# Patient Record
Sex: MALE | Race: WHITE | NOT HISPANIC OR LATINO | URBAN - METROPOLITAN AREA
[De-identification: names, ages, dates, MRNs, and addresses within clinical notes are randomized per-mention and may not be internally consistent; named-entity substitution may affect disease eponyms.]

---

## 2017-08-15 ENCOUNTER — OUTPATIENT (OUTPATIENT)
Dept: OUTPATIENT SERVICES | Facility: HOSPITAL | Age: 67
LOS: 1 days | Discharge: HOME | End: 2017-08-15

## 2017-08-15 ENCOUNTER — APPOINTMENT (OUTPATIENT)
Age: 67
End: 2017-08-15
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
## 2017-08-15 DIAGNOSIS — S81.802A UNSPECIFIED OPEN WOUND, LEFT LOWER LEG, INITIAL ENCOUNTER: ICD-10-CM
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
## 2017-08-15 DIAGNOSIS — L03.90 CELLULITIS, UNSPECIFIED: ICD-10-CM

## 2017-08-15 DIAGNOSIS — S80.10XA CONTUSION OF UNSPECIFIED LOWER LEG, INITIAL ENCOUNTER: ICD-10-CM

## 2017-08-31 ENCOUNTER — OUTPATIENT (OUTPATIENT)
Dept: OUTPATIENT SERVICES | Facility: HOSPITAL | Age: 67
LOS: 1 days | Discharge: HOME | End: 2017-08-31

## 2017-08-31 ENCOUNTER — APPOINTMENT (OUTPATIENT)
Age: 67
End: 2017-08-31

## 2017-08-31 DIAGNOSIS — L03.90 CELLULITIS, UNSPECIFIED: ICD-10-CM

## 2017-08-31 DIAGNOSIS — S80.10XA CONTUSION OF UNSPECIFIED LOWER LEG, INITIAL ENCOUNTER: ICD-10-CM

## 2017-08-31 DIAGNOSIS — S81.802A UNSPECIFIED OPEN WOUND, LEFT LOWER LEG, INITIAL ENCOUNTER: ICD-10-CM
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
## 2017-09-13 DIAGNOSIS — S81.802A UNSPECIFIED OPEN WOUND, LEFT LOWER LEG, INITIAL ENCOUNTER: ICD-10-CM

## 2017-12-13 ENCOUNTER — APPOINTMENT (OUTPATIENT)
Dept: UROLOGY | Facility: CLINIC | Age: 67
End: 2017-12-13
Payer: MEDICARE

## 2017-12-13 VITALS
SYSTOLIC BLOOD PRESSURE: 129 MMHG | HEIGHT: 67 IN | HEART RATE: 70 BPM | BODY MASS INDEX: 45.52 KG/M2 | WEIGHT: 290 LBS | DIASTOLIC BLOOD PRESSURE: 71 MMHG

## 2017-12-13 DIAGNOSIS — N45.1 EPIDIDYMITIS: ICD-10-CM

## 2017-12-13 DIAGNOSIS — Z82.49 FAMILY HISTORY OF ISCHEMIC HEART DISEASE AND OTHER DISEASES OF THE CIRCULATORY SYSTEM: ICD-10-CM

## 2017-12-13 LAB
BILIRUB UR QL STRIP: NORMAL
CLARITY UR: CLEAR
COLLECTION METHOD: NORMAL
GLUCOSE UR-MCNC: NORMAL
HCG UR QL: NORMAL EU/DL
HGB UR QL STRIP.AUTO: NORMAL
KETONES UR-MCNC: NORMAL
LEUKOCYTE ESTERASE UR QL STRIP: NORMAL
NITRITE UR QL STRIP: NORMAL
PH UR STRIP: 8
PROT UR STRIP-MCNC: NORMAL
SP GR UR STRIP: 1.01

## 2017-12-13 PROCEDURE — 99214 OFFICE O/P EST MOD 30 MIN: CPT | Mod: 25

## 2017-12-13 PROCEDURE — 81003 URINALYSIS AUTO W/O SCOPE: CPT | Mod: QW

## 2018-06-12 ENCOUNTER — APPOINTMENT (OUTPATIENT)
Dept: BURN CARE | Facility: CLINIC | Age: 68
End: 2018-06-12

## 2018-12-19 ENCOUNTER — APPOINTMENT (OUTPATIENT)
Dept: UROLOGY | Facility: CLINIC | Age: 68
End: 2018-12-19
Payer: MEDICARE

## 2018-12-19 VITALS
HEART RATE: 60 BPM | WEIGHT: 290 LBS | SYSTOLIC BLOOD PRESSURE: 134 MMHG | BODY MASS INDEX: 45.52 KG/M2 | HEIGHT: 67 IN | DIASTOLIC BLOOD PRESSURE: 64 MMHG

## 2018-12-19 DIAGNOSIS — N20.1 CALCULUS OF URETER: ICD-10-CM

## 2018-12-19 PROCEDURE — 99213 OFFICE O/P EST LOW 20 MIN: CPT

## 2018-12-26 ENCOUNTER — OTHER (OUTPATIENT)
Age: 68
End: 2018-12-26

## 2019-12-19 ENCOUNTER — RESULT CHARGE (OUTPATIENT)
Age: 69
End: 2019-12-19

## 2019-12-19 ENCOUNTER — APPOINTMENT (OUTPATIENT)
Dept: UROLOGY | Facility: CLINIC | Age: 69
End: 2019-12-19
Payer: MEDICARE

## 2019-12-19 VITALS — HEIGHT: 67 IN | BODY MASS INDEX: 45.52 KG/M2 | WEIGHT: 290 LBS

## 2019-12-19 PROCEDURE — 99214 OFFICE O/P EST MOD 30 MIN: CPT

## 2020-12-22 ENCOUNTER — APPOINTMENT (OUTPATIENT)
Dept: UROLOGY | Facility: CLINIC | Age: 70
End: 2020-12-22
Payer: MEDICARE

## 2020-12-22 VITALS — WEIGHT: 290 LBS | BODY MASS INDEX: 45.52 KG/M2 | TEMPERATURE: 97.1 F | HEIGHT: 67 IN

## 2020-12-22 LAB
BILIRUB UR QL STRIP: NORMAL
CLARITY UR: CLEAR
COLLECTION METHOD: NORMAL
GLUCOSE UR-MCNC: NORMAL
HCG UR QL: NORMAL EU/DL
HGB UR QL STRIP.AUTO: 10
KETONES UR-MCNC: NORMAL
LEUKOCYTE ESTERASE UR QL STRIP: NORMAL
NITRITE UR QL STRIP: NORMAL
PH UR STRIP: 7
PROT UR STRIP-MCNC: NORMAL
SP GR UR STRIP: 1.01

## 2020-12-22 PROCEDURE — 99072 ADDL SUPL MATRL&STAF TM PHE: CPT

## 2020-12-22 PROCEDURE — 99214 OFFICE O/P EST MOD 30 MIN: CPT

## 2020-12-22 PROCEDURE — 81003 URINALYSIS AUTO W/O SCOPE: CPT | Mod: QW

## 2021-01-27 ENCOUNTER — NON-APPOINTMENT (OUTPATIENT)
Age: 71
End: 2021-01-27

## 2021-01-28 ENCOUNTER — APPOINTMENT (OUTPATIENT)
Dept: VASCULAR SURGERY | Facility: CLINIC | Age: 71
End: 2021-01-28
Payer: MEDICARE

## 2021-01-28 VITALS
TEMPERATURE: 96.7 F | HEIGHT: 67 IN | HEART RATE: 73 BPM | BODY MASS INDEX: 47.09 KG/M2 | WEIGHT: 300 LBS | DIASTOLIC BLOOD PRESSURE: 72 MMHG | SYSTOLIC BLOOD PRESSURE: 151 MMHG

## 2021-01-28 PROCEDURE — 99204 OFFICE O/P NEW MOD 45 MIN: CPT | Mod: 25

## 2021-01-28 PROCEDURE — 93970 EXTREMITY STUDY: CPT

## 2021-01-28 PROCEDURE — 99072 ADDL SUPL MATRL&STAF TM PHE: CPT

## 2021-01-28 PROCEDURE — 29580 STRAPPING UNNA BOOT: CPT | Mod: 50

## 2021-01-28 NOTE — ASSESSMENT
[FreeTextEntry1] : 69 y/o male with venous insufficiency and venous ulcerations to bilateral lower extremities.\par \par No evidence of DVT in the lower extremities bilaterally.\par \par I am initiating unna boot therapy to bilateral lower extremities to treat the ulcerations and improve the swelling. I will see him back in one weeks time.

## 2021-01-28 NOTE — DATA REVIEWED
[FreeTextEntry1] : I performed a venous duplex which was medically necessary to evaluate for DVT and GSV reflux. It showed no evidence of DVT in the lower extremities bilaterally.

## 2021-01-28 NOTE — HISTORY OF PRESENT ILLNESS
[FreeTextEntry1] : 69 y/o gentleman presents for evaluation of bilateral leg swelling, discoloration and weeping wounds for the past few months, denies any h/o DVT.

## 2021-02-04 ENCOUNTER — APPOINTMENT (OUTPATIENT)
Dept: VASCULAR SURGERY | Facility: CLINIC | Age: 71
End: 2021-02-04
Payer: MEDICARE

## 2021-02-04 PROCEDURE — 29580 STRAPPING UNNA BOOT: CPT | Mod: 50

## 2021-02-04 PROCEDURE — 99072 ADDL SUPL MATRL&STAF TM PHE: CPT

## 2021-02-04 RX ORDER — BUMETANIDE 1 MG/1
1 TABLET ORAL
Refills: 0 | Status: ACTIVE | COMMUNITY

## 2021-02-04 RX ORDER — INDOMETHACIN 50 MG/1
50 CAPSULE ORAL
Refills: 0 | Status: ACTIVE | COMMUNITY

## 2021-02-04 RX ORDER — ATENOLOL 25 MG/1
25 TABLET ORAL
Refills: 0 | Status: ACTIVE | COMMUNITY

## 2021-02-04 NOTE — ASSESSMENT
[FreeTextEntry1] : 69 y/o male with venous insufficiency and venous ulcerations to bilateral lower extremities.\par \par I recommend to continue with unna boot therapy to bilateral lower extremities until the wounds are healed. I will see him back in one weeks time. \par \par

## 2021-02-04 NOTE — HISTORY OF PRESENT ILLNESS
[FreeTextEntry1] : 71 y/o gentleman with venous insufficiency, ulcerations on unna boot therapy bilaterally, tolerated the unna boot, presents for followup.

## 2021-02-11 ENCOUNTER — APPOINTMENT (OUTPATIENT)
Dept: VASCULAR SURGERY | Facility: CLINIC | Age: 71
End: 2021-02-11
Payer: MEDICARE

## 2021-02-11 PROCEDURE — 29580 STRAPPING UNNA BOOT: CPT | Mod: 50

## 2021-02-11 PROCEDURE — 99072 ADDL SUPL MATRL&STAF TM PHE: CPT

## 2021-02-11 NOTE — HISTORY OF PRESENT ILLNESS
[FreeTextEntry1] : 69 y/o gentleman with venous insufficiency, ulcerations on unna boot therapy bilaterally, tolerated the unna boot, presents for followup.

## 2021-02-18 ENCOUNTER — APPOINTMENT (OUTPATIENT)
Dept: VASCULAR SURGERY | Facility: CLINIC | Age: 71
End: 2021-02-18
Payer: MEDICARE

## 2021-02-18 PROCEDURE — 29580 STRAPPING UNNA BOOT: CPT | Mod: 50

## 2021-02-18 PROCEDURE — 99072 ADDL SUPL MATRL&STAF TM PHE: CPT

## 2021-02-18 RX ORDER — SILVER SULFADIAZINE 10 MG/G
1 CREAM TOPICAL DAILY
Qty: 1 | Refills: 1 | Status: ACTIVE | COMMUNITY
Start: 2021-02-18 | End: 1900-01-01

## 2021-02-18 NOTE — ASSESSMENT
[FreeTextEntry1] : 69 y/o male with venous insufficiency and venous ulcerations to bilateral lower extremities.\par \par I recommend to continue with unna boot therapy to bilateral lower extremities until the wounds are healed. I am referring him to [facility] for weekly unna boot.\par \par

## 2021-02-25 ENCOUNTER — APPOINTMENT (OUTPATIENT)
Dept: VASCULAR SURGERY | Facility: CLINIC | Age: 71
End: 2021-02-25
Payer: MEDICARE

## 2021-02-25 PROCEDURE — 29580 STRAPPING UNNA BOOT: CPT | Mod: 50

## 2021-02-25 PROCEDURE — 99072 ADDL SUPL MATRL&STAF TM PHE: CPT

## 2021-03-25 ENCOUNTER — APPOINTMENT (OUTPATIENT)
Dept: VASCULAR SURGERY | Facility: CLINIC | Age: 71
End: 2021-03-25
Payer: MEDICARE

## 2021-03-25 VITALS
HEIGHT: 67 IN | SYSTOLIC BLOOD PRESSURE: 155 MMHG | WEIGHT: 290 LBS | TEMPERATURE: 96.6 F | BODY MASS INDEX: 45.52 KG/M2 | DIASTOLIC BLOOD PRESSURE: 76 MMHG | HEART RATE: 78 BPM

## 2021-03-25 DIAGNOSIS — I83.029 VARICOSE VEINS OF RIGHT LOWER EXTREMITY WITH ULCER OF UNSPECIFIED SITE: ICD-10-CM

## 2021-03-25 DIAGNOSIS — L97.919 VARICOSE VEINS OF RIGHT LOWER EXTREMITY WITH ULCER OF UNSPECIFIED SITE: ICD-10-CM

## 2021-03-25 DIAGNOSIS — I83.019 VARICOSE VEINS OF RIGHT LOWER EXTREMITY WITH ULCER OF UNSPECIFIED SITE: ICD-10-CM

## 2021-03-25 DIAGNOSIS — L97.929 VARICOSE VEINS OF RIGHT LOWER EXTREMITY WITH ULCER OF UNSPECIFIED SITE: ICD-10-CM

## 2021-03-25 PROCEDURE — 99212 OFFICE O/P EST SF 10 MIN: CPT

## 2021-03-25 PROCEDURE — 99072 ADDL SUPL MATRL&STAF TM PHE: CPT

## 2021-03-25 NOTE — ASSESSMENT
[FreeTextEntry1] : 69 y/o male with venous insufficiency and venous ulcerations to bilateral lower extremities that have now healed on unna boot therapy. \par \par He was advised on use of emollient daily and compression stockings. He can follow up as needed.\par

## 2021-07-08 ENCOUNTER — TRANSCRIPTION ENCOUNTER (OUTPATIENT)
Age: 71
End: 2021-07-08

## 2021-11-09 ENCOUNTER — APPOINTMENT (OUTPATIENT)
Dept: VASCULAR SURGERY | Facility: CLINIC | Age: 71
End: 2021-11-09
Payer: MEDICARE

## 2021-11-09 PROCEDURE — 29580 STRAPPING UNNA BOOT: CPT | Mod: LT

## 2021-11-09 NOTE — HISTORY OF PRESENT ILLNESS
[FreeTextEntry1] : 72 y/o gentleman with venous insufficiency, presents for ulcerations on left lower extremity, cannot tolerate compression stockings.\par

## 2021-11-09 NOTE — ASSESSMENT
[FreeTextEntry1] : 72 y/o gentleman with venous insufficiency and left leg ulcerations for the past few months.\par \par I am applying unna boot to treat the ulcerations and will refer him to obtain Lymphapress pumps to be used. He will follow up in one weeks time.

## 2021-11-16 ENCOUNTER — APPOINTMENT (OUTPATIENT)
Dept: VASCULAR SURGERY | Facility: CLINIC | Age: 71
End: 2021-11-16
Payer: MEDICARE

## 2021-11-16 PROCEDURE — 29580 STRAPPING UNNA BOOT: CPT | Mod: LT

## 2021-11-16 RX ORDER — SILVER SULFADIAZINE 10 MG/G
1 CREAM TOPICAL DAILY
Qty: 1 | Refills: 1 | Status: ACTIVE | COMMUNITY
Start: 2021-11-16 | End: 1900-01-01

## 2021-11-16 NOTE — HISTORY OF PRESENT ILLNESS
[FreeTextEntry1] : 70 y/o gentleman with venous insufficiency, presents for ulcerations on left lower extremity, improving on unna boot.\par

## 2021-11-23 ENCOUNTER — APPOINTMENT (OUTPATIENT)
Dept: VASCULAR SURGERY | Facility: CLINIC | Age: 71
End: 2021-11-23
Payer: MEDICARE

## 2021-11-23 PROCEDURE — 29580 STRAPPING UNNA BOOT: CPT | Mod: LT

## 2021-11-30 ENCOUNTER — APPOINTMENT (OUTPATIENT)
Dept: VASCULAR SURGERY | Facility: CLINIC | Age: 71
End: 2021-11-30
Payer: MEDICARE

## 2021-11-30 PROCEDURE — 29580 STRAPPING UNNA BOOT: CPT | Mod: LT

## 2021-11-30 NOTE — ASSESSMENT
[FreeTextEntry1] : 72 y/o gentleman with venous insufficiency and left leg ulcerations ,now almost healed, will need one more week of unna boot.\par \par I am applying unna boot to treat the ulcerations and advised him to continue with use of Lymphapress pumps twice a day. He will follow up in one weeks time.

## 2021-11-30 NOTE — HISTORY OF PRESENT ILLNESS
[FreeTextEntry1] : 72 y/o gentleman with venous insufficiency, presents for ulcerations on left lower extremity, improving on unna boot.\par

## 2021-12-07 ENCOUNTER — APPOINTMENT (OUTPATIENT)
Dept: VASCULAR SURGERY | Facility: CLINIC | Age: 71
End: 2021-12-07
Payer: MEDICARE

## 2021-12-07 DIAGNOSIS — L97.929 VARICOSE VEINS OF LEFT LOWER EXTREMITY WITH ULCER OF UNSPECIFIED SITE: ICD-10-CM

## 2021-12-07 DIAGNOSIS — I83.029 VARICOSE VEINS OF LEFT LOWER EXTREMITY WITH ULCER OF UNSPECIFIED SITE: ICD-10-CM

## 2021-12-07 PROCEDURE — 99213 OFFICE O/P EST LOW 20 MIN: CPT

## 2021-12-07 NOTE — ASSESSMENT
[FreeTextEntry1] : 70 y/o gentleman with venous insufficiency and left leg ulcerations ,now healed on unna boot.\par \par I advised him to continue with use of Lymphapress pumps twice a day. \par \par He was advised follow up as needed.

## 2021-12-21 ENCOUNTER — APPOINTMENT (OUTPATIENT)
Dept: UROLOGY | Facility: CLINIC | Age: 71
End: 2021-12-21
Payer: MEDICARE

## 2021-12-21 PROCEDURE — 99213 OFFICE O/P EST LOW 20 MIN: CPT

## 2022-05-31 ENCOUNTER — APPOINTMENT (OUTPATIENT)
Dept: VASCULAR SURGERY | Facility: CLINIC | Age: 72
End: 2022-05-31
Payer: MEDICARE

## 2022-05-31 DIAGNOSIS — M79.89 OTHER SPECIFIED SOFT TISSUE DISORDERS: ICD-10-CM

## 2022-05-31 DIAGNOSIS — I87.2 VENOUS INSUFFICIENCY (CHRONIC) (PERIPHERAL): ICD-10-CM

## 2022-05-31 PROCEDURE — 29580 STRAPPING UNNA BOOT: CPT | Mod: 50

## 2022-05-31 NOTE — ASSESSMENT
[FreeTextEntry1] : 72 y/o gentleman with venous insufficiency and left leg ulcerations\par \par I am applying unna boot to bilateral lower extremities.\par \par I advised him to continue with use of Lymphapress pumps twice a day. \par \par He was advised follow up as needed.

## 2022-12-21 ENCOUNTER — APPOINTMENT (OUTPATIENT)
Dept: UROLOGY | Facility: CLINIC | Age: 72
End: 2022-12-21

## 2022-12-21 VITALS
TEMPERATURE: 98 F | RESPIRATION RATE: 15 BRPM | SYSTOLIC BLOOD PRESSURE: 150 MMHG | BODY MASS INDEX: 45.52 KG/M2 | HEIGHT: 67 IN | DIASTOLIC BLOOD PRESSURE: 80 MMHG | WEIGHT: 290 LBS | HEART RATE: 80 BPM | OXYGEN SATURATION: 99 %

## 2022-12-21 LAB
BILIRUB UR QL STRIP: NORMAL
COLLECTION METHOD: NORMAL
GLUCOSE UR-MCNC: NORMAL
HCG UR QL: 0.2 EU/DL
HGB UR QL STRIP.AUTO: NORMAL
KETONES UR-MCNC: NORMAL
LEUKOCYTE ESTERASE UR QL STRIP: NORMAL
NITRITE UR QL STRIP: NORMAL
PH UR STRIP: 7.5
PROT UR STRIP-MCNC: NORMAL
SP GR UR STRIP: 1.01

## 2022-12-21 PROCEDURE — 99214 OFFICE O/P EST MOD 30 MIN: CPT | Mod: 25

## 2022-12-21 PROCEDURE — 81003 URINALYSIS AUTO W/O SCOPE: CPT | Mod: QW

## 2023-11-03 ENCOUNTER — NON-APPOINTMENT (OUTPATIENT)
Age: 73
End: 2023-11-03

## 2023-12-21 ENCOUNTER — APPOINTMENT (OUTPATIENT)
Dept: UROLOGY | Facility: CLINIC | Age: 73
End: 2023-12-21

## 2024-02-21 ENCOUNTER — APPOINTMENT (OUTPATIENT)
Dept: UROLOGY | Facility: CLINIC | Age: 74
End: 2024-02-21
Payer: MEDICARE

## 2024-02-21 DIAGNOSIS — R39.9 UNSPECIFIED SYMPTOMS AND SIGNS INVOLVING THE GENITOURINARY SYSTEM: ICD-10-CM

## 2024-02-21 DIAGNOSIS — N20.0 CALCULUS OF KIDNEY: ICD-10-CM

## 2024-02-21 DIAGNOSIS — N40.1 BENIGN PROSTATIC HYPERPLASIA WITH LOWER URINARY TRACT SYMPMS: ICD-10-CM

## 2024-02-21 DIAGNOSIS — N13.8 BENIGN PROSTATIC HYPERPLASIA WITH LOWER URINARY TRACT SYMPMS: ICD-10-CM

## 2024-02-21 PROCEDURE — 99213 OFFICE O/P EST LOW 20 MIN: CPT

## 2024-02-21 PROCEDURE — G2211 COMPLEX E/M VISIT ADD ON: CPT

## 2024-02-21 NOTE — PHYSICAL EXAM
[General Appearance - Well Developed] : well developed [General Appearance - Well Nourished] : well nourished [Normal Appearance] : normal appearance [Well Groomed] : well groomed [Abdomen Soft] : soft [Costovertebral Angle Tenderness] : no ~M costovertebral angle tenderness [Penis Abnormality] : normal circumcised penis [Scrotum] : the scrotum was normal [Testes Tenderness] : no tenderness of the testes [Testes Mass (___cm)] : there were no testicular masses [Prostate Tenderness] : the prostate was not tender [No Prostate Nodules] : no prostate nodules [Prostate Size ___ gm] : prostate size [unfilled] gm [Exam Deferred] : was deferred [Skin Color & Pigmentation] : normal skin color and pigmentation [Skin Lesions] : no skin lesions [Edema] : no peripheral edema [] : no respiratory distress [Oriented To Time, Place, And Person] : oriented to person, place, and time [Not Anxious] : not anxious [Normal Station and Gait] : the gait and station were normal for the patient's age [No Focal Deficits] : no focal deficits [Motor Exam] : the motor exam was normal [No Palpable Adenopathy] : no palpable adenopathy

## 2024-02-21 NOTE — HISTORY OF PRESENT ILLNESS
[Urinary Frequency] : urinary frequency [Nocturia] : nocturia [Post-Void Dribbling] : post-void dribbling [None] : None

## 2025-04-04 ENCOUNTER — APPOINTMENT (EMERGENCY)
Dept: RADIOLOGY | Facility: HOSPITAL | Age: 75
End: 2025-04-04
Payer: COMMERCIAL

## 2025-04-04 ENCOUNTER — HOSPITAL ENCOUNTER (INPATIENT)
Facility: HOSPITAL | Age: 75
LOS: 2 days | Discharge: HOME HEALTH | End: 2025-04-07
Attending: EMERGENCY MEDICINE | Admitting: STUDENT IN AN ORGANIZED HEALTH CARE EDUCATION/TRAINING PROGRAM
Payer: COMMERCIAL

## 2025-04-04 ENCOUNTER — APPOINTMENT (EMERGENCY)
Dept: CT IMAGING | Facility: HOSPITAL | Age: 75
End: 2025-04-04
Payer: COMMERCIAL

## 2025-04-04 DIAGNOSIS — R29.6 UNWITNESSED FALL: ICD-10-CM
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
## 2025-04-04 DIAGNOSIS — S09.90XA CLOSED HEAD INJURY, INITIAL ENCOUNTER: ICD-10-CM

## 2025-04-04 DIAGNOSIS — R56.9 SEIZURE-LIKE ACTIVITY (HCC): ICD-10-CM

## 2025-04-04 DIAGNOSIS — R55 SYNCOPE: ICD-10-CM

## 2025-04-04 DIAGNOSIS — D69.6 THROMBOCYTOPENIA (HCC): ICD-10-CM

## 2025-04-04 LAB
BASOPHILS # BLD AUTO: 0.09 THOUSANDS/ÂΜL (ref 0–0.1)
BASOPHILS NFR BLD AUTO: 1 % (ref 0–1)
EOSINOPHIL # BLD AUTO: 0.19 THOUSAND/ÂΜL (ref 0–0.61)
EOSINOPHIL NFR BLD AUTO: 2 % (ref 0–6)
ERYTHROCYTE [DISTWIDTH] IN BLOOD BY AUTOMATED COUNT: 15.1 % (ref 11.6–15.1)
GLUCOSE SERPL-MCNC: 159 MG/DL (ref 65–140)
HCT VFR BLD AUTO: 38.8 % (ref 36.5–49.3)
HGB BLD-MCNC: 12.5 G/DL (ref 12–17)
IMM GRANULOCYTES # BLD AUTO: 0.11 THOUSAND/UL (ref 0–0.2)
IMM GRANULOCYTES NFR BLD AUTO: 1 % (ref 0–2)
LYMPHOCYTES # BLD AUTO: 1.59 THOUSANDS/ÂΜL (ref 0.6–4.47)
LYMPHOCYTES NFR BLD AUTO: 13 % (ref 14–44)
MCH RBC QN AUTO: 30.1 PG (ref 26.8–34.3)
MCHC RBC AUTO-ENTMCNC: 32.2 G/DL (ref 31.4–37.4)
MCV RBC AUTO: 94 FL (ref 82–98)
MONOCYTES # BLD AUTO: 0.98 THOUSAND/ÂΜL (ref 0.17–1.22)
MONOCYTES NFR BLD AUTO: 8 % (ref 4–12)
NEUTROPHILS # BLD AUTO: 9.52 THOUSANDS/ÂΜL (ref 1.85–7.62)
NEUTS SEG NFR BLD AUTO: 75 % (ref 43–75)
NRBC BLD AUTO-RTO: 0 /100 WBCS
PLATELET # BLD AUTO: 123 THOUSANDS/UL (ref 149–390)
PMV BLD AUTO: 12.8 FL (ref 8.9–12.7)
RBC # BLD AUTO: 4.15 MILLION/UL (ref 3.88–5.62)
WBC # BLD AUTO: 12.48 THOUSAND/UL (ref 4.31–10.16)

## 2025-04-04 PROCEDURE — 71045 X-RAY EXAM CHEST 1 VIEW: CPT

## 2025-04-04 PROCEDURE — 36415 COLL VENOUS BLD VENIPUNCTURE: CPT

## 2025-04-04 PROCEDURE — 99285 EMERGENCY DEPT VISIT HI MDM: CPT

## 2025-04-04 PROCEDURE — 99285 EMERGENCY DEPT VISIT HI MDM: CPT | Performed by: EMERGENCY MEDICINE

## 2025-04-04 PROCEDURE — 96365 THER/PROPH/DIAG IV INF INIT: CPT

## 2025-04-04 PROCEDURE — 73502 X-RAY EXAM HIP UNI 2-3 VIEWS: CPT

## 2025-04-04 PROCEDURE — 93005 ELECTROCARDIOGRAM TRACING: CPT

## 2025-04-04 PROCEDURE — 70450 CT HEAD/BRAIN W/O DYE: CPT

## 2025-04-04 PROCEDURE — 72125 CT NECK SPINE W/O DYE: CPT

## 2025-04-04 PROCEDURE — 82948 REAGENT STRIP/BLOOD GLUCOSE: CPT

## 2025-04-04 PROCEDURE — 85025 COMPLETE CBC W/AUTO DIFF WBC: CPT

## 2025-04-04 PROCEDURE — 80053 COMPREHEN METABOLIC PANEL: CPT

## 2025-04-04 RX ORDER — ACETAMINOPHEN 10 MG/ML
1000 INJECTION, SOLUTION INTRAVENOUS ONCE
Status: COMPLETED | OUTPATIENT
Start: 2025-04-04 | End: 2025-04-05

## 2025-04-04 RX ORDER — ACETAMINOPHEN 325 MG/1
975 TABLET ORAL ONCE
Status: DISCONTINUED | OUTPATIENT
Start: 2025-04-04 | End: 2025-04-04

## 2025-04-04 RX ADMIN — ACETAMINOPHEN 1000 MG: 10 INJECTION INTRAVENOUS at 23:49

## 2025-04-05 ENCOUNTER — APPOINTMENT (INPATIENT)
Dept: CT IMAGING | Facility: HOSPITAL | Age: 75
End: 2025-04-05
Payer: COMMERCIAL

## 2025-04-05 PROBLEM — R55 SYNCOPE: Status: ACTIVE | Noted: 2025-04-05

## 2025-04-05 PROBLEM — Z87.442 HISTORY OF KIDNEY STONES: Status: RESOLVED | Noted: 2025-04-05 | Resolved: 2025-04-05

## 2025-04-05 PROBLEM — I10 HYPERTENSION: Status: ACTIVE | Noted: 2025-04-05

## 2025-04-05 PROBLEM — R29.6 UNWITNESSED FALL: Status: ACTIVE | Noted: 2025-04-05

## 2025-04-05 PROBLEM — E11.9 DM (DIABETES MELLITUS) (HCC): Status: ACTIVE | Noted: 2025-04-05

## 2025-04-05 PROBLEM — Z87.442 HISTORY OF KIDNEY STONES: Status: ACTIVE | Noted: 2025-04-05

## 2025-04-05 LAB
ALBUMIN SERPL BCG-MCNC: 3.8 G/DL (ref 3.5–5)
ALP SERPL-CCNC: 80 U/L (ref 34–104)
ALT SERPL W P-5'-P-CCNC: 18 U/L (ref 7–52)
ANION GAP SERPL CALCULATED.3IONS-SCNC: 8 MMOL/L (ref 4–13)
ANION GAP SERPL CALCULATED.3IONS-SCNC: 9 MMOL/L (ref 4–13)
AST SERPL W P-5'-P-CCNC: 23 U/L (ref 13–39)
BASOPHILS # BLD AUTO: 0.05 THOUSANDS/ÂΜL (ref 0–0.1)
BASOPHILS NFR BLD AUTO: 0 % (ref 0–1)
BILIRUB SERPL-MCNC: 0.57 MG/DL (ref 0.2–1)
BILIRUB UR QL STRIP: NEGATIVE
BUN SERPL-MCNC: 16 MG/DL (ref 5–25)
BUN SERPL-MCNC: 16 MG/DL (ref 5–25)
CALCIUM SERPL-MCNC: 8.9 MG/DL (ref 8.4–10.2)
CALCIUM SERPL-MCNC: 9.2 MG/DL (ref 8.4–10.2)
CHLORIDE SERPL-SCNC: 97 MMOL/L (ref 96–108)
CHLORIDE SERPL-SCNC: 98 MMOL/L (ref 96–108)
CLARITY UR: CLEAR
CO2 SERPL-SCNC: 26 MMOL/L (ref 21–32)
CO2 SERPL-SCNC: 27 MMOL/L (ref 21–32)
COLOR UR: NORMAL
CREAT SERPL-MCNC: 0.76 MG/DL (ref 0.6–1.3)
CREAT SERPL-MCNC: 0.84 MG/DL (ref 0.6–1.3)
EOSINOPHIL # BLD AUTO: 0.02 THOUSAND/ÂΜL (ref 0–0.61)
EOSINOPHIL NFR BLD AUTO: 0 % (ref 0–6)
ERYTHROCYTE [DISTWIDTH] IN BLOOD BY AUTOMATED COUNT: 14.9 % (ref 11.6–15.1)
EST. AVERAGE GLUCOSE BLD GHB EST-MCNC: 137 MG/DL
GFR SERPL CREATININE-BSD FRML MDRD: 86 ML/MIN/1.73SQ M
GFR SERPL CREATININE-BSD FRML MDRD: 89 ML/MIN/1.73SQ M
GLUCOSE SERPL-MCNC: 107 MG/DL (ref 65–140)
GLUCOSE SERPL-MCNC: 135 MG/DL (ref 65–140)
GLUCOSE SERPL-MCNC: 151 MG/DL (ref 65–140)
GLUCOSE SERPL-MCNC: 156 MG/DL (ref 65–140)
GLUCOSE SERPL-MCNC: 163 MG/DL (ref 65–140)
GLUCOSE SERPL-MCNC: 92 MG/DL (ref 65–140)
GLUCOSE UR STRIP-MCNC: NEGATIVE MG/DL
HBA1C MFR BLD: 6.4 %
HCT VFR BLD AUTO: 36.4 % (ref 36.5–49.3)
HGB BLD-MCNC: 12 G/DL (ref 12–17)
HGB UR QL STRIP.AUTO: NEGATIVE
IMM GRANULOCYTES # BLD AUTO: 0.06 THOUSAND/UL (ref 0–0.2)
IMM GRANULOCYTES NFR BLD AUTO: 1 % (ref 0–2)
KETONES UR STRIP-MCNC: NEGATIVE MG/DL
LACTATE SERPL-SCNC: 2 MMOL/L (ref 0.5–2)
LEUKOCYTE ESTERASE UR QL STRIP: NEGATIVE
LYMPHOCYTES # BLD AUTO: 0.93 THOUSANDS/ÂΜL (ref 0.6–4.47)
LYMPHOCYTES NFR BLD AUTO: 8 % (ref 14–44)
MCH RBC QN AUTO: 30.5 PG (ref 26.8–34.3)
MCHC RBC AUTO-ENTMCNC: 33 G/DL (ref 31.4–37.4)
MCV RBC AUTO: 93 FL (ref 82–98)
MONOCYTES # BLD AUTO: 0.79 THOUSAND/ÂΜL (ref 0.17–1.22)
MONOCYTES NFR BLD AUTO: 7 % (ref 4–12)
NEUTROPHILS # BLD AUTO: 9.37 THOUSANDS/ÂΜL (ref 1.85–7.62)
NEUTS SEG NFR BLD AUTO: 84 % (ref 43–75)
NITRITE UR QL STRIP: NEGATIVE
NRBC BLD AUTO-RTO: 0 /100 WBCS
PH UR STRIP.AUTO: 7.5 [PH]
PLATELET # BLD AUTO: 113 THOUSANDS/UL (ref 149–390)
PMV BLD AUTO: 13.9 FL (ref 8.9–12.7)
POTASSIUM SERPL-SCNC: 3.9 MMOL/L (ref 3.5–5.3)
POTASSIUM SERPL-SCNC: 4.2 MMOL/L (ref 3.5–5.3)
PROT SERPL-MCNC: 7.5 G/DL (ref 6.4–8.4)
PROT UR STRIP-MCNC: NEGATIVE MG/DL
RBC # BLD AUTO: 3.93 MILLION/UL (ref 3.88–5.62)
SODIUM SERPL-SCNC: 132 MMOL/L (ref 135–147)
SODIUM SERPL-SCNC: 133 MMOL/L (ref 135–147)
SP GR UR STRIP.AUTO: 1.02 (ref 1–1.03)
UROBILINOGEN UR STRIP-ACNC: <2 MG/DL
WBC # BLD AUTO: 11.22 THOUSAND/UL (ref 4.31–10.16)

## 2025-04-05 PROCEDURE — 83605 ASSAY OF LACTIC ACID: CPT

## 2025-04-05 PROCEDURE — 81003 URINALYSIS AUTO W/O SCOPE: CPT

## 2025-04-05 PROCEDURE — 82948 REAGENT STRIP/BLOOD GLUCOSE: CPT

## 2025-04-05 PROCEDURE — 80048 BASIC METABOLIC PNL TOTAL CA: CPT

## 2025-04-05 PROCEDURE — 99223 1ST HOSP IP/OBS HIGH 75: CPT | Performed by: INTERNAL MEDICINE

## 2025-04-05 PROCEDURE — 70450 CT HEAD/BRAIN W/O DYE: CPT

## 2025-04-05 PROCEDURE — 83036 HEMOGLOBIN GLYCOSYLATED A1C: CPT

## 2025-04-05 PROCEDURE — 85025 COMPLETE CBC W/AUTO DIFF WBC: CPT

## 2025-04-05 RX ORDER — ACETAMINOPHEN 325 MG/1
650 TABLET ORAL EVERY 6 HOURS PRN
Status: DISCONTINUED | OUTPATIENT
Start: 2025-04-05 | End: 2025-04-07 | Stop reason: HOSPADM

## 2025-04-05 RX ORDER — ATENOLOL 25 MG/1
50 TABLET ORAL DAILY
Status: DISCONTINUED | OUTPATIENT
Start: 2025-04-05 | End: 2025-04-07 | Stop reason: HOSPADM

## 2025-04-05 RX ORDER — ENOXAPARIN SODIUM 100 MG/ML
40 INJECTION SUBCUTANEOUS DAILY
Status: DISCONTINUED | OUTPATIENT
Start: 2025-04-05 | End: 2025-04-07 | Stop reason: HOSPADM

## 2025-04-05 RX ORDER — ALLOPURINOL 300 MG/1
300 TABLET ORAL DAILY
Status: DISCONTINUED | OUTPATIENT
Start: 2025-04-05 | End: 2025-04-07 | Stop reason: HOSPADM

## 2025-04-05 RX ORDER — POTASSIUM CITRATE AND CITRIC ACID MONOHYDRATE 1100; 334 MG/5ML; MG/5ML
5 SOLUTION ORAL
Status: DISCONTINUED | OUTPATIENT
Start: 2025-04-05 | End: 2025-04-05 | Stop reason: SDUPTHER

## 2025-04-05 RX ORDER — INSULIN LISPRO 100 [IU]/ML
1-6 INJECTION, SOLUTION INTRAVENOUS; SUBCUTANEOUS
Status: DISCONTINUED | OUTPATIENT
Start: 2025-04-05 | End: 2025-04-07 | Stop reason: HOSPADM

## 2025-04-05 RX ORDER — CHLORTHALIDONE 25 MG/1
25 TABLET ORAL DAILY
Status: DISCONTINUED | OUTPATIENT
Start: 2025-04-05 | End: 2025-04-07 | Stop reason: HOSPADM

## 2025-04-05 RX ORDER — POTASSIUM CITRATE 1080 MG/1
10 TABLET, EXTENDED RELEASE ORAL
Status: DISCONTINUED | OUTPATIENT
Start: 2025-04-05 | End: 2025-04-07 | Stop reason: HOSPADM

## 2025-04-05 RX ORDER — SODIUM CHLORIDE 9 MG/ML
75 INJECTION, SOLUTION INTRAVENOUS CONTINUOUS
Status: DISCONTINUED | OUTPATIENT
Start: 2025-04-05 | End: 2025-04-05

## 2025-04-05 RX ADMIN — SODIUM CHLORIDE 75 ML/HR: 0.9 INJECTION, SOLUTION INTRAVENOUS at 03:29

## 2025-04-05 RX ADMIN — POTASSIUM CITRATE 10 MEQ: 10 TABLET, EXTENDED RELEASE ORAL at 17:27

## 2025-04-05 RX ADMIN — POTASSIUM CITRATE 10 MEQ: 10 TABLET, EXTENDED RELEASE ORAL at 13:54

## 2025-04-05 RX ADMIN — INSULIN LISPRO 1 UNITS: 100 INJECTION, SOLUTION INTRAVENOUS; SUBCUTANEOUS at 08:22

## 2025-04-05 RX ADMIN — CHLORTHALIDONE 25 MG: 25 TABLET ORAL at 08:21

## 2025-04-05 RX ADMIN — ACETAMINOPHEN 650 MG: 325 TABLET, FILM COATED ORAL at 21:38

## 2025-04-05 RX ADMIN — ATENOLOL 50 MG: 25 TABLET ORAL at 08:21

## 2025-04-05 RX ADMIN — ALLOPURINOL 300 MG: 300 TABLET ORAL at 08:21

## 2025-04-05 NOTE — CONSULTS
Consultation - Neurology   Name: Deonte Attarian 74 y.o. male I MRN: [id number]  Unit/Bed#: S -01 I Date of Admission: 4/4/2025   Date of Service: 4/5/2025 I Hospital Day: 0   Inpatient consult to Neurology  Consult performed by: Jean Paul Partida DO  Consult ordered by: James Messina MD        Physician Requesting Evaluation: Tessa Coronado MD   Reason for Evaluation / Principal Problem: Unwitnessed fall, r/o seizure     Assessment & Plan  Unwitnessed fall  74M presenting via EMS after unwitnessed fall in at home which some heard a loud noise and found patient on ground unconscious with upper extremity shaking. After a few minutes (5-10) patient regained consciousness and was noted to be confused. Pt is amnestic to event. No tongue bite but did loose control of bowels but not bladder. Patient recalls watching Tarzan and then next thing he remembers is waking up in the ambulance.     Does report hitting his head against the side of the door to the bathroom a few weeks ago but reports its was not significant and did not fall or loose consciousness.   Epilepsy Risk Factors: None  Denies prior seizure history     Upon arrival to the ED, patient was noted to be without an focal findings. Initial VS: T 98F, HR 72, RR 20, /72. ED work up revealed EKG NSR, NC CTH showed a small subdural hemorrhage along the R posterior falx measuring up to 4 mm thickness however upon repeat CTH it appears to have spontaneously resolved in short interval follow up, labs with leukocytosis 12.47, thrombocytopenia 123, hyponatremia 133, and normal LA.     Impression: Unwitnessed fall with observed convulsion of the upper extremities with period of confusion following event, concern for possible first time seizure event. DDx include convulsive syncope.     Plan:  Frequent Neuro Checks, notify Neurology team if any acute changes in exam and obtain STAT CTH  Seizure / Fall Precautions  Telemetry Monitoring  AEDs: No  indications to initiate AEDs at this time. Continue to monitor off antiepileptics. Given that this is first event concerning for seizure no role in starting an antiepileptic agent at this time  EEG: Obtain Routine EEG  Imaging Studies: Discussed obtain MRI brain rubio seizure protocol while addmited but patient states that he is claustrophobic and requesting this to be done as an outpatient in an open MRI. This can be performed at that time but request that patient asks for a disc with his imaging to be taken to his outpatient follow up appointment with neurology.   Obtain repeat CTH in the morning to revisiualize the mentioned SDH, as well as r/o stroke   Rescue Meds: Ativan IV 2mg prn 2 complex partial seizures or 1 GTC seizure  PT/OT Evaluate and Treat  [place] DOT: Needs to be completed prior to discharge  Seizure precautions outpatient: no driving, no operating heavy machinery, no swimming alone, no climbing, no baths only showers, no cooking alone, or any activity that would put them at increased risk of harm if they were to have a seizure.   Rest of care per primary medical team   DM (diabetes mellitus) ([facility])  Lab Results   Component Value Date    HGBA1C 6.4 (H) 04/05/2025       Recent Labs     04/04/25  2340 04/05/25  0729 04/05/25  1043 04/05/25  1605   POCGLU 159* 151* 135 92       Blood Sugar Average: Last 72 hrs:  (P) 134.25      Deonte Attarian will need follow up in in 6 weeks with epilepsy attending.     History of Present Illness   74M presenting via EMS after unwitnessed fall in at home which some heard a loud noise and found patient on ground unconscious with upper extremity shaking. After a few minutes (5-10) patient regained consciousness and was noted to be confused. Pt is amnestic to event. No tongue bite but did loose control of bowels but not bladder. Patient recalls watching Tarzan and then next thing he remembers is waking up in the ambulance.   Loss of Consciousness: completely unresponsive  during worst of episode  Preceding/concomitant actions: None  Precipitants: Appear to be precipitated by no precipitation factors noted.   Prodromal features: None reported/identified  Premonitory symptoms: none  Aura: none  Abnormal movements: Jerking and twitching movements were recognized on the bilateral upper extremities  Epileptic Features: incontinence of stool, loss of memory, and amnesia  Post Ictal State: Confusion  Recall: He has no recall of the event.     Does report hitting his head against the side of the door to the bathroom a few weeks ago but reports its was not significant and did not fall or loose consciousness.     Patient has not had prior episodes. He denies recent trauma, head strike, medication changes, recent sickness/sick contacts.    Upon arrival to the ED, patient was noted to be without an focal findings. Initial VS: T 98F, HR 72, RR 20, /72. ED work up revealed EKG NSR, NC CTH showed a small subdural hemorrhage along the R posterior falx measuring up to 4 mm thickness however upon repeat CTH it appears to have spontaneously resolved in short interval follow up, labs with leukocytosis 12.47, thrombocytopenia 123, hyponatremia 133, and normal LA.     Briefly reviewing his history, Deonte Greenfield does not have a history prior seizure and/or epilepsy.   - He has not  been evaluated for this before.  - Current outpatient neurologist: None    Anti-Seizure Medications (ASM's): Patient is not currently on any Anti-Seizure Medications, and has never been previously prescribed any ASMs in the past.    Epilepsy Risk Factors: None  Abnormal Pregnancy: No  Abnormal Birth/: No  Abnormal Development: No  Intellectual Disability: No  Cerebral Palsy: No  Febrile Seizures, Simple: No  Febrile Seizures, Complex: No  CNS Infection: No  Head Trauma (Moderate/Severe): No  CNS Malformation: No  CNS Neoplasm: No  Prior Neurosurgical Procedure(s): No  Prior Stroke: No  History of EtOH Abuse:  No  History of Drug Abuse: No  Family History of Seizures/Epilepsy: No  Prior Physical/Sexual/Emotional Abuse: No    Prior Evaluation:  MRI brain: No Prior  Routine EEG: No Prior  Ambulatory EEG: No Prior  Video EEG: No Prior  PET scan brain : No Prior  Neuropsychologic testing: No PriorFamily History of Seizures/Epilepsy: No    Psychiatric History:  Depression: denies  Anxiety: denies  Psychosis: denies  Psychiatric Admissions: denies      Review of Systems   Constitutional:  Negative for chills, fatigue, fever and unexpected weight change.   HENT:  Negative for drooling, hearing loss, sinus pressure, sinus pain, tinnitus, trouble swallowing and voice change.    Eyes:  Negative for photophobia and visual disturbance.   Respiratory:  Negative for chest tightness and shortness of breath.    Cardiovascular:  Negative for chest pain, palpitations and leg swelling.   Gastrointestinal:  Negative for constipation, diarrhea and nausea.   Endocrine: Negative for cold intolerance.   Genitourinary:  Negative for difficulty urinating.   Musculoskeletal:  Negative for arthralgias, back pain, gait problem, myalgias, neck pain and neck stiffness.   Skin:  Negative for pallor and rash.   Neurological:  Negative for dizziness, tremors, seizures, syncope, facial asymmetry, speech difficulty, weakness, light-headedness, numbness and headaches.   Psychiatric/Behavioral:  Negative for behavioral problems, confusion, decreased concentration and sleep disturbance.         I have reviewed the patient's PMH, PSH, Social History, Family History, Meds, and Allergies  Historical Information   History reviewed. No pertinent past medical history.  Past Surgical History:   Procedure Laterality Date    TOTAL HIP ARTHROPLASTY Bilateral      Social History     Tobacco Use    Smoking status: Former     Types: Cigarettes    Smokeless tobacco: Never   Substance and Sexual Activity    Alcohol use: Not Currently    Drug use: Not on file    Sexual  activity: Not on file     E-Cigarette/Vaping     E-Cigarette/Vaping Substances     History reviewed. No pertinent family history.  Social History     Tobacco Use    Smoking status: Former     Types: Cigarettes    Smokeless tobacco: Never   Substance and Sexual Activity    Alcohol use: Not Currently    Drug use: Not on file    Sexual activity: Not on file       ACTIVE MEDICATIONS   Scheduled PRN   allopurinol, 300 mg, Daily  atenolol, 50 mg, Daily   And  chlorthalidone, 25 mg, Daily  enoxaparin, 40 mg, Daily  insulin lispro, 1-6 Units, TID AC  insulin lispro, 1-6 Units, HS  potassium citrate, 10 mEq, TID With Meals          Continuous          None     Patient has no known allergies.    Objective :  Temp:  [98 °F (36.7 °C)-98.4 °F (36.9 °C)] 98.3 °F (36.8 °C)  HR:  [61-78] 61  BP: ()/(52-91) 151/78  Resp:  [15-20] 15  SpO2:  [93 %-97 %] 96 %  O2 Device: None (Room air)      GENERAL EXAM:  General Appearance: in no apparent distress, well developed and well nourished, non-toxic, and in no respiratory distress and acyanotic  HENT: Normocephalic and atraumatic. Nose and Ears normal.   Neck: Full range of motion with no pain or limitations in flexion, extension, lateral flexion and rotation  Cardiovascular: Distal extremities warm without palpable edema or tenderness, no observed significant swelling.   Pulmonary: Pulmonary effort is normal. Not in respiratory distress  Musculoskeletal: No swelling or deformity.  Skin: Warm and dry  Psychiatric: Normal behavior and appropriate affect     NEUROLOGIC  EXAM:  MENTAL STATUS:   Alertness: alert  Orientation: time, date, person, place, city, president  Speech/Language Normal Rate, Tone, Rhythm, Clear, No Dysarthria , Coherent, Fluent, and Able to identify/Name both Low/High Frequency Objects  Commands: Can follow multistep commands  Current and Remote Memory:intact  Affect: normal  Thought content exhibits logical connections    CRANIAL NERVES:  I Not tested   II Visual  Fields normal   II, Ill,  IV, VI Pupils equal, round, reactive to light and accommodation  EOM full and intact   V facial sensation was normal and symmetrical   VII facial symmetry equal   VIII Normal hearing to speech   IX, X Normal Palatal Elevation, No Uvular Deviation   XI Shoulder shrug and head turn is normal   XII Midline tongue protrusion      MOTOR:   Pronator Drift - Absent  Atrophy - No atrophy or muscle wasting  Normal Tone  No Involuntary Movements  No Tremors Appreciated  ARM RIGHT  LEFT LEG RIGHT  LEFT   Deltoid 5/5 5/5 lliopsoas 5/5 5/5   Biceps 5/5 5/5 Quads 5/5 5/5   Triceps 5/5 5/5 Hamstrings 5/5 5/5   Wrist Extension 5/5 5/5 Ankle Dorsiflexion 5/5 5/5   Wrist Flexion 5/5 5/5 Ankle Plantarflexion 5/5 5/5     REFLEXES:   Reflexes are normal and symmetric  DTR's are 2/4 in all tested locations  Ankle Clonus: Absent  Babinski: Bilateral toes downgoing  Oakley Sign: Absent    SENSORY:  Normal sensation to light touch, pinprick, vibration, temperature, and proprioception in all limbs, romberg negative    COORDINATION:   Fast Alternative Movements:  R - Intact, L - Intact  Finger To Nose:  R - Intact, L - Intact, Heel To Shin: R - Intact, L - Intact  Romberg Test: normal    STATION/GAIT: NT      Lab Results: I have reviewed the following results:  Labs: I have personally reviewed pertinent labs.    CBC:  Results from last 7 days   Lab Units 04/05/25  0425 04/04/25  2345   WBC Thousand/uL 11.22* 12.48*   RBC Million/uL 3.93 4.15   HEMOGLOBIN g/dL 12.0 12.5   HEMATOCRIT % 36.4* 38.8   MCV fL 93 94   MCH pg 30.5 30.1   MCHC g/dL 33.0 32.2   RDW % 14.9 15.1   MPV fL 13.9* 12.8*   PLATELETS Thousands/uL 113* 123*   NRBC AUTO /100 WBCs 0 0   SEGS PCT % 84* 75   LYMPHO PCT % 8* 13*   MONO PCT % 7 8   EOS PCT % 0 2   BASOS PCT % 0 1   TOTAL NEUT ABS Thousands/µL 9.37* 9.52*   LYMPHS ABS Thousands/µL 0.93 1.59   MONOS ABS Thousand/µL 0.79 0.98   EOS ABS Thousand/µL 0.02 0.19   ,   Chemistry Profile:   Results  "from last 7 days   Lab Units 04/05/25  0425 04/04/25  2345   POTASSIUM mmol/L 4.2 3.9   CHLORIDE mmol/L 98 97   CO2 mmol/L 26 27   BUN mg/dL 16 16   CREATININE mg/dL 0.76 0.84   CALCIUM mg/dL 8.9 9.2   AST U/L  --  23   ALT U/L  --  18   ALK PHOS U/L  --  80   EGFR ml/min/1.73sq m 89 86     Results from last 7 days   Lab Units 04/05/25  0425 04/04/25  2345   GLUCOSE RANDOM mg/dL 156* 163*      Recent Labs     04/04/25  2340 04/05/25  0729 04/05/25  1043 04/05/25  1605   POCGLU 159* 151* 135 92     PT/INR: No results found for: \"PT\", \"INR\",   Hemoglobin A1c 6.4 (4/5/2025), LDL No results in last 5 years (No results in last 5 years)    Micro: I have reviewed pertinent results.  Results from last 7 days   Lab Units 04/05/25  0110   LEUKOCYTES UA  Negative   NITRITE UA  Negative   GLUCOSE UA mg/dl Negative   KETONES UA mg/dl Negative   BLOOD UA  Negative     No results found for: \"BLOODCX\", \"URINECX\", \"WOUNDCULT\", \"SPUTUMCULTUR\"    Imaging Results Review: No pertinent imaging studies reviewed.  Other Study Results Review: No additional pertinent studies reviewed.  IMAGING STUDIES    Radiology Results: I have personally reviewed pertinent reports and reviewed films in PACS  XR chest 1 view portable  Result Date: 4/5/2025  Impression: No acute cardiopulmonary disease. No acute displaced fractures. Workstation performed: [id number]     CT head wo contrast  Result Date: 4/5/2025  Impression: Previously identified tiny right parafalcine subdural hematoma has resolved. No new intracranial hemorrhage. Workstation performed: [id number]     CT head without contrast  Result Date: 4/5/2025  Impression: Small subdural hemorrhage along the right posterior falx, measuring up to 4 mm thickness. Close clinical correlation and follow-up recommended. Recommend follow-up head CT now. There is a significant additional finding or different interpretation from the [facility] preliminary report which was provided shortly after " completion of the exam. The finding of subdural hemorrhage may alter immediate patient management, therefore we will now contact referring physicians for direct verbal discussion. I personally discussed this study with SHALINI RUBIO on 4/5/2025 7:29 AM. Workstation performed: [id number]     CT cervical spine without contrast  Result Date: 4/5/2025  Impression: No acute cervical spine fracture or traumatic malalignment. Multilevel degenerative changes are present. Atherosclerosis. The major findings are in agreement with the preliminary report provided by [facility] which was provided shortly after completion of the exam. Workstation performed: [id number]      Other Diagnostic Results: I have personally reviewed pertinent reports  No results found for this or any previous visit.    Encounter Date: 04/04/25   ECG 12 lead   Result Value    Ventricular Rate 74    Atrial Rate 74    WY Interval 168    QRSD Interval 94    QT Interval 372    QTC Interval 412    P Axis 70    QRS Axis 83    T Wave Axis 70        VTE Prophylaxis: VTE covered by:  enoxaparin, Subcutaneous       Jean Paul Partida DO [facility] Neurology Residency, PGY-3

## 2025-04-05 NOTE — ED PROVIDER NOTES
Time reflects when diagnosis was documented in both MDM as applicable and the Disposition within this note       Time User Action Codes Description Comment    4/5/2025  2:22 AM Victor M Bah [W19.XXXA] Fall, initial encounter     4/5/2025  2:22 AM Victor M Bah [S09.90XA] Closed head injury, initial encounter     4/5/2025  2:22 AM Victor M Bah [[id number]] Syncope           ED Disposition       ED Disposition   Admit    Condition   Stable    Date/Time   Sat Apr 5, 2025  2:22 AM    Comment                  Assessment & Plan       Medical Decision Making  74-year-old male history of hypertension presenting after unwitnessed fall with head strike.  Patient states that he does not remember falling or what he was doing at that time.  Per family, son was upstairs and heard a crash, came downstairs and patient was laying on the ground unconscious with some shaking in his upper extremities.  Patient did take a few minutes to regain consciousness at which time patient was confused and did not know what was going on.  Last thing patient remembers is being in the car driving to the hospital.  Wife does state that after this episode patient had soiled himself but is unsure if this happened prior to or after the fall.  No recent medication changes.  Currently patient is alert and oriented, family states he is acting normally at this time.  Patient's current complaint is of right hip pain.  Denies any chest pain, shortness of breath, palpitations, nausea or vomiting, headache, vision changes.  Patient has no history of seizures.    Vitals reviewed, hypertensive but otherwise unremarkable, physical exam does shows small abrasion of the right posterior scalp as well as small half centimeter skin tear of right hand.  No clear tenderness to right hip appreciated.  No neurologic deficits appreciated.  Cranial nerves intact.  Normal coordination and strength and sensation.  EKG shows normal sinus rhythm, 75 bpm.  Due to  unclear cause of fall, will do basic labs for evaluation of electrolyte abnormalities, anemia, dehydration, decreased kidney function.  Will also do x-ray of the right hip, chest and CT of head and neck evaluation of traumatic injuries.    Amount and/or Complexity of Data Reviewed  Labs: ordered. Decision-making details documented in ED Course.  Radiology: ordered and independent interpretation performed. Decision-making details documented in ED Course.    Risk  OTC drugs.  Prescription drug management.  Decision regarding hospitalization.        ED Course as of 04/05/25 0223 Fri Apr 04, 2025   2318 Blood Pressure: 158/72   2318 Temperature: 98 °F (36.7 °C)   2318 Pulse: 72   2318 Respirations: 20   2318 SpO2: 97 %   2344 POC Glucose(!): 159   Sat Apr 05, 2025   0002 WBC(!): 12.48   0002 Platelet Count(!): 123   0042 XR hip/pelv 2-3 vws right  My personal interpretation-no acute osseous abnormalities or dislocations appreciated.  Arthritic changes present.   0151 Workup largely unremarkable at this time other than mild leukocytosis and mild hyponatremia.  UA not suggestive of UTI.  Pending CT radiology read which has been sent to be read at this time for traumatic injuries to head or neck.   0152 XR chest 1 view portable  My personal interpretation-no acute cardiopulmonary disease appreciated.   0206 CT cervical spine without contrast  IMPRESSION: 1. No cervical spine fracture or focal subluxation. 2. Multilevel uncovertebral and facet joint hypertrophic changes with multilevel bilateral severe foraminal stenoses. 3. Severe central canal stenosis at C4-C5 secondary to posterior osteophyte disc complex.     0207 CT head without contrast  IMPRESSION: 1. Mild hypodensities in the deep white matter which are characteristic for chronic small vessel ischemic disease. 2. No acute intracranial findings.     0223 Discussed with inpatient team, will admit for further observation and workup due to syncope of unknown origin  and prolonged time until patient was back to baseline.       Medications   acetaminophen (Ofirmev) injection 1,000 mg (0 mg Intravenous Stopped 4/5/25 0055)       ED Risk Strat Scores                            SBIRT 22yo+      Flowsheet Row Most Recent Value   Initial Alcohol Screen: US AUDIT-C     1. How often do you have a drink containing alcohol? 0 Filed at: 04/05/2025 0030   2. How many drinks containing alcohol do you have on a typical day you are drinking?  0 Filed at: 04/05/2025 0030   3a. Male UNDER 65: How often do you have five or more drinks on one occasion? 0 Filed at: 04/05/2025 0030   3b. FEMALE Any Age, or MALE 65+: How often do you have 4 or more drinks on one occassion? 0 Filed at: 04/05/2025 0030   Audit-C Score 0 Filed at: 04/05/2025 0030   CHIO: How many times in the past year have you...    Used an illegal drug or used a prescription medication for non-medical reasons? Never Filed at: 04/05/2025 0030                            History of Present Illness       Chief Complaint   Patient presents with    Fall     Pt arrives from home via EMS. Pt fell in the kitchen unsure how. +HS/ unknown LOC./ denies Blood thinners/aspirin. Small lac back of head. Pt c/o of right hip/leg pain. Ambulated to stretcher        History reviewed. No pertinent past medical history.   Past Surgical History:   Procedure Laterality Date    TOTAL HIP ARTHROPLASTY Bilateral       History reviewed. No pertinent family history.   Social History     Tobacco Use    Smoking status: Former     Types: Cigarettes    Smokeless tobacco: Never      E-Cigarette/Vaping      E-Cigarette/Vaping Substances      I have reviewed and agree with the history as documented.     74-year-old male history of hypertension presenting after unwitnessed fall with head strike.  Patient states that he does not remember falling or what he was doing at that time.  Per family, son was upstairs and heard a crash, came downstairs and patient was laying on  the ground unconscious with some shaking in his upper extremities.  Patient did take a few minutes to regain consciousness at which time patient was confused and did not know what was going on.  Last thing patient remembers is being in the car driving to the hospital.  Wife does state that after this episode patient had soiled himself but is unsure if this happened prior to or after the fall.  No recent medication changes.  Currently patient is alert and oriented, family states he is acting normally at this time.  Patient's current complaint is of right hip pain.  Denies any chest pain, shortness of breath, palpitations, nausea or vomiting, headache, vision changes.        Review of Systems   Constitutional:  Negative for chills and fever.   HENT:  Negative for congestion, dental problem, ear pain and sore throat.    Eyes:  Negative for pain and visual disturbance.   Respiratory:  Negative for cough and shortness of breath.    Cardiovascular:  Negative for chest pain and palpitations.   Gastrointestinal:  Negative for abdominal pain and vomiting.   Genitourinary:  Negative for dysuria and hematuria.   Musculoskeletal:  Positive for arthralgias (R hip pain). Negative for back pain, neck pain and neck stiffness.   Skin:  Positive for wound. Negative for color change and rash.   Neurological:  Negative for dizziness, seizures, syncope, facial asymmetry, light-headedness, numbness and headaches.   All other systems reviewed and are negative.          Objective       ED Triage Vitals   Temperature Pulse Blood Pressure Respirations SpO2 Patient Position - Orthostatic VS   04/04/25 2250 04/04/25 2250 04/04/25 2253 04/04/25 2250 04/04/25 2250 04/05/25 0114   98 °F (36.7 °C) 72 158/72 20 97 % Sitting      Temp src Heart Rate Source BP Location FiO2 (%) Pain Score    -- 04/04/25 2250 04/04/25 2253 -- --     Monitor Right arm        Vitals      Date and Time Temp Pulse SpO2 Resp BP Pain Score FACES Pain Rating User   04/05/25  0130 -- 70 93 % -- 165/75 -- --    04/05/25 0114 -- 73 95 % 20 181/82 -- -- EB   04/04/25 2253 -- -- -- -- 158/72 -- -- SM   04/04/25 2250 98 °F (36.7 °C) 72 97 % 20 -- -- --             Physical Exam  Vitals and nursing note reviewed.   Constitutional:       General: He is not in acute distress.     Appearance: He is well-developed.   HENT:      Head: Normocephalic.      Comments: Small contusion of right posterior scalp, no laceration or bleeding appreciated.  No hematoma.     Right Ear: Tympanic membrane, ear canal and external ear normal.      Left Ear: Tympanic membrane, ear canal and external ear normal.      Nose: Nose normal. No congestion.      Mouth/Throat:      Mouth: Mucous membranes are moist.      Pharynx: Oropharynx is clear.   Eyes:      Extraocular Movements: Extraocular movements intact.      Conjunctiva/sclera: Conjunctivae normal.      Pupils: Pupils are equal, round, and reactive to light.   Cardiovascular:      Rate and Rhythm: Normal rate and regular rhythm.      Pulses: Normal pulses.      Heart sounds: Normal heart sounds. No murmur heard.  Pulmonary:      Effort: Pulmonary effort is normal. No respiratory distress.      Breath sounds: Normal breath sounds.   Chest:      Chest wall: No tenderness.   Abdominal:      General: Abdomen is flat. Bowel sounds are normal. There is no distension.      Palpations: Abdomen is soft.      Tenderness: There is no abdominal tenderness. There is no right CVA tenderness or left CVA tenderness.   Musculoskeletal:         General: No tenderness, deformity or signs of injury. Normal range of motion.      Cervical back: Normal range of motion and neck supple. No rigidity or tenderness.   Skin:     General: Skin is warm and dry.      Findings: Lesion present. No bruising or rash.      Comments: Half centimeter skin tear of right dorsal hand.  No active bleeding.   Neurological:      General: No focal deficit present.      Mental Status: He is alert.       Cranial Nerves: No cranial nerve deficit.      Sensory: No sensory deficit.      Motor: No weakness.      Coordination: Coordination normal.         Results Reviewed       Procedure Component Value Units Date/Time    UA w Reflex to Microscopic w Reflex to Culture [[id number]] Collected: 04/05/25 0110    Lab Status: Final result Specimen: Urine, Clean Catch Updated: 04/05/25 0134     Color, UA Light Yellow     Clarity, UA Clear     Specific Gravity, UA 1.016     pH, UA 7.5     Leukocytes, UA Negative     Nitrite, UA Negative     Protein, UA Negative mg/dl      Glucose, UA Negative mg/dl      Ketones, UA Negative mg/dl      Urobilinogen, UA <2.0 mg/dl      Bilirubin, UA Negative     Occult Blood, UA Negative    Comprehensive metabolic panel [[id number]]  (Abnormal) Collected: 04/04/25 2345    Lab Status: Final result Specimen: Blood from Arm, Right Updated: 04/05/25 0004     Sodium 133 mmol/L      Potassium 3.9 mmol/L      Chloride 97 mmol/L      CO2 27 mmol/L      ANION GAP 9 mmol/L      BUN 16 mg/dL      Creatinine 0.84 mg/dL      Glucose 163 mg/dL      Calcium 9.2 mg/dL      AST 23 U/L      ALT 18 U/L      Alkaline Phosphatase 80 U/L      Total Protein 7.5 g/dL      Albumin 3.8 g/dL      Total Bilirubin 0.57 mg/dL      eGFR 86 ml/min/1.73sq m     Narrative:      [employer] guidelines for Chronic Kidney Disease (CKD):     Stage 1 with normal or high GFR (GFR > 90 mL/min/1.73 square meters)    Stage 2 Mild CKD (GFR = 60-89 mL/min/1.73 square meters)    Stage 3A Moderate CKD (GFR = 45-59 mL/min/1.73 square meters)    Stage 3B Moderate CKD (GFR = 30-44 mL/min/1.73 square meters)    Stage 4 Severe CKD (GFR = 15-29 mL/min/1.73 square meters)    Stage 5 End Stage CKD (GFR <15 mL/min/1.73 square meters)  Note: GFR calculation is accurate only with a steady state creatinine    CBC and differential [[id number]]  (Abnormal) Collected: 04/04/25 2345    Lab Status: Final result Specimen: Blood from Arm,  Right Updated: 04/04/25 2351     WBC 12.48 Thousand/uL      RBC 4.15 Million/uL      Hemoglobin 12.5 g/dL      Hematocrit 38.8 %      MCV 94 fL      MCH 30.1 pg      MCHC 32.2 g/dL      RDW 15.1 %      MPV 12.8 fL      Platelets 123 Thousands/uL      nRBC 0 /100 WBCs      Segmented % 75 %      Immature Grans % 1 %      Lymphocytes % 13 %      Monocytes % 8 %      Eosinophils Relative 2 %      Basophils Relative 1 %      Absolute Neutrophils 9.52 Thousands/µL      Absolute Immature Grans 0.11 Thousand/uL      Absolute Lymphocytes 1.59 Thousands/µL      Absolute Monocytes 0.98 Thousand/µL      Eosinophils Absolute 0.19 Thousand/µL      Basophils Absolute 0.09 Thousands/µL     Fingerstick Glucose (POCT) [[id number]]  (Abnormal) Collected: 04/04/25 2340    Lab Status: Final result Specimen: Blood Updated: 04/04/25 2341     POC Glucose 159 mg/dl             XR hip/pelv 2-3 vws right   ED Interpretation by Victor M Bah MD (04/05 0042)   My personal interpretation-no acute osseous abnormalities or dislocations appreciated.  Arthritic changes present.      XR chest 1 view portable   ED Interpretation by Victor M Bah MD (04/05 0152)   My personal interpretation-no acute cardiopulmonary disease appreciated.      CT head without contrast    (Results Pending)   CT cervical spine without contrast    (Results Pending)       ECG 12 Lead Documentation Only    Date/Time: 4/4/2025 11:18 PM    Performed by: Vicotr M Bah MD  Authorized by: Victor M Bah MD    Patient location:  ED  Previous ECG:     Previous ECG:  Unavailable  Interpretation:     Interpretation: normal    Rate:     ECG rate:  74    ECG rate assessment: normal    Rhythm:     Rhythm: sinus rhythm    Ectopy:     Ectopy: none    QRS:     QRS axis:  Normal    QRS intervals:  Normal  Conduction:     Conduction: normal    ST segments:     ST segments:  Normal  T waves:     T waves: normal        ED Medication and Procedure Management   None     Patient's Medications    No  medications on file     No discharge procedures on file.  ED SEPSIS DOCUMENTATION   Time reflects when diagnosis was documented in both MDM as applicable and the Disposition within this note       Time User Action Codes Description Comment    4/5/2025  2:22 AM Victor M Bah [W19.XXXA] Fall, initial encounter     4/5/2025  2:22 AM Victor M Bah [S09.90XA] Closed head injury, initial encounter     4/5/2025  2:22 AM Victor M Bah [R55] Syncope                  Victor M Bah MD  04/05/25 0223

## 2025-04-05 NOTE — ASSESSMENT & PLAN NOTE
"No results found for: \"HGBA1C\"    Recent Labs     04/04/25  2340   POCGLU 159*       Blood Sugar Average: Last 72 hrs:  (P) 159  Recent Labs     04/04/25  2340 04/04/25  2345   POCGLU 159*  --    GLUC  --  163*       Hold home regimen while inpatient and resume on discharge: metformin 1000 mg twice daily  Diabetic diet  Insulin regimen  Glucose checks and Insulin correction ACHS  Goal -180 while admitted, adjusting insulin regimen as appropriate  Monitor for hypoglycemia and treat per protocol    "

## 2025-04-05 NOTE — H&P
"H&P - Hospitalist   Name: Deonte Attdallas 74 y.o. male I MRN: [id number]  Unit/Bed#: S -01 I Date of Admission: 4/4/2025   Date of Service: 4/5/2025 I Hospital Day: 0     Assessment & Plan  Unwitnessed fall  Patient presents to the ED due to an unwitnessed fall/syncope/seizure episode.  Patient does not recall the event and he denies any presyncopal symptoms.  The son heard a sound from downstairs where he went immediately and found the patient laying on the kitchen floor.  Patient was unresponsive for 5-10 minutes then he gradually regained consciousness however, he was confused unable to answer questions appropriately.  His confusion lasted until he got to the emergency department.  Patient reports multiple episodes of loose stool in the past 2 days.  In the ED, there was concerns for head strike due to head laceration.  Labs were unremarkable except for elevated WBC and mildly hyponatremia.  Chest x-ray, hip x-ray negative.  CT head and spine were unremarkable per ED reading.  Patient denies fever, chills, headache, dizziness, lightheadedness, visual changes, cough, SOB, chest pain, palpitation, nausea or vomiting, abdominal pain, pain or burning while urination, hematuria, poor appetite, history of seizures, history of falls, any new mental/behavioral changes.  Patient denies any history of cardiac disease, arrhythmia, previous similar episodes.    Symptoms likely due to seizure VS mechanical form VS hypoglycemia VS less likely arrhythmia vs orthostatic hypotension due to volume depletion.    Plan:  Telemetry  Orthostatic vitals  Gentle fluid IV normal saline 75 cc/HR for 10 hours  Lactic acid  EEG  Echocardiogram  Fall precautions  Consider neurology consult  Hypertension  Continue atenolol-chlorthalidone 50/25 mg  DM (diabetes mellitus) (HCC)  No results found for: \"HGBA1C\"    Recent Labs     04/04/25  2340   POCGLU 159*       Blood Sugar Average: Last 72 hrs:  (P) 159  Recent Labs     04/04/25  2340 " 04/04/25  2345   POCGLU 159*  --    GLUC  --  163*       Hold home regimen while inpatient and resume on discharge: metformin 1000 mg twice daily  Diabetic diet  Insulin regimen  Glucose checks and Insulin correction ACHS  Goal -180 while admitted, adjusting insulin regimen as appropriate  Monitor for hypoglycemia and treat per protocol    History of kidney stones  Continue allopurinol 300 mg once daily  continue potassium citrate 5 mL oral solution 3 times daily      VTE Pharmacologic Prophylaxis: VTE Score: 3 Moderate Risk (Score 3-4) - Pharmacological DVT Prophylaxis Ordered: enoxaparin (Lovenox).  Code Status: Level 1 - Full Code   Discussion with family: Updated  (wife and son) at bedside.    Anticipated Length of Stay: Patient will be admitted on an observation basis with an anticipated length of stay of less than 2 midnights secondary to unwitnessed fall/syncope episode.    History of Present Illness   Chief Complaint: Fall/syncope unwitnessed    Deonte Attarian is a 74 y.o. male with a PMH of HTN, HLD, DM, kidney stones who presents with unwitnessed syncope/seizure episode.    Patient does not recall the episode or any presyncopal symptoms or events that led to his presentation.    The family was at bedside, the son recalls hearing a sound from downstairs.  He went immediately and found his and laying on the kitchen floor, on his back, with hands clenched up to his chest.  Patient was unresponsive for 5-10 minutes, then he was confused and unable to answer questions appropriately neither recalling what happened all the way until he reached the hospital.  The wife reported urine incontinence without tongue bite or drooling.    In the ED, patient regained full consciousness and he was alert oriented and back to baseline.  He was concerned for head strike due to a laceration on the posterior scalp.    CT scan head and spine were unremarkable.  Labs includes CBC, CMP, UA  were unremarkable  except for elevated WBC and mild hyponatremia.  EKG showed normal sinus rhythm without ST segment abnormalities.    On my evaluation, patient was alert oriented x 3.  Without any complaints.    Patient denies fever, chills, headache, dizziness, lightheadedness, visual changes, cough, SOB, chest pain, palpitation, nausea or vomiting, abdominal pain, pain or burning while urination, hematuria.  Patient reports good appetite and oral intake.  Patient reports constipation and he has a bowel regimen due to which he had 4-5 episodes of loose stool daily for the past 48 hours, he reports that his normal, he also denies any blood in stool.    Patient and family denies any history of seizures, history of falls, recent mental or behavior changes, sick contacts or recent travels.    Patient was admitted for observation, further evaluation and management    Review of Systems   Constitutional:  Negative for activity change, appetite change, chills, fatigue, fever and unexpected weight change.   HENT:  Negative for congestion, ear pain, postnasal drip, rhinorrhea and sore throat.    Eyes:  Negative for photophobia, pain and visual disturbance.   Respiratory:  Negative for apnea, cough, chest tightness, shortness of breath and wheezing.    Cardiovascular:  Negative for chest pain, palpitations and leg swelling.   Gastrointestinal:  Positive for diarrhea. Negative for abdominal distention, abdominal pain, blood in stool, nausea and vomiting.   Endocrine: Negative for cold intolerance, heat intolerance, polydipsia and polyuria.   Genitourinary:  Negative for difficulty urinating, dysuria, flank pain and hematuria.   Musculoskeletal:  Negative for arthralgias, back pain, myalgias and neck pain.   Skin:  Negative for color change and rash.   Neurological:  Negative for dizziness, seizures, syncope, speech difficulty, weakness, light-headedness, numbness and headaches.   Psychiatric/Behavioral:  Negative for agitation and confusion.     All other systems reviewed and are negative.      Historical Information   History reviewed. No pertinent past medical history.  Past Surgical History:   Procedure Laterality Date    TOTAL HIP ARTHROPLASTY Bilateral      Social History     Tobacco Use    Smoking status: Former     Types: Cigarettes    Smokeless tobacco: Never   Substance and Sexual Activity    Alcohol use: Not Currently    Drug use: Not on file    Sexual activity: Not on file     E-Cigarette/Vaping     E-Cigarette/Vaping Substances     History reviewed. No pertinent family history.  Social History:  Marital Status: /Civil Union   Occupation:   Patient Pre-hospital Living Situation: Home  Patient Pre-hospital Level of Mobility: walks  Patient Pre-hospital Diet Restrictions:     Meds/Allergies   I have reviewed home medications with patient personally.  Prior to Admission medications    Not on File     No Known Allergies    Objective :  Temp:  [98 °F (36.7 °C)-98.4 °F (36.9 °C)] 98.4 °F (36.9 °C)  HR:  [69-78] 78  BP: ()/(52-91) 94/52  Resp:  [18-20] 18  SpO2:  [93 %-97 %] 94 %  O2 Device: None (Room air)    Physical Exam  Vitals and nursing note reviewed.   Constitutional:       General: He is not in acute distress.     Appearance: Normal appearance. He is well-developed. He is not ill-appearing.   HENT:      Head: Normocephalic and atraumatic.      Nose: No congestion.      Mouth/Throat:      Mouth: Mucous membranes are dry.   Eyes:      General: No scleral icterus.     Conjunctiva/sclera: Conjunctivae normal.   Cardiovascular:      Rate and Rhythm: Normal rate and regular rhythm.      Pulses: Normal pulses.      Heart sounds: No murmur heard.  Pulmonary:      Effort: Pulmonary effort is normal. No respiratory distress.      Breath sounds: Normal breath sounds. No wheezing or rhonchi.   Abdominal:      General: Bowel sounds are normal.      Palpations: Abdomen is soft.      Tenderness: There is no abdominal tenderness. There is no  "right CVA tenderness, left CVA tenderness, guarding or rebound.   Musculoskeletal:         General: No swelling.      Cervical back: Neck supple. No rigidity.      Right lower leg: No edema.      Left lower leg: No edema.      Comments: Bilateral venous stasis   Skin:     General: Skin is warm and dry.      Capillary Refill: Capillary refill takes less than 2 seconds.      Findings: Bruising present.      Comments: Laceration on posterior scalp   Neurological:      Mental Status: He is alert and oriented to person, place, and time.      Cranial Nerves: No cranial nerve deficit.      Sensory: No sensory deficit.      Motor: No weakness.   Psychiatric:         Mood and Affect: Mood normal.         Behavior: Behavior normal.          Lines/Drains:            Lab Results: I have reviewed the following results:  Results from last 7 days   Lab Units 04/04/25  2345   WBC Thousand/uL 12.48*   HEMOGLOBIN g/dL 12.5   HEMATOCRIT % 38.8   PLATELETS Thousands/uL 123*   SEGS PCT % 75   LYMPHO PCT % 13*   MONO PCT % 8   EOS PCT % 2     Results from last 7 days   Lab Units 04/04/25  2345   SODIUM mmol/L 133*   POTASSIUM mmol/L 3.9   CHLORIDE mmol/L 97   CO2 mmol/L 27   BUN mg/dL 16   CREATININE mg/dL 0.84   ANION GAP mmol/L 9   CALCIUM mg/dL 9.2   ALBUMIN g/dL 3.8   TOTAL BILIRUBIN mg/dL 0.57   ALK PHOS U/L 80   ALT U/L 18   AST U/L 23   GLUCOSE RANDOM mg/dL 163*         Results from last 7 days   Lab Units 04/04/25  2340   POC GLUCOSE mg/dl 159*     No results found for: \"HGBA1C\"        Imaging Results Review: I reviewed radiology reports from this admission including: chest xray, CT head, CT C-spine, and xray(s).  Other Study Results Review: EKG was reviewed.     Administrative Statements     ** Please Note: This note has been constructed using a voice recognition system. **    "

## 2025-04-05 NOTE — ASSESSMENT & PLAN NOTE
Continue allopurinol 300 mg once daily  continue potassium citrate 5 mL oral solution 3 times daily

## 2025-04-05 NOTE — PLAN OF CARE
Problem: Potential for Falls  Goal: Patient will remain free of falls  Description: INTERVENTIONS:- Educate patient/family on patient safety including physical limitations- Instruct patient to call for assistance with activity - Consult OT/PT to assist with strengthening/mobility - Keep Call bell within reach- Keep bed low and locked with side rails adjusted as appropriate- Keep care items and personal belongings within reach- Initiate and maintain comfort rounds- Make Fall Risk Sign visible to staff- Apply yellow socks and bracelet for high fall risk patients- Consider moving patient to room near nurses station  INTERVENTIONS:- Educate patient/family on patient safety including physical limitations- Instruct patient to call for assistance with activity - Consult OT/PT to assist with strengthening/mobility - Keep Call bell within reach- Keep bed low and locked with side rails adjusted as appropriate- Keep care items and personal belongings within reach- Initiate and maintain comfort rounds- Make Fall Risk Sign visible to staff- Apply yellow socks and bracelet for high fall risk patients- Consider moving patient to room near nurses station  Outcome: Progressing     Problem: PAIN - ADULT  Goal: Verbalizes/displays adequate comfort level or baseline comfort level  Description: Interventions:- Encourage patient to monitor pain and request assistance- Assess pain using appropriate pain scale- Administer analgesics based on type and severity of pain and evaluate response- Implement non-pharmacological measures as appropriate and evaluate response- Consider cultural and social influences on pain and pain management- Notify physician/advanced practitioner if interventions unsuccessful or patient reports new pain  Outcome: Progressing     Problem: INFECTION - ADULT  Goal: Absence or prevention of progression during hospitalization  Description: INTERVENTIONS:- Assess and monitor for signs and symptoms of infection- Monitor  lab/diagnostic results- Monitor all insertion sites, i.e. indwelling lines, tubes, and drains- Monitor endotracheal if appropriate and nasal secretions for changes in amount and color- [place] appropriate cooling/warming therapies per order- Administer medications as ordered- Instruct and encourage patient and family to use good hand hygiene technique- Identify and instruct in appropriate isolation precautions for identified infection/condition  Outcome: Progressing  Goal: Absence of fever/infection during neutropenic period  Description: INTERVENTIONS:- Monitor WBC  Outcome: Progressing     Problem: SAFETY ADULT  Goal: Patient will remain free of falls  Description: INTERVENTIONS:- Educate patient/family on patient safety including physical limitations- Instruct patient to call for assistance with activity - Consult OT/PT to assist with strengthening/mobility - Keep Call bell within reach- Keep bed low and locked with side rails adjusted as appropriate- Keep care items and personal belongings within reach- Initiate and maintain comfort rounds- Make Fall Risk Sign visible to staff- Apply yellow socks and bracelet for high fall risk patients- Consider moving patient to room near nurses station  INTERVENTIONS:- Educate patient/family on patient safety including physical limitations- Instruct patient to call for assistance with activity - Consult OT/PT to assist with strengthening/mobility - Keep Call bell within reach- Keep bed low and locked with side rails adjusted as appropriate- Keep care items and personal belongings within reach- Initiate and maintain comfort rounds- Make Fall Risk Sign visible to staff- Apply yellow socks and bracelet for high fall risk patients- Consider moving patient to room near nurses station  Outcome: Progressing

## 2025-04-05 NOTE — QUICK NOTE
Evaluated patient at bedside, no overnight events noted per nursing staff.  Patient was seen resting comfortably in bed, appears to be in no apparent distress.  He denies any chest pain, headache, vision changes, neurological changes.  He denies any sudden weakness or paresthesias that are abnormal for him.  And regarding his history, he denies any recent medication changes, illnesses, diarrhea, changes in food or habits.    Physical exam unremarkable, complete neurological exam shows no deficits.    Was notified by on-call radiologist in regards to his CT scan of the head that was done in the ED.  Per radiologist, there is a possible 4 mm subdural hematoma present.  It was advised to have a stat CT of the head without contrast at this time.  Orders were placed, DVT prophylaxis Lovenox held.  Patient being transported to CT upon completion of my visit.  Nursing notified, patient aware and understands plan of care going forward.    Repeat ct head showed subdural hematoma resolved

## 2025-04-05 NOTE — ASSESSMENT & PLAN NOTE
Patient presents to the ED due to an unwitnessed fall/syncope/seizure episode.  Patient does not recall the event and he denies any presyncopal symptoms.  The son heard a sound from downstairs where he went immediately and found the patient laying on the kitchen floor.  Patient was unresponsive for 5-10 minutes then he gradually regained consciousness however, he was confused unable to answer questions appropriately.  His confusion lasted until he got to the emergency department.  Patient reports multiple episodes of loose stool in the past 2 days.  In the ED, there was concerns for head strike due to head laceration.  Labs were unremarkable except for elevated WBC and mildly hyponatremia.  Chest x-ray, hip x-ray negative.  CT head and spine were unremarkable per ED reading.  Patient denies fever, chills, headache, dizziness, lightheadedness, visual changes, cough, SOB, chest pain, palpitation, nausea or vomiting, abdominal pain, pain or burning while urination, hematuria, poor appetite, history of seizures, history of falls, any new mental/behavioral changes.  Patient denies any history of cardiac disease, arrhythmia, previous similar episodes.    Symptoms likely due to seizure VS mechanical form VS hypoglycemia VS less likely arrhythmia vs orthostatic hypotension due to volume depletion.    Plan:  Telemetry  Orthostatic vitals  Gentle fluid IV normal saline 75 cc/HR for 10 hours  Lactic acid  EEG  Echocardiogram  Fall precautions  Consider neurology consult

## 2025-04-05 NOTE — ASSESSMENT & PLAN NOTE
Lab Results   Component Value Date    HGBA1C 6.4 (H) 04/05/2025       Recent Labs     04/04/25  2340 04/05/25  0729 04/05/25  1043 04/05/25  1605   POCGLU 159* 151* 135 92       Blood Sugar Average: Last 72 hrs:  (P) 134.25

## 2025-04-05 NOTE — PHYSICAL THERAPY NOTE
Physical Therapy Cancellation Note         04/05/25 8055   Note Type   Note type Cancelled Session   Cancel Reasons Other   Additional Comments PT orders noted and chart reviewed.  Pt currently pending imaging of R hip/pelvis x-ray and chest x-ray.  Will continue to follow and evaluate once imaging is officially read.     BRIEN JiménezT

## 2025-04-05 NOTE — ED ATTENDING ATTESTATION
4/4/2025   IMela MD, saw and evaluated the patient. I have discussed the patient with the resident/non-physician practitioner and agree with the resident's/non-physician practitioner's findings, Plan of Care, and MDM as documented in the resident's/non-physician practitioner's note, except where noted. All available labs and Radiology studies were reviewed.  I was present for key portions of any procedure(s) performed by the resident/non-physician practitioner and I was immediately available to provide assistance.       At this point I agree with the current assessment done in the Emergency Department.  I have conducted an independent evaluation of this patient a history and physical is as follows:    Unit/Bed#: ED-33 Encounter: [id number]    Chief Complaint   Patient presents with    Fall     Pt arrives from home via EMS. Pt fell in the kitchen unsure how. +HS/ unknown LOC./ denies Blood thinners/aspirin. Small lac back of head. Pt c/o of right hip/leg pain. [facility]      74 y.o. male presenting after a fall.  Patient does not recall the circumstances leading up to the fall.  Patient's son heard a loud bang and ran downstairs to find his father on his back, eyes open, arms up in the air with bilateral upper extremity tremor.  Patient was not  answering any questions.  He was not responding for at least several minutes.  Afterwards, he did regain consciousness but was very confused, forgetting that they moved to [place], and then forgetting how old he is.  No recent illnesses.  Patient has not any chest pain or shortness of breath.  He did have diarrhea for the past 2 days but he hydrates well.  No nausea or vomiting.  No headache at present.  No history of stroke.  No history of seizures.  Patient follows with his physician in [place].  He has a history of hypertension, hyperlipidemia, diabetes mellitus, and gout, he has a history of kidney stones and takes potassium citrate for  this.  He has no burning with urination or blood in urine.    Physical Exam  ED Triage Vitals   Temperature Pulse Respirations Blood Pressure SpO2   04/04/25 2250 04/04/25 2250 04/04/25 2250 04/04/25 2253 04/04/25 2250   98 °F (36.7 °C) 72 20 158/72 97 %      Temp src Heart Rate Source Patient Position - Orthostatic VS BP Location FiO2 (%)   -- 04/04/25 2250 04/05/25 0114 04/04/25 2253 --    Monitor Sitting Right arm       Pain Score       --                  Vital signs and nursing notes reviewed    CONSTITUTIONAL: male appearing stated age resting in bed, in no acute distress  HEENT: atraumatic, normocephalic. Sclera anicteric, conjunctiva are not injected. Moist oral mucosa  CARDIOVASCULAR/CHEST: RRR, no M/R/G. 2+ radial pulses  PULMONARY: Breathing comfortably on RA. Breath sounds are equal and clear to auscultation  ABDOMEN: non-distended. BS present, normoactive. Non-tender  MSK: No midline C, T, or L-spine tenderness.  Moves all extremities, no deformities, no peripheral edema, no calf asymmetry.  Some tenderness with palpation along the right medial thigh without underlying crepitus or edema.  NEURO: Awake, alert, and oriented x 3.  GCS 15.  Face symmetric. Moves all extremities spontaneously. No focal neurologic deficits  SKIN: Warm, appears well-perfused  MENTAL STATUS: Normal affect      Labs and Imaging  Labs Reviewed   CBC AND DIFFERENTIAL - Abnormal       Result Value Ref Range Status    WBC 12.48 (*) 4.31 - 10.16 Thousand/uL Final    RBC 4.15  3.88 - 5.62 Million/uL Final    Hemoglobin 12.5  12.0 - 17.0 g/dL Final    Hematocrit 38.8  36.5 - 49.3 % Final    MCV 94  82 - 98 fL Final    MCH 30.1  26.8 - 34.3 pg Final    MCHC 32.2  31.4 - 37.4 g/dL Final    RDW 15.1  11.6 - 15.1 % Final    MPV 12.8 (*) 8.9 - 12.7 fL Final    Platelets 123 (*) 149 - 390 Thousands/uL Final    nRBC 0  /100 WBCs Final    Segmented % 75  43 - 75 % Final    Immature Grans % 1  0 - 2 % Final    Lymphocytes % 13 (*) 14 - 44 %  Final    Monocytes % 8  4 - 12 % Final    Eosinophils Relative 2  0 - 6 % Final    Basophils Relative 1  0 - 1 % Final    Absolute Neutrophils 9.52 (*) 1.85 - 7.62 Thousands/µL Final    Absolute Immature Grans 0.11  0.00 - 0.20 Thousand/uL Final    Absolute Lymphocytes 1.59  0.60 - 4.47 Thousands/µL Final    Absolute Monocytes 0.98  0.17 - 1.22 Thousand/µL Final    Eosinophils Absolute 0.19  0.00 - 0.61 Thousand/µL Final    Basophils Absolute 0.09  0.00 - 0.10 Thousands/µL Final   COMPREHENSIVE METABOLIC PANEL - Abnormal    Sodium 133 (*) 135 - 147 mmol/L Final    Potassium 3.9  3.5 - 5.3 mmol/L Final    Chloride 97  96 - 108 mmol/L Final    CO2 27  21 - 32 mmol/L Final    ANION GAP 9  4 - 13 mmol/L Final    BUN 16  5 - 25 mg/dL Final    Creatinine 0.84  0.60 - 1.30 mg/dL Final    Comment: Standardized to IDMS reference method    Glucose 163 (*) 65 - 140 mg/dL Final    Comment: If the patient is fasting, the ADA then defines impaired fasting glucose as > 100 mg/dL and diabetes as > or equal to 123 mg/dL.    Calcium 9.2  8.4 - 10.2 mg/dL Final    AST 23  13 - 39 U/L Final    ALT 18  7 - 52 U/L Final    Comment: Specimen collection should occur prior to Sulfasalazine administration due to the potential for falsely depressed results.     Alkaline Phosphatase 80  34 - 104 U/L Final    Total Protein 7.5  6.4 - 8.4 g/dL Final    Albumin 3.8  3.5 - 5.0 g/dL Final    Total Bilirubin 0.57  0.20 - 1.00 mg/dL Final    Comment: Use of this assay is not recommended for patients undergoing treatment with eltrombopag due to the potential for falsely elevated results.  N-acetyl-p-benzoquinone imine (metabolite of Acetaminophen) will generate erroneously low results in samples for patients that have taken an overdose of Acetaminophen.    eGFR 86  ml/min/1.73sq m Final    Narrative:     National Kidney Disease Foundation guidelines for Chronic Kidney Disease (CKD):     Stage 1 with normal or high GFR (GFR > 90 mL/min/1.73 square  meters)    Stage 2 Mild CKD (GFR = 60-89 mL/min/1.73 square meters)    Stage 3A Moderate CKD (GFR = 45-59 mL/min/1.73 square meters)    Stage 3B Moderate CKD (GFR = 30-44 mL/min/1.73 square meters)    Stage 4 Severe CKD (GFR = 15-29 mL/min/1.73 square meters)    Stage 5 End Stage CKD (GFR <15 mL/min/1.73 square meters)  Note: GFR calculation is accurate only with a steady state creatinine   POCT GLUCOSE - Abnormal    POC Glucose 159 (*) 65 - 140 mg/dl Final   UA W REFLEX TO MICROSCOPIC WITH REFLEX TO CULTURE       XR hip/pelv 2-3 vws right   ED Interpretation   My personal interpretation-no acute osseous abnormalities or dislocations appreciated.  Arthritic changes present.      CT head without contrast    (Results Pending)   CT cervical spine without contrast    (Results Pending)   XR chest 1 view portable    (Results Pending)         Procedures  ECG 12 Lead Documentation Only    Date/Time: 4/4/2025 11:00 PM    Performed by: Mela Whiting MD  Authorized by: Mela Whiting MD    Comments:      Normal sinus rhythm, ventricular rate 74, OR interval 168, QRS 94, QTc 412, normal axis, isolated T wave inversions in high lateral lead aVL, no delta waves of WPW, no evidence of Brugada syndrome, no epsilon waves of ARVD, nothing to suggest hypertrophic cardiomyopathy, no bundle-branch blocks or AV blocks, no STEMI.  No prior EKG available for my review.          ED Course  Medications   acetaminophen (Ofirmev) injection 1,000 mg (0 mg Intravenous Stopped 4/5/25 0055)     74-year-old male presenting after either a syncopal event, seizure event, or mechanical fall with head strike and closed head injury with resultant loss of consciousness and confusion.  At present, patient is awake, alert, oriented x 3 and GCS is 15.  EKG to my interpretation is as above, no proarrhythmic changes.  Workup including CT head and CT C-spine pursued, chest x-ray and x-ray of right hip and pelvis pursued.  To my review, chest x-ray and x-ray of  right hip and pelvis are without acute traumatic injuries.  There is no cardiomediastinal widening.  We are awaiting CT imaging of head and neck.  Labs are reassuring, CMP is with mild hyponatremia that is not seem to be clinically significant.  CBC is with mild leukocytosis and mild thrombocytopenia.  Patient will require admission due to unclear etiology of his symptoms today but given possibility of syncopal event versus seizure event.

## 2025-04-06 ENCOUNTER — APPOINTMENT (INPATIENT)
Dept: NON INVASIVE DIAGNOSTICS | Facility: HOSPITAL | Age: 75
End: 2025-04-06
Payer: COMMERCIAL

## 2025-04-06 ENCOUNTER — APPOINTMENT (INPATIENT)
Dept: CT IMAGING | Facility: HOSPITAL | Age: 75
End: 2025-04-06
Payer: COMMERCIAL

## 2025-04-06 PROBLEM — R10.31 GROIN PAIN, RIGHT: Status: ACTIVE | Noted: 2025-04-06

## 2025-04-06 LAB
ALBUMIN SERPL BCG-MCNC: 3.7 G/DL (ref 3.5–5)
ALP SERPL-CCNC: 70 U/L (ref 34–104)
ALT SERPL W P-5'-P-CCNC: 14 U/L (ref 7–52)
ANION GAP SERPL CALCULATED.3IONS-SCNC: 8 MMOL/L (ref 4–13)
AORTIC ROOT: 3.2 CM
AST SERPL W P-5'-P-CCNC: 16 U/L (ref 13–39)
ATRIAL RATE: 74 BPM
BASOPHILS # BLD AUTO: 0.06 THOUSANDS/ÂΜL (ref 0–0.1)
BASOPHILS NFR BLD AUTO: 1 % (ref 0–1)
BILIRUB SERPL-MCNC: 1.23 MG/DL (ref 0.2–1)
BSA FOR ECHO PROCEDURE: 2.22 M2
BUN SERPL-MCNC: 16 MG/DL (ref 5–25)
CALCIUM SERPL-MCNC: 9 MG/DL (ref 8.4–10.2)
CHLORIDE SERPL-SCNC: 97 MMOL/L (ref 96–108)
CO2 SERPL-SCNC: 26 MMOL/L (ref 21–32)
CREAT SERPL-MCNC: 0.79 MG/DL (ref 0.6–1.3)
DOP CALC LVOT AREA: 3.8 CM2
DOP CALC LVOT DIAMETER: 2.2 CM
E WAVE DECELERATION TIME: 337 MS
E/A RATIO: 0.71
EOSINOPHIL # BLD AUTO: 0.12 THOUSAND/ÂΜL (ref 0–0.61)
EOSINOPHIL NFR BLD AUTO: 2 % (ref 0–6)
ERYTHROCYTE [DISTWIDTH] IN BLOOD BY AUTOMATED COUNT: 15 % (ref 11.6–15.1)
FRACTIONAL SHORTENING: 35 (ref 28–44)
GFR SERPL CREATININE-BSD FRML MDRD: 88 ML/MIN/1.73SQ M
GLUCOSE SERPL-MCNC: 120 MG/DL (ref 65–140)
GLUCOSE SERPL-MCNC: 126 MG/DL (ref 65–140)
GLUCOSE SERPL-MCNC: 137 MG/DL (ref 65–140)
GLUCOSE SERPL-MCNC: 140 MG/DL (ref 65–140)
GLUCOSE SERPL-MCNC: 154 MG/DL (ref 65–140)
HCT VFR BLD AUTO: 37.7 % (ref 36.5–49.3)
HGB BLD-MCNC: 12.1 G/DL (ref 12–17)
IMM GRANULOCYTES # BLD AUTO: 0.04 THOUSAND/UL (ref 0–0.2)
IMM GRANULOCYTES NFR BLD AUTO: 1 % (ref 0–2)
INTERVENTRICULAR SEPTUM IN DIASTOLE (PARASTERNAL SHORT AXIS VIEW): 1.2 CM
INTERVENTRICULAR SEPTUM: 1.2 CM (ref 0.6–1.1)
LAAS-AP2: 25.6 CM2
LAAS-AP4: 25.1 CM2
LEFT ATRIUM AREA SYSTOLE SINGLE PLANE A4C: 20.9 CM2
LEFT ATRIUM SIZE: 3.4 CM
LEFT ATRIUM VOLUME (MOD BIPLANE): 87 ML
LEFT ATRIUM VOLUME INDEX (MOD BIPLANE): 39.4 ML/M2
LEFT INTERNAL DIMENSION IN SYSTOLE: 3.2 CM (ref 2.1–4)
LEFT VENTRICULAR INTERNAL DIMENSION IN DIASTOLE: 4.9 CM (ref 3.5–6)
LEFT VENTRICULAR POSTERIOR WALL IN END DIASTOLE: 1.1 CM
LEFT VENTRICULAR STROKE VOLUME: 71 ML
LV EF US.2D.A4C+ESTIMATED: 64 %
LVSV (TEICH): 71 ML
LYMPHOCYTES # BLD AUTO: 1.46 THOUSANDS/ÂΜL (ref 0.6–4.47)
LYMPHOCYTES NFR BLD AUTO: 18 % (ref 14–44)
MCH RBC QN AUTO: 29.8 PG (ref 26.8–34.3)
MCHC RBC AUTO-ENTMCNC: 32.1 G/DL (ref 31.4–37.4)
MCV RBC AUTO: 93 FL (ref 82–98)
MONOCYTES # BLD AUTO: 0.89 THOUSAND/ÂΜL (ref 0.17–1.22)
MONOCYTES NFR BLD AUTO: 11 % (ref 4–12)
MV E'TISSUE VEL-SEP: 8 CM/S
MV PEAK A VEL: 0.85 M/S
MV PEAK E VEL: 60 CM/S
MV STENOSIS PRESSURE HALF TIME: 98 MS
MV VALVE AREA P 1/2 METHOD: 2.2
NEUTROPHILS # BLD AUTO: 5.61 THOUSANDS/ÂΜL (ref 1.85–7.62)
NEUTS SEG NFR BLD AUTO: 67 % (ref 43–75)
NRBC BLD AUTO-RTO: 0 /100 WBCS
P AXIS: 70 DEGREES
PLATELET # BLD AUTO: 109 THOUSANDS/UL (ref 149–390)
PMV BLD AUTO: 13.8 FL (ref 8.9–12.7)
POTASSIUM SERPL-SCNC: 3.9 MMOL/L (ref 3.5–5.3)
PR INTERVAL: 168 MS
PROT SERPL-MCNC: 7.3 G/DL (ref 6.4–8.4)
QRS AXIS: 83 DEGREES
QRSD INTERVAL: 94 MS
QT INTERVAL: 372 MS
QTC INTERVAL: 412 MS
RA PRESSURE ESTIMATED: 3 MMHG
RBC # BLD AUTO: 4.06 MILLION/UL (ref 3.88–5.62)
RIGHT ATRIUM AREA SYSTOLE A4C: 15.7 CM2
RIGHT VENTRICLE ID DIMENSION: 2.7 CM
SL CV LEFT ATRIUM LENGTH A2C: 6.6 CM
SL CV LV EF: 65
SL CV PED ECHO LEFT VENTRICLE DIASTOLIC VOLUME (MOD BIPLANE) 2D: 113 ML
SL CV PED ECHO LEFT VENTRICLE SYSTOLIC VOLUME (MOD BIPLANE) 2D: 42 ML
SODIUM SERPL-SCNC: 131 MMOL/L (ref 135–147)
T WAVE AXIS: 70 DEGREES
TRICUSPID ANNULAR PLANE SYSTOLIC EXCURSION: 2.5 CM
VENTRICULAR RATE: 74 BPM
WBC # BLD AUTO: 8.18 THOUSAND/UL (ref 4.31–10.16)

## 2025-04-06 PROCEDURE — 70450 CT HEAD/BRAIN W/O DYE: CPT

## 2025-04-06 PROCEDURE — 97116 GAIT TRAINING THERAPY: CPT

## 2025-04-06 PROCEDURE — 93306 TTE W/DOPPLER COMPLETE: CPT

## 2025-04-06 PROCEDURE — 82948 REAGENT STRIP/BLOOD GLUCOSE: CPT

## 2025-04-06 PROCEDURE — 97110 THERAPEUTIC EXERCISES: CPT

## 2025-04-06 PROCEDURE — 97166 OT EVAL MOD COMPLEX 45 MIN: CPT

## 2025-04-06 PROCEDURE — 85025 COMPLETE CBC W/AUTO DIFF WBC: CPT

## 2025-04-06 PROCEDURE — 97163 PT EVAL HIGH COMPLEX 45 MIN: CPT

## 2025-04-06 PROCEDURE — 93306 TTE W/DOPPLER COMPLETE: CPT | Performed by: STUDENT IN AN ORGANIZED HEALTH CARE EDUCATION/TRAINING PROGRAM

## 2025-04-06 PROCEDURE — 93010 ELECTROCARDIOGRAM REPORT: CPT | Performed by: STUDENT IN AN ORGANIZED HEALTH CARE EDUCATION/TRAINING PROGRAM

## 2025-04-06 PROCEDURE — 99232 SBSQ HOSP IP/OBS MODERATE 35: CPT | Performed by: INTERNAL MEDICINE

## 2025-04-06 PROCEDURE — 80053 COMPREHEN METABOLIC PANEL: CPT

## 2025-04-06 RX ORDER — LIDOCAINE 50 MG/G
1 PATCH TOPICAL DAILY
Status: DISCONTINUED | OUTPATIENT
Start: 2025-04-06 | End: 2025-04-07 | Stop reason: HOSPADM

## 2025-04-06 RX ORDER — SODIUM CHLORIDE 9 MG/ML
75 INJECTION, SOLUTION INTRAVENOUS CONTINUOUS
Status: DISPENSED | OUTPATIENT
Start: 2025-04-06 | End: 2025-04-06

## 2025-04-06 RX ADMIN — INSULIN LISPRO 1 UNITS: 100 INJECTION, SOLUTION INTRAVENOUS; SUBCUTANEOUS at 11:44

## 2025-04-06 RX ADMIN — LIDOCAINE 1 PATCH: 700 PATCH TOPICAL at 08:09

## 2025-04-06 RX ADMIN — ATENOLOL 50 MG: 25 TABLET ORAL at 08:07

## 2025-04-06 RX ADMIN — ACETAMINOPHEN 650 MG: 325 TABLET, FILM COATED ORAL at 03:37

## 2025-04-06 RX ADMIN — ALLOPURINOL 300 MG: 300 TABLET ORAL at 08:07

## 2025-04-06 RX ADMIN — ACETAMINOPHEN 650 MG: 325 TABLET, FILM COATED ORAL at 08:30

## 2025-04-06 RX ADMIN — DICLOFENAC SODIUM 2 G: 10 GEL TOPICAL at 13:09

## 2025-04-06 RX ADMIN — POTASSIUM CITRATE 10 MEQ: 10 TABLET, EXTENDED RELEASE ORAL at 13:09

## 2025-04-06 RX ADMIN — DICLOFENAC SODIUM 2 G: 10 GEL TOPICAL at 17:12

## 2025-04-06 RX ADMIN — DICLOFENAC SODIUM 2 G: 10 GEL TOPICAL at 21:06

## 2025-04-06 RX ADMIN — POTASSIUM CITRATE 10 MEQ: 10 TABLET, EXTENDED RELEASE ORAL at 08:08

## 2025-04-06 RX ADMIN — SODIUM CHLORIDE 75 ML/HR: 0.9 INJECTION, SOLUTION INTRAVENOUS at 09:46

## 2025-04-06 RX ADMIN — CHLORTHALIDONE 25 MG: 25 TABLET ORAL at 08:08

## 2025-04-06 RX ADMIN — POTASSIUM CITRATE 10 MEQ: 10 TABLET, EXTENDED RELEASE ORAL at 17:13

## 2025-04-06 NOTE — PROGRESS NOTES
Progress Note - Hospitalist   Name: Deonte Greenfield 74 y.o. male I MRN: [id number]  Unit/Bed#: S -01 I Date of Admission: 4/4/2025   Date of Service: 4/6/2025 I Hospital Day: 1    Assessment & Plan  Unwitnessed fall  Patient presents to the ED due to an unwitnessed fall/syncope/seizure episode.  Patient does not recall the event and he denies any presyncopal symptoms.  The son heard a sound from downstairs where he went immediately and found the patient laying on the kitchen floor.  Patient was unresponsive for 5-10 minutes then he gradually regained consciousness however, he was confused unable to answer questions appropriately.  His confusion lasted until he got to the emergency department.  Patient reports multiple episodes of loose stool in the past 2 days.  In the ED, there was concerns for head strike due to head laceration.  Labs were unremarkable except for elevated WBC and mildly hyponatremia.  Chest x-ray, hip x-ray negative.  CT head and spine were unremarkable per ED reading.  Patient denies fever, chills, headache, dizziness, lightheadedness, visual changes, cough, SOB, chest pain, palpitation, nausea or vomiting, abdominal pain, pain or burning while urination, hematuria, poor appetite, history of seizures, history of falls, any new mental/behavioral changes.  Patient denies any history of cardiac disease, arrhythmia, previous similar episodes.    Symptoms likely due to seizure VS mechanical form VS hypoglycemia VS less likely arrhythmia vs orthostatic hypotension due to volume depletion.    Plan:  Telemetry  Orthostatic vitals positive  Received gentle fluid IV normal saline 75 cc/HR for 10 hours. Reordered, will check orthostatics again.  EEG pending  Echocardiogram pending  Fall precautions  Neuro consulted, recommended repeat CTH given previous finding of SDH that resolved and to r/o stroke, recommend MRI brain but patient would like to pursue this outpatient in open MRI since he is  claustrophobic  Pending PT/OT recommendations  Hypertension  Continue atenolol-chlorthalidone 50/25 mg  DM (diabetes mellitus) ([facility])  Lab Results   Component Value Date    HGBA1C 6.4 (H) 04/05/2025       Recent Labs     04/05/25  0729 04/05/25  1043 04/05/25  1605 04/05/25  2110   POCGLU 151* 135 92 107       Blood Sugar Average: Last 72 hrs:  (P) 128.8  Recent Labs     04/04/25  2345 04/05/25  0425 04/05/25  0729 04/05/25  1043 04/05/25  1605 04/05/25  2110 04/06/25  0506   POCGLU  --   --    < > 135 92 107  --    GLUC 163* 156*  --   --   --   --  140    < > = values in this interval not displayed.       Hold home regimen while inpatient and resume on discharge: metformin 1000 mg twice daily  Diabetic diet  Insulin regimen  Glucose checks and Insulin correction ACHS  Goal -180 while admitted, adjusting insulin regimen as appropriate  Monitor for hypoglycemia and treat per protocol    Groin pain, right  Patient states last night he experienced some groin pain, Tylenol helped.  He states 3 weeks ago he was changing his cat litter and thinks he pulled a muscle.  Has been having pain on and off since then.  -Ordered lidocaine patch for pain down his thigh.  -PT/OT evaluation pending given patient states it is difficult for him to walk.      VTE Pharmacologic Prophylaxis: VTE Score: 3 Moderate Risk (Score 3-4) - Pharmacological DVT Prophylaxis Ordered: enoxaparin (Lovenox).    Mobility:   Basic Mobility Inpatient Raw Score: 18  JH-HLM Goal: 6: Walk 10 steps or more  JH-HLM Achieved: 6: Walk 10 steps or more  JH-HLM Goal achieved. Continue to encourage appropriate mobility.    Patient Centered Rounds: I performed bedside rounds with nursing staff today.   Discussions with Specialists or Other Care Team Provider: Dr. Coronado    Education and Discussions with Family / Patient:  will update family.     Current Length of Stay: 1 day(s)  Current Patient Status: Inpatient   Certification Statement: The patient will  continue to require additional inpatient hospital stay due to EEG, EKG, pending PT/OT eval,  Discharge Plan: Anticipate discharge in 24-48 hrs to discharge location to be determined pending rehab evaluations.    Code Status: Level 1 - Full Code    Subjective   Patient appears comfortable upon my evaluation, sitting up in chair.  Patient states that he has been tolerating oral intake and drinking well.  He complains about groin pain that he experienced last night in his right groin.  He stated Tylenol helped.  He endorses a burning like pain on his right upper thigh.  He has been having this groin pain on and off, denies any history of hernias.  States he thinks he pulled a muscle when he was changing his cat litter 3 weeks ago.  Patient states that he is having some difficulty ambulating with his groin pain, physical therapy has not seen him yet.  Patient uses a cane at home to ambulate, has needed to use a walker while being here.    Objective :  Temp:  [98 °F (36.7 °C)-98.3 °F (36.8 °C)] 98 °F (36.7 °C)  HR:  [61-71] 71  BP: (115-151)/(78-92) 115/92  Resp:  [14-15] 14  SpO2:  [93 %-96 %] 93 %    Body mass index is 36.71 kg/m².     Input and Output Summary (last 24 hours):     Intake/Output Summary (Last 24 hours) at 4/6/2025 0755  Last data filed at 4/5/2025 0900  Gross per 24 hour   Intake 120 ml   Output --   Net 120 ml       Physical Exam  Constitutional:       Appearance: Normal appearance.   HENT:      Head: Normocephalic and atraumatic.   Cardiovascular:      Rate and Rhythm: Normal rate and regular rhythm.      Pulses: Normal pulses.      Heart sounds: Normal heart sounds.   Pulmonary:      Effort: Pulmonary effort is normal.      Breath sounds: Normal breath sounds.   Abdominal:      General: Abdomen is flat. Bowel sounds are normal.      Palpations: Abdomen is soft.   Musculoskeletal:      Right lower leg: No edema.      Left lower leg: No edema.   Skin:     General: Skin is warm and dry.   Neurological:       General: No focal deficit present.      Mental Status: He is alert.      Cranial Nerves: No cranial nerve deficit.      Sensory: No sensory deficit.      Motor: No weakness.   Psychiatric:         Mood and Affect: Mood normal.           Lines/Drains:        Telemetry:  Telemetry Orders (From admission, onward)               24 Hour Telemetry Monitoring  Continuous x 24 Hours (Telem)        Expiring   Question:  Reason for 24 Hour Telemetry  Answer:  Syncope suspected to be cardiac in origin                     Telemetry Reviewed: Normal Sinus Rhythm  Indication for Continued Telemetry Use: Syncope               Lab Results: I have reviewed the following results:   Results from last 7 days   Lab Units 04/06/25  0506   WBC Thousand/uL 8.18   HEMOGLOBIN g/dL 12.1   HEMATOCRIT % 37.7   PLATELETS Thousands/uL 109*   SEGS PCT % 67   LYMPHO PCT % 18   MONO PCT % 11   EOS PCT % 2     Results from last 7 days   Lab Units 04/06/25  0506   SODIUM mmol/L 131*   POTASSIUM mmol/L 3.9   CHLORIDE mmol/L 97   CO2 mmol/L 26   BUN mg/dL 16   CREATININE mg/dL 0.79   ANION GAP mmol/L 8   CALCIUM mg/dL 9.0   ALBUMIN g/dL 3.7   TOTAL BILIRUBIN mg/dL 1.23*   ALK PHOS U/L 70   ALT U/L 14   AST U/L 16   GLUCOSE RANDOM mg/dL 140         Results from last 7 days   Lab Units 04/05/25  2110 04/05/25  1605 04/05/25  1043 04/05/25  0729 04/04/25  2340   POC GLUCOSE mg/dl 107 92 135 151* 159*     Results from last 7 days   Lab Units 04/05/25  0416   HEMOGLOBIN A1C % 6.4*     Results from last 7 days   Lab Units 04/05/25  0416   LACTIC ACID mmol/L 2.0       Recent Cultures (last 7 days):               Last 24 Hours Medication List:     Current Facility-Administered Medications:     acetaminophen (TYLENOL) tablet 650 mg, Q6H PRN    allopurinol (ZYLOPRIM) tablet 300 mg, Daily    atenolol (TENORMIN) tablet 50 mg, Daily **AND** chlorthalidone tablet 25 mg, Daily    enoxaparin (LOVENOX) subcutaneous injection 40 mg, Daily    insulin lispro  (HumALOG/ADMELOG) 100 units/mL subcutaneous injection 1-6 Units, TID AC **AND** Fingerstick Glucose (POCT), TID AC    insulin lispro (HumALOG/ADMELOG) 100 units/mL subcutaneous injection 1-6 Units, HS    potassium citrate (UROCIT-K) CR tablet 10 mEq, TID With Meals    Administrative Statements   Today, Patient Was Seen By: Zuly Higgins MD      **Please Note: This note may have been constructed using a voice recognition system.**

## 2025-04-06 NOTE — PLAN OF CARE
Problem: OCCUPATIONAL THERAPY ADULT  Goal: Performs self-care activities at highest level of function for planned discharge setting.  See evaluation for individualized goals.  Description: Treatment Interventions: ADL retraining, Functional transfer training, Neuromuscular reeducation, Compensatory technique education, Continued evaluation, Activityengagement          See flowsheet documentation for full assessment, interventions and recommendations.   Note: Limitation: Decreased ADL status, Decreased self-care trans, Decreased high-level ADLs  Prognosis: Fair  Assessment: Patient evaluated by Occupational Therapy. Patient admitted with Unwitnessed fall. The patients occupational profile, medical and therapy history includes a expanded review of medical and/or therapy records and additional review of physical, cognitive, or psychosocial history related to current functional performance. Comorbidities affecting functional mobility and ADLS include: diabetes and hypertension. Prior to admission, patient was independent with functional mobility with SPC, independent with ADLS, and requiring assist for IADLS. See above for performance levels.The evaluation identifies the following performance deficits: impaired balance, decreased endurance, decreased coordination, increased fall risk, new onset of impairment of functional mobility, decreased ADLS, decreased IADLS, pain, decreased activity tolerance, and decreased safety awareness, that result in activity limitations and/or participation restrictions. This evaluation requires clinical decision making of moderate complexity, because the patient may present with comorbidities that affect occupational performance and required minimal or moderate modification of tasks or assistance with the consideration of several treatment options. The Barthel Index was used as a functional outcome tool presenting with a score of Barthel Index Score: 65, The patient's raw score on the  AM-PAC Daily Activity Inpatient Short Form is 19. A raw score of greater than or equal to 19 suggests the patient may benefit from discharge to home. Please refer to the recommendation of the Occupational Therapist for safe discharge planning. Patient will benefit from skilled Occupational Therapy services to address above deficits and facilitate a safe return to prior level of function.     Rehab Resource Intensity Level, OT: III (Minimum Resource Intensity)

## 2025-04-06 NOTE — ASSESSMENT & PLAN NOTE
Patient presents to the ED due to an unwitnessed fall/syncope/seizure episode.  Patient does not recall the event and he denies any presyncopal symptoms.  The son heard a sound from downstairs where he went immediately and found the patient laying on the kitchen floor.  Patient was unresponsive for 5-10 minutes then he gradually regained consciousness however, he was confused unable to answer questions appropriately.  His confusion lasted until he got to the emergency department.  Patient reports multiple episodes of loose stool in the past 2 days.  In the ED, there was concerns for head strike due to head laceration.  Labs were unremarkable except for elevated WBC and mildly hyponatremia.  Chest x-ray, hip x-ray negative.  CT head and spine were unremarkable per ED reading.  Patient denies fever, chills, headache, dizziness, lightheadedness, visual changes, cough, SOB, chest pain, palpitation, nausea or vomiting, abdominal pain, pain or burning while urination, hematuria, poor appetite, history of seizures, history of falls, any new mental/behavioral changes.  Patient denies any history of cardiac disease, arrhythmia, previous similar episodes.    Symptoms likely due to seizure VS mechanical form VS hypoglycemia VS less likely arrhythmia vs orthostatic hypotension due to volume depletion.    Plan:  Telemetry  Orthostatic vitals positive  Received gentle fluid IV normal saline 75 cc/HR for 10 hours. Reordered, will check orthostatics again.  EEG pending  Echocardiogram pending  Fall precautions  Neuro consulted, recommended repeat CTH given previous finding of SDH that resolved and to r/o stroke, recommend MRI brain but patient would like to pursue this outpatient in open MRI since he is claustrophobic  Pending PT/OT recommendations

## 2025-04-06 NOTE — ASSESSMENT & PLAN NOTE
74M presenting via EMS after unwitnessed fall in at home which some heard a loud noise and found patient on ground unconscious with upper extremity shaking. After a few minutes (5-10) patient regained consciousness and was noted to be confused. Pt is amnestic to event. No tongue bite but did loose control of bowels but not bladder. Patient recalls watching Tarzan and then next thing he remembers is waking up in the ambulance.     Does report hitting his head against the side of the door to the bathroom a few weeks ago but reports its was not significant and did not fall or loose consciousness.   Epilepsy Risk Factors: None  Denies prior seizure history     Upon arrival to the ED, patient was noted to be without an focal findings. Initial VS: T 98F, HR 72, RR 20, /72. ED work up revealed EKG NSR, NC CTH showed a small subdural hemorrhage along the R posterior falx measuring up to 4 mm thickness however upon repeat CTH it appears to have spontaneously resolved in short interval follow up, labs with leukocytosis 12.47, thrombocytopenia 123, hyponatremia 133, and normal LA.     Impression: Unwitnessed fall with observed convulsion of the upper extremities with period of confusion following event, concern for possible first time seizure event. DDx include convulsive syncope.     Plan:  Frequent Neuro Checks, notify Neurology team if any acute changes in exam and obtain STAT CTH  Seizure / Fall Precautions  Telemetry Monitoring  AEDs: No indications to initiate AEDs at this time. Continue to monitor off antiepileptics. Given that this is first event concerning for seizure no role in starting an antiepileptic agent at this time  EEG: Obtain Routine EEG  Imaging Studies: Discussed obtain MRI brain rubio seizure protocol while addmited but patient states that he is claustrophobic and requesting this to be done as an outpatient in an open MRI. This can be performed at that time but request that patient asks for a disc  with his imaging to be taken to his outpatient follow up appointment with neurology.   Obtain repeat CTH in the morning to revisiualize the mentioned SDH, as well as r/o stroke   Rescue Meds: Ativan IV 2mg prn 2 complex partial seizures or 1 GTC seizure  PT/OT Evaluate and Treat  [place] DOT: Needs to be completed prior to discharge  Seizure precautions outpatient: no driving, no operating heavy machinery, no swimming alone, no climbing, no baths only showers, no cooking alone, or any activity that would put them at increased risk of harm if they were to have a seizure.   Rest of care per primary medical team

## 2025-04-06 NOTE — ASSESSMENT & PLAN NOTE
----- Message from Katrina Vilchis LPN sent at 3/15/2017  9:30 AM CDT -----  Please advise. Thanks   ----- Message -----     From: Diana Delaney     Sent: 3/15/2017   9:22 AM       To: Ag Cabrera V Staff    Pt mom(Lucila) at [phone]//states she is calling to get the results of a test done a few days ago for kidney stones//please call asap//thanks/lh     Continue atenolol-chlorthalidone 50/25 mg

## 2025-04-06 NOTE — PLAN OF CARE
Problem: Potential for Falls  Goal: Patient will remain free of falls  Description: INTERVENTIONS:- Educate patient/family on patient safety including physical limitations- Instruct patient to call for assistance with activity - Consult OT/PT to assist with strengthening/mobility - Keep Call bell within reach- Keep bed low and locked with side rails adjusted as appropriate- Keep care items and personal belongings within reach- Initiate and maintain comfort rounds- Make Fall Risk Sign visible to staff- Offer Toileting every  Hours, in advance of need- Initiate/Maintain alarm- Obtain necessary fall risk management equipment: - Apply yellow socks and bracelet for high fall risk patients- Consider moving patient to room near nurses station  INTERVENTIONS:- Educate patient/family on patient safety including physical limitations- Instruct patient to call for assistance with activity - Consult OT/PT to assist with strengthening/mobility - Keep Call bell within reach- Keep bed low and locked with side rails adjusted as appropriate- Keep care items and personal belongings within reach- Initiate and maintain comfort rounds- Make Fall Risk Sign visible to staff- Offer Toileting every  Hours, in advance of need- Initiate/Maintain alarm- Obtain necessary fall risk management equipme- Apply yellow socks and bracelet for high fall risk patients- Consider moving patient to room near nurses station  Outcome: Progressing     Problem: INFECTION - ADULT  Goal: Absence or prevention of progression during hospitalization  Description: INTERVENTIONS:- Assess and monitor for signs and symptoms of infection- Monitor lab/diagnostic results- Monitor all insertion sites, i.e. indwelling lines, tubes, and drains- Monitor endotracheal if appropriate and nasal secretions for changes in amount and color- [place] appropriate cooling/warming therapies per order- Administer medications as ordered- Instruct and encourage patient and family to use  good hand hygiene technique- Identify and instruct in appropriate isolation precautions for identified infection/condition  Outcome: Progressing  Goal: Absence of fever/infection during neutropenic period  Description: INTERVENTIONS:- Monitor WBC  Outcome: Progressing     Problem: PAIN - ADULT  Goal: Verbalizes/displays adequate comfort level or baseline comfort level  Description: Interventions:- Encourage patient to monitor pain and request assistance- Assess pain using appropriate pain scale- Administer analgesics based on type and severity of pain and evaluate response- Implement non-pharmacological measures as appropriate and evaluate response- Consider cultural and social influences on pain and pain management- Notify physician/advanced practitioner if interventions unsuccessful or patient reports new pain  Outcome: Progressing

## 2025-04-06 NOTE — UTILIZATION REVIEW
Initial Clinical Review    Admission: Date/Time/Statement:   Admission Orders (From admission, onward)       Ordered        04/05/25 0222  INPATIENT ADMISSION  Once                          Orders Placed This Encounter   Procedures    INPATIENT ADMISSION     Standing Status:   Standing     Number of Occurrences:   1     Level of Care:   Med Surg [16]     Estimated length of stay:   More than 2 Midnights     Certification:   I certify that inpatient services are medically necessary for this patient for a duration of greater than two midnights. See H&P and MD Progress Notes for additional information about the patient's course of treatment.     ED Arrival Information       Expected   -    Arrival   4/4/2025 22:44    Acuity   Urgent              Means of arrival   Ambulance    Escorted by   [facility]    Service   Hospitalist    Admission type   Emergency              Arrival complaint   EMS_Fall             Chief Complaint   Patient presents with    Fall     Pt arrives from home via EMS. Pt fell in the kitchen unsure how. +HS/ unknown LOC./ denies Blood thinners/aspirin. Small lac back of head. Pt c/o of right hip/leg pain. Ambulated to stretcher        Initial Presentation: 74 y.o. male with hx  HTN, HLD, DM, kidney stones  who presents to ED 4/4  via EMS from home with unwitnessed syncope/seizure episode. Pt son heard noises  and found pt laying on the kitchen floor, on his back, with hands clenched up to his chest. Pt was unresponsive for 5-10 minutes, then he was confused and unable to answer questions appropriately neither recalling what happened all the way until he reached the hospital. On exam in ED, pt regained full consciousness and he was alert oriented and back to baseline. Has laceration posterior scalp. Concern for head strike . reports constipation and he has a bowel regimen due to which he had 4-5 episodes of loose stool daily for the past 48 hours, he reports that his normal, he also denies any  blood in stool. Has dry mucous membranes. Labs- elevated WBC 12.48  , mild hyponatremia 133. Plt 123.  CT scan head and spine were unremarkable. EKG : NSR without ST segment abnormalities. Pt given IV Tylenol in ED. Admitted as Inpatient to telemetry 4/5 with unwitnessed fall/ syncope  2/2 seizure VS mechanical form VS hypoglycemia VS less likely arrhythmia vs orthostatic hypotension due to volume depletion. Plan- telemetry, check orthostatic vitals . IVF x 10 h . Check lactic acid. Echo . Consider neuro consult .   Anticipated Length of Stay/Certification Statement: Patient will be admitted on an Inpatient basis with an anticipated length of stay of  more than 2 midnights.   Justification for Hospital Stay: syncope.     Date: 4/5    Day 2:    + ortho statics . IVF infusing .   No focal neuro deficits on exam .  . Notified by radiology of possible 4 mm subdural hematoma present . Stat CT of the head without contrast . Lovenox DVT ppx on hold . Repeat ct head showed subdural hematoma resolved    Neurology consult-  Patient recalls watching Tarzan and then next thing he remembers is waking up in the ambulance. Does report hitting his head against the side of the door to the bathroom a few weeks ago but reports its was not significant and did not fall or loose consciousness. CTH showed a small subdural hemorrhage along the R posterior falx measuring up to 4 mm thickness however upon repeat CTH it appears to have spontaneously resolved in short interval follow up . Unwitnessed fall with observed convulsion of the upper extremities with period of confusion following event, concern for possible first time seizure event. DDx include convulsive syncope. PLan- frequent neuro checks, Seizure monitoring. Telemetry. No indication for AED's at this time . Routine EEG . Obtain repeat CTH in the morning to revisiualize the mentioned SDH, as well as r/o stroke  . Ativan IV 2mg prn 2 complex partial seizures or 1 GTC seizure . PT/OT  . Antwan DFoT for needs kathrine be completed .     Date: 4/6   Day 3: Has surpassed a 2nd midnight with active treatments and services.  IVF reordered today . Recheck orthostatic vitals . GCS  15 .No focal neuro deficits .  Repeat CT head today shows resolved SDH . Recommend MRI brain but patient would like to pursue this outpatient in open MRI since he is claustrophobic   Pending EEG, echo .  . Last night he experienced some groin pain, Tylenol helped. He states 3 weeks ago he was changing his cat litter and thinks he pulled a muscle.Pt states that he is having some difficulty ambulating with his groin pain . Patient uses a cane at home to ambulate, has needed to use a walker while being here. Ordered Lido patch . Pending PT/OT evals      ED Treatment-Medication Administration from 04/04/2025 2244 to 04/05/2025 0322         Date/Time Order Dose Route Action     04/04/2025 4658 acetaminophen (Ofirmev) injection 1,000 mg 1,000 mg Intravenous New Bag            Scheduled Medications:  allopurinol, 300 mg, Oral, Daily  atenolol, 50 mg, Oral, Daily   And  chlorthalidone, 25 mg, Oral, Daily  Diclofenac Sodium, 2 g, Topical, 4x Daily  enoxaparin, 40 mg, Subcutaneous, Daily  insulin lispro, 1-6 Units, Subcutaneous, TID AC  insulin lispro, 1-6 Units, Subcutaneous, HS  lidocaine, 1 patch, Topical, Daily  potassium citrate, 10 mEq, Oral, TID With Meals    citric acid-potassium citrate (POLYCITRA K) oral solution 5 mL  Dose: 5 mL  Freq: 3 times daily before meals Route: PO  Start: 04/05/25 0700 End: 04/05/25 1240- on hold prio to d/c     Continuous IV Infusions:  sodium chloride, 75 mL/hr, Intravenous, ContinuousStart: 04/06/25 0930     sodium chloride 0.9 % infusion  Rate: 75 mL/hr Dose: 75 mL/hr  Freq: Continuous Route: IV  Last Dose: Stopped (04/05/25 1333)  Start: 04/05/25 0330 End: 04/05/25 1328  PRN Meds:  acetaminophen, 650 mg, Oral, Q6H PRN x1 4/5, x 2 4/6       ED Triage Vitals   Temperature Pulse Respirations Blood  Pressure SpO2 Pain Score   04/04/25 2250 04/04/25 2250 04/04/25 2250 04/04/25 2253 04/04/25 2250 04/05/25 0230   98 °F (36.7 °C) 72 20 158/72 97 % No Pain     Weight (last 2 days)       Date/Time Weight    04/04/25 2253 110 (241.4)            Vital Signs (last 3 days)       Date/Time Temp Pulse Resp BP MAP (mmHg) SpO2 O2 Device Patient Position - Orthostatic VS [place] Coma Scale Score Pain    04/06/25 1225 -- -- -- -- -- -- None (Room air) -- -- No Pain    04/06/25 11:47:30 -- 59 -- 124/58 80 95 % -- -- -- --    04/06/25 1147 -- -- -- 124/58 -- -- -- -- -- --    04/06/25 10:56:41 98.6 °F (37 °C) 54 -- 103/64 77 94 % -- -- -- --    04/06/25 0800 -- -- -- -- -- -- None (Room air) -- 15 --    04/06/25 07:54:36 98 °F (36.7 °C) 71 -- 115/92 100 93 % -- -- -- --    04/06/25 0337 -- -- -- -- -- -- -- -- -- 8    04/05/25 22:39:38 98.3 °F (36.8 °C) 61 14 149/80 103 96 % -- -- -- --    04/05/25 2138 -- -- -- -- -- -- -- -- -- 6    04/05/25 2000 -- -- -- -- -- -- -- -- 15 No Pain    04/05/25 15:36:47 98.3 °F (36.8 °C) 61 15 151/78 102 96 % -- -- -- --    04/05/25 1300 -- -- -- -- -- -- -- -- -- No Pain    04/05/25 1215 -- -- -- -- -- -- -- -- 15 --    04/05/25 07:29:59 98.2 °F (36.8 °C) 67 -- 161/73 102 93 % -- -- -- --    04/05/25 03:56:56 -- 78 -- 94/52 66 94 % -- Standing for 3 minutes - Orthostatic VS 15 No Pain    04/05/25 03:52:36 -- 78 -- 116/91 99 94 % -- Standing - Orthostatic VS -- --    04/05/25 03:50:24 -- 76 -- 159/66 97 94 % -- Sitting - Orthostatic VS -- --    04/05/25 03:49:52 -- 76 -- 159/66 97 94 % -- Lying - Orthostatic VS -- --    04/05/25 03:47:21 98.4 °F (36.9 °C) 73 -- 163/80 108 93 % -- Lying -- --    04/05/25 03:42:33 98.4 °F (36.9 °C) 69 -- 152/67 95 94 % -- -- -- --    04/05/25 03:27:48 -- 69 -- 152/67 95 96 % -- -- -- --    04/05/25 0230 -- 73 18 160/74 107 94 % None (Room air) Sitting -- No Pain    04/05/25 0130 -- 70 -- 165/75 108 93 % -- -- -- --    04/05/25 0114 -- 73 20 181/82 -- 95 % None  (Room air) Sitting -- --    04/05/25 0016 -- -- -- -- -- -- -- -- 15 --    04/04/25 2253 -- -- -- 158/72 -- -- -- -- -- --    04/04/25 2250 98 °F (36.7 °C) 72 20 -- -- 97 % None (Room air) -- -- --              Pertinent Labs/Diagnostic Test Results:   Radiology:  CT head wo contrast   Final Interpretation by Price Meehan MD (04/06 0940)      Previously noted parafalcine subdural hematoma has resolved. There is no new intra or extra-axial hemorrhage.      Prominence of the bilateral frontoparietal extra-axial CSF spaces likely representing a combination of volume loss and chronic subdural hygromas.      Mild chronic microangiopathic ischemic changes.                  Workstation performed: [id number]         CT head wo contrast   Final Interpretation by Carlito Saldivar MD (04/05 0915)      Previously identified tiny right parafalcine subdural hematoma has resolved. No new intracranial hemorrhage.                  Workstation performed: [id number]         XR hip/pelv 2-3 vws right   ED Interpretation by Victor M Bah MD (04/05 0042)   My personal interpretation-no acute osseous abnormalities or dislocations appreciated.  Arthritic changes present.      Final Interpretation by Davey No MD (04/06 0835)      No acute osseous abnormality. 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      Computerized Assisted Algorithm (CAA) may have been used to analyze all applicable images.               Resident: Blayne Gillespie I, the attending radiologist, have reviewed the images and agree with the final report above.      Workstation performed: [id number]         XR chest 1 view portable   ED Interpretation by Victor M Bah MD (04/05 0152)   My personal interpretation-no acute cardiopulmonary disease appreciated.      Final Interpretation by Lavinia Maldonado MD (04/05 6530)      No acute cardiopulmonary disease.      No acute displaced fractures.             Workstation performed: [id number]         CT head without contrast   Final Interpretation by Meenakshi Bland MD (04/05 0748)      Small subdural hemorrhage along the right posterior falx, measuring up to 4 mm thickness. Close clinical correlation and follow-up recommended. Recommend follow-up head CT now.      There is a significant additional finding or different interpretation from the [facility] preliminary report which was provided shortly after completion of the exam. The finding of subdural hemorrhage may alter immediate patient management,    therefore we will now contact referring physicians for direct verbal discussion.         I personally discussed this study with SHALINI RAMIRO on 4/5/2025 7:29 AM.                        Workstation performed: [id number]         CT cervical spine without contrast   Final Interpretation by Meenakshi Bland MD (04/05 0743)      No acute cervical spine fracture or traumatic malalignment. Multilevel degenerative changes are present.      Atherosclerosis.      The major findings are in agreement with the preliminary report provided by [facility] which was provided shortly after completion of the exam.               Workstation performed: [id number]           Cardiology:   4/4 ECG- ED read    Normal sinus rhythm, ventricular rate 74, MA interval 168, QRS 94, QTc   412, normal axis, isolated T wave inversions in high lateral lead aVL, no   delta waves of WPW, no evidence of Brugada syndrome, no epsilon waves of   ARVD, nothing to suggest hypertrophic cardiomyopathy, no bundle-branch   blocks or AV blocks, no STEMI.  No prior EKG available for my review.   ECG 12 lead    by Interface, Ris Results In (04/04 2255)              Results from last 7 days   Lab Units 04/06/25  0506 04/05/25  0425 04/04/25  2345   WBC Thousand/uL 8.18 11.22* 12.48*   HEMOGLOBIN g/dL 12.1 12.0 12.5   HEMATOCRIT % 37.7 36.4* 38.8   PLATELETS Thousands/uL 109* 113* 123*   TOTAL NEUT  ABS Thousands/µL 5.61 9.37* 9.52*         Results from last 7 days   Lab Units 04/06/25  0506 04/05/25  0425 04/04/25  2345   SODIUM mmol/L 131* 132* 133*   POTASSIUM mmol/L 3.9 4.2 3.9   CHLORIDE mmol/L 97 98 97   CO2 mmol/L 26 26 27   ANION GAP mmol/L 8 8 9   BUN mg/dL 16 16 16   CREATININE mg/dL 0.79 0.76 0.84   EGFR ml/min/1.73sq m 88 89 86   CALCIUM mg/dL 9.0 8.9 9.2     Results from last 7 days   Lab Units 04/06/25  0506 04/04/25  2345   AST U/L 16 23   ALT U/L 14 18   ALK PHOS U/L 70 80   TOTAL PROTEIN g/dL 7.3 7.5   ALBUMIN g/dL 3.7 3.8   TOTAL BILIRUBIN mg/dL 1.23* 0.57     Results from last 7 days   Lab Units 04/06/25  1056 04/06/25  0755 04/05/25  2110 04/05/25  1605 04/05/25  1043 04/05/25  0729 04/04/25  2340   POC GLUCOSE mg/dl 154* 137 107 92 135 151* 159*     Results from last 7 days   Lab Units 04/06/25  0506 04/05/25  0425 04/04/25  2345   GLUCOSE RANDOM mg/dL 140 156* 163*         Results from last 7 days   Lab Units 04/05/25  0416   HEMOGLOBIN A1C % 6.4*   EAG mg/dl 137             Results from last 7 days   Lab Units 04/05/25  0416   LACTIC ACID mmol/L 2.0                   Results from last 7 days   Lab Units 04/05/25  0110   CLARITY UA  Clear   COLOR UA  Light Yellow   SPEC GRAV UA  1.016   PH UA  7.5   GLUCOSE UA mg/dl Negative   KETONES UA mg/dl Negative   BLOOD UA  Negative   PROTEIN UA mg/dl Negative   NITRITE UA  Negative   BILIRUBIN UA  Negative   UROBILINOGEN UA (BE) mg/dl <2.0   LEUKOCYTES UA  Negative             Past Medical History:   Diagnosis Date    History of kidney stones 04/05/2025     Present on Admission:  **None**      Admitting Diagnosis: Syncope [R55]  Closed head injury, initial encounter [S09.90XA]  Multiple injuries [T07.XXXA]  Age/Sex: 74 y.o. male    Network Utilization Review Department  ATTENTION: Please call with any questions or concerns to [phone] and carefully listen to the prompts so that you are directed to the right person. All voicemails are  confidential.   For Discharge needs, contact Care Management DC Support Team at [phone] opt. 2  Send all requests for admission clinical reviews, approved or denied determinations and any other requests to dedicated fax number below belonging to the campus where the patient is receiving treatment. List of dedicated fax numbers for the Facilities:  FACILITY NAME UR FAX NUMBER   ADMISSION DENIALS (Administrative/Medical Necessity) [phone]   DISCHARGE SUPPORT TEAM (NETWORK) [phone]   PARENT CHILD HEALTH (Maternity/NICU/Pediatrics) [phone]   [facility] [phone]   [facility] [phone]   [facility] [phone]   [facility] [phone]   [facility] [phone]   [facility] [phone]   [facility] [phone]   [facility] [phone]   [facility] [phone]   [facility] [phone]   [facility] [phone]   [facility] [phone]

## 2025-04-06 NOTE — OCCUPATIONAL THERAPY NOTE
Occupational Therapy Evaluation     Patient Name: Deonte Greenfield  Today's Date: 4/6/2025  Problem List  Principal Problem:    Unwitnessed fall  Active Problems:    Hypertension    DM (diabetes mellitus) (HCC)    Groin pain, right    Past Medical History  Past Medical History:   Diagnosis Date    History of kidney stones 04/05/2025     Past Surgical History  Past Surgical History:   Procedure Laterality Date    TOTAL HIP ARTHROPLASTY Bilateral          04/06/25 1445   Note Type   Note type Evaluation   Pain Assessment   Pain Assessment Tool 0-10   Restrictions/Precautions   Other Precautions Bed Alarm;Chair Alarm;Fall Risk;Pain;Multiple lines   Home Living   Type of Home House  (1 level 1 HERMILO)   Home Layout One level;Able to live on main level with bedroom/bathroom   Bathroom Shower/Tub Walk-in shower   Bathroom Toilet Raised   Bathroom Equipment Grab bars in shower   Home Equipment Cane   Additional Comments Per patient uses a SPC to ambulate at baseline.   Prior Function   Level of [place] Independent with ADLs;Independent with functional mobility;Needs assistance with IADLS   Lives With Spouse   Receives Help From Family   IADLs Independent with medication management;Independent with driving;Family/Friend/Other provides meals   Falls in the last 6 months 1 to 4  (This admission)   Comments Per spouse she performes the cooking and cleaning   General   Additional Pertinent History X-Ray:o acute osseous abnormality. 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   Additional General Comments Patient is s/p a unwitnessed fall/syncope/seizure episode.   ADL   Eating Assistance 7  Independent   Grooming Assistance 5  Supervision/Setup   UB Bathing Assistance 5  Supervision/Setup   UB Dressing Assistance 5  Supervision/Setup   LB Dressing Assistance 2  Maximal Assistance   Additional Comments Patient was educated on compensatory strategies with dressing due to pain   Bed  Mobility   Additional Comments unable to assess received in recliner   Transfers   Sit to Stand 5  Supervision   Additional items Assist x 1;Verbal cues   Stand to Sit 5  Supervision   Additional items Assist x 1;Increased time required;Verbal cues   Toilet transfer 5  Supervision   Additional items Assist x 1;Increased time required;Verbal cues   Functional Mobility   Additional Comments Patient ambulated SHD with RW at Sup. Pt required verbal cues with negotiating the RW   Balance   Static Sitting Good   Dynamic Sitting Good   Static Standing Fair   Dynamic Standing Fair -   Activity Tolerance   Activity Tolerance Patient limited by pain   Nurse Made Aware   (RN)   RUE Assessment   RUE Assessment WFL   LUE Assessment   LUE Assessment WFL   Assessment   Limitation Decreased ADL status;Decreased self-care trans;Decreased high-level ADLs   Prognosis Fair   Assessment Patient evaluated by Occupational Therapy. Patient admitted with Unwitnessed fall. The patients occupational profile, medical and therapy history includes a expanded review of medical and/or therapy records and additional review of physical, cognitive, or psychosocial history related to current functional performance. Comorbidities affecting functional mobility and ADLS include: diabetes and hypertension. Prior to admission, patient was independent with functional mobility with SPC, independent with ADLS, and requiring assist for IADLS. See above for performance levels.The evaluation identifies the following performance deficits: impaired balance, decreased endurance, decreased coordination, increased fall risk, new onset of impairment of functional mobility, decreased ADLS, decreased IADLS, pain, decreased activity tolerance, and decreased safety awareness, that result in activity limitations and/or participation restrictions. This evaluation requires clinical decision making of moderate complexity, because the patient may present with comorbidities  "that affect occupational performance and required minimal or moderate modification of tasks or assistance with the consideration of several treatment options. The Barthel Index was used as a functional outcome tool presenting with a score of Barthel Index Score: 65, The patient's raw score on the -PAC Daily Activity Inpatient Short Form is 19. A raw score of greater than or equal to 19 suggests the patient may benefit from discharge to home. Please refer to the recommendation of the Occupational Therapist for safe discharge planning. Patient will benefit from skilled Occupational Therapy services to address above deficits and facilitate a safe return to prior level of function.   Goals   Patient Goals \"less pain in groin\"   LTG Time Frame   (8-14)   Long Term Goal #1 see below   Plan   Treatment Interventions ADL retraining;Functional transfer training;Neuromuscular reeducation;Compensatory technique education;Continued evaluation;Activityengagement   Goal Expiration Date 04/20/25   OT Frequency 1-2x/wk   Discharge Recommendation   Rehab Resource Intensity Level, OT III (Minimum Resource Intensity)   AM-PAC Daily Activity Inpatient   Lower Body Dressing 2   Bathing 3   Toileting 3   Upper Body Dressing 3   Grooming 4   Eating 4   Daily Activity Raw Score 19   Daily Activity Standardized Score (Calc for Raw Score >=11) 40.22   AM-PAC Applied Cognition Inpatient   Following a Speech/Presentation 4   Understanding Ordinary Conversation 4   Taking Medications 4   Remembering Where Things Are Placed or Put Away 4   Remembering List of 4-5 Errands 4   Taking Care of Complicated Tasks 4   Applied Cognition Raw Score 24   Applied Cognition Standardized Score 62.21   Barthel Index   Feeding 10   Bathing 5   Grooming Score 5   Dressing Score 5   Bladder Score 10   Bowels Score 10   Toilet Use Score 5   Transfers (Bed/Chair) Score 10   Mobility (Level Surface) Score 0   Stairs Score 5   Barthel Index Score 65   End of " Consult   Patient Position at End of Consult Bed/Chair alarm activated;All needs within reach;Bedside chair   GOALS    1) Pt will improve activity tolerance to G for 30 min txment sessions for increased engagement in functional tasks    2) Pt will complete UB/LB dressing/self care w/ mod I using adaptive device and DME as needed    3) Pt will complete bathing w/ Mod I w/ use of AE and DME as needed    4) Pt will complete toileting w/ mod I w/ G hygiene/thoroughness using DME as needed    5) Pt will improve functional transfers to Mod I on/off all surfaces using DME as needed w/ G balance/safety     6) Pt will improve functional mobility during ADL/IADL/leisure tasks to Mod I using DME as needed w/ G balance/safety     7) Pt will improve bed mobility to Mod I and sit EOB w/ G balance/trunk control as a prerequisite for further engagement in meaningful tasks

## 2025-04-06 NOTE — ASSESSMENT & PLAN NOTE
Patient states last night he experienced some groin pain, Tylenol helped.  He states 3 weeks ago he was changing his cat litter and thinks he pulled a muscle.  Has been having pain on and off since then.  -Ordered lidocaine patch for pain down his thigh.  -PT/OT evaluation pending given patient states it is difficult for him to walk.

## 2025-04-06 NOTE — ASSESSMENT & PLAN NOTE
Lab Results   Component Value Date    HGBA1C 6.4 (H) 04/05/2025       Recent Labs     04/05/25  0729 04/05/25  1043 04/05/25  1605 04/05/25  2110   POCGLU 151* 135 92 107       Blood Sugar Average: Last 72 hrs:  (P) 128.8  Recent Labs     04/04/25  2345 04/05/25  0425 04/05/25  0729 04/05/25  1043 04/05/25  1605 04/05/25  2110 04/06/25  0506   POCGLU  --   --    < > 135 92 107  --    GLUC 163* 156*  --   --   --   --  140    < > = values in this interval not displayed.       Hold home regimen while inpatient and resume on discharge: metformin 1000 mg twice daily  Diabetic diet  Insulin regimen  Glucose checks and Insulin correction ACHS  Goal -180 while admitted, adjusting insulin regimen as appropriate  Monitor for hypoglycemia and treat per protocol

## 2025-04-07 ENCOUNTER — TELEPHONE (OUTPATIENT)
Age: 75
End: 2025-04-07

## 2025-04-07 ENCOUNTER — APPOINTMENT (INPATIENT)
Dept: NEUROLOGY | Facility: HOSPITAL | Age: 75
End: 2025-04-07
Payer: COMMERCIAL

## 2025-04-07 VITALS
TEMPERATURE: 98.7 F | WEIGHT: 242.51 LBS | HEIGHT: 68 IN | SYSTOLIC BLOOD PRESSURE: 115 MMHG | BODY MASS INDEX: 36.75 KG/M2 | DIASTOLIC BLOOD PRESSURE: 45 MMHG | HEART RATE: 59 BPM | OXYGEN SATURATION: 92 % | RESPIRATION RATE: 18 BRPM

## 2025-04-07 PROBLEM — R93.1 ABNORMAL ECHOCARDIOGRAM: Status: ACTIVE | Noted: 2025-04-07

## 2025-04-07 PROBLEM — D69.6 THROMBOCYTOPENIA (HCC): Status: ACTIVE | Noted: 2025-04-07

## 2025-04-07 PROBLEM — R55 SYNCOPE: Status: ACTIVE | Noted: 2025-04-07

## 2025-04-07 LAB
ALBUMIN SERPL BCG-MCNC: 3.7 G/DL (ref 3.5–5)
ALP SERPL-CCNC: 68 U/L (ref 34–104)
ALT SERPL W P-5'-P-CCNC: 12 U/L (ref 7–52)
ANION GAP SERPL CALCULATED.3IONS-SCNC: 6 MMOL/L (ref 4–13)
AST SERPL W P-5'-P-CCNC: 13 U/L (ref 13–39)
BASOPHILS # BLD AUTO: 0.05 THOUSANDS/ÂΜL (ref 0–0.1)
BASOPHILS NFR BLD AUTO: 1 % (ref 0–1)
BILIRUB SERPL-MCNC: 0.97 MG/DL (ref 0.2–1)
BUN SERPL-MCNC: 21 MG/DL (ref 5–25)
CALCIUM SERPL-MCNC: 9.1 MG/DL (ref 8.4–10.2)
CHLORIDE SERPL-SCNC: 99 MMOL/L (ref 96–108)
CO2 SERPL-SCNC: 28 MMOL/L (ref 21–32)
CREAT SERPL-MCNC: 0.92 MG/DL (ref 0.6–1.3)
EOSINOPHIL # BLD AUTO: 0.14 THOUSAND/ÂΜL (ref 0–0.61)
EOSINOPHIL NFR BLD AUTO: 2 % (ref 0–6)
ERYTHROCYTE [DISTWIDTH] IN BLOOD BY AUTOMATED COUNT: 15.2 % (ref 11.6–15.1)
GFR SERPL CREATININE-BSD FRML MDRD: 81 ML/MIN/1.73SQ M
GLUCOSE SERPL-MCNC: 106 MG/DL (ref 65–140)
GLUCOSE SERPL-MCNC: 111 MG/DL (ref 65–140)
GLUCOSE SERPL-MCNC: 122 MG/DL (ref 65–140)
HCT VFR BLD AUTO: 35.1 % (ref 36.5–49.3)
HGB BLD-MCNC: 11.6 G/DL (ref 12–17)
IMM GRANULOCYTES # BLD AUTO: 0.02 THOUSAND/UL (ref 0–0.2)
IMM GRANULOCYTES NFR BLD AUTO: 0 % (ref 0–2)
LYMPHOCYTES # BLD AUTO: 1.84 THOUSANDS/ÂΜL (ref 0.6–4.47)
LYMPHOCYTES NFR BLD AUTO: 24 % (ref 14–44)
MCH RBC QN AUTO: 30.4 PG (ref 26.8–34.3)
MCHC RBC AUTO-ENTMCNC: 33 G/DL (ref 31.4–37.4)
MCV RBC AUTO: 92 FL (ref 82–98)
MONOCYTES # BLD AUTO: 0.95 THOUSAND/ÂΜL (ref 0.17–1.22)
MONOCYTES NFR BLD AUTO: 12 % (ref 4–12)
NEUTROPHILS # BLD AUTO: 4.68 THOUSANDS/ÂΜL (ref 1.85–7.62)
NEUTS SEG NFR BLD AUTO: 61 % (ref 43–75)
NRBC BLD AUTO-RTO: 0 /100 WBCS
PLATELET # BLD AUTO: 97 THOUSANDS/UL (ref 149–390)
PMV BLD AUTO: 13.4 FL (ref 8.9–12.7)
POTASSIUM SERPL-SCNC: 4.2 MMOL/L (ref 3.5–5.3)
PROT SERPL-MCNC: 7.3 G/DL (ref 6.4–8.4)
RBC # BLD AUTO: 3.81 MILLION/UL (ref 3.88–5.62)
SODIUM SERPL-SCNC: 133 MMOL/L (ref 135–147)
WBC # BLD AUTO: 7.68 THOUSAND/UL (ref 4.31–10.16)

## 2025-04-07 PROCEDURE — 99239 HOSP IP/OBS DSCHRG MGMT >30: CPT | Performed by: INTERNAL MEDICINE

## 2025-04-07 PROCEDURE — 95816 EEG AWAKE AND DROWSY: CPT

## 2025-04-07 PROCEDURE — 80053 COMPREHEN METABOLIC PANEL: CPT | Performed by: INTERNAL MEDICINE

## 2025-04-07 PROCEDURE — 85025 COMPLETE CBC W/AUTO DIFF WBC: CPT | Performed by: INTERNAL MEDICINE

## 2025-04-07 PROCEDURE — 82948 REAGENT STRIP/BLOOD GLUCOSE: CPT

## 2025-04-07 PROCEDURE — 95816 EEG AWAKE AND DROWSY: CPT | Performed by: STUDENT IN AN ORGANIZED HEALTH CARE EDUCATION/TRAINING PROGRAM

## 2025-04-07 RX ORDER — ALLOPURINOL 300 MG/1
300 TABLET ORAL DAILY
COMMUNITY

## 2025-04-07 RX ORDER — ATENOLOL AND CHLORTHALIDONE TABLET 50; 25 MG/1; MG/1
1 TABLET ORAL DAILY
COMMUNITY

## 2025-04-07 RX ORDER — ATORVASTATIN CALCIUM 10 MG/1
10 TABLET, FILM COATED ORAL
COMMUNITY

## 2025-04-07 RX ORDER — VALSARTAN 320 MG/1
320 TABLET ORAL DAILY
COMMUNITY
Start: 2024-11-26

## 2025-04-07 RX ORDER — POTASSIUM CITRATE AND CITRIC ACID MONOHYDRATE 1100; 334 MG/5ML; MG/5ML
5 SOLUTION ORAL
COMMUNITY

## 2025-04-07 RX ADMIN — ATENOLOL 50 MG: 25 TABLET ORAL at 08:01

## 2025-04-07 RX ADMIN — DICLOFENAC SODIUM 2 G: 10 GEL TOPICAL at 11:38

## 2025-04-07 RX ADMIN — POTASSIUM CITRATE 10 MEQ: 10 TABLET, EXTENDED RELEASE ORAL at 08:01

## 2025-04-07 RX ADMIN — CHLORTHALIDONE 25 MG: 25 TABLET ORAL at 08:01

## 2025-04-07 RX ADMIN — DICLOFENAC SODIUM 2 G: 10 GEL TOPICAL at 08:01

## 2025-04-07 RX ADMIN — ENOXAPARIN SODIUM 40 MG: 40 INJECTION SUBCUTANEOUS at 08:01

## 2025-04-07 RX ADMIN — ALLOPURINOL 300 MG: 300 TABLET ORAL at 08:01

## 2025-04-07 RX ADMIN — POTASSIUM CITRATE 10 MEQ: 10 TABLET, EXTENDED RELEASE ORAL at 11:39

## 2025-04-07 NOTE — PHYSICAL THERAPY NOTE
"PHYSICAL THERAPY EVALUATION  NAME:  Deonte Attdallas  DATE: 04/06/25    AGE:   74 y.o.  Mrn:   [id number]  Principal problem: Principal Problem:    Unwitnessed fall  Active Problems:    Hypertension    DM (diabetes mellitus) (HCC)    Groin pain, right      Vitals:    04/06/25 1147 04/06/25 1147 04/06/25 1330 04/06/25 1555   BP: 124/58 124/58 124/58 127/59   Pulse:  59 60 60   Resp:       Temp:    98.4 °F (36.9 °C)   SpO2:  95%  94%   Weight:   110 kg (242 lb 8.1 oz)    Height:   5' 8\" (1.727 m)        Length Of Stay: 1  Performed at least 2 patient identifiers during session: Name and ID bracelet  PHYSICAL THERAPY EVALUATION :    04/06/25 1450   PT Last Visit   PT Visit Date 04/06/25   Note Type   Note type Evaluation   Pain Assessment   Pain Assessment Tool FLACC   Pain Location/Orientation Orientation: Right  (medial thigh)   Pain Radiating Towards 0   Effect of Pain on Daily Activities limits eccentric >concentric at medial R thigh, but not reproduced at this time.  Pt reports recently having pain medication/patch/ ointment that he stated is asssiting his pain.   Patient's Stated Pain Goal No pain   Hospital Pain Intervention(s) Medication (See MAR)  (cold offered to pt at medial thigh)   Multiple Pain Sites No   Pain Rating: FLACC (Rest) - Face 0   Pain Rating: FLACC (Rest) - Legs 1   Pain Rating: FLACC (Rest) - Activity 0   Pain Rating: FLACC (Rest) - Cry 0   Pain Rating: FLACC (Rest) - Consolability 0   Score: FLACC (Rest) 1   Pain Rating: FLACC (Activity) - Face 1   Pain Rating: FLACC (Activity) - Legs 1   Pain Rating: FLACC (Activity) - Activity 1   Pain Rating: FLACC (Activity) - Cry 1   Pain Rating: FLACC (Activity) - Consolability 0   Score: FLACC (Activity) 4   Restrictions/Precautions   Weight Bearing Precautions Per Order No   Other Precautions Bed Alarm;Chair Alarm;Fall Risk   Home Living   Type of Home House   Home Layout One level  (1STE)   Home Equipment Cane  (does have access to a walker if " needed)   Additional Comments sleeps on side in sofe or on recliner   Prior Function   Level of [place] Independent with functional mobility   Lives With Spouse   Receives Help From Family   IADLs Independent with medication management   Falls in the last 6 months 1 to 4   Vocational   (worked as a  in the past)   General   Family/Caregiver Present   (spouse and son)   Cognition   Overall Cognitive Status WFL   Arousal/Participation Alert   Orientation Level Oriented X4   Subjective   Subjective Pt pleasant and cooperative, attributed R medial thigh injury to maneuvering to manage Cat litter at home.   RLE Assessment   RLE Assessment   (only intermittent grimacing with hip ER+hip flexion)   Strength RLE   R Hip Flexion 3/5   R Knee Extension 4/5   R Ankle Dorsiflexion 4+/5   Strength LLE   L Hip Flexion 4/5   L Knee Extension 4+/5   L Ankle Dorsiflexion 4+/5   Light Touch   RLE Light Touch Grossly intact   LLE Light Touch Grossly intact   Bed Mobility   Supine to Sit   (NT as pt OOB in recliner)   Transfers   Sit to Stand 5  Supervision   Additional items Increased time required;Armrests   Stand to Sit 5  Supervision   Additional items Increased time required;Armrests   Ambulation/Elevation   Gait pattern Antalgic;Excessively slow;Step to  (increased grimacing w/ RLE advancement and RLE WB during advance/retreat.)   Gait Assistance 5  Supervision  (vs steadying support)   Additional items Assist x 1;Verbal cues   Assistive Device Rolling walker   Distance advance retreat x3 reps RLE and 2 sets x3 reps LLE   Stair Management Assistance Not tested   Balance   Static Sitting Good   Dynamic Sitting Good   Static Standing Fair   Dynamic Standing Fair -   Endurance Deficit   Endurance Deficit Yes   Endurance Deficit Description lmited amb distance and standing tolerance   Activity Tolerance   Activity Tolerance Patient limited by pain;Patient limited by fatigue   Medical Staff Made Aware care coordination w/ Dariana  "from OT   Nurse Made Aware spoke to RN post session, including re ice for R thigh. :   Assessment:   Pt is a 74 y.o. male who [facility] on 4/4/2025 w/ Unwitnessed fall. Neuro DDx include convulsive syncope.  CT head and R hip xrays reviewed. Order placed for PT.  Neuro recommending seizure precautions outpatient. However pt reports having R groin pain for some time.      Prior to admission: Pt lived w/ s/o in one story home with 1 HERMILO, and typically used cane for amb.  Upon evaluation: Pt needed no physical A for transfers but intermittent steadying A for ambulation w/rolling walker and instruction for walker management.      Pt's clinical presentation is currently unstable/unpredictable given the functional mobility deficits above, especially (but not limited to) gait deviations and pain, and combined with medical complications including abnormal WBCs, readmission to hospital, and abnormal sodium values.  Pt IS NOT at his mobility baseline.      During this admission, pt would benefit from continued skilled inpatient PT in the acute care setting in order to address deficits as defined above to maximize function and mobility.      Recommendations:    From a PT standpoint, recommend next several sessions focus on continued mobility trial to promote definitive A device, therex with slow progress open to closed chain, stair training.     Prognosis Fair   Problem List Decreased strength;Decreased endurance;Impaired balance;Decreased mobility;Pain;Obesity;Decreased coordination  (gait deviations)   Barriers to Discharge Decreased caregiver support;Inaccessible home environment   Goals   Patient Goals \"less pain in groin\"   STG Expiration Date 04/16/25   Short Term Goal #1 Goals: Pt will: Perform rolling  and supine<>sit bed mobility tasks with modified I to prepare for transfers and reposition in bed. Perform transfers with modified I to promote proper hand placement and approach. Perform ambulation " with LRAD for up to 50' with modified I to increase Indep in home environment and promote proper use of assistive device. Perform 1 step w/ railing +/- DME and w/Supervision to return to home with HERMILO. Perform at least 2 HEP w/o physical A to demonstrate compliance w/therex and improve ease of transfers and improve ease of bed mobility.   PT Treatment Day 1   Plan   Treatment/Interventions Functional transfer training;LE strengthening/ROM;Elevations;Therapeutic exercise;Endurance training;Patient/family training;Equipment eval/education;Bed mobility;Gait training;Spoke to nursing;OT;Family   PT Frequency 3-5x/wk   Discharge Recommendation   Rehab Resource Intensity Level, PT III (Minimum Resource Intensity)  (Pt/family report preferring HHPT >OPPT)   Equipment Recommended Walker   AM-PAC Basic Mobility Inpatient   Turning in Flat Bed Without Bedrails 3   Lying on Back to Sitting on Edge of Flat Bed Without Bedrails 3   Moving Bed to Chair 3   Standing Up From Chair Using Arms 3   Walk in Room 3   Climb 3-5 Stairs With Railing 1   Basic Mobility Inpatient Raw Score 16   Basic Mobility Standardized Score 38.32   [facility] Highest Level Of Mobility   -HL Goal 5: Stand one or more mins   -HL Achieved 7: Walk 25 feet or more  (during treatment)   Additional Treatment Session   Start Time 1515   End Time 1549   Treatment Assessment Pt /family verbalized understanding of education and potential progression of therapy in the future, jeffrey w/using BUE support vs unilateral support (preferably on non involved side). Pt displays less gait deviations when using walker > one sided device. Anticipate pt will be able to perform HERMILO jeffrey if using recommended sequencing as described. SKilled PT recommended to progress pt toward treatment goals.   Equipment Use rolling walker, and trials w/unilateral DME including SPC and SBQC.   Additional Treatment Day 1   Exercises   Neuro re-ed Transfers continued S. Amb w/rolling walker  "for 20'x2 w/ S, with pt abandoning walker when close to target. Post RW trial, Pt performed pregait activities such as advance-retreat w/quad cane and SPC on both involved and uninvolved side--Pt using unilateral device with open portion of cane forward--using device more for support than for steadying. Pt/spouse verbally instructed on sequencing with stairs for HERMILO @ home, and recommended progression for therex. Ice provided to pt.   End of Consult   Patient Position at End of Consult All needs within reach;Bed/Chair alarm activated;Bedside chair   Pt requires PT /OT co-eval due to required skilled interventions of at least 2 clinicians for care delivery, medical complexity, limited activity tolerance.   PT and OT goals addressed separately.   (Please find full objective findings from PT assessment regarding body systems outlined above).     The patient's -[facility] Basic Mobility Inpatient Short Form Raw Score is 16. A Raw score of less than or equal to 16 suggests the patient may benefit from discharge to post-acute rehabilitation services, which DOES NOT coincide with EVENTUAL above PT recommendations.     However please refer to therapist recommendation for discharge planning given other factors that may influence destination.     Adapted from Freeman DEAN, Frank J, Manju J, Denise J. [facility] “6-Clicks” Basic Mobility and Daily Activity Scores With Discharge Destination. Physical Therapy, 2021;101:1-9. DOI: 10.1093/[email or web address]    Portions of the record may have been created with voice recognition software.  Occasional wrong word or \"sound a like\" substitutions may have occurred due to the inherent limitations of voice recognition software.  Read the chart carefully and recognize, using context, where substitutions have occurred    "

## 2025-04-07 NOTE — QUICK NOTE
[facility] Service Attending Physician Attestation Note - Discharge    I have seen and examined Deonte Greenfield personally and have reviewed the medical record independently.  I have reviewed the case with the resident physician including all assessments and the plan of care for each.  I agree with the resident physician and offer the following addendum to the below statements by the resident physician:     Date Evaluated: April 7th 2025  Time Evaluated: morning.     Subjective / HPI:     This is a 74-year-old male with syncopal episode.  Neuro were consulted for questionable seizure, EEG has been performed and is pending at the time of my encounter with the patient.  Discussed with neurology could possibly be discharged without report however now that the EEG has been done we will anny up the report prior to discharge.    Regardless patient has no further syncopal episodes, and is asymptomatic with no dizziness or lightheadedness.  He is able to ambulate independently and was assessed by PT and OT yesterday and determined to be a level 3 with no inpatient rehab needs.    He will need to follow-up with neurology.  In addition I have recommended that he follow-up with cardiology and we have placed a cardiology referral in the chart for him to be assessed for a Zio patch.  Both him and his son and wife are in understanding.  He also understands that if he experiences any further syncopal episodes or lightheadedness or dizziness he should come to the ER for evaluation.    So explained to patient that he will need routine lab work done including a basic metabolic panel in 3 to 5 days and follow-up with the primary care doctor.    Exam:   Regular, chest clear, abdomen soft nondistended, bowel sounds present, extremities no edema, strength is 5 out of 5 throughout, normal reflexes, cranial nerves II through XII grossly intact, the patient is not confused and cheerful making jokes eager to be discharged  home.      Patient Centered Rounds: I have performed bedside rounds with nursing staff today.    Discussions with Specialists or Other Care Team Provider: Discussed with neurology they will follow-up on EEG results in an hour or so and get back to her team.    Education and Discussions with Family / Patient: Flavio with the wife and son at bedside went over discharge plan with them in detail.    Discharge Statement:  I spent 45 minutes discharging the patient. This time was spent on the day of discharge. I had direct contact with the patient on the day of discharge. Greater than 50% of the total time was spent examining patient, answering all patient questions, arranging and discussing plan of care with patient as well as directly providing post-discharge instructions.  Additional time then spent on discharge activities.    For detailed history, assessment, and plan of care, please review the statements below by the resident physician .    Tessa Coronado MD

## 2025-04-07 NOTE — QUICK NOTE
Chart reviewed. Patient admitted s/p unwitnessed fall with observed convulsion of BUEs with period of confusion following event. Concern for first time seizure vs convulsive syncope. MRI brain recommended; however patient claustrophobic and requesting it to be done outpatient in an open MRI. Order has been placed.   EEG was also completed, read pending.  Discharge is pending.    Deonte Attarian will need follow-up in in 6 weeks with epilepsy team for Other in 60 minute appointment. They will require an MRI brain w wo as an outpatient with open MRI machine.  Message sent to the office.

## 2025-04-07 NOTE — ASSESSMENT & PLAN NOTE
Lab Results   Component Value Date    HGBA1C 6.4 (H) 04/05/2025       Recent Labs     04/06/25  1056 04/06/25  1636 04/06/25 2049 04/07/25  0714   POCGLU 154* 120 126 106       Blood Sugar Average: Last 72 hrs:  (P) 128.7  Recent Labs     04/05/25  0425 04/05/25  0729 04/06/25  0506 04/06/25  0755 04/06/25  1636 04/06/25 2049 04/07/25  0306 04/07/25  0714   POCGLU  --    < >  --    < > 120 126  --  106   GLUC 156*  --  140  --   --   --  111  --     < > = values in this interval not displayed.       Held home regimen while inpatient and resume on discharge: metformin 1000 mg twice daily

## 2025-04-07 NOTE — CASE MANAGEMENT
Case Management Discharge Planning Note    Patient name Deonte Cohenarian  Location S /S - MRN [id number]  : 1950 Date 2025       Current Admission Date: 2025  Current Admission Diagnosis:Unwitnessed fall   Patient Active Problem List    Diagnosis Date Noted Date Diagnosed    Syncope 2025     Thrombocytopenia (HCC) 2025     Abnormal echocardiogram 2025     Groin pain, right 2025     Unwitnessed fall 2025     Hypertension 2025     DM (diabetes mellitus) (HCC) 2025       LOS (days): 2  Geometric Mean LOS (GMLOS) (days):   Days to GMLOS:     OBJECTIVE:  Risk of Unplanned Readmission Score: 9.57         Current admission status: Inpatient   Preferred Pharmacy:   UNKNOWN - FOLLOW UP PRIOR TO DISCHARGE TO E-PRESCRIBE  No address on file      Primary Care Provider: Javier Engel MD    Primary Insurance: LISNR REP  Secondary Insurance:     DISCHARGE DETAILS:    Discharge planning discussed with:: Pt and S/O, Jing  Freedom of Choice: Yes  Comments - [facility]: [facility]    Contacts  Patient Contacts: S/O Jing  Relationship to Patient:: Family  Contact Method: In Person  Reason/Outcome: Emergency Contact, Continuity of Care    DME Referral Provided  Referral made for DME?: Yes  DME referral completed for the following items:: Walker    Other Referral/Resources/Interventions Provided:  Interventions: DME  Referral Comments: CM provided list for pt's S/O to review. She decided on [facility]. CM reserved and updated f/u providers. As per [employer] pt does not have a co-pay. RW delivered to bedside. OT plans to provide grabber.

## 2025-04-07 NOTE — ASSESSMENT & PLAN NOTE
Results for orders placed during the hospital encounter of 04/04/25    Echo complete w/ contrast if indicated    Interpretation Summary    Left Ventricle: Left ventricular cavity size is normal. Wall thickness is mildly increased. The left ventricular ejection fraction is 65%. Systolic function is normal. Although no diagnostic regional wall motion abnormality was identified, this possibility cannot be completely excluded on the basis of this study. Diastolic function is mildly abnormal, consistent with grade I (abnormal) relaxation.    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 There is no associated thrombus.    Aortic Valve: There is aortic valve sclerosis.    Mitral Valve: There is moderate annular calcification.    Tricuspid Valve: There is mild regurgitation.    In combination with syncope, concern for possible cardiac arrhythmia   Cardiology referral outpatient

## 2025-04-07 NOTE — DISCHARGE INSTR - AVS FIRST PAGE
Dear Deonte Greenfield,     It was our pleasure to care for you here at [facility].  It is our hope that we were always able to exceed the expected standards for your care during your stay.  You were hospitalized due to unwitnessed fall.  You were cared for on the 2nd floor by Inna Tariq DO under the service of Tessa Coronado MD with the [facility] who covers for your primary care physician (PCP), Javier Engel MD, while you were hospitalized.  If you have any questions or concerns related to this hospitalization, you may contact us at .  For follow up as well as any medication refills, we recommend that you follow up with your primary care physician.  A registered nurse will reach out to you by phone within a few days after your discharge to answer any additional questions that you may have after going home.  However, at this time we provide for you here, the most important instructions / recommendations at discharge:     Notable Medication Adjustments -   None  Testing Required after Discharge -   CBC and CMP in 3-5 days   ** Please contact your PCP to request testing orders for any of the testing recommended here **  Outpatient brain MRI as ordered by neurology   Important follow up information -   Please follow up with neurology outpatient, get brain MRI done and review EEG done on 4/7   Please follow up with PCP within 3-5 days of discharge   Please follow up with hem/onc for low platelets   Please follow up with cardiology given heart ultrasound results in combination with your syncopal event  Other Instructions -   If you experience any syncopal, seizure-like activity, or fall please go to ED   Please review this entire after visit summary as additional general instructions including medication list, appointments, activity, diet, any pertinent wound care, and other additional recommendations from your care team that may be  provided for you.      Sincerely,     Inna Tariq, DO

## 2025-04-07 NOTE — ASSESSMENT & PLAN NOTE
Recent Labs     04/05/25  0425 04/06/25  0506 04/07/25  0306   * 109* 97*     Appears to be chronic, unclear etiology   Referral to hem/onc upon dc

## 2025-04-07 NOTE — ASSESSMENT & PLAN NOTE
Patient states experienced some groin pain, Tylenol helped.  He states 3 weeks ago he was changing his cat litter and thinks he pulled a muscle.  Has been having pain on and off since then.  -Ordered lidocaine patch for pain down his thigh.  -PT/OT rec level 3  Follow up with outpatient PCP

## 2025-04-07 NOTE — ASSESSMENT & PLAN NOTE
Lab Results   Component Value Date    HGBA1C 6.4 (H) 04/05/2025       Recent Labs     04/06/25  1056 04/06/25  1636 04/06/25 2049 04/07/25  0714   POCGLU 154* 120 126 106       Blood Sugar Average: Last 72 hrs:  (P) 128.7  Recent Labs     04/05/25  0425 04/05/25  0729 04/06/25  0506 04/06/25  0755 04/06/25  1636 04/06/25 2049 04/07/25  0306 04/07/25  0714   POCGLU  --    < >  --    < > 120 126  --  106   GLUC 156*  --  140  --   --   --  111  --     < > = values in this interval not displayed.       Hold home regimen while inpatient and resume on discharge: metformin 1000 mg twice daily  Diabetic diet  Insulin regimen  Glucose checks and Insulin correction ACHS  Goal -180 while admitted, adjusting insulin regimen as appropriate  Monitor for hypoglycemia and treat per protocol

## 2025-04-07 NOTE — DISCHARGE SUMMARY
Discharge Summary - Hospitalist   Name: Deonte Greenfield 74 y.o. male I MRN: [id number]  Unit/Bed#: S -01 I Date of Admission: 4/4/2025   Date of Service: 4/7/2025 I Hospital Day: 2     Assessment & Plan  Unwitnessed fall  Patient presents to the ED due to an unwitnessed fall/syncope/seizure episode.  Patient does not recall the event and he denies any presyncopal symptoms.  The son heard a sound from downstairs where he went immediately and found the patient laying on the kitchen floor.  Patient was unresponsive for 5-10 minutes then he gradually regained consciousness however, he was confused unable to answer questions appropriately.  His confusion lasted until he got to the emergency department.  Patient reports multiple episodes of loose stool in the past 2 days.  In the ED, there was concerns for head strike due to head laceration.  Labs were unremarkable except for elevated WBC and mildly hyponatremia.  Chest x-ray, hip x-ray negative.  CT head and spine were unremarkable per ED reading.  Echo 65% EF, mildly abnormal diastolic function, LV wall thickness mildly increased, small mobile septal aneurysm, AV sclerosis, moderate annular calcification, mild TR  Patient denies fever, chills, headache, dizziness, lightheadedness, visual changes, cough, SOB, chest pain, palpitation, nausea or vomiting, abdominal pain, pain or burning while urination, hematuria, poor appetite, history of seizures, history of falls, any new mental/behavioral changes.  Patient denies any history of cardiac disease, arrhythmia, previous similar episodes.    Symptoms likely due to seizure VS mechanical form VS hypoglycemia VS less likely arrhythmia vs orthostatic hypotension due to volume depletion.    Plan:  Received gentle fluid IV normal saline 75 cc/HR for 10 hours x2, orthostatics were positive.  EEG completed 4/7  MRI brain ordered by neurology for outpatient given patient claustrophobia and request to be done op   Follow up  with neurology outpatient   PT/OT rec level 3   Hypertension  Continue atenolol-chlorthalidone 50/25 mg  DM (diabetes mellitus) (HCC)  Lab Results   Component Value Date    HGBA1C 6.4 (H) 04/05/2025       Recent Labs     04/06/25  1056 04/06/25  1636 04/06/25 2049 04/07/25  0714   POCGLU 154* 120 126 106       Blood Sugar Average: Last 72 hrs:  (P) 128.7  Recent Labs     04/05/25  0425 04/05/25  0729 04/06/25  0506 04/06/25  0755 04/06/25  1636 04/06/25  2049 04/07/25  0306 04/07/25  0714   POCGLU  --    < >  --    < > 120 126  --  106   GLUC 156*  --  140  --   --   --  111  --     < > = values in this interval not displayed.       Held home regimen while inpatient and resume on discharge: metformin 1000 mg twice daily    Groin pain, right  Patient states experienced some groin pain, Tylenol helped.  He states 3 weeks ago he was changing his cat litter and thinks he pulled a muscle.  Has been having pain on and off since then.  -Ordered lidocaine patch for pain down his thigh.  -PT/OT rec level 3  Follow up with outpatient PCP    Syncope  See unwitnessed fall  Thrombocytopenia (HCC)  Recent Labs     04/05/25  0425 04/06/25  0506 04/07/25  0306   * 109* 97*     Appears to be chronic, unclear etiology   Referral to hem/onc upon dc      Medical Problems       Resolved Problems  Date Reviewed: 4/6/2025          Resolved    History of kidney stones 4/5/2025     Resolved by  Jean Paul Partida DO        Discharging Physician / Practitioner: Inna Tariq DO  PCP: Javier Engel MD  Admission Date:   Admission Orders (From admission, onward)       Ordered        04/05/25 0222  INPATIENT ADMISSION  Once                          Discharge Date: 04/07/25    Consultations During Hospital Stay:  Neurology, PT/OT    Procedures Performed:   None    Significant Findings / Test Results:   CT head wo 4/5 showing small subdural hemorrhage along right posterior falx measuring up to 4 mm thickness, repeat showing  resolved and no new intracranial hemorrhage; prominence of bilateral frontoparietal extra-axial CSF spaces likely representing combination of volume loss and chronic subdural hygromas, mild chronic microangiopathic ischemic changes  Hyponatremia  Anemia  Leukocytosis  Results for orders placed during the hospital encounter of 04/04/25    Echo complete w/ contrast if indicated    Interpretation Summary    Left Ventricle: Left ventricular cavity size is normal. Wall thickness is mildly increased. The left ventricular ejection fraction is 65%. Systolic function is normal. Although no diagnostic regional wall motion abnormality was identified, this possibility cannot be completely excluded on the basis of this study. Diastolic function is mildly abnormal, consistent with grade I (abnormal) relaxation.    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 There is no associated thrombus.    Aortic Valve: There is aortic valve sclerosis.    Mitral Valve: There is moderate annular calcification.    Tricuspid Valve: There is mild regurgitation.    Incidental Findings:   None    Test Results Pending at Discharge (will require follow up):    None     Outpatient Tests Requested: Discharge  Outpatient MRI brain and EEG   CMP and CBC in 3-5 days   Referral to hem/onc for thrombocytopenia   Referral to cardiology outpatient     Complications:  None    Reason for Admission: unwitnessed fall    Hospital Course:   Deonte Greenfield is a 74 y.o. male patient who originally presented to the hospital on 4/4/2025 due to unwitnessed fall. Patient was watching TV then was found by son on his back on the floor. Patient unaware of what happened during event. Was likely unresponsive for 5-10 min, was confused upon waking and unable to answer questions. Had episode of urinary incontinence at that time. Some loose stools for past 2 days prior to admission. [place] okay when got to ED. Noted to have small head lac posteriorly, may have hit  head during fall.  Patient complained of right groin pain down to right knee, thinks leg may have given out which contributed to fall.    Leukocytosis and mild hyponatremia on labs. Placed on tele, orthostatic VS positive. Given IVFs. CT cervical spine without acute abnormality. CT head initially showing small subdural hemorrhage along right posterior falx measuring up to 4 mm thickness.  Repeat CT head showing resolution.  CXR unremarkable.  Right hip x-ray no acute osseous abnormality.  Another repeat CT head still showing resolution of subdural hematoma.  Showing prominence of bilateral frontoparietal extra-axial CSF spaces likely representing combination of volume loss and chronic subdural hygromas as well as mild chronic microangiopathic ischemic changes.    Evaluated by neurology due to concern for possible first-time seizure event.  Recommended no AEDs at that time.  Recommended MRI brain with/without seizure protocol while admitted but patient requesting done outpatient due to claustrophobia.  Also recommended EEG, reports can be done outpatient as well.  PT/OT recommending level 3. Patient feels overall well and discharged home with follow up op MRI, EEG, and neurology follow up. Follow up with PCP in 3-5 days for repeat CBC and CMP. Also provided referral to hem/onc and cardiology upon dc.     Please see above list of diagnoses and related plan for additional information.     Condition at Discharge: stable    Discharge Day Visit / Exam:   Subjective:  Reports feeling overall well. Still having some right groin pain with radiation down to right knee. Feels this may have contributed to fall. Denies any headaches. Feels might have hit head during fall. Reports was watching TV then next remembers waking up in home in wheelchair and taken to ED. [place] okay once in ED. Denies CP, SOB, NVD.     Vitals: Blood Pressure: 115/65 (04/07/25 1129)  Pulse: 61 (04/07/25 1129)  Temperature: 97.6 °F (36.4 °C) (04/07/25  "1129)  Respirations: 16 (04/07/25 1129)  Height: 5' 8\" (172.7 cm) (04/06/25 1330)  Weight - Scale: 110 kg (242 lb 8.1 oz) (04/06/25 1330)  SpO2: 92 % (04/07/25 1129)  Physical Exam  Constitutional:       Appearance: Normal appearance.   HENT:      Head: Normocephalic and atraumatic.      Right Ear: External ear normal.      Left Ear: External ear normal.      Nose: Nose normal.      Mouth/Throat:      Pharynx: Oropharynx is clear.   Eyes:      Conjunctiva/sclera: Conjunctivae normal.      Pupils: Pupils are equal, round, and reactive to light.   Cardiovascular:      Rate and Rhythm: Normal rate and regular rhythm.      Pulses: Normal pulses.      Heart sounds: Normal heart sounds.   Pulmonary:      Effort: Pulmonary effort is normal.      Breath sounds: Normal breath sounds.   Abdominal:      General: Abdomen is flat.      Palpations: Abdomen is soft.   Musculoskeletal:         General: Tenderness present.      Cervical back: Neck supple.      Right lower leg: No edema.      Left lower leg: No edema.      Comments: Tenderness to palpation of inner thigh muscle from right groin region to right knee   Skin:     General: Skin is warm.   Neurological:      Mental Status: He is alert and oriented to person, place, and time. Mental status is at baseline.   Psychiatric:         Mood and Affect: Mood normal.         Behavior: Behavior normal.          Discussion with Family: Updated  (wife) via phone.    Discharge instructions/Information to patient and family:   See after visit summary for information provided to patient and family.      Provisions for Follow-Up Care:  See after visit summary for information related to follow-up care and any pertinent home health orders.      Mobility at time of Discharge:   Basic Mobility Inpatient Raw Score: 16  JH-HLM Goal: 5: Stand one or more mins  JH-HLM Achieved: 6: Walk 10 steps or more  HLM Goal achieved. Continue to encourage appropriate mobility.     Disposition: "   Home    Planned Readmission: No    Discharge Medications:  See after visit summary for reconciled discharge medications provided to patient and/or family.      Administrative Statements   Discharge Statement:  I have spent a total time of 45 minutes in caring for this patient on the day of the visit/encounter. >30 minutes of time was spent on: Instructions for management, Patient and family education, Impressions, Counseling / Coordination of care, Documenting in the medical record, and Reviewing / ordering tests, medicine, procedures  .    **Please Note: This note may have been constructed using a voice recognition system**

## 2025-04-07 NOTE — CASE MANAGEMENT
Case Management Discharge Planning Note    Patient name Deonet Attarian  Location S /S - MRN [id number]  : 1950 Date 2025       Current Admission Date: 2025  Current Admission Diagnosis:Unwitnessed fall   Patient Active Problem List    Diagnosis Date Noted Date Diagnosed    Syncope 2025     Thrombocytopenia (HCC) 2025     Abnormal echocardiogram 2025     Groin pain, right 2025     Unwitnessed fall 2025     Hypertension 2025     DM (diabetes mellitus) (HCC) 2025       LOS (days): 2  Geometric Mean LOS (GMLOS) (days):   Days to GMLOS:     OBJECTIVE:  Risk of Unplanned Readmission Score: 9.54         Current admission status: Inpatient   Preferred Pharmacy:   UNKNOWN - FOLLOW UP PRIOR TO DISCHARGE TO E-PRESCRIBE  No address on file      Primary Care Provider: Javier Engel MD    Primary Insurance: 3nder REP  Secondary Insurance:     DISCHARGE DETAILS:    Discharge planning discussed with:: pt and his S/o, Jing  Freedom of Choice: Yes  Comments - Freedom of Choice: HHC referrals and DME RW order    Contacts  Patient Contacts: S/O Jing  Relationship to Patient:: Family  Contact Method: In Person  Reason/Outcome: Emergency Contact, Continuity of Care    Requested Home Health Care         Is the patient interested in HHC at discharge?: Yes  Home Health Discipline requested:: Nursing, Physical Therapy, Occupational Therapy  Home Health Agency Name:: Other (TBD)  Home Health Follow-Up Provider:: PCP  Home Health Services Needed:: Other (comment), Gait/ADL Training, Evaluate Functional Status and Safety, Strengthening/Theraputic Exercises to Improve Function  Homebound Criteria Met:: Uses an Assist Device (i.e. cane, walker, etc)  Supporting Clincal Findings:: Limited Endurance    DME Referral Provided  Referral made for DME?: Yes  DME referral completed for the following items:: Walker    Other Referral/Resources/Interventions  Provided:  Interventions: DME, HHC  Referral Comments: CM notified by [facility] Pt medically stable for dc. Pt requesting HHC and RW. CM met with pt and his S/O at bedside. Both confirmed they would like HHC and RW. Referrals placed in [facility] and DME order placed via parachute for RW. Pt also asking for a grabber - OT notified.

## 2025-04-07 NOTE — PLAN OF CARE
Problem: PHYSICAL THERAPY ADULT  Goal: Performs mobility at highest level of function for planned discharge setting.  See evaluation for individualized goals.  Description: Treatment/Interventions: Functional transfer training, LE strengthening/ROM, Elevations, Therapeutic exercise, Endurance training, Patient/family training, Equipment eval/education, Bed mobility, Gait training, Spoke to nursing, OT, Family  Equipment Recommended: Walker       See flowsheet documentation for full assessment, interventions and recommendations.  Note: Prognosis: Fair  Problem List: Decreased strength, Decreased endurance, Impaired balance, Decreased mobility, Pain, Obesity, Decreased coordination (gait deviations)  Assessment: Assessment:   Pt is a 74 y.o. male who [facility] at [facility] on 4/4/[facility]25 w/ Unwitnessed fall. Neuro DDx include convulsive syncope.  CT head and R hip xrays reviewed. Order placed for PT.  Neuro recommending seizure precautions outpatient. However pt reports having R groin pain for some time.      Prior to admission: Pt lived w/ s/o in one story home with 1 HERMILO, and typically used cane for amb.  Upon evaluation: Pt needed no physical A for transfers but intermittent steadying A for ambulation w/rolling walker and instruction for walker management.      Pt's clinical presentation is currently unstable/unpredictable given the functional mobility deficits above, especially (but not limited to) gait deviations and pain, and combined with medical complications including abnormal WBCs, readmission to hospital, and abnormal sodium values.  Pt IS NOT at his mobility baseline.      During this admission, pt would benefit from continued skilled inpatient PT in the acute care setting in order to address deficits as defined above to maximize function and mobility.      Recommendations:     From a PT standpoint, recommend next several sessions focus on continued mobility trial to promote definitive A device,  therex with slow progress open to closed chain, stair training.  Barriers to Discharge: Decreased caregiver support, Inaccessible home environment     Rehab Resource Intensity Level, PT: III (Minimum Resource Intensity) (Pt/family report preferring HHPT >OPPT)    See flowsheet documentation for full assessment.

## 2025-04-07 NOTE — ASSESSMENT & PLAN NOTE
Patient presents to the ED due to an unwitnessed fall/syncope/seizure episode.  Patient does not recall the event and he denies any presyncopal symptoms.  The son heard a sound from downstairs where he went immediately and found the patient laying on the kitchen floor.  Patient was unresponsive for 5-10 minutes then he gradually regained consciousness however, he was confused unable to answer questions appropriately.  His confusion lasted until he got to the emergency department.  Patient reports multiple episodes of loose stool in the past 2 days.  In the ED, there was concerns for head strike due to head laceration.  Labs were unremarkable except for elevated WBC and mildly hyponatremia.  Chest x-ray, hip x-ray negative.  CT head and spine were unremarkable per ED reading.  Echo 65% EF, mildly abnormal diastolic function, LV wall thickness mildly increased, small mobile septal aneurysm, AV sclerosis, moderate annular calcification, mild TR  Patient denies fever, chills, headache, dizziness, lightheadedness, visual changes, cough, SOB, chest pain, palpitation, nausea or vomiting, abdominal pain, pain or burning while urination, hematuria, poor appetite, history of seizures, history of falls, any new mental/behavioral changes.  Patient denies any history of cardiac disease, arrhythmia, previous similar episodes.    Symptoms likely due to seizure VS mechanical form VS hypoglycemia VS less likely arrhythmia vs orthostatic hypotension due to volume depletion.    Plan:  Received gentle fluid IV normal saline 75 cc/HR for 10 hours x2, orthostatics were positive.  EEG completed 4/7  MRI brain ordered by neurology for outpatient given patient claustrophobia and request to be done op   Follow up with neurology outpatient   PT/OT rec level 3

## 2025-04-07 NOTE — TELEPHONE ENCOUNTER
STILL ADMITTED:4/4/2025 - present (3 days)  [facility]      [facility]/ VERONICA CHERELLE/Unwitnessed fall     ----- Message from Lily Marr PA-C sent at 4/7/2025 11:42 AM EDT -----  Regarding: HFMACKENZIE Clemons Attarian will need follow-up in in 6 weeks with epilepsy team for Other = ATTENDING in 60 minute appointment. They will require an MRI brain w wo as an outpatient with open MRI machine.

## 2025-04-07 NOTE — QUICK NOTE
[facility] Service Attending Physician Attestation Note - Discharge    I have seen and examined Deonte Greenfield personally and have reviewed the medical record independently.  I have reviewed the case with the resident physician including all assessments and the plan of care for each.  I agree with the resident physician and offer the following addendum to the below statements by the resident physician:     Date Evaluated: April 7th 2025  Time Evaluated: morning.     Subjective / HPI:     This is a 74-year-old male with syncopal episode.  Neuro were consulted for questionable seizure, EEG has been performed and is pending at the time of my encounter with the patient.  Discussed with neurology could possibly be discharged without report however now that the EEG has been done we will anny up the report prior to discharge.    Regardless patient has no further syncopal episodes, and is asymptomatic with no dizziness or lightheadedness.  He is able to ambulate independently and was assessed by PT and OT yesterday and determined to be a level 3 with no inpatient rehab needs.    He will need to follow-up with neurology.  In addition I have recommended that he follow-up with cardiology and we have placed a cardiology referral in the chart for him to be assessed for a Zio patch.  Both him and his son and wife are in understanding.  He also understands that if he experiences any further syncopal episodes or lightheadedness or dizziness he should come to the ER for evaluation.    So explained to patient that he will need routine lab work done including a basic metabolic panel in 3 to 5 days and follow-up with the primary care doctor.    Exam:   Regular, chest clear, abdomen soft nondistended, bowel sounds present, extremities no edema, strength is 5 out of 5 throughout, normal reflexes, cranial nerves II through XII grossly intact, the patient is not confused and cheerful making jokes eager to be discharged  home.      Patient Centered Rounds: I have performed bedside rounds with nursing staff today.    Discussions with Specialists or Other Care Team Provider: Discussed with neurology they will follow-up on EEG results in an hour or so and get back to her team.    Education and Discussions with Family / Patient: Flavio with the wife and son at bedside went over discharge plan with them in detail.    Discharge Statement:  I spent 45 minutes discharging the patient. This time was spent on the day of discharge. I had direct contact with the patient on the day of discharge. Greater than 50% of the total time was spent examining patient, answering all patient questions, arranging and discussing plan of care with patient as well as directly providing post-discharge instructions.  Additional time then spent on discharge activities.    For detailed history, assessment, and plan of care, please review the statements below by the resident physician .    Tessa Coronado MD

## 2025-04-08 LAB
DME PARACHUTE DELIVERY DATE ACTUAL: NORMAL
DME PARACHUTE DELIVERY DATE REQUESTED: NORMAL
DME PARACHUTE ITEM DESCRIPTION: NORMAL
DME PARACHUTE ORDER STATUS: NORMAL
DME PARACHUTE SUPPLIER NAME: NORMAL
DME PARACHUTE SUPPLIER PHONE: NORMAL

## 2025-04-08 NOTE — UTILIZATION REVIEW
NOTIFICATION OF ADMISSION DISCHARGE   This is a Notification of Discharge from [facility]. Please be advised that this patient has been discharge from our facility. Below you will find the admission and discharge date and time including the patient’s disposition.   UTILIZATION REVIEW CONTACT:  Utilization Review Assistants  Network Utilization Review Department  Phone: [phone] x carefully listen to the prompts. All voicemails are confidential.  Email: [email or web address]     ADMISSION INFORMATION  PRESENTATION DATE: 4/4/2025 10:44 PM  OBERVATION ADMISSION DATE: N/A  INPATIENT ADMISSION DATE: 4/5/25  2:22 AM   DISCHARGE DATE: 4/7/2025  4:45 PM   DISPOSITION:Home with Home Health Care    [facility] Utilization Review Department  ATTENTION: Please call with any questions or concerns to [phone] and carefully listen to the prompts so that you are directed to the right person. All voicemails are confidential.   For Discharge needs, contact Care Management DC Support Team at [phone] opt. [phone]  Send all requests for admission clinical reviews, approved or denied determinations and any other requests to dedicated fax number below belonging to the campus where the patient is receiving treatment. List of dedicated fax numbers for the Facilities:  FACILITY NAME UR FAX NUMBER   ADMISSION DENIALS (Administrative/Medical Necessity) [phone]   DISCHARGE SUPPORT TEAM ([facility]) [phone]   PARENT CHILD HEALTH (Maternity/NICU/Pediatrics) [phone]   [facility] [phone]   [facility] [phone]   [facility] [phone]   [facility] [phone]   [facility] [phone]   [facility] [phone]   [facility] [phone]   [facility] [phone]   [facility]  [facility] [phone]   [facility] [phone]   [facility] [phone]   [facility] [phone]

## 2025-04-09 ENCOUNTER — OFFICE VISIT (OUTPATIENT)
Dept: INTERNAL MEDICINE CLINIC | Facility: CLINIC | Age: 75
End: 2025-04-09
Payer: COMMERCIAL

## 2025-04-09 ENCOUNTER — HOSPITAL ENCOUNTER (OUTPATIENT)
Dept: RADIOLOGY | Facility: HOSPITAL | Age: 75
Discharge: HOME | End: 2025-04-09
Payer: COMMERCIAL

## 2025-04-09 ENCOUNTER — APPOINTMENT (OUTPATIENT)
Dept: LAB | Facility: CLINIC | Age: 75
End: 2025-04-09
Payer: COMMERCIAL

## 2025-04-09 VITALS
RESPIRATION RATE: 16 BRPM | BODY MASS INDEX: 39.99 KG/M2 | TEMPERATURE: 98 F | OXYGEN SATURATION: 96 % | HEIGHT: 65 IN | SYSTOLIC BLOOD PRESSURE: 120 MMHG | WEIGHT: 240 LBS | DIASTOLIC BLOOD PRESSURE: 68 MMHG | HEART RATE: 61 BPM

## 2025-04-09 DIAGNOSIS — N18.2 TYPE 2 DIABETES MELLITUS WITH STAGE 2 CHRONIC KIDNEY DISEASE, WITHOUT LONG-TERM CURRENT USE OF INSULIN  (HCC): ICD-10-CM

## 2025-04-09 DIAGNOSIS — R17 SERUM TOTAL BILIRUBIN ELEVATED: ICD-10-CM

## 2025-04-09 DIAGNOSIS — D69.6 THROMBOCYTOPENIA (HCC): ICD-10-CM

## 2025-04-09 DIAGNOSIS — D64.9 ACUTE ANEMIA: ICD-10-CM

## 2025-04-09 DIAGNOSIS — D64.9 ACUTE ANEMIA: Primary | ICD-10-CM
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
## 2025-04-09 DIAGNOSIS — D64.9 NORMOCYTIC ANEMIA: Primary | ICD-10-CM

## 2025-04-09 DIAGNOSIS — E11.22 TYPE 2 DIABETES MELLITUS WITH STAGE 2 CHRONIC KIDNEY DISEASE, WITHOUT LONG-TERM CURRENT USE OF INSULIN  (HCC): ICD-10-CM

## 2025-04-09 DIAGNOSIS — I10 PRIMARY HYPERTENSION: ICD-10-CM

## 2025-04-09 PROBLEM — R82.991 HYPOCITRATURIA: Status: ACTIVE | Noted: 2024-01-18

## 2025-04-09 PROBLEM — I25.3 ATRIAL SEPTAL ANEURYSM: Status: ACTIVE | Noted: 2025-04-09

## 2025-04-09 PROBLEM — G62.9 NEUROPATHY: Status: ACTIVE | Noted: 2019-09-12

## 2025-04-09 LAB
ALBUMIN SERPL BCG-MCNC: 3.8 G/DL (ref 3.5–5)
ALP SERPL-CCNC: 65 U/L (ref 34–104)
ALT SERPL W P-5'-P-CCNC: 15 U/L (ref 7–52)
ANION GAP SERPL CALCULATED.3IONS-SCNC: 5 MMOL/L (ref 4–13)
AST SERPL W P-5'-P-CCNC: 16 U/L (ref 13–39)
BILIRUB DIRECT SERPL-MCNC: 0.1 MG/DL (ref 0–0.2)
BILIRUB SERPL-MCNC: 1 MG/DL (ref 0.2–1)
BUN SERPL-MCNC: 29 MG/DL (ref 5–25)
CALCIUM SERPL-MCNC: 9.2 MG/DL (ref 8.4–10.2)
CHLORIDE SERPL-SCNC: 98 MMOL/L (ref 96–108)
CO2 SERPL-SCNC: 30 MMOL/L (ref 21–32)
CREAT SERPL-MCNC: 1.04 MG/DL (ref 0.6–1.3)
ERYTHROCYTE [DISTWIDTH] IN BLOOD BY AUTOMATED COUNT: 15.1 % (ref 11.6–15.1)
FERRITIN SERPL-MCNC: 90 NG/ML (ref 30–336)
GFR SERPL CREATININE-BSD FRML MDRD: 70 ML/MIN/1.73SQ M
GLUCOSE P FAST SERPL-MCNC: 98 MG/DL (ref 65–99)
HCT VFR BLD AUTO: 36.9 % (ref 36.5–49.3)
HGB BLD-MCNC: 11.7 G/DL (ref 12–17)
IRON SATN MFR SERPL: 10 % (ref 15–50)
IRON SERPL-MCNC: 39 UG/DL (ref 50–212)
LDH SERPL-CCNC: 165 U/L (ref 140–271)
MCH RBC QN AUTO: 30 PG (ref 26.8–34.3)
MCHC RBC AUTO-ENTMCNC: 31.7 G/DL (ref 31.4–37.4)
MCV RBC AUTO: 95 FL (ref 82–98)
PLATELET # BLD AUTO: 129 THOUSANDS/UL (ref 149–390)
PMV BLD AUTO: 13.5 FL (ref 8.9–12.7)
POTASSIUM SERPL-SCNC: 4.4 MMOL/L (ref 3.5–5.3)
PROT SERPL-MCNC: 7.6 G/DL (ref 6.4–8.4)
RBC # BLD AUTO: 3.9 MILLION/UL (ref 3.88–5.62)
RETICS # AUTO: NORMAL 10*3/UL (ref 14356–105094)
RETICS # CALC: 1.42 % (ref 0.37–1.87)
SODIUM SERPL-SCNC: 133 MMOL/L (ref 135–147)
TIBC SERPL-MCNC: 383.6 UG/DL (ref 250–450)
TRANSFERRIN SERPL-MCNC: 274 MG/DL (ref 203–362)
UIBC SERPL-MCNC: 345 UG/DL (ref 155–355)
WBC # BLD AUTO: 7.61 THOUSAND/UL (ref 4.31–10.16)

## 2025-04-09 PROCEDURE — 82728 ASSAY OF FERRITIN: CPT

## 2025-04-09 PROCEDURE — 85045 AUTOMATED RETICULOCYTE COUNT: CPT

## 2025-04-09 PROCEDURE — 80053 COMPREHEN METABOLIC PANEL: CPT

## 2025-04-09 PROCEDURE — 99387 INIT PM E/M NEW PAT 65+ YRS: CPT

## 2025-04-09 PROCEDURE — 83550 IRON BINDING TEST: CPT

## 2025-04-09 PROCEDURE — 36415 COLL VENOUS BLD VENIPUNCTURE: CPT

## 2025-04-09 PROCEDURE — 73562 X-RAY EXAM OF KNEE 3: CPT

## 2025-04-09 PROCEDURE — 82248 BILIRUBIN DIRECT: CPT

## 2025-04-09 PROCEDURE — 83615 LACTATE (LD) (LDH) ENZYME: CPT

## 2025-04-09 PROCEDURE — 83540 ASSAY OF IRON: CPT

## 2025-04-09 PROCEDURE — 85027 COMPLETE CBC AUTOMATED: CPT

## 2025-04-09 NOTE — ASSESSMENT & PLAN NOTE
Chronic per review of EMR  Repeat CBC  Referred to Heme-Onc during recent admission, encouraged to schedule initial eval

## 2025-04-09 NOTE — PROGRESS NOTES
Name: Deonte Greenfield      : 1950      MRN: [id number]  Encounter Provider: Garcia Cartwright MD  Encounter Date: 2025   Encounter department: [facility] INTERNAL MEDICINE [place]  :  Assessment & Plan  Acute anemia  -Most recent Hgb 11.6 during recent admission.  -MCV 92  -T bili elevated  -No prior anemia on review of EMR.    -Repeat CBC  -Check iron panel  -Check retic count  -Check D bili and LDH   -If iron deficiency, will recommend colonoscopy  -Pt referred to Heme-Onc during recent admission, encouraged to make appointment for initial evaluation.    Orders:    Iron Panel (Includes Ferritin, Iron Sat%, Iron, and TIBC); Future    Retic Count; Future    Bilirubin, direct; Future    LD,Blood; Future    CBC and Platelet; Future    Serum total bilirubin elevated  AST, ALT, Alk phos wnl  Pt reports remote history of episodic excessive EtOH; pt and spouse report only occasional EtOH in moderation for last 30 years.  Repeat CMP, check RUQ US  Check D bili and LDH, as above    Orders:    US right upper quadrant; Future    Comprehensive metabolic panel; Future    Fall, subsequent encounter  Seizure vs syncope   Has referrals to Neuro and Cardiology from recent admission  With RLE pain and tenderness to palpation, check XR R knee. XR R hip and pelvis during recent admission showed b/l arthroplasties of hip and degenerative changes of lumbar spine, otherwise unremarkable.    Check XR R knee  Obtain MRI as ordered  F/u w Neuro as ordered  F/u w Cards as ordered  Establish w PT as ordered--referral provided to Ambulatory PT this visit if pt deemed not appropriate for Home Health    Orders:    XR knee 3 vw right non injury; Future    Ambulatory Referral to Physical Therapy; Future    Thrombocytopenia (HCC)  Chronic per review of EMR  Repeat CBC  Referred to Heme-Onc during recent admission, encouraged to schedule initial eval     Type 2 diabetes mellitus with stage 2 chronic kidney disease, without long-term  current use of insulin  ([facility])    Lab Results   Component Value Date    HGBA1C 6.4 (H) 04/05/2025     A1c during recent hospitalization demonstrates excellent glycemic control, continue metformin at current dose       Primary hypertension  BP at goal in clinic today, continue home regimen valsartan, atenolol-chlorthalidone            History of Present Illness   Mr. Greenfield is seen in clinic this morning to establish care following a recent admission for an unwitnessed fall with head strike due to apparent LOC followed by confusion concerning for possible seizure vs syncope. Found to have small acute subdural hemorrhage (up to 4 mm) that resolved on subsequent imaging. No focal neuro deficits. EEG during admission w/o epileptiform activity. Outpatient MRI brain seizure protocol ordered.     Today, pt reports feeling well. Complains only of MSK pain, predominantly RLE, but also some pain in his back and L shoulder. Reports that RLE pain started 2 weeks prior to fall, associated with an episode of increased physical activity while emptying cat litter boxes. Pain is described as a burning sensation in a distribution from R groin to medial thigh to knee. Superior-lateral R patella is tender to palpation w/o similar contralateral findings. Reports using Voltaren gel since discharge with good analgesic effect. ROM of BLE at baseline and symmetric. Pt ambulates with cane at baseline, no acute change.     Denies fevers, chills, sweats, dizziness, lightheadedness, fatigue, change in bowel habits--including diarrhea, constipation, BRBPR, melena--change in urinary habits. Does endorse unintentional weight loss of 50 pounds over the last year, in the setting of dental problem--pt and wife report that weight loss occurred due to resultant difficulty chewing, and that patient's weight has stabilized since corrective dental work. Pt denies decreased appetite or early satiety. Pt reports stable chronic swelling of the BLE's, and  "denies associated SOB, NUÑEZ, orthopnea, or PND. Echo during recent hospitalization showed LVEF of 65% and only grade 1 diastolic dysfunction.       Review of Systems as noted in HPI    Objective   /68 (BP Location: Left arm, Patient Position: Sitting, Cuff Size: Large)   Pulse 61   Temp 98 °F (36.7 °C) (Tympanic)   Resp 16   Ht 5' 5\" (1.651 m)   Wt 109 kg (240 lb)   SpO2 96%   BMI 39.94 kg/m²      Physical Exam  Vitals reviewed.   Constitutional:       General: He is not in acute distress.     Appearance: Normal appearance. He is obese. He is not ill-appearing, toxic-appearing or diaphoretic.   HENT:      Head: Normocephalic and atraumatic.   Eyes:      General: No scleral icterus.        Right eye: No discharge.         Left eye: No discharge.      Conjunctiva/sclera: Conjunctivae normal.   Cardiovascular:      Rate and Rhythm: Normal rate and regular rhythm.      Heart sounds: Normal heart sounds. No murmur heard.     No friction rub. No gallop.   Pulmonary:      Effort: Pulmonary effort is normal. No respiratory distress.      Breath sounds: Normal breath sounds. No stridor. No wheezing, rhonchi or rales.   Chest:      Chest wall: No tenderness.   Abdominal:      General: Bowel sounds are normal. There is no distension.      Palpations: Abdomen is soft.      Tenderness: There is no abdominal tenderness. There is no guarding or rebound.   Musculoskeletal:         General: Tenderness present.      Right lower leg: Edema (1+ pitting edema to the mid-shin) present.      Left lower leg: Edema (1+ pitting edema to the mid-shin) present.      Comments: Moderate tenderness to palpation of the R superior-lateral patella. Mild/minimal tenderness to palpation of the medial R thigh and groin.    Skin:     General: Skin is warm and dry.      Coloration: Skin is not jaundiced or pale.      Comments: Darkening of the mid-shins bilaterally c/w chronic hemosiderin deposition   Neurological:      General: No focal " deficit present.      Mental Status: He is alert.      Sensory: No sensory deficit.      Motor: No weakness.      Comments: Shuffling gate, walks with cane   Psychiatric:         Mood and Affect: Mood normal.         Behavior: Behavior normal.

## 2025-04-09 NOTE — ASSESSMENT & PLAN NOTE
Lab Results   Component Value Date    HGBA1C 6.4 (H) 04/05/2025     A1c during recent hospitalization demonstrates excellent glycemic control, continue metformin at current dose

## 2025-04-10 ENCOUNTER — RESULTS FOLLOW-UP (OUTPATIENT)
Dept: INTERNAL MEDICINE CLINIC | Facility: CLINIC | Age: 75
End: 2025-04-10

## 2025-04-10 DIAGNOSIS — Z12.11 COLON CANCER SCREENING: Primary | ICD-10-CM

## 2025-04-10 DIAGNOSIS — D50.9 IRON DEFICIENCY ANEMIA, UNSPECIFIED IRON DEFICIENCY ANEMIA TYPE: ICD-10-CM

## 2025-04-10 NOTE — TELEPHONE ENCOUNTER
Reviewed lab work, called patient, referral placed to GI for colonoscopy, will order oral iron supplementation when pt calls clinic with his preferred pharmacy.

## 2025-04-10 NOTE — TELEPHONE ENCOUNTER
Patient's wife called stating that preferred pharmacy is [facility] mail order.

## 2025-04-11 NOTE — TELEPHONE ENCOUNTER
Hello,     I have Scheduled with , [facility], MACKENZIE HUMMEL. 7/9/2025, 11AM.     Placed patient on waiting list.     Thank you,     Jenifer        SPOUSE AND BOTH SON'S ON CONSENT FORM..    CHUN/ VERONICA VILLARREAL/Unwitnessed fall     DC- HOME- 4/7/2025     ----- Message from Lily Marr PA-C sent at 4/7/2025 11:42 AM EDT -----  Regarding: [facility]  Deonte Attarian will need follow-up in in 6 weeks with epilepsy team for Other = ATTENDING in 60 minute appointment.     They will require an MRI brain w wo as an outpatient with open MRI machine.    PATIENT IS WORKING ON GETTING SCHEDULED FOR THE MRI BRAIN....

## 2025-04-14 ENCOUNTER — HOSPITAL ENCOUNTER (OUTPATIENT)
Dept: ULTRASOUND IMAGING | Facility: HOSPITAL | Age: 75
Discharge: HOME | End: 2025-04-14
Payer: COMMERCIAL

## 2025-04-14 ENCOUNTER — TELEPHONE (OUTPATIENT)
Dept: INTERNAL MEDICINE CLINIC | Facility: CLINIC | Age: 75
End: 2025-04-14

## 2025-04-14 DIAGNOSIS — K86.89 DILATED PANCREATIC DUCT: ICD-10-CM

## 2025-04-14 DIAGNOSIS — K86.2 PANCREATIC CYST: Primary | ICD-10-CM

## 2025-04-14 DIAGNOSIS — R17 SERUM TOTAL BILIRUBIN ELEVATED: ICD-10-CM

## 2025-04-14 PROCEDURE — 76705 ECHO EXAM OF ABDOMEN: CPT

## 2025-04-16 DIAGNOSIS — D50.9 IRON DEFICIENCY ANEMIA, UNSPECIFIED IRON DEFICIENCY ANEMIA TYPE: Primary | ICD-10-CM

## 2025-04-16 RX ORDER — FERROUS SULFATE 325(65) MG
325 TABLET ORAL EVERY OTHER DAY
Qty: 5 TABLET | Refills: 0 | Status: SHIPPED | OUTPATIENT
Start: 2025-04-16 | End: 2025-04-26

## 2025-04-18 ENCOUNTER — TELEPHONE (OUTPATIENT)
Age: 75
End: 2025-04-18

## 2025-04-18 NOTE — TELEPHONE ENCOUNTER
Patient needs to schedule MRI for Brain and Abdomen and is claustrophobic. Patient's wife would like PCP to call patient and advise if something can be given for his claustrophobia.    Thank you!!

## 2025-04-21 ENCOUNTER — CONSULT (OUTPATIENT)
Dept: CARDIOLOGY CLINIC | Facility: CLINIC | Age: 75
End: 2025-04-21
Payer: COMMERCIAL

## 2025-04-21 VITALS
TEMPERATURE: 97 F | HEIGHT: 65 IN | WEIGHT: 230 LBS | OXYGEN SATURATION: 95 % | BODY MASS INDEX: 38.32 KG/M2 | DIASTOLIC BLOOD PRESSURE: 70 MMHG | HEART RATE: 58 BPM | SYSTOLIC BLOOD PRESSURE: 146 MMHG

## 2025-04-21 DIAGNOSIS — R55 SYNCOPE: ICD-10-CM

## 2025-04-21 DIAGNOSIS — E78.2 MIXED HYPERLIPIDEMIA: Primary | ICD-10-CM

## 2025-04-21 DIAGNOSIS — R00.1 BRADYCARDIA, SINUS: ICD-10-CM

## 2025-04-21 DIAGNOSIS — R55 RECURRENT SYNCOPE: ICD-10-CM

## 2025-04-21 DIAGNOSIS — R29.6 UNWITNESSED FALL: ICD-10-CM

## 2025-04-21 PROCEDURE — 99204 OFFICE O/P NEW MOD 45 MIN: CPT | Performed by: INTERNAL MEDICINE

## 2025-04-21 RX ORDER — LANCETS 33 GAUGE
EACH MISCELLANEOUS
COMMUNITY
Start: 2025-04-09

## 2025-04-21 NOTE — ASSESSMENT & PLAN NOTE
Lipids reviewed from 10/24.  HDL 21, LDL 55 and triglycerides 152.  Previously was on low intensity statins but currently not taking them.  Will follow-up with primary care regarding this.

## 2025-04-21 NOTE — ASSESSMENT & PLAN NOTE
Mild asymptomatic sinus bradycardia on exam related to atenolol use.  Heart rate historically is in the 50s.  Recent EKG with sinus rhythm with rate 74 and no Q waves or ischemic changes.  Montyo patch requested to look for any worsening nocturnal bradycardia given that he may also have sleep apnea.  If significant bradycardia noted, beta-blockers will need to be stopped before consideration of device therapy.

## 2025-04-21 NOTE — PROGRESS NOTES
[facility] [place]  [place] [facility] [address]  Phone#  [phone]  Fax#  [phone]  [facility] Cardiology Office Consultation             NAME: Deonte Greenfield  AGE: 74 y.o. SEX: male   : 1950   MRN: [id number]    DATE: 2025  TIME: 9:30 AM    Cardiology Problem list:  Syncope  : Echo EF 65, DD 1, aortic sclerosis, moderate MAC  Possible convulsive syncope versus seizures  Hypertension  Dyslipidemia      Assessment & Plan  Syncope  Suspected primary neurological event: seizures versus convulsive syncope.  Neurological workup is in progress.  Inpatient neurology notes reviewed.  Imaging studies reviewed.  MRI is going to be scheduled by them soon.  Referred here for further cardiac evaluation to look for arrhythmic cause of syncope.  Echo with preserved LV function, aortic sclerosis and marginal calcification but no significant obstructive valvular disease.  Low likelihood of tachyarrhythmia.  Zio patch event monitor planned to correlate symptoms of palpitations with any cardiac rhythm abnormality.    Orders:    Ambulatory Referral to Cardiology    Zio Monitor; Future    Mixed hyperlipidemia  Lipids reviewed from 10/24.  HDL 21, LDL 55 and triglycerides 152.  Previously was on low intensity statins but currently not taking them.  Will follow-up with primary care regarding this.       Bradycardia, sinus  Mild asymptomatic sinus bradycardia on exam related to atenolol use.  Heart rate historically is in the 50s.  Recent EKG with sinus rhythm with rate 74 and no Q waves or ischemic changes.  Zio patch requested to look for any worsening nocturnal bradycardia given that he may also have sleep apnea.  If significant bradycardia noted, beta-blockers will need to be stopped before consideration of device therapy.              HPI:    Deonte Greenfield is a 74 y.o.-year-old male who presents to the cardiology clinic for initial consultation.    ER visit on 2025 with  syncope.  Patient does not recall the details of the event.  Had a unwitnessed fall with head strike and was found unconscious by family.  Regained consciousness in a few minutes.  Report of incontinence during the episode.  EKG on the ER visit showed normal sinus rhythm.  Telemetry showed sinus rhythm as well.  CT cervical spine with severe C4-C5 stenosis.  Seen by neurology during the hospital stay due to concerns of seizure versus convulsive syncope.  MRI recommended but patient was claustrophobic.  No prior history of epilepsy.  CT head showed small subdural hematoma that resolved on follow-up imaging.  Mild chronic microangiopathic ischemic changes also noted.  Echo reviewed: Preserved LVEF and RV function with aortic sclerosis and moderate mitral calcification.  Lost 70lbs over last one year related to poor oral intake due to dental issues.  Heart rate historically is in the 50s related to beta-blocker use.  Uses a cane due to severe osteoarthritis and back pain.    He has been referred to cardiology for prolonged rhythm monitoring.    Past history, family history, social history, current medications, vital signs, recent lab and imaging studies and  prior cardiology studies reviewed independently on this visit.    No Known Allergies    Current Outpatient Medications:     allopurinol (ZYLOPRIM) 300 mg tablet, Take 300 mg by mouth daily, Disp: , Rfl:     atenolol-chlorthalidone (TENORETIC) 50-25 mg per tablet, Take 1 tablet by mouth daily, Disp: , Rfl:     citric acid-potassium citrate (POLYCITRA K) 1,100-334 mg/5 mL solution, Take 5 mL by mouth 3 (three) times a day with meals, Disp: , Rfl:     Lancets (OneTouch Delica Plus [id number]) MISC, USE AS DIRECTED 1 TO 2     TIMES DAILY., Disp: , Rfl:     metFORMIN (GLUCOPHAGE) 1000 MG tablet, Take 1,000 mg by mouth 2 (two) times a day with meals, Disp: , Rfl:     valsartan (DIOVAN) 320 MG tablet, Take 320 mg by mouth daily, Disp: , Rfl:     atorvastatin (LIPITOR) 10  mg tablet, Take 10 mg by mouth daily at bedtime (Patient not taking: Reported on 4/21/2025), Disp: , Rfl:     ferrous sulfate 325 (65 Fe) mg tablet, Take 1 tablet (325 mg total) by mouth every other day for 10 days (Patient not taking: Reported on 4/21/2025), Disp: 5 tablet, Rfl: 0    Past Medical History:   Diagnosis Date    History of kidney stones 04/05/2025     Past Surgical History:   Procedure Laterality Date    TOTAL HIP ARTHROPLASTY Bilateral      History reviewed. No pertinent family history.  Social History   reports that he has quit smoking. His smoking use included cigarettes. He has never used smokeless tobacco. He reports that he does not currently use alcohol. He reports that he does not use drugs.     Review of Systems   Constitutional:  Positive for unexpected weight change. Negative for fatigue.   HENT: Negative.     Eyes: Negative.    Respiratory:  Negative for shortness of breath.    Cardiovascular:  Negative for chest pain, palpitations and leg swelling.   Gastrointestinal: Negative.    Endocrine: Negative.    Musculoskeletal:  Positive for arthralgias, back pain and gait problem.   Neurological:  Positive for syncope. Negative for dizziness, weakness and light-headedness.   Hematological: Negative.    Psychiatric/Behavioral:  Positive for sleep disturbance.    All other systems reviewed and are negative.      Objective:     Vitals:    04/21/25 0922   BP: 146/70   Pulse: 58   Temp: (!) 97 °F (36.1 °C)   SpO2: 95%     Physical Exam  Vitals reviewed.   Constitutional:       General: He is not in acute distress.     Appearance: He is obese.   HENT:      Head: Normocephalic.   Cardiovascular:      Rate and Rhythm: Normal rate and regular rhythm.      Heart sounds: No murmur heard.  Pulmonary:      Breath sounds: No wheezing or rales.   Musculoskeletal:         General: No swelling.   Skin:     General: Skin is warm.   Neurological:      Mental Status: He is alert. Mental status is at baseline.       "Gait: Gait abnormal.   Psychiatric:         Mood and Affect: Mood normal.         Pertinent Laboratory/Diagnostic Studies:    Laboratory studies reviewed personally by Navdeep Dutta MD    BMP:   Lab Results   Component Value Date    SODIUM 133 (L) 04/09/2025    K 4.4 04/09/2025    CL 98 04/09/2025    CO2 30 04/09/2025    BUN 29 (H) 04/09/2025    CREATININE 1.04 04/09/2025    GLUC 111 04/07/2025    CALCIUM 9.2 04/09/2025     CBC:  Lab Results   Component Value Date    WBC 7.61 04/09/2025    HGB 11.7 (L) 04/09/2025    HCT 36.9 04/09/2025    MCV 95 04/09/2025     (L) 04/09/2025       Other labs:  No results found for: \"[id number]\", \"TSH\"  Lab Results   Component Value Date    ALT 15 04/09/2025    AST 16 04/09/2025     Lab Results   Component Value Date    HGBA1C 6.4 (H) 04/05/2025       Imaging Studies:     Pertinent cardiac studies and imaging studies were personally reviewed on this visit and results summarized.    I have spent a total time of 42  minutes in caring for this patient on the day of the visit/encounter including reviewing and entering of medical history, physical examination, diagnostic results, instructions for management, patient education, risk factor reduction, documenting in the medical record, sending communications to other providers, reviewing/ordering tests, medicine, procedures etc.    Portions of the record may have been created with voice recognition software.  Occasional wrong word or \"sound alike\" substitutions may have occurred due to the inherent limitations of voice recognition software.  Read the chart carefully and recognize, using context, where substitutions have occurred. Please reach out to me directly for any clarifications.  "

## 2025-04-21 NOTE — ASSESSMENT & PLAN NOTE
Suspected primary neurological event: seizures versus convulsive syncope.  Neurological workup is in progress.  Inpatient neurology notes reviewed.  Imaging studies reviewed.  MRI is going to be scheduled by them soon.  Referred here for further cardiac evaluation to look for arrhythmic cause of syncope.  Echo with preserved LV function, aortic sclerosis and marginal calcification but no significant obstructive valvular disease.  Low likelihood of tachyarrhythmia.  Zio patch event monitor planned to correlate symptoms of palpitations with any cardiac rhythm abnormality.    Orders:    Ambulatory Referral to Cardiology    Zio Monitor; Future

## 2025-04-21 NOTE — PATIENT INSTRUCTIONS
o patch event monitor planned to correlate symptoms of palpitations with any cardiac rhythm abnormality.

## 2025-05-07 ENCOUNTER — RA CDI HCC (OUTPATIENT)
Dept: OTHER | Facility: HOSPITAL | Age: 75
End: 2025-05-07

## 2025-05-07 NOTE — PROGRESS NOTES
E66.01, E11.51  HCC coding opportunities          Chart Reviewed number of suggestions sent to Provider: 2     Patients Insurance     Medicare Insurance: [employer]

## 2025-05-08 ENCOUNTER — APPOINTMENT (OUTPATIENT)
Dept: LAB | Facility: CLINIC | Age: 75
End: 2025-05-08
Attending: INTERNAL MEDICINE
Payer: COMMERCIAL

## 2025-05-08 ENCOUNTER — RESULTS FOLLOW-UP (OUTPATIENT)
Dept: INTERNAL MEDICINE CLINIC | Facility: CLINIC | Age: 75
End: 2025-05-08

## 2025-05-08 DIAGNOSIS — D64.9 NORMOCYTIC ANEMIA: ICD-10-CM

## 2025-05-08 LAB — BLD SMEAR INTERP: NORMAL

## 2025-05-08 PROCEDURE — 83010 ASSAY OF HAPTOGLOBIN QUANT: CPT

## 2025-05-08 PROCEDURE — 36415 COLL VENOUS BLD VENIPUNCTURE: CPT

## 2025-05-08 NOTE — RESULT ENCOUNTER NOTE
Please let the patient know that I received the results for the remainder of his blood work that was ordered last month.  There was no evidence of hemolysis to explain his anemia (his red blood cells are not being broken down in circulation).  Agree with Dr. Cartwright's referral to GI for consideration for endoscopy for evaluation of unexplained iron deficiency anemia and starting iron supplementation that was started last month. I do not see that the patient has a scheduled appointment with them yet.      Thanks,     Cardio

## 2025-05-09 LAB — HAPTOGLOB SERPL-MCNC: 191 MG/DL (ref 34–355)

## 2025-05-14 ENCOUNTER — OFFICE VISIT (OUTPATIENT)
Dept: INTERNAL MEDICINE CLINIC | Facility: CLINIC | Age: 75
End: 2025-05-14
Payer: COMMERCIAL

## 2025-05-14 VITALS
WEIGHT: 228 LBS | BODY MASS INDEX: 37.99 KG/M2 | TEMPERATURE: 97.6 F | HEIGHT: 65 IN | RESPIRATION RATE: 14 BRPM | SYSTOLIC BLOOD PRESSURE: 130 MMHG | DIASTOLIC BLOOD PRESSURE: 70 MMHG | HEART RATE: 62 BPM | OXYGEN SATURATION: 97 %

## 2025-05-14 DIAGNOSIS — E78.2 MIXED HYPERLIPIDEMIA: ICD-10-CM

## 2025-05-14 DIAGNOSIS — R29.6 UNWITNESSED FALL: ICD-10-CM

## 2025-05-14 DIAGNOSIS — D69.6 THROMBOCYTOPENIA (HCC): ICD-10-CM

## 2025-05-14 DIAGNOSIS — Z00.00 MEDICARE ANNUAL WELLNESS VISIT, SUBSEQUENT: Primary | ICD-10-CM

## 2025-05-14 DIAGNOSIS — E11.22 TYPE 2 DIABETES MELLITUS WITH STAGE 2 CHRONIC KIDNEY DISEASE, WITHOUT LONG-TERM CURRENT USE OF INSULIN  (HCC): ICD-10-CM

## 2025-05-14 DIAGNOSIS — N18.2 TYPE 2 DIABETES MELLITUS WITH STAGE 2 CHRONIC KIDNEY DISEASE, WITHOUT LONG-TERM CURRENT USE OF INSULIN  (HCC): ICD-10-CM

## 2025-05-14 DIAGNOSIS — R55 SYNCOPE, UNSPECIFIED SYNCOPE TYPE: ICD-10-CM

## 2025-05-14 DIAGNOSIS — Z13.6 SCREENING FOR AAA (ABDOMINAL AORTIC ANEURYSM): ICD-10-CM

## 2025-05-14 DIAGNOSIS — N18.2 STAGE 2 CHRONIC KIDNEY DISEASE: ICD-10-CM

## 2025-05-14 DIAGNOSIS — F40.240 CLAUSTROPHOBIA: ICD-10-CM

## 2025-05-14 LAB — SL AMB POCT HEMOGLOBIN AIC: 6 (ref ?–6.5)

## 2025-05-14 PROCEDURE — 83036 HEMOGLOBIN GLYCOSYLATED A1C: CPT

## 2025-05-14 PROCEDURE — G2211 COMPLEX E/M VISIT ADD ON: HCPCS

## 2025-05-14 PROCEDURE — G0439 PPPS, SUBSEQ VISIT: HCPCS

## 2025-05-14 PROCEDURE — 99213 OFFICE O/P EST LOW 20 MIN: CPT

## 2025-05-14 RX ORDER — LORAZEPAM 0.5 MG/1
0.5 TABLET ORAL ONCE AS NEEDED
Qty: 1 TABLET | Refills: 0 | Status: ON HOLD | OUTPATIENT
Start: 2025-05-14

## 2025-05-14 NOTE — PATIENT INSTRUCTIONS
Medicare Preventive Visit Patient Instructions  Thank you for completing your Welcome to Medicare Visit or Medicare Annual Wellness Visit today. Your next wellness visit will be due in one year (5/15/2026).  The screening/preventive services that you may require over the next 5-10 years are detailed below. Some tests may not apply to you based off risk factors and/or age. Screening tests ordered at today's visit but not completed yet may show as past due. Also, please note that scanned in results may not display below.  Preventive Screenings:  Service Recommendations Previous Testing/Comments   Colorectal Cancer Screening  Colonoscopy    Fecal Occult Blood Test (FOBT)/Fecal Immunochemical Test (FIT)  Fecal DNA/Cologuard Test  Flexible Sigmoidoscopy Age: 45-75 years old   Colonoscopy: every 10 years (May be performed more frequently if at higher risk)  OR  FOBT/FIT: every 1 year  OR  Cologuard: every 3 years  OR  Sigmoidoscopy: every 5 years  Screening may be recommended earlier than age 45 if at higher risk for colorectal cancer. Also, an individualized decision between you and your healthcare provider will decide whether screening between the ages of 76-85 would be appropriate. Colonoscopy: Not on file  FOBT/FIT: Not on file  Cologuard: Not on file  Sigmoidoscopy: Not on file          Prostate Cancer Screening Individualized decision between patient and health care provider in men between ages of 55-69   Medicare will cover every 12 months beginning on the day after your 50th birthday PSA: No results in last 5 years           Hepatitis C Screening Once for adults born between 1945 and 1965  More frequently in patients at high risk for Hepatitis C Hep C Antibody: Not on file        Diabetes Screening 1-2 times per year if you're at risk for diabetes or have pre-diabetes Fasting glucose: 98 mg/dL (4/9/2025)  A1C: 6.4 % (4/5/2025)  Screening Not Indicated  History Diabetes   Cholesterol Screening Once every 5 years if  you don't have a lipid disorder. May order more often based on risk factors. Lipid panel: Not on file  Screening Not Indicated  History Lipid Disorder      Other Preventive Screenings Covered by Medicare:  Abdominal Aortic Aneurysm (AAA) Screening: covered once if your at risk. You're considered to be at risk if you have a family history of AAA or a male between the age of 65-75 who smoking at least 100 cigarettes in your lifetime.  Lung Cancer Screening: covers low dose CT scan once per year if you meet all of the following conditions: (1) Age 55-77; (2) No signs or symptoms of lung cancer; (3) Current smoker or have quit smoking within the last 15 years; (4) You have a tobacco smoking history of at least 20 pack years (packs per day x number of years you smoked); (5) You get a written order from a healthcare provider.  Glaucoma Screening: covered annually if you're considered high risk: (1) You have diabetes OR (2) Family history of glaucoma OR (3)  aged 50 and older OR (4)  American aged 65 and older  Osteoporosis Screening: covered every 2 years if you meet one of the following conditions: (1) Have a vertebral abnormality; (2) On glucocorticoid therapy for more than 3 months; (3) Have primary hyperparathyroidism; (4) On osteoporosis medications and need to assess response to drug therapy.  HIV Screening: covered annually if you're between the age of 15-65. Also covered annually if you are younger than 15 and older than 65 with risk factors for HIV infection. For pregnant patients, it is covered up to 3 times per pregnancy.    Immunizations:  Immunization Recommendations   Influenza Vaccine Annual influenza vaccination during flu season is recommended for all persons aged >= 6 months who do not have contraindications   Pneumococcal Vaccine   * Pneumococcal conjugate vaccine = PCV13 (Prevnar 13), PCV15 (Vaxneuvance), PCV20 (Prevnar 20)  * Pneumococcal polysaccharide vaccine = PPSV23  (Pneumovax) Adults 19-65 yo with certain risk factors or if 65+ yo  If never received any pneumonia vaccine: recommend Prevnar 20 (PCV20)  Give PCV20 if previously received 1 dose of PCV13 or PPSV23   Hepatitis B Vaccine 3 dose series if at intermediate or high risk (ex: diabetes, end stage renal disease, liver disease)   Respiratory syncytial virus (RSV) Vaccine - COVERED BY MEDICARE PART D  * RSVPreF3 (Arexvy) CDC recommends that adults 60 years of age and older may receive a single dose of RSV vaccine using shared clinical decision-making (SCDM)   Tetanus (Td) Vaccine - COST NOT COVERED BY MEDICARE PART B Following completion of primary series, a booster dose should be given every 10 years to maintain immunity against tetanus. Td may also be given as tetanus wound prophylaxis.   Tdap Vaccine - COST NOT COVERED BY MEDICARE PART B Recommended at least once for all adults. For pregnant patients, recommended with each pregnancy.   Shingles Vaccine (Shingrix) - COST NOT COVERED BY MEDICARE PART B  2 shot series recommended in those 19 years and older who have or will have weakened immune systems or those 50 years and older     Health Maintenance Due:      Topic Date Due   • Hepatitis C Screening  Never done   • Colorectal Cancer Screening  Never done     Immunizations Due:      Topic Date Due   • Pneumococcal Vaccine: 65+ Years (1 of 2 - PCV) Never done   • COVID-19 Vaccine (1 - 2024-25 season) Never done     Advance Directives   What are advance directives?  Advance directives are legal documents that state your wishes and plans for medical care. These plans are made ahead of time in case you lose your ability to make decisions for yourself. Advance directives can apply to any medical decision, such as the treatments you want, and if you want to donate organs.   What are the types of advance directives?  There are many types of advance directives, and each state has rules about how to use them. You may choose a  combination of any of the following:  Living will:  This is a written record of the treatment you want. You can also choose which treatments you do not want, which to limit, and which to stop at a certain time. This includes surgery, medicine, IV fluid, and tube feedings.   Durable power of  for healthcare (DPAHC):  This is a written record that states who you want to make healthcare choices for you when you are unable to make them for yourself. This person, called a proxy, is usually a family member or a friend. You may choose more than 1 proxy.  Do not resuscitate (DNR) order:  A DNR order is used in case your heart stops beating or you stop breathing. It is a request not to have certain forms of treatment, such as CPR. A DNR order may be included in other types of advance directives.  Medical directive:  This covers the care that you want if you are in a coma, near death, or unable to make decisions for yourself. You can list the treatments you want for each condition. Treatment may include pain medicine, surgery, blood transfusions, dialysis, IV or tube feedings, and a ventilator (breathing machine).  Values history:  This document has questions about your views, beliefs, and how you feel and think about life. This information can help others choose the care that you would choose.  Why are advance directives important?  An advance directive helps you control your care. Although spoken wishes may be used, it is better to have your wishes written down. Spoken wishes can be misunderstood, or not followed. Treatments may be given even if you do not want them. An advance directive may make it easier for your family to make difficult choices about your care.   Fall Prevention    Fall prevention  includes ways to make your home and other areas safer. It also includes ways you can move more carefully to prevent a fall. Health conditions that cause changes in your blood pressure, vision, or muscle strength and  coordination may increase your risk for falls. Medicines may also increase your risk for falls if they make you dizzy, weak, or sleepy.   Fall prevention tips:   Stand or sit up slowly.    Use assistive devices as directed.    Wear shoes that fit well and have soles that .    Wear a personal alarm.    Stay active.    Manage your medical conditions.    Home Safety Tips:  Add items to prevent falls in the bathroom.    Keep paths clear.    Install bright lights in your home.    Keep items you use often on shelves within reach.    Paint or place reflective tape on the edges of your stairs.    Weight Management   Why it is important to manage your weight:  Being overweight increases your risk of health conditions such as heart disease, high blood pressure, type 2 diabetes, and certain types of cancer. It can also increase your risk for osteoarthritis, sleep apnea, and other respiratory problems. Aim for a slow, steady weight loss. Even a small amount of weight loss can lower your risk of health problems.  How to lose weight safely:  A safe and healthy way to lose weight is to eat fewer calories and get regular exercise. You can lose up about 1 pound a week by decreasing the number of calories you eat by 500 calories each day.   Healthy meal plan for weight management:  A healthy meal plan includes a variety of foods, contains fewer calories, and helps you stay healthy. A healthy meal plan includes the following:  Eat whole-grain foods more often.  A healthy meal plan should contain fiber. Fiber is the part of grains, fruits, and vegetables that is not broken down by your body. Whole-grain foods are healthy and provide extra fiber in your diet. Some examples of whole-grain foods are whole-wheat breads and pastas, oatmeal, brown rice, and bulgur.  Eat a variety of vegetables every day.  Include dark, leafy greens such as spinach, kale, elizabeth greens, and mustard greens. Eat yellow and orange vegetables such as  carrots, sweet potatoes, and winter squash.   Eat a variety of fruits every day.  Choose fresh or canned fruit (canned in its own juice or light syrup) instead of juice. Fruit juice has very little or no fiber.  Eat low-fat dairy foods.  Drink fat-free (skim) milk or 1% milk. Eat fat-free yogurt and low-fat cottage cheese. Try low-fat cheeses such as mozzarella and other reduced-fat cheeses.  Choose meat and other protein foods that are low in fat.  Choose beans or other legumes such as split peas or lentils. Choose fish, skinless poultry (chicken or turkey), or lean cuts of red meat (beef or pork). Before you cook meat or poultry, cut off any visible fat.   Use less fat and oil.  Try baking foods instead of frying them. Add less fat, such as margarine, sour cream, regular salad dressing and mayonnaise to foods. Eat fewer high-fat foods. Some examples of high-fat foods include french fries, doughnuts, ice cream, and cakes.  Eat fewer sweets.  Limit foods and drinks that are high in sugar. This includes candy, cookies, regular soda, and sweetened drinks.  Exercise:  Exercise at least 30 minutes per day on most days of the week. Some examples of exercise include walking, biking, dancing, and swimming. You can also fit in more physical activity by taking the stairs instead of the elevator or parking farther away from stores. Ask your healthcare provider about the best exercise plan for you.    © Copyright [employer] 2018 Information is for End User's use only and may not be sold, redistributed or otherwise used for commercial purposes. All illustrations and images included in CareNotes® are the copyrighted property of [employer] Inc[employer] or [employer]

## 2025-05-14 NOTE — PROGRESS NOTES
"Name: Deonte Attdallas      : 1950      MRN: [id number]  Encounter Provider: Nico Baumann MD  Encounter Date: 2025   Encounter department: [facility]NAL MEDIC[facility] [place]  :  Assessment & Plan  Type 2 diabetes mellitus with stage 2 chronic kidney disease, without long-term current use of insulin  (HCC)    Lab Results   Component Value Date    HGBA1C 6.4 (H) 2025     Currently well controled  POCT HgA1c was 6.0 we decided to decrease metformin from 1000mg PO BID to 500mg PO BID    Orders:    POCT hemoglobin A1c    Albumin / creatinine urine ratio; Future    Ambulatory referral to Podiatry; Future    IRIS Diabetic eye exam    Ambulatory Referral to Ophthalmology; Future    Thrombocytopenia (HCC)  No results for input(s): \"PLT\" in the last 72 hours.  Pt was previously referred to Heme Onc in setting of no hemolytic process noted on recent labs and plts pt should follow up w/ heme onc asap         Syncope, unspecified syncope type  Follow up w/ neuro         Mixed hyperlipidemia  Continue statin     Orders:    Lipid Panel with Direct LDL reflex; Future    Stage 2 chronic kidney disease  Lab Results   Component Value Date    EGFR 70 2025    EGFR 81 2025    EGFR 88 2025    CREATININE 1.04 2025    CREATININE 0.92 2025    CREATININE 0.79 2025     Pt w/ previous history of nephrolithiasis where multiple stones removed from kidney and some left in   Pt would like to continue to follow with nephrology after having moved from [place]  Orders:    Ambulatory Referral to Nephrology; Future    Screening for AAA (abdominal aortic aneurysm)    Orders:     Abdominal Aorta Medicare Screening AAA; Future    Claustrophobia    Orders:    LORazepam (ATIVAN) 0.5 mg tablet; Take 1 tablet (0.5 mg total) by mouth once as needed (Take 30 minutes prior to MRI) for up to 1 dose    Unwitnessed fall  PT recommended. Pt already has referral provided sheet with options " to contact. Both pt and wife were made aware they have to contact PT.   Pt and wife asked to use walker all the time.          Depression Screening and Follow-up Plan: Patient was screened for depression during today's encounter. They screened negative with a PHQ-2 score of 0.      Falls Plan of Care: referral to physical therapy. Recommended assistive device to help with gait and balance. Assessed feet and footwear.       Preventive health issues were discussed with patient, and age appropriate screening tests were ordered as noted in patient's After Visit Summary. Personalized health advice and appropriate referrals for health education or preventive services given if needed, as noted in patient's After Visit Summary.    History of Present Illness     Pt presented for annual wellness visit on 5/14.   Pt denies anything new.    Pt does endorse chronic leg problem that is worsening. Pt was supposed to get physical therapy but no one came.  Pt saw dr. Cartwright and cardio who suggested physical therapy and no one called to make an appointment.   Pt denies any changes.  Still burning pain.   Pt is        Patient Care Team:  Garcia Cartwright MD as PCP - General (Internal Medicine)  Navdeep Dutta MD (Cardiology)    Review of Systems  Medical History Reviewed by provider this encounter:  Tobacco  Allergies  Meds  Problems  Med Hx  Surg Hx  Fam Hx       Annual Wellness Visit Questionnaire   Deonte is here for his Subsequent Wellness visit.     Health Risk Assessment:   Patient rates overall health as fair. Patient feels that their physical health rating is slightly worse. Patient is satisfied with their life. Eyesight was rated as same. Hearing was rated as same. Patient feels that their emotional and mental health rating is same. Patients states they are sometimes angry. Patient states they are often unusually tired/fatigued. Pain experienced in the last 7 days has been some. Patient's pain rating has been 6/10.  Patient states that he has experienced weight loss or gain in last 6 months.     Depression Screening:   PHQ-2 Score: 0      Fall Risk Screening:   In the past year, patient has experienced: history of falling in past year    Number of falls: 1  Injured during fall?: Yes    Feels unsteady when standing or walking?: Yes    Worried about falling?: Yes      Home Safety:  Patient has trouble with stairs inside or outside of their home. Home safety hazards include: none.     Nutrition:   Current diet is Regular.     Medications:   Patient is not currently taking any over-the-counter supplements. Patient is able to manage medications.     Activities of Daily Living (ADLs)/Instrumental Activities of Daily Living (IADLs):   Walk and transfer into and out of bed and chair?: Yes  Dress and groom yourself?: Yes    Bathe or shower yourself?: Yes    Feed yourself? Yes  Do your laundry/housekeeping?: No  Manage your money, pay your bills and track your expenses?: No  Make your own meals?: No    Do your own shopping?: No    Durable Medical Equipment Suppliers  cane, walker    Previous Hospitalizations:   Any hospitalizations or ED visits within the last 12 months?: Yes      Hospitalization Comments: Fall    Preventive Screenings      Cardiovascular Screening:    General: Screening Not Indicated and History Lipid Disorder      Diabetes Screening:     General: Screening Not Indicated and History Diabetes      Abdominal Aortic Aneurysm (AAA) Screening:    Risk factors include: age between 65-76 yo and tobacco use        Lung Cancer Screening:     General: Screening Not Indicated    Immunizations:  - Immunizations due: Prevnar 20 and Zoster (Shingrix)    Screening, Brief Intervention, and Referral to Treatment (SBIRT)     Screening      AUDIT-C Screenin) How often did you have a drink containing alcohol in the past year? never  2) How many drinks did you have on a typical day when you were drinking in the past year? 0  3) How  "often did you have 6 or more drinks on one occasion in the past year? never    AUDIT-C Score: 0  Interpretation: Score 0-3 (male): Negative screen for alcohol misuse    Single Item Drug Screening:  How often have you used an illegal drug (including marijuana) or a prescription medication for non-medical reasons in the past year? never    Single Item Drug Screen Score: 0  Interpretation: Negative screen for possible drug use disorder    Social Drivers of Health     Financial Resource Strain: Low Risk  (11/26/2024)    Received from [facility]    Overall Financial Resource Strain (CARDIA)     Difficulty of Paying Living Expenses: Not hard at all   Food Insecurity: No Food Insecurity (11/26/2024)    Received from [facility]    Hunger Vital Sign     Within the past 12 months, you worried that your food would run out before you got the money to buy more.: Never true     Within the past 12 months, the food you bought just didn't last and you didn't have money to get more.: Never true   Transportation Needs: No Transportation Needs (5/14/2025)    PRAPARE - Transportation     Lack of Transportation (Medical): No     Lack of Transportation (Non-Medical): No   Housing Stability: Low Risk  (5/14/2025)    Housing Stability Vital Sign     Unable to Pay for Housing in the Last Year: No     Number of Times Moved in the Last Year: 0     Homeless in the Last Year: No   Utilities: Not At Risk (5/14/2025)    [facility] Utilities     Threatened with loss of utilities: No     No results found.    Objective   /70 (BP Location: Left arm, Patient Position: Sitting, Cuff Size: Large)   Pulse 62   Temp 97.6 °F (36.4 °C) (Tympanic)   Resp 14   Ht 5' 5\" (1.651 m)   Wt 103 kg (228 lb)   SpO2 97%   BMI 37.94 kg/m²     Physical Exam    Cardiovascular:      Pulses: no weak pulses.           Dorsalis pedis pulses are 1+ on the right side and 1+ on the left side.        Posterior tibial pulses are 1+ on the right " side and 1+ on the left side.   Feet:      Right foot:      Skin integrity: Callus and dry skin present. No ulcer, skin breakdown, erythema or warmth.      Left foot:      Skin integrity: Callus and dry skin present. No ulcer, skin breakdown, erythema or warmth.       Diabetic Foot Exam    Patient's shoes and socks removed.    Right Foot/Ankle   Right Foot Inspection  Skin Exam: skin normal, skin intact, dry skin, callus, abnormal color and callus. No warmth, no erythema, no maceration, no pre-ulcer and no ulcer.     Toe Exam: swelling.     Sensory   Monofilament testing: intact    Vascular  The right DP pulse is 1+. The right PT pulse is 1+.     Left Foot/Ankle  Left Foot Inspection  Skin Exam: skin normal, skin intact, dry skin, abnormal color and callus. No warmth, no erythema, no maceration, no pre-ulcer and no ulcer.     Toe Exam: swelling.     Sensory   Monofilament testing: intact    Vascular  The left DP pulse is 1+. The left PT pulse is 1+.     Assign Risk Category  No deformity present  No loss of protective sensation  No weak pulses  Risk: 0

## 2025-05-14 NOTE — ASSESSMENT & PLAN NOTE
PT recommended. Pt already has referral provided sheet with options to contact. Both pt and wife were made aware they have to contact PT.   Pt and wife asked to use walker all the time.

## 2025-05-14 NOTE — ASSESSMENT & PLAN NOTE
May 14, 2024      [facility]  [address] [address]  Phone: [phone]  Fax: [phone]       Patient: Mayda Lyman   YOB: 2018  Date of Visit: 05/14/2024    To Whom It May Concern:    Myra Lyman  was at [facility] on 05/14/2024. The patient may return to school on 05/15/2024 with no restrictions. If you have any questions or concerns, or if I can be of further assistance, please do not hesitate to contact me.    Sincerely,    Silvia De La Torre MD      "No results for input(s): \"PLT\" in the last 72 hours.  Pt was previously referred to Heme Onc in setting of no hemolytic process noted on recent labs and plts pt should follow up w/ heme onc asap         "

## 2025-05-14 NOTE — ASSESSMENT & PLAN NOTE
Lab Results   Component Value Date    HGBA1C 6.4 (H) 04/05/2025     Currently well controled  POCT HgA1c was 6.0 we decided to decrease metformin from 1000mg PO BID to 500mg PO BID    Orders:    POCT hemoglobin A1c    Albumin / creatinine urine ratio; Future    Ambulatory referral to Podiatry; Future    IRIS Diabetic eye exam    Ambulatory Referral to Ophthalmology; Future

## 2025-05-15 ENCOUNTER — TELEPHONE (OUTPATIENT)
Dept: OTHER | Facility: HOSPITAL | Age: 75
End: 2025-05-15

## 2025-05-15 NOTE — TELEPHONE ENCOUNTER
Called patient. Spoke with wife. Reminded her about the importance of undergoing an MRI brain w wo. She informed me that it has been scheduled for 5/17 with Ativan given prior. Patient has a follow-up with Dr. Tellez In July.

## 2025-05-16 ENCOUNTER — OFFICE VISIT (OUTPATIENT)
Dept: HEMATOLOGY ONCOLOGY | Facility: CLINIC | Age: 75
End: 2025-05-16
Payer: COMMERCIAL

## 2025-05-16 VITALS
DIASTOLIC BLOOD PRESSURE: 88 MMHG | HEART RATE: 68 BPM | BODY MASS INDEX: 37.82 KG/M2 | SYSTOLIC BLOOD PRESSURE: 150 MMHG | TEMPERATURE: 96.3 F | HEIGHT: 65 IN | RESPIRATION RATE: 18 BRPM | WEIGHT: 227 LBS | OXYGEN SATURATION: 97 %

## 2025-05-16 DIAGNOSIS — E66.01 SEVERE OBESITY (BMI 35.0-39.9) WITH COMORBIDITY (HCC): ICD-10-CM

## 2025-05-16 DIAGNOSIS — D69.6 THROMBOCYTOPENIA (HCC): Primary | ICD-10-CM

## 2025-05-16 DIAGNOSIS — D64.9 ANEMIA, UNSPECIFIED TYPE: ICD-10-CM

## 2025-05-16 PROCEDURE — 99204 OFFICE O/P NEW MOD 45 MIN: CPT | Performed by: PHYSICIAN ASSISTANT

## 2025-05-16 NOTE — ASSESSMENT & PLAN NOTE
See labs attached, mild fluctuating thrombocytopenia present for the last 10 years  Likely secondary to mild thrombopoietin deficiency secondary to his hepatic steatosis  Also patient notes he lost 60 pounds in the last year secondary to decreased oral intake from numerous teeth extractions, would recommend assessing B12 and folic acid as if he has any substrate deficiencies they can also contribute to mild thrombocytopenia    Patient was advised to complete labs in the next 1 to 2 months at minimum.  He does not require follow-up here    Continue monitoring with primary care    Orders:    Vitamin B12; Future    Folate; Future    CBC and differential; Future    Erythropoietin; Future    Protein electrophoresis, serum; Future

## 2025-05-16 NOTE — PROGRESS NOTES
Name: Deonte Greenfield      : 1950      MRN: [id number]  Encounter Provider: Jessica Bautista PA-C  Encounter Date: 2025   Encounter department: [facility] [place]  :  Assessment & Plan  Thrombocytopenia (HCC)  See labs attached, mild fluctuating thrombocytopenia present for the last 10 years  Likely secondary to mild thrombopoietin deficiency secondary to his hepatic steatosis  Also patient notes he lost 60 pounds in the last year secondary to decreased oral intake from numerous teeth extractions, would recommend assessing B12 and folic acid as if he has any substrate deficiencies they can also contribute to mild thrombocytopenia    Patient was advised to complete labs in the next 1 to 2 months at minimum.  He does not require follow-up here    Continue monitoring with primary care    Orders:    Vitamin B12; Future    Folate; Future    CBC and differential; Future    Erythropoietin; Future    Protein electrophoresis, serum; Future    Anemia, unspecified type  Mild anemia   Borderline iron def -- started on oral iron.  Advised to continue.  PCP to monitor to avoid any iron overload  Also likely related to erythropoietin deficiency in the setting of chronic kidney disease --erythropoietin level below 500 we will confirm this  For completeness we will check SPEP    Orders:    Erythropoietin; Future    Protein electrophoresis, serum; Future    Severe obesity (BMI 35.0-39.9) with comorbidity (HCC)  Continue weight loss            Assessment & Plan        Return if symptoms worsen or fail to improve.    History of Present Illness   Chief Complaint   Patient presents with    Consult     History of Present Illness    Pertinent Medical History     25:     74-year-old male presents for consultation regarding thrombocytopenia.    Mild fluctuating thrombocytopenia has been ongoing for 10 years at minimum    His average weight during adulthood has been close to 300  "pounds    No significant alcohol intake, no smoking, worked as a  in [place] with a sedentary job       Review of Systems   Constitutional:  Negative for activity change, appetite change, chills, fatigue, fever and unexpected weight change.   HENT:  Negative for mouth sores and nosebleeds.    Respiratory:  Negative for cough and shortness of breath.    Cardiovascular:  Negative for chest pain, palpitations and leg swelling.   Gastrointestinal:  Negative for abdominal pain, constipation, diarrhea, nausea and vomiting.   Genitourinary:  Negative for difficulty urinating, dysuria and hematuria.   Musculoskeletal:  Negative for arthralgias.   Skin: Negative.    Neurological:  Negative for dizziness, weakness, light-headedness, numbness and headaches.   Hematological: Negative.    Psychiatric/Behavioral: Negative.             Objective   /88 (BP Location: Left arm, Patient Position: Sitting, Cuff Size: Adult)   Pulse 68   Temp (!) 96.3 °F (35.7 °C) (Temporal)   Resp 18   Ht 5' 5\" (1.651 m)   Wt 103 kg (227 lb)   SpO2 97%   BMI 37.77 kg/m²     Physical Exam  Vitals reviewed.   Constitutional:       General: He is not in acute distress.     Appearance: He is well-developed. He is not ill-appearing.   HENT:      Head: Normocephalic and atraumatic.     Eyes:      General: No scleral icterus.     Conjunctiva/sclera: Conjunctivae normal.       Cardiovascular:      Rate and Rhythm: Normal rate and regular rhythm.      Heart sounds: Normal heart sounds. No murmur heard.  Pulmonary:      Effort: Pulmonary effort is normal. No respiratory distress.      Breath sounds: Normal breath sounds.   Abdominal:      Palpations: Abdomen is soft.      Tenderness: There is no abdominal tenderness.     Musculoskeletal:         General: No tenderness. Normal range of motion.      Cervical back: Normal range of motion and neck supple.      Right lower leg: No edema.      Left lower leg: No edema. "   Lymphadenopathy:      Cervical: No cervical adenopathy.     Skin:     General: Skin is warm and dry.     Neurological:      Mental Status: He is alert and oriented to person, place, and time.      Cranial Nerves: No cranial nerve deficit.     Psychiatric:         Mood and Affect: Mood normal.         Behavior: Behavior normal.       Physical Exam      Results    Labs: I have reviewed the following labs:                    Lab Results   Component Value Date/Time    Iron 39 (L) 04/09/2025 11:41 AM    Iron Saturation 10 (L) 04/09/2025 11:41 AM    Ferritin 90 04/09/2025 11:41 AM

## 2025-05-19 ENCOUNTER — HOSPITAL ENCOUNTER (OUTPATIENT)
Dept: ULTRASOUND IMAGING | Facility: HOSPITAL | Age: 75
Discharge: HOME | End: 2025-05-19
Payer: COMMERCIAL

## 2025-05-19 DIAGNOSIS — Z13.6 SCREENING FOR AAA (ABDOMINAL AORTIC ANEURYSM): ICD-10-CM

## 2025-05-19 PROCEDURE — 76706 US ABDL AORTA SCREEN AAA: CPT

## 2025-05-20 ENCOUNTER — RESULTS FOLLOW-UP (OUTPATIENT)
Dept: CARDIOLOGY CLINIC | Facility: CLINIC | Age: 75
End: 2025-05-20

## 2025-05-20 NOTE — RESULT ENCOUNTER NOTE
Cardiac monitor reviewed.  Overall heart rhythm is normal (sinus rhythm).  Very few skipped beats arising from the upper/lower heart chamber (APCs/PVCs) noted.  No dangerous heart rhythms (arrhythmias) recorded.  Average heart rate is normal at 60.  No need for pacemaker.  Can safely continue current blood pressure medicines.  Please call with results.

## 2025-05-22 ENCOUNTER — HOSPITAL ENCOUNTER (OUTPATIENT)
Dept: MRI IMAGING | Facility: HOSPITAL | Age: 75
Discharge: HOME | End: 2025-05-22
Attending: INTERNAL MEDICINE
Payer: COMMERCIAL

## 2025-05-22 DIAGNOSIS — K86.89 DILATED PANCREATIC DUCT: ICD-10-CM

## 2025-05-22 DIAGNOSIS — K86.2 PANCREATIC CYST: ICD-10-CM

## 2025-05-22 PROCEDURE — A9585 GADOBUTROL INJECTION: HCPCS

## 2025-05-22 PROCEDURE — 74183 MRI ABD W/O CNTR FLWD CNTR: CPT

## 2025-05-22 RX ORDER — GADOBUTROL 604.72 MG/ML
10 INJECTION INTRAVENOUS
Status: COMPLETED | OUTPATIENT
Start: 2025-05-22 | End: 2025-05-22

## 2025-05-22 RX ADMIN — GADOBUTROL 10 ML: 604.72 INJECTION INTRAVENOUS at 19:05

## 2025-05-24 ENCOUNTER — HOSPITAL ENCOUNTER (INPATIENT)
Facility: HOSPITAL | Age: 75
LOS: 10 days | DRG: 025 | End: 2025-06-03
Attending: SURGERY | Admitting: SURGERY
Payer: COMMERCIAL

## 2025-05-24 ENCOUNTER — HOSPITAL ENCOUNTER (OUTPATIENT)
Dept: MRI IMAGING | Facility: HOSPITAL | Age: 75
Discharge: HOME | DRG: 084 | End: 2025-05-24
Attending: PHYSICIAN ASSISTANT
Payer: COMMERCIAL

## 2025-05-24 ENCOUNTER — HOSPITAL ENCOUNTER (INPATIENT)
Facility: HOSPITAL | Age: 75
LOS: 1 days | DRG: 084 | End: 2025-05-24
Attending: EMERGENCY MEDICINE | Admitting: SURGERY
Payer: COMMERCIAL

## 2025-05-24 ENCOUNTER — APPOINTMENT (EMERGENCY)
Dept: CT IMAGING | Facility: HOSPITAL | Age: 75
DRG: 084 | End: 2025-05-24
Payer: COMMERCIAL

## 2025-05-24 VITALS
RESPIRATION RATE: 18 BRPM | DIASTOLIC BLOOD PRESSURE: 87 MMHG | HEART RATE: 70 BPM | BODY MASS INDEX: 39.73 KG/M2 | TEMPERATURE: 97.6 F | WEIGHT: 238.76 LBS | OXYGEN SATURATION: 96 % | SYSTOLIC BLOOD PRESSURE: 186 MMHG

## 2025-05-24 DIAGNOSIS — K86.2 PANCREATIC CYST: ICD-10-CM
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
## 2025-05-24 DIAGNOSIS — Z98.890 POST-OPERATIVE STATE: ICD-10-CM

## 2025-05-24 DIAGNOSIS — G93.5 BRAIN COMPRESSION (HCC): ICD-10-CM

## 2025-05-24 DIAGNOSIS — R29.6 UNWITNESSED FALL: ICD-10-CM

## 2025-05-24 DIAGNOSIS — S06.5XAA SUBDURAL HEMATOMA (HCC): Primary | ICD-10-CM

## 2025-05-24 DIAGNOSIS — I63.9 ACUTE CVA (CEREBROVASCULAR ACCIDENT) (HCC): ICD-10-CM

## 2025-05-24 PROBLEM — M79.89 LEG SWELLING: Status: ACTIVE | Noted: 2025-05-24

## 2025-05-24 LAB
AAASAAGGREGATION: 99.4 % (ref 89–100)
AAASAINHIBITION: 0.6 % (ref 0–11)
AAMA (MAX AMPLITUDE AA): 68.3 MM (ref 51–71)
ABO GROUP BLD: NORMAL
ACTFMA(MAX AMPLITUDE ACTF): 20.4 MM (ref 2–19)
ADPADPAGGREGATION: 96.9 % (ref 83–100)
ADPADPINHIBITION: 3.1 % (ref 0–17)
ADPMA(MAX AMPLITUDE ADP): 67.1 MM (ref 45–69)
ALBUMIN SERPL BCG-MCNC: 3.8 G/DL (ref 3.5–5)
ALP SERPL-CCNC: 80 U/L (ref 34–104)
ALT SERPL W P-5'-P-CCNC: 8 U/L (ref 7–52)
ANION GAP SERPL CALCULATED.3IONS-SCNC: 7 MMOL/L (ref 4–13)
APTT PPP: 32 SECONDS (ref 23–34)
AST SERPL W P-5'-P-CCNC: 12 U/L (ref 13–39)
BASOPHILS # BLD AUTO: 0.1 THOUSANDS/ÂΜL (ref 0–0.1)
BASOPHILS NFR BLD AUTO: 1 % (ref 0–1)
BILIRUB SERPL-MCNC: 0.8 MG/DL (ref 0.2–1)
BLD GP AB SCN SERPL QL: NEGATIVE
BLD GP AB SCN SERPL QL: NEGATIVE
BUN SERPL-MCNC: 24 MG/DL (ref 5–25)
CALCIUM SERPL-MCNC: 9.4 MG/DL (ref 8.4–10.2)
CFFMA (FUNCTIONAL FIBRINOGEN MAX AMPLITUDE): 20.1 MM (ref 15–32)
CFFMA (FUNCTIONAL FIBRINOGEN MAX AMPLITUDE): 32 MM (ref 15–32)
CHLORIDE SERPL-SCNC: 98 MMOL/L (ref 96–108)
CKLY30: 0.1 % (ref 0–2.6)
CKLY30: 0.7 % (ref 0–2.6)
CKR(REACTION TIME): 10.3 MIN (ref 4.6–9.1)
CKR(REACTION TIME): 5.2 MIN (ref 4.6–9.1)
CO2 SERPL-SCNC: 29 MMOL/L (ref 21–32)
CREAT SERPL-MCNC: 0.9 MG/DL (ref 0.6–1.3)
CRTMA(RAPIDTEG MAX AMPLITUDE): 55.5 MM (ref 52–70)
CRTMA(RAPIDTEG MAX AMPLITUDE): 67.1 MM (ref 52–70)
EOSINOPHIL # BLD AUTO: 0.11 THOUSAND/ÂΜL (ref 0–0.61)
EOSINOPHIL NFR BLD AUTO: 1 % (ref 0–6)
ERYTHROCYTE [DISTWIDTH] IN BLOOD BY AUTOMATED COUNT: 14.9 % (ref 11.6–15.1)
EST. AVERAGE GLUCOSE BLD GHB EST-MCNC: 131 MG/DL
FOLATE SERPL-MCNC: 19.2 NG/ML
GFR SERPL CREATININE-BSD FRML MDRD: 83 ML/MIN/1.73SQ M
GLUCOSE SERPL-MCNC: 102 MG/DL (ref 65–140)
GLUCOSE SERPL-MCNC: 130 MG/DL (ref 65–140)
GLUCOSE SERPL-MCNC: 69 MG/DL (ref 65–140)
HBA1C MFR BLD: 6.2 %
HCT VFR BLD AUTO: 37 % (ref 36.5–49.3)
HGB BLD-MCNC: 11.9 G/DL (ref 12–17)
HKHMA(MAX AMPLITUDE KAOLIN): 68.6 MM (ref 53–68)
IMM GRANULOCYTES # BLD AUTO: 0.03 THOUSAND/UL (ref 0–0.2)
IMM GRANULOCYTES NFR BLD AUTO: 0 % (ref 0–2)
INR PPP: 1.13 (ref 0.85–1.19)
LYMPHOCYTES # BLD AUTO: 1.34 THOUSANDS/ÂΜL (ref 0.6–4.47)
LYMPHOCYTES NFR BLD AUTO: 15 % (ref 14–44)
MCH RBC QN AUTO: 29.9 PG (ref 26.8–34.3)
MCHC RBC AUTO-ENTMCNC: 32.2 G/DL (ref 31.4–37.4)
MCV RBC AUTO: 93 FL (ref 82–98)
MONOCYTES # BLD AUTO: 0.79 THOUSAND/ÂΜL (ref 0.17–1.22)
MONOCYTES NFR BLD AUTO: 9 % (ref 4–12)
NEUTROPHILS # BLD AUTO: 6.45 THOUSANDS/ÂΜL (ref 1.85–7.62)
NEUTS SEG NFR BLD AUTO: 74 % (ref 43–75)
NRBC BLD AUTO-RTO: 0 /100 WBCS
PLATELET # BLD AUTO: 171 THOUSANDS/UL (ref 149–390)
PMV BLD AUTO: 12.1 FL (ref 8.9–12.7)
POTASSIUM SERPL-SCNC: 4.7 MMOL/L (ref 3.5–5.3)
PROT SERPL-MCNC: 7.6 G/DL (ref 6.4–8.4)
PROTHROMBIN TIME: 15.2 SECONDS (ref 12.3–15)
RBC # BLD AUTO: 3.98 MILLION/UL (ref 3.88–5.62)
RH BLD: POSITIVE
SODIUM SERPL-SCNC: 134 MMOL/L (ref 135–147)
SPECIMEN EXPIRATION DATE: NORMAL
SPECIMEN EXPIRATION DATE: NORMAL
VIT B12 SERPL-MCNC: 271 PG/ML (ref 180–914)
WBC # BLD AUTO: 8.82 THOUSAND/UL (ref 4.31–10.16)

## 2025-05-24 PROCEDURE — 86900 BLOOD TYPING SEROLOGIC ABO: CPT | Performed by: EMERGENCY MEDICINE

## 2025-05-24 PROCEDURE — 36415 COLL VENOUS BLD VENIPUNCTURE: CPT | Performed by: EMERGENCY MEDICINE

## 2025-05-24 PROCEDURE — 85347 COAGULATION TIME ACTIVATED: CPT | Performed by: PHYSICIAN ASSISTANT

## 2025-05-24 PROCEDURE — 85576 BLOOD PLATELET AGGREGATION: CPT | Performed by: PHYSICIAN ASSISTANT

## 2025-05-24 PROCEDURE — 80053 COMPREHEN METABOLIC PANEL: CPT | Performed by: EMERGENCY MEDICINE

## 2025-05-24 PROCEDURE — 30233K1 TRANSFUSION OF NONAUTOLOGOUS FROZEN PLASMA INTO PERIPHERAL VEIN, PERCUTANEOUS APPROACH: ICD-10-PCS | Performed by: STUDENT IN AN ORGANIZED HEALTH CARE EDUCATION/TRAINING PROGRAM

## 2025-05-24 PROCEDURE — 82746 ASSAY OF FOLIC ACID SERUM: CPT

## 2025-05-24 PROCEDURE — 85397 CLOTTING FUNCT ACTIVITY: CPT | Performed by: PHYSICIAN ASSISTANT

## 2025-05-24 PROCEDURE — 82948 REAGENT STRIP/BLOOD GLUCOSE: CPT

## 2025-05-24 PROCEDURE — 87081 CULTURE SCREEN ONLY: CPT | Performed by: PHYSICIAN ASSISTANT

## 2025-05-24 PROCEDURE — 85025 COMPLETE CBC W/AUTO DIFF WBC: CPT | Performed by: EMERGENCY MEDICINE

## 2025-05-24 PROCEDURE — 85347 COAGULATION TIME ACTIVATED: CPT | Performed by: SURGERY

## 2025-05-24 PROCEDURE — 85576 BLOOD PLATELET AGGREGATION: CPT | Performed by: SURGERY

## 2025-05-24 PROCEDURE — 86901 BLOOD TYPING SEROLOGIC RH(D): CPT | Performed by: EMERGENCY MEDICINE

## 2025-05-24 PROCEDURE — 99284 EMERGENCY DEPT VISIT MOD MDM: CPT

## 2025-05-24 PROCEDURE — 70450 CT HEAD/BRAIN W/O DYE: CPT

## 2025-05-24 PROCEDURE — 83036 HEMOGLOBIN GLYCOSYLATED A1C: CPT | Performed by: PHYSICIAN ASSISTANT

## 2025-05-24 PROCEDURE — 99291 CRITICAL CARE FIRST HOUR: CPT | Performed by: SURGERY

## 2025-05-24 PROCEDURE — 99291 CRITICAL CARE FIRST HOUR: CPT | Performed by: PHYSICIAN ASSISTANT

## 2025-05-24 PROCEDURE — 86850 RBC ANTIBODY SCREEN: CPT | Performed by: PHYSICIAN ASSISTANT

## 2025-05-24 PROCEDURE — 70553 MRI BRAIN STEM W/O & W/DYE: CPT

## 2025-05-24 PROCEDURE — 86901 BLOOD TYPING SEROLOGIC RH(D): CPT | Performed by: PHYSICIAN ASSISTANT

## 2025-05-24 PROCEDURE — 82607 VITAMIN B-12: CPT

## 2025-05-24 PROCEDURE — 85384 FIBRINOGEN ACTIVITY: CPT | Performed by: PHYSICIAN ASSISTANT

## 2025-05-24 PROCEDURE — 86850 RBC ANTIBODY SCREEN: CPT | Performed by: EMERGENCY MEDICINE

## 2025-05-24 PROCEDURE — A9585 GADOBUTROL INJECTION: HCPCS | Performed by: PHYSICIAN ASSISTANT

## 2025-05-24 PROCEDURE — 99285 EMERGENCY DEPT VISIT HI MDM: CPT | Performed by: EMERGENCY MEDICINE

## 2025-05-24 PROCEDURE — 85730 THROMBOPLASTIN TIME PARTIAL: CPT | Performed by: PHYSICIAN ASSISTANT

## 2025-05-24 PROCEDURE — 85610 PROTHROMBIN TIME: CPT | Performed by: PHYSICIAN ASSISTANT

## 2025-05-24 PROCEDURE — 85384 FIBRINOGEN ACTIVITY: CPT | Performed by: SURGERY

## 2025-05-24 PROCEDURE — 85397 CLOTTING FUNCT ACTIVITY: CPT | Performed by: SURGERY

## 2025-05-24 PROCEDURE — 99223 1ST HOSP IP/OBS HIGH 75: CPT | Performed by: NEUROLOGICAL SURGERY

## 2025-05-24 PROCEDURE — 86900 BLOOD TYPING SEROLOGIC ABO: CPT | Performed by: PHYSICIAN ASSISTANT

## 2025-05-24 PROCEDURE — P9017 PLASMA 1 DONOR FRZ W/IN 8 HR: HCPCS

## 2025-05-24 RX ORDER — LEVETIRACETAM 500 MG/1
500 TABLET ORAL EVERY 12 HOURS SCHEDULED
Qty: 13 TABLET | Refills: 0 | OUTPATIENT
Start: 2025-05-24 | End: 2025-05-31

## 2025-05-24 RX ORDER — ONDANSETRON 2 MG/ML
4 INJECTION INTRAMUSCULAR; INTRAVENOUS EVERY 6 HOURS PRN
Status: DISCONTINUED | OUTPATIENT
Start: 2025-05-24 | End: 2025-06-03 | Stop reason: HOSPADM

## 2025-05-24 RX ORDER — FERROUS SULFATE 325(65) MG
325 TABLET ORAL EVERY OTHER DAY
Status: CANCELLED | OUTPATIENT
Start: 2025-05-24

## 2025-05-24 RX ORDER — LOSARTAN POTASSIUM 50 MG/1
100 TABLET ORAL DAILY
Status: CANCELLED | OUTPATIENT
Start: 2025-05-24

## 2025-05-24 RX ORDER — LOSARTAN POTASSIUM 50 MG/1
100 TABLET ORAL DAILY
Status: DISCONTINUED | OUTPATIENT
Start: 2025-05-25 | End: 2025-06-03 | Stop reason: HOSPADM

## 2025-05-24 RX ORDER — CHLORTHALIDONE 25 MG/1
25 TABLET ORAL DAILY
Status: DISCONTINUED | OUTPATIENT
Start: 2025-05-25 | End: 2025-06-03 | Stop reason: HOSPADM

## 2025-05-24 RX ORDER — ACETAMINOPHEN 325 MG/1
650 TABLET ORAL EVERY 6 HOURS PRN
Status: DISCONTINUED | OUTPATIENT
Start: 2025-05-24 | End: 2025-06-03 | Stop reason: HOSPADM

## 2025-05-24 RX ORDER — CHLORHEXIDINE GLUCONATE ORAL RINSE 1.2 MG/ML
15 SOLUTION DENTAL ONCE
Status: DISCONTINUED | OUTPATIENT
Start: 2025-05-24 | End: 2025-05-26

## 2025-05-24 RX ORDER — CEFAZOLIN SODIUM 2 G/50ML
2000 SOLUTION INTRAVENOUS ONCE
Status: DISCONTINUED | OUTPATIENT
Start: 2025-05-25 | End: 2025-05-28

## 2025-05-24 RX ORDER — SODIUM CHLORIDE, SODIUM GLUCONATE, SODIUM ACETATE, POTASSIUM CHLORIDE, MAGNESIUM CHLORIDE, SODIUM PHOSPHATE, DIBASIC, AND POTASSIUM PHOSPHATE .53; .5; .37; .037; .03; .012; .00082 G/100ML; G/100ML; G/100ML; G/100ML; G/100ML; G/100ML; G/100ML
75 INJECTION, SOLUTION INTRAVENOUS CONTINUOUS
Status: DISCONTINUED | OUTPATIENT
Start: 2025-05-24 | End: 2025-05-24

## 2025-05-24 RX ORDER — LEVETIRACETAM 250 MG/1
500 TABLET ORAL EVERY 12 HOURS SCHEDULED
Status: DISCONTINUED | OUTPATIENT
Start: 2025-05-24 | End: 2025-05-24 | Stop reason: HOSPADM

## 2025-05-24 RX ORDER — ATENOLOL 50 MG/1
50 TABLET ORAL DAILY
Status: DISCONTINUED | OUTPATIENT
Start: 2025-05-25 | End: 2025-06-03 | Stop reason: HOSPADM

## 2025-05-24 RX ORDER — ALLOPURINOL 300 MG/1
300 TABLET ORAL DAILY
Status: DISCONTINUED | OUTPATIENT
Start: 2025-05-25 | End: 2025-06-03 | Stop reason: HOSPADM

## 2025-05-24 RX ORDER — LABETALOL HYDROCHLORIDE 5 MG/ML
10 INJECTION, SOLUTION INTRAVENOUS EVERY 4 HOURS PRN
Status: DISCONTINUED | OUTPATIENT
Start: 2025-05-24 | End: 2025-06-03 | Stop reason: HOSPADM

## 2025-05-24 RX ORDER — CHLORHEXIDINE GLUCONATE ORAL RINSE 1.2 MG/ML
15 SOLUTION DENTAL EVERY 12 HOURS SCHEDULED
Status: DISCONTINUED | OUTPATIENT
Start: 2025-05-24 | End: 2025-05-24

## 2025-05-24 RX ORDER — ALLOPURINOL 300 MG/1
300 TABLET ORAL DAILY
Status: CANCELLED | OUTPATIENT
Start: 2025-05-24

## 2025-05-24 RX ORDER — LABETALOL HYDROCHLORIDE 5 MG/ML
10 INJECTION, SOLUTION INTRAVENOUS EVERY 6 HOURS PRN
Status: DISCONTINUED | OUTPATIENT
Start: 2025-05-24 | End: 2025-05-24

## 2025-05-24 RX ORDER — INSULIN LISPRO 100 [IU]/ML
2-12 INJECTION, SOLUTION INTRAVENOUS; SUBCUTANEOUS
Status: DISCONTINUED | OUTPATIENT
Start: 2025-05-24 | End: 2025-06-03 | Stop reason: HOSPADM

## 2025-05-24 RX ORDER — GADOBUTROL 604.72 MG/ML
10 INJECTION INTRAVENOUS
Status: COMPLETED | OUTPATIENT
Start: 2025-05-24 | End: 2025-05-24

## 2025-05-24 RX ORDER — PHYTONADIONE 10 MG/ML
10 INJECTION, EMULSION INTRAMUSCULAR; INTRAVENOUS; SUBCUTANEOUS ONCE
Status: DISCONTINUED | OUTPATIENT
Start: 2025-05-24 | End: 2025-05-24

## 2025-05-24 RX ORDER — CHLORHEXIDINE GLUCONATE ORAL RINSE 1.2 MG/ML
15 SOLUTION DENTAL EVERY 12 HOURS SCHEDULED
Status: DISCONTINUED | OUTPATIENT
Start: 2025-05-24 | End: 2025-06-03 | Stop reason: HOSPADM

## 2025-05-24 RX ORDER — LEVETIRACETAM 500 MG/1
500 TABLET ORAL EVERY 12 HOURS SCHEDULED
Status: COMPLETED | OUTPATIENT
Start: 2025-05-24 | End: 2025-05-31

## 2025-05-24 RX ORDER — HYDRALAZINE HYDROCHLORIDE 20 MG/ML
5 INJECTION INTRAMUSCULAR; INTRAVENOUS EVERY 4 HOURS PRN
Status: DISCONTINUED | OUTPATIENT
Start: 2025-05-24 | End: 2025-05-25

## 2025-05-24 RX ADMIN — HYDRALAZINE HYDROCHLORIDE 5 MG: 20 INJECTION INTRAMUSCULAR; INTRAVENOUS at 21:15

## 2025-05-24 RX ADMIN — LEVETIRACETAM 500 MG: 500 TABLET, FILM COATED ORAL at 21:11

## 2025-05-24 RX ADMIN — LEVETIRACETAM 500 MG: 250 TABLET, FILM COATED ORAL at 14:07

## 2025-05-24 RX ADMIN — CHLORHEXIDINE GLUCONATE 15 ML: 1.2 SOLUTION ORAL at 21:11

## 2025-05-24 RX ADMIN — GADOBUTROL 10 ML: 604.72 INJECTION INTRAVENOUS at 11:14

## 2025-05-24 RX ADMIN — LABETALOL HYDROCHLORIDE 10 MG: 5 INJECTION, SOLUTION INTRAVENOUS at 19:12

## 2025-05-24 NOTE — ED ATTENDING ATTESTATION
5/24/2025  I, Marcy Vela MD, saw and evaluated the patient. I have discussed the patient with the resident/non-physician practitioner and agree with the resident's/non-physician practitioner's findings, Plan of Care, and MDM as documented in the resident's/non-physician practitioner's note, except where noted. All available labs and Radiology studies were reviewed.  I was present for key portions of any procedure(s) performed by the resident/non-physician practitioner and I was immediately available to provide assistance.       At this point I agree with the current assessment done in the Emergency Department.  I have conducted an independent evaluation of this patient a history and physical is as follows:    74-year-old male with history of mild thrombocytopenia secondary to mild thrombopoietin deficiency in the setting of hepatic steatosis.  Patient comes in for evaluation of an abnormal outpatient MRI.    The patient was admitted to the hospital after a fall with head strike in early April.  He had a CT scan of his head on April 5 that showed resolution of a previously noted parafalcine subdural hematoma.  Patient was evaluated by neurology during that admission and his fall was thought to possibly be due to seizure activity.  He had an EEG and was referred for an outpatient MRI which was performed this morning.  Following the MRI the MRI tech recommended that the family bring him into the emergency department for evaluation.    Patient denies any headache, nausea, or vomiting.  Family does note that he has had a progressive decline over the last month, now requiring a walker to ambulate.    Prior fall, hit the back of his head, was stable, no intervention. Discharged with routien f/u. Chronic RLE weakness.     Unwitnessed fall back in April- fall vs. Syncope vs. Seizure- MRI brain ordered by neuro to be performed outpatient    Last CT head reviewed from 4/5/25- previously noted parafalcine subdural  hematoma has resolved, no new itnra or exta-axial hemorrhage.  Patient was noted to have prominence of the bilateral frontoparietal extra-axial CSF spaces likely representing a combination of volume loss and chronic subdural hygromas.    Physical Exam  Vitals and nursing note reviewed.   Constitutional:       General: He is not in acute distress.     Appearance: He is well-developed. He is not ill-appearing, toxic-appearing or diaphoretic.   HENT:      Head: Normocephalic and atraumatic.      Right Ear: External ear normal.      Left Ear: External ear normal.      Nose: Nose normal.     Eyes:      Conjunctiva/sclera: Conjunctivae normal.       Cardiovascular:      Rate and Rhythm: Normal rate and regular rhythm.      Pulses: Normal pulses.      Heart sounds: Normal heart sounds. No murmur heard.     No friction rub. No gallop.   Pulmonary:      Effort: Pulmonary effort is normal. No respiratory distress.      Breath sounds: Normal breath sounds. No wheezing or rales.   Abdominal:      General: Bowel sounds are normal. There is no distension.      Palpations: Abdomen is soft.      Tenderness: There is no abdominal tenderness. There is no guarding.     Musculoskeletal:         General: No deformity. Normal range of motion.      Cervical back: Normal range of motion and neck supple.     Skin:     General: Skin is warm and dry.     Neurological:      General: No focal deficit present.      Mental Status: He is alert and oriented to person, place, and time.      Motor: No abnormal muscle tone.      Comments: 5 out of 5 strength in the proximal and distal bilateral upper and lower extremities with intact sensation to light touch.  No truncal ataxia with sitting up in bed.  Clear speech.  Cranial nerves intact. A&Ox4.    Psychiatric:         Mood and Affect: Mood normal.       ED Course  ED Course as of 05/24/25 1436   Sat May 24, 2025   1221 Patient's MRI shows subacute large bilateral subdurals with mass effect, no  midline shift, abnormal FLAIR signal, and suggestion of trace subarachnoid hemorrhage.  Patient denies any recent falls since his fall at the beginning of April.  No headache.  Family does report gradual decline in his ability to attend to his own ADLs over the last month, states that he is now ambulating with a walker which he is never required in the past.  No confusion.  Patient is laughing and joking with me in the exam room with normal neurologic exam.  Case was discussed with Dr. Higginbotham with trauma surgery.  She will be admitting the patient to her service.  Message sent to AYLEEN De La Paz on call with trauma for evaluation.    1238 Platelet count 171.    1238 Case discussed with AYLEEN Lopez on call with [facility]. Requests a non-contrasted CT of the patient's head. Order placed.    1416 CT head with stable large bilateral mixed attenuating high convexity subdural hematomas, right greater than left, likely predominantly subacute in timing, with mass effect on the right greater than left cerebral hemispheres.  No midline shift noted.  Patient admitted to trauma for further management.         Critical Care Time  Procedures

## 2025-05-24 NOTE — CONSULTS
Consultation - Neurosurgery   Name: Deonte Attdallas 74 y.o. male I MRN: [id number]  Unit/Bed#: ICU 06 I Date of Admission: 2025   Date of Service: 2025 I Hospital Day: 0   Inpatient consult to Neurosurgery  Consult performed by: Consuelo Yao PA-C  Consult ordered by: Adolfo Alfonso PA-C        Physician Requesting Evaluation: Cassidy Raza DO   Reason for Evaluation / Principal Problem: odalis SDH     Assessment & Plan  Thrombocytopenia (HCC)  Hx of mild thrombocytopenia - been fluctuating and present for the last 10 years  Following with  heme-onc - seen on 2025   Felt to be secondary to mild thrombopoietin deficiency secondary to his hepatic steatosis    plt: 171     Plan:   Maintain goal plt > 100   Pt received FFP per [facility] team   Ongoing correct of coagulopathy pending TEG results   [facility] attending to provide medical clearance for possible surgical intervention     Subdural hematoma (HCC)  Odalis mixed density SDH   Reports a fall on 2025   Since fall, pt has had progressively worsening ambulatory dysfunction   No requiring use of a rolling walker   Family also reported some slower speech   Pt has been following with  neurology as an outpt and underwent MRI brain today which revealed odalis SDH.   Pt was advised he seek care in the ER - presented to [facility] ER on  and transferred to [facility] for further eval and recs   No reported AC/AP med use     Imagin/24 CTH: Stable, large bilateral mixed attenuating, high convexity subdural hematomas, right greater than left, likely predominantly subacute in timing, with mass effect on the right greater than left cerebral hemispheres near the vertex. No midline shift noted.    MRI brain: New, large bilateral high convexity subdural hematomas, right greater than left, which are predominantly subacute in timing, with mass effect on the bilateral cerebral hemispheres, and effacement of the sulci. No significant midline shift noted. FLAIR  signal hyperintensity along the sulci of the right frontal and parietal convexities, which may represent trace associated subarachnoid hemorrhage. No acute infarct. Mild chronic white matter microangiopathic changes involving both cerebral hemispheres. No intracranial enhancing lesion noted    Plan:   Continue to closely monitor neuro exam   Frequent neuro checks per primary team   Repeat STAT CTH with any acute decline in GCS > 2pts or more in 1hr   Maintain normotensive BP goals, SBP < 160, MAP> 65   Case and imaging reviewed with Dr. Grady   Patient with bilateral mixed density subdural collections, right greater than left  Patient with progressively worsening ambulatory dysfunction as well as some noted left upper extremity and left lower extremity drift and subtle weakness on exam, consistent with symptomatic subdural hematoma  Anticipate need for possible evacuation of SDH with lake holes vs mini craniotomies   Ongoing discussions w/ nsgy attending, tentatively could add on for tomorrow  Will prep pt for possible OR tomorrow --> maintain NPO status at midnight   [facility] attending to provide medical clearance   Will consider odalis MMA embolization as well this admission at a later date   Continue keppra for seizure ppx   Hold all AC/AP meds   No reported use at home   Correct any coagulopathy   Maintain goal INR < 1.4, plt > 100 - see above   DVT ppx: SCDs, hold chem dvt ppx until cleared by nsgy team   Medical management per primary team   Pain control per primary team   PT/OT   Social work following for assistance with dispo once medically cleared     Neurosurgery will continue to follow. Please reach out with any further questions or concerns.     Please contact the SecureChat role for the Neurosurgery service with any questions/concerns.    History of Present Illness   HPI: Deonte Attarian is a 74 y.o. year old male with a PMH significant for thrombocytopenia, CKD 2, obesity, diabetes type 2, hyperlipidemia  who presents to the [facility] emergency department today after outpatient MRI revealed bilateral acute on chronic subdural hematoma.    Upon further questioning, patient reports that he had a fall in the beginning of April 2025 and since this fall he and his family have noted a progressive decline, specifically in his ambulatory dysfunction.  Patient states that he started to have trouble walking, often losing his balance and requiring use of a cane.  This is progressed to the point of now he needs a rolling walker at all times.  Patient denies any additional falls or injuries.  His family reports some slower speech but otherwise they offer no concerns for confusion, or altered mental status.  Patient denies any headaches, dizziness, vision changes.  He states at times he will intermittently drag his leg.  He does report some pain/burning sensation to the right thigh.  Patient denies any AC/AP medication use.    Review of Systems   Constitutional:  Negative for fatigue and fever.   Eyes:  Negative for photophobia and visual disturbance.   Respiratory:  Negative for cough.    Cardiovascular:  Negative for chest pain.   Gastrointestinal:  Negative for nausea and vomiting.   Musculoskeletal:  Positive for gait problem and myalgias. Negative for arthralgias, back pain, joint swelling, neck pain and neck stiffness.   Neurological:  Positive for speech difficulty (slower speech). Negative for dizziness, tremors, seizures, syncope, facial asymmetry, weakness, light-headedness, numbness and headaches.   Psychiatric/Behavioral:  Negative for agitation, behavioral problems, confusion and decreased concentration. The patient is not nervous/anxious.      Medical History Review: I have reviewed the patient's PMH, PSH, Social History, Family History, Meds, and Allergies     Objective :  Temp:  [97.5 °F (36.4 °C)-97.6 °F (36.4 °C)] 97.5 °F (36.4 °C)  HR:  [66-79] 79  BP: (150-199)/(66-89) 199/89  Resp:  [16-18] 16  SpO2:   [95 %-97 %] 95 %  O2 Device: None (Room air)    Physical Exam Neurological Exam  General appearance: alert, appears stated age, cooperative and no distress  Head: Normocephalic, without obvious abnormality, atraumatic  Eyes: EOMI, PERRL  Neck: supple, symmetrical, trachea midline and NT  Back: no kyphosis present, no tenderness to percussion or palpation  Lungs: non labored breathing, no resp distress on room air   Heart: regular heart rate  Neurologic:   Mental status: Alert, oriented x3, thought content appropriate  Cranial nerves: grossly intact (Cranial nerves II-XII)  Sensory: normal to LT odalis throughout   Motor: moving all extremities   - RUE: 4/5   - RLE: 4+/5   - L side intact, 5/5 throughout   Coordination: noted mild ataxia to the RUE with RUE and RLE drift       Lab Results: I have reviewed the following results:  Recent Labs     05/24/25  1226 05/24/25  1411   WBC 8.82  --    HGB 11.9*  --    HCT 37.0  --      --    SODIUM 134*  --    K 4.7  --    CL 98  --    CO2 29  --    BUN 24  --    CREATININE 0.90  --    GLUC 102  --    AST 12*  --    ALT 8  --    ALB 3.8  --    TBILI 0.80  --    ALKPHOS 80  --    PTT  --  32   INR  --  1.13     VTE Pharmacologic Prophylaxis: Sequential compression device (Venodyne)

## 2025-05-24 NOTE — ASSESSMENT & PLAN NOTE
- obtain LEVD bilaterally to evaluate for DVT given swelling and patient has been sedentary over the last few weeks.

## 2025-05-24 NOTE — H&P
H&P - Critical Care/ICU   Name: Deonte Greenfield 74 y.o. male I MRN: [id number]  Unit/Bed#: ICU 06 I Date of Admission: 5/24/2025   Date of Service: 5/24/2025 I Hospital Day: 0       Assessment & Plan   There are no hospital problems to display for this patient.    Neuro:   Diagnosis: b/l SDHs (R>L) s/p 4/4 fall  Plan: appreciate nsg recs - planning b/l lake holes, MMA embo  5/25 AM CTH  Q1 neurochecks  Keppra 500 bid for 7d for seizure ppx  Vit K and Kcentra given 5/24 TEG    CV:   Diagnosis: HTN, HLD  Plan: hold home valsartan, atenolol-chlorthalidone  Prn hydralazine/labetalol  Continue statin    Pulm:  No active issues    GI:   Diagnosis: hepatic steatosis  Plan: outpt f/u with PCP    :   Diagnosis: CKD2 (base 0.7-0.9)  Plan: trend Cr, IVF resuscitation prn    F/E/N:   F: none  E: replete prn  N: NPO    Heme/Onc:   DVT ppx: hold AC/AP meds until cleared by nsg  Diagnosis: chronic thrombocytopenia  Plan: per 5/16 hemonc office visit: mild fluctuating thrombocytopenia present for last 10 years, likely 2/2 mild thrombopoietin deficiency 2/2 hepatic steatosis  pt has also lost 60 pounds in last year 2/2 decreased oral intake from numerous teeth extractions  assess B12 and folic acid (can contribute to mild thrombocytopenia)  Diagnosis: gout  Plan: continue home allopurinol    Endo:   Diagnosis: DM  Plan: hold home metformin  Sliding scale insulin, goal -180    ID:   No active issues    MSK/Skin:   Diagnosis: b/l lower extremity edema  Plan: f/u b/l LE venous duplex (given swelling and patient has been sedentary over last few weeks)  Diagnosis: 4/4 fall, frequent falls  Plan: PT/OT, IS/OOB  Appreciate valerie recs    Disposition: Critical care    History of Present Illness   Deonte Greenfield is a 74 y.o. with hx of DM, CKD2, HTN, HLD, thrombocytopenia, gout, b/l total hip arthroplasty, who presents as transfer from [facility] due to SDH. Had a fall on 4/4 resulting in small R posterior falx SDH. Follow-up exams on 4/5  and 4/6 revealed resolution of SDH. A workup was initiated for frequent falls by neurology including an MRI which was performed 5/24 revealing large b/l high convexity SDHs with mass effect. He was referred to the hospital where CTH confirmed these findings.  Patient arrives to the ICU with no complaints. Patient does note progressive weakness, requiring a walker to ambulate in last couple weeks. No headache, n/v, or recent new falls. GCS is 15.     History obtained from chart review and the patient.  Review of Systems: See HPI for Review of Systems    Historical Information   Past Medical History:  No date: Diabetes mellitus (HCC)  04/05/2025: History of kidney stones  No date: Hypertension Past Surgical History:  No date: TOTAL HIP ARTHROPLASTY; Bilateral   Current Outpatient Medications   Medication Instructions    allopurinol (ZYLOPRIM) 300 mg, Daily    atenolol-chlorthalidone (TENORETIC) 50-25 mg per tablet 1 tablet, Daily    atorvastatin (LIPITOR) 10 mg, Daily at bedtime    citric acid-potassium citrate (POLYCITRA K) 1,100-334 mg/5 mL solution 5 mL, 3 times daily with meals    ferrous sulfate 325 mg, Oral, Every other day    Lancets (OneTouch Delica Plus [id number]) MISC     LORazepam (ATIVAN) 0.5 mg, Oral, Once as needed    LORazepam (ATIVAN) 0.5 mg, Oral, Once, Take 1 tablet 0.5 mg 30mins prior to MRI    metFORMIN (GLUCOPHAGE) 1,000 mg, 2 times daily with meals    valsartan (DIOVAN) 320 mg, Daily    Allergies[1]   Social History[2] Family History[3]       Objective :                   Vitals I/O      Most Recent Min/Max in 24hrs   Temp   Temp  Min: 97.6 °F (36.4 °C)  Max: 97.6 °F (36.4 °C)   Pulse   Pulse  Min: 66  Max: 74   Resp   Resp  Min: 18  Max: 18   BP   BP  Min: 150/66  Max: 186/87   O2 Sat   SpO2  Min: 95 %  Max: 97 %    No intake or output data in the 24 hours ending 05/24/25 1839    No diet orders on file    Invasive Monitoring           Physical Exam   Physical Exam  Eyes:      Extraocular  Movements: Extraocular movements intact.      Conjunctiva/sclera: Conjunctivae normal.   Skin:     General: Skin is warm and dry.      Findings: Wound (1cm LLE) present.   HENT:      Head: Normocephalic.   Cardiovascular:      Rate and Rhythm: Normal rate.      Pulses: Normal pulses.   Musculoskeletal:         General: Normal range of motion.      Right lower le+ Edema present.      Left lower le+ Edema present.      Comments: Red discoloration, appears like venous insufficiency   Abdominal:      Palpations: Abdomen is soft.   Constitutional:       General: He is not in acute distress.  Pulmonary:      Effort: Pulmonary effort is normal. No respiratory distress.      Comments: On RA  Neurological:      General: No focal deficit present.      Mental Status: He is alert and oriented to person, place and time.          Diagnostic Studies        Lab Results: I have reviewed the following results:     Medications:  Scheduled PRN        Continuous    No current facility-administered medications for this encounter.       Labs:   CBC    Recent Labs     25  1226   WBC 8.82   HGB 11.9*   HCT 37.0        BMP    Recent Labs     25  1226   SODIUM 134*   K 4.7   CL 98   CO2 29   AGAP 7   BUN 24   CREATININE 0.90   CALCIUM 9.4       Coags    Recent Labs     25  1411   INR 1.13   PTT 32        Additional Electrolytes  No recent results       Blood Gas    No recent results  No recent results LFTs  Recent Labs     25  1226   ALT 8   AST 12*   ALKPHOS 80   ALB 3.8   TBILI 0.80       Infectious  No recent results  Glucose  Recent Labs     25  1226   GLUC 102             [1] No Known Allergies  [2]   Social History  Tobacco Use    Smoking status: Former     Types: Cigarettes    Smokeless tobacco: Never   Vaping Use    Vaping status: Never Used   Substance Use Topics    Alcohol use: Not Currently    Drug use: Never   [3] No family history on file.

## 2025-05-24 NOTE — EMTALA/ACUTE CARE TRANSFER
[facility] EMERGENCY DEPARTMENT   [facility] [place]  [place] [address]  Dept: [phone]      EMTALA TRANSFER CONSENT    NAME Deonte AKINS 1950                              MRN [id number]    I have been informed of my rights regarding examination, treatment, and transfer   by Dr. Sherry Higginbotham MD    Benefits:      Risks:        Transfer Request   I acknowledge that my medical condition has been evaluated and explained to me by the emergency department physician or other qualified medical person and/or my attending physician who has recommended and offered to me further medical examination and treatment. I understand the Hospital's obligation with respect to the treatment and stabilization of my emergency medical condition. I nevertheless request to be transferred. I release the Hospital, the doctor, and any other persons caring for me from all responsibility or liability for any injury or ill effects that may result from my transfer and agree to accept all responsibility for the consequences of my choice to transfer, rather than receive stabilizing treatment at the Hospital. I understand that because the transfer is my request, my insurance may not provide reimbursement for the services.  The Hospital will assist and direct me and my family in how to make arrangements for transfer, but the hospital is not liable for any fees charged by the transport service.  In spite of this understanding, I refuse to consent to further medical examination and treatment which has been offered to me, and request transfer to  . I authorize the performance of emergency medical procedures and treatments upon me in both transit and upon arrival at the receiving facility.  Additionally, I authorize the release of any and all medical records to the receiving facility and request they be transported with me, if possible.    I authorize the performance of  emergency medical procedures and treatments upon me in both transit and upon arrival at the receiving facility.  Additionally, I authorize the release of any and all medical records to the receiving facility and request they be transported with me, if possible.  I understand that the safest mode of transportation during a medical emergency is an ambulance and that the Hospital advocates the use of this mode of transport. Risks of traveling to the receiving facility by car, including absence of medical control, life sustaining equipment, such as oxygen, and medical personnel has been explained to me and I fully understand them.    (ALCON CORRECT BOX BELOW)  [  ]  I consent to the stated transfer and to be transported by ambulance/helicopter.  [  ]  I consent to the stated transfer, but refuse transportation by ambulance and accept full responsibility for my transportation by car.  I understand the risks of non-ambulance transfers and I exonerate the Hospital and its staff from any deterioration in my condition that results from this refusal.    X___________________________________________    DATE  25  TIME________  Signature of patient or legally responsible individual signing on patient behalf           RELATIONSHIP TO PATIENT_________________________          Provider Certification    NAME Deonte Attdallas                                         1950                              MRN [id number]    A medical screening exam was performed on the above named patient.  Based on the examination:    Condition Necessitating Transfer The primary encounter diagnosis was Subdural hematoma (HCC). A diagnosis of Fall, initial encounter was also pertinent to this visit.    Patient Condition:      Reason for Transfer:      Transfer Requirements: Facility     Space available and qualified personnel available for treatment as acknowledged by    Agreed to accept transfer and to provide appropriate medical treatment as  acknowledged by          Appropriate medical records of the examination and treatment of the patient are provided at the time of transfer   STAFF INITIAL WHEN COMPLETED _______  Transfer will be performed by qualified personnel from    and appropriate transfer equipment as required, including the use of necessary and appropriate life support measures.    Provider Certification: I have examined the patient and explained the following risks and benefits of being transferred/refusing transfer to the patient/family:         Based on these reasonable risks and benefits to the patient and/or the unborn child(michelle), and based upon the information available at the time of the patient’s examination, I certify that the medical benefits reasonably to be expected from the provision of appropriate medical treatments at another medical facility outweigh the increasing risks, if any, to the individual’s medical condition, and in the case of labor to the unborn child, from effecting the transfer.    X____________________________________________ DATE 05/24/25        TIME_______      ORIGINAL - SEND TO MEDICAL RECORDS   COPY - SEND WITH PATIENT DURING TRANSFER

## 2025-05-24 NOTE — ASSESSMENT & PLAN NOTE
- mild, chronic from hepatic steatosis, per hematology  - PC normal today  - f/u outpatient with hematology as needed

## 2025-05-24 NOTE — ASSESSMENT & PLAN NOTE
- fall appx 2 months ago  - sustained below stated injury  - Transfer to [facility] for MMA embolization/Yael holes per neurosugery  - Geriatrics consultation  - PT/OT evaluations  - CM for ultimate dispo planning

## 2025-05-24 NOTE — PLAN OF CARE
Problem: PAIN - ADULT  Goal: Verbalizes/displays adequate comfort level or baseline comfort level  Description: Interventions:  - Encourage patient to monitor pain and request assistance  - Assess pain using appropriate pain scale  - Administer analgesics as ordered based on type and severity of pain and evaluate response  - Implement non-pharmacological measures as appropriate and evaluate response  - Consider cultural and social influences on pain and pain management  - Notify physician/advanced practitioner if interventions unsuccessful or patient reports new pain  - Educate patient/family on pain management process including their role and importance of  reporting pain   - Provide non-pharmacologic/complimentary pain relief interventions  Outcome: Not Progressing

## 2025-05-24 NOTE — ASSESSMENT & PLAN NOTE
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  - Transfer to [facility] for MMA embolization and [place] holes, per Dr. Grady. Patient and family in agreement. ICU and trauma team accepting   - NPO after midnight for anticipated procedure tomorrow   - Neurosurgery evaluation and recommendations appreciated.  - Hold anti-platelet and anticoagulant medications for least 2 weeks and/or until cleared by Neurosurgery.  - Keppra for 7 days for seizure prophylaxis.  - F/u TEG and coags.  - Continue symptomatic management with analgesia as needed.  - F/u CT head in AM recommended by neurosurgery for 5/25.

## 2025-05-24 NOTE — ASSESSMENT & PLAN NOTE
Hx of mild thrombocytopenia - been fluctuating and present for the last 10 years  Following with [facility] heme-onc - seen on 5/16/2025   Felt to be secondary to mild thrombopoietin deficiency secondary to his hepatic steatosis   5/24 plt: 171     Plan:   Maintain goal plt > 100   Pt received FFP per [facility] team   Ongoing correct of coagulopathy pending TEG results   SCC attending to provide medical clearance for possible surgical intervention

## 2025-05-24 NOTE — H&P
"H&P - Trauma   Name: Deonte Greenfield 74 y.o. 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  - Transfer to [facility] for MMA embolization and Yael holes, per Dr. Grady. Patient and family in agreement. ICU and trauma team accepting   - NPO after midnight for anticipated procedure tomorrow   - Neurosurgery evaluation and recommendations appreciated.  - Hold anti-platelet and anticoagulant medications for least 2 weeks and/or until cleared by Neurosurgery.  - Keppra for 7 days for seizure prophylaxis.  - F/u TEG and coags.  - Continue symptomatic management with analgesia as needed.  - F/u CT head in AM recommended by neurosurgery for 5/25.   Leg swelling  - obtain LEVD bilaterally to evaluate for DVT given swelling and patient has been sedentary over the last few weeks.   Thrombocytopenia (HCC)  - mild, chronic from hepatic steatosis, per hematology  - PC normal today  - f/u outpatient with hematology as needed    Trauma Alert: Evaluation; trauma team notified at 1221 via text   Model of Arrival: Self    Trauma Team: Attending Neftaly and RACHELLE Huggins  Consultants:     Neurosurgery: routine consult; Epic consult order placed and consultant notified (via phone/text @ time 1229);     History of Present Illness   Chief Complaint: \"I'm doing okay\"  Mechanism:Fall     Deonte Greenfield is a 74 y.o. male who presents s/p fall at the beginning of April with head strike. He was admitted to [facility] on the medicine service, however after admission, radiology reported a thin right sided subdural, however repeat CT scan " the following day showed resolution. Since that time he has required the use of a walker for ambulation due to poor balance and has been having mor difficulty with ADLs. He is undergoing a work up by neurology to determine the etiology of the fall and he had an MRI done this morning which showed large bilateral subdural hematomas with mass effect. He denies headache, N/V, numbness/weakness/tingling, vision changes. He does not take AC/AP medications. He does have a history of mild thrombocytopenia which appears to be chronic. He was referred to Heme/Onc last month and he reports they said he did not need to come back as it is a chronic finding.     Review of Systems   Constitutional: Negative.    HENT: Negative.     Eyes: Negative.    Respiratory: Negative.     Cardiovascular: Negative.    Gastrointestinal: Negative.    Genitourinary: Negative.    Musculoskeletal: Negative.    Skin: Negative.    Neurological: Negative.  Negative for dizziness, weakness and light-headedness.   Psychiatric/Behavioral: Negative.       Medical History Review: I have reviewed the patient's PMH, PSH, Social History, Family History, Meds, and Allergies     Last Tetanus: unknown      ISAR Score: Did you order a geriatric consult if the score was 2 or greater?: yes No data recorded       Objective :  Temp:  [97.6 °F (36.4 °C)] 97.6 °F (36.4 °C)  HR:  [66-74] 66  BP: (150-151)/(66-71) 150/66  Resp:  [18] 18  SpO2:  [95 %-97 %] 97 %  O2 Device: None (Room air)    Initial Vitals:   Temperature: 97.6 °F (36.4 °C) (05/24/25 1147)  Pulse: 74 (05/24/25 1136)  Respirations: 18 (05/24/25 1136)  Blood Pressure: 151/71 (05/24/25 1136)    Primary Survey:   Airway:        Status: patent;        Pre-hospital Interventions: none        Hospital Interventions: none  Breathing:        Pre-hospital Interventions: none       Effort: normal       Right breath sounds: normal       Left breath sounds: normal  Circulation:        Rhythm: regular       Rate:  regular   Right Pulses Left Pulses    R radial: 2+  R femoral: 2+  R pedal: 2+  R carotid: 2+  R popliteal: 2+ L radial: 2+  L femoral: 2+  L pedal: 2+  L carotid: 2+  L popliteal: 2+   Disability:        GCS: Eye: 4; Verbal: 5 Motor: 6 Total: 15       Right Pupil: round;  reactive         Left Pupil:  round;  reactive      R Motor Strength L Motor Strength    R : 5/5  R dorsiflex: 5/5  R plantarflex: 5/5 L : 5/5  L dorsiflex: 5/5  L plantarflex: 5/5        Sensory:  No sensory deficit  Exposure:       Completed: Yes      Secondary Survey:  Physical Exam  Constitutional:       Appearance: Normal appearance.   HENT:      Head: Normocephalic.      Nose: Nose normal.      Mouth/Throat:      Mouth: Mucous membranes are moist.     Eyes:      Pupils: Pupils are equal, round, and reactive to light.       Cardiovascular:      Rate and Rhythm: Normal rate and regular rhythm.      Pulses: Normal pulses.   Pulmonary:      Effort: Pulmonary effort is normal. No respiratory distress.      Breath sounds: Normal breath sounds.   Abdominal:      General: Abdomen is flat.      Palpations: Abdomen is soft.      Tenderness: There is no abdominal tenderness.     Musculoskeletal:      Cervical back: Normal range of motion and neck supple. No tenderness.      Comments: + LLE edema > RLE edema  + Chronic venous stasis changes  + Left anterior shin skin tear     Skin:     General: Skin is warm and dry.     Neurological:      General: No focal deficit present.      Mental Status: He is alert and oriented to person, place, and time.      Sensory: No sensory deficit.      Motor: No weakness.     Psychiatric:         Behavior: Behavior normal.           Lab Results: I have reviewed the following results:  Recent Labs     05/24/25  1226   WBC 8.82   HGB 11.9*   HCT 37.0      SODIUM 134*   K 4.7   CL 98   CO2 29   BUN 24   CREATININE 0.90   GLUC 102   AST 12*   ALT 8   ALB 3.8   TBILI 0.80   ALKPHOS 80       Imaging Results: I  have personally reviewed pertinent images saved in PACS. CT scan findings (and other pertinent positive findings on images) were discussed with radiology. My interpretation of the images/reports are as follows:  CT head without contrast  Result Date: 5/24/2025  Impression: Stable, large bilateral mixed attenuating, high convexity subdural hematomas, right greater than left, likely predominantly subacute in timing, with mass effect on the right greater than left cerebral hemispheres near the vertex. No midline shift noted. Workstation performed: [id number]     MRI brain w wo contrast  Result Date: 5/24/2025  Impression: 1.  New, large bilateral high convexity subdural hematomas, right greater than left, which are predominantly subacute in timing, with mass effect on the bilateral cerebral hemispheres, and effacement of the sulci. No significant midline shift noted. 2.  FLAIR signal hyperintensity along the sulci of the right frontal and parietal convexities, which may represent trace associated subarachnoid hemorrhage. 3.  No acute infarct. Mild chronic white matter microangiopathic changes involving both cerebral hemispheres. 4.  No intracranial enhancing lesion noted. I personally discussed this study with LAVINIA OLEARY on 5/24/2025 at 12:04 p.m. Workstation performed: [id number]       Other Studies: Other Study Results Review: No additional pertinent studies reviewed.

## 2025-05-24 NOTE — ASSESSMENT & PLAN NOTE
Blayne mixed density SDH   Reports a fall on 2025   Since fall, pt has had progressively worsening ambulatory dysfunction   No requiring use of a rolling walker   Family also reported some slower speech   Pt has been following with  neurology as an outpt and underwent MRI brain today which revealed blayne SDH.   Pt was advised he seek care in the ER - presented to [facility] ER on  and transferred to [facility] for further eval and recs   No reported AC/AP med use     Imagin/24 CTH: Stable, large bilateral mixed attenuating, high convexity subdural hematomas, right greater than left, likely predominantly subacute in timing, with mass effect on the right greater than left cerebral hemispheres near the vertex. No midline shift noted.    MRI brain: New, large bilateral high convexity subdural hematomas, right greater than left, which are predominantly subacute in timing, with mass effect on the bilateral cerebral hemispheres, and effacement of the sulci. No significant midline shift noted. FLAIR signal hyperintensity along the sulci of the right frontal and parietal convexities, which may represent trace associated subarachnoid hemorrhage. No acute infarct. Mild chronic white matter microangiopathic changes involving both cerebral hemispheres. No intracranial enhancing lesion noted    Plan:   Continue to closely monitor neuro exam   Frequent neuro checks per primary team   Repeat STAT CTH with any acute decline in GCS > 2pts or more in 1hr   Maintain normotensive BP goals, SBP < 160, MAP> 65   Case and imaging reviewed with Dr. Grady   Patient with bilateral mixed density subdural collections, right greater than left  Patient with progressively worsening ambulatory dysfunction as well as some noted left upper extremity and left lower extremity drift and subtle weakness on exam, consistent with symptomatic subdural hematoma  Anticipate need for possible evacuation of SDH with [place] holes vs mini craniotomies    Ongoing discussions w/ nsgy attending, tentatively could add on for tomorrow  Will prep pt for possible OR tomorrow --> maintain NPO status at midnight   SCC attending to provide medical clearance   Will consider odalis MMA embolization as well this admission at a later date   Continue keppra for seizure ppx   Hold all AC/AP meds   No reported use at home   Correct any coagulopathy   Maintain goal INR < 1.4, plt > 100 - see above   DVT ppx: SCDs, hold chem dvt ppx until cleared by nsgy team   Medical management per primary team   Pain control per primary team   PT/OT   Social work following for assistance with dispo once medically cleared     Neurosurgery will continue to follow. Please reach out with any further questions or concerns.

## 2025-05-25 ENCOUNTER — ANESTHESIA EVENT (INPATIENT)
Dept: PERIOP | Facility: HOSPITAL | Age: 75
DRG: 025 | End: 2025-05-25
Payer: COMMERCIAL

## 2025-05-25 ENCOUNTER — APPOINTMENT (INPATIENT)
Dept: NON INVASIVE DIAGNOSTICS | Facility: HOSPITAL | Age: 75
DRG: 025 | End: 2025-05-25
Payer: COMMERCIAL

## 2025-05-25 ENCOUNTER — ANESTHESIA (INPATIENT)
Dept: PERIOP | Facility: HOSPITAL | Age: 75
DRG: 025 | End: 2025-05-25
Payer: COMMERCIAL

## 2025-05-25 ENCOUNTER — APPOINTMENT (INPATIENT)
Dept: RADIOLOGY | Facility: HOSPITAL | Age: 75
DRG: 025 | End: 2025-05-25
Payer: COMMERCIAL

## 2025-05-25 LAB
ABO GROUP BLD BPU: NORMAL
ALBUMIN SERPL BCG-MCNC: 4 G/DL (ref 3.5–5)
ALP SERPL-CCNC: 77 U/L (ref 34–104)
ALT SERPL W P-5'-P-CCNC: 10 U/L (ref 7–52)
ANION GAP SERPL CALCULATED.3IONS-SCNC: 11 MMOL/L (ref 4–13)
APTT PPP: 29 SECONDS (ref 23–34)
AST SERPL W P-5'-P-CCNC: 14 U/L (ref 13–39)
BASOPHILS # BLD AUTO: 0.1 THOUSANDS/ÂΜL (ref 0–0.1)
BASOPHILS NFR BLD AUTO: 1 % (ref 0–1)
BILIRUB SERPL-MCNC: 1.05 MG/DL (ref 0.2–1)
BPU ID: NORMAL
BUN SERPL-MCNC: 20 MG/DL (ref 5–25)
CA-I BLD-SCNC: 1.17 MMOL/L (ref 1.12–1.32)
CALCIUM SERPL-MCNC: 9.8 MG/DL (ref 8.4–10.2)
CHLORIDE SERPL-SCNC: 97 MMOL/L (ref 96–108)
CO2 SERPL-SCNC: 27 MMOL/L (ref 21–32)
CREAT SERPL-MCNC: 0.84 MG/DL (ref 0.6–1.3)
EOSINOPHIL # BLD AUTO: 0.12 THOUSAND/ÂΜL (ref 0–0.61)
EOSINOPHIL NFR BLD AUTO: 1 % (ref 0–6)
ERYTHROCYTE [DISTWIDTH] IN BLOOD BY AUTOMATED COUNT: 14.9 % (ref 11.6–15.1)
GFR SERPL CREATININE-BSD FRML MDRD: 86 ML/MIN/1.73SQ M
GLUCOSE SERPL-MCNC: 105 MG/DL (ref 65–140)
GLUCOSE SERPL-MCNC: 110 MG/DL (ref 65–140)
GLUCOSE SERPL-MCNC: 112 MG/DL (ref 65–140)
GLUCOSE SERPL-MCNC: 113 MG/DL (ref 65–140)
GLUCOSE SERPL-MCNC: 123 MG/DL (ref 65–140)
GLUCOSE SERPL-MCNC: 97 MG/DL (ref 65–140)
HCT VFR BLD AUTO: 37.8 % (ref 36.5–49.3)
HGB BLD-MCNC: 12.4 G/DL (ref 12–17)
IMM GRANULOCYTES # BLD AUTO: 0.05 THOUSAND/UL (ref 0–0.2)
IMM GRANULOCYTES NFR BLD AUTO: 0 % (ref 0–2)
INR PPP: 1.09 (ref 0.85–1.19)
LYMPHOCYTES # BLD AUTO: 1.3 THOUSANDS/ÂΜL (ref 0.6–4.47)
LYMPHOCYTES NFR BLD AUTO: 12 % (ref 14–44)
MAGNESIUM SERPL-MCNC: 1.7 MG/DL (ref 1.9–2.7)
MCH RBC QN AUTO: 30.4 PG (ref 26.8–34.3)
MCHC RBC AUTO-ENTMCNC: 32.8 G/DL (ref 31.4–37.4)
MCV RBC AUTO: 93 FL (ref 82–98)
MONOCYTES # BLD AUTO: 1.07 THOUSAND/ÂΜL (ref 0.17–1.22)
MONOCYTES NFR BLD AUTO: 10 % (ref 4–12)
NEUTROPHILS # BLD AUTO: 8.49 THOUSANDS/ÂΜL (ref 1.85–7.62)
NEUTS SEG NFR BLD AUTO: 76 % (ref 43–75)
NRBC BLD AUTO-RTO: 0 /100 WBCS
PHOSPHATE SERPL-MCNC: 3.4 MG/DL (ref 2.3–4.1)
PLATELET # BLD AUTO: 179 THOUSANDS/UL (ref 149–390)
PMV BLD AUTO: 12.8 FL (ref 8.9–12.7)
POTASSIUM SERPL-SCNC: 4.3 MMOL/L (ref 3.5–5.3)
PROT SERPL-MCNC: 8.1 G/DL (ref 6.4–8.4)
PROTHROMBIN TIME: 14.4 SECONDS (ref 12.3–15)
RBC # BLD AUTO: 4.08 MILLION/UL (ref 3.88–5.62)
SODIUM SERPL-SCNC: 135 MMOL/L (ref 135–147)
UNIT DISPENSE STATUS: NORMAL
UNIT PRODUCT CODE: NORMAL
UNIT PRODUCT VOLUME: 280 ML
UNIT RH: NORMAL
WBC # BLD AUTO: 11.13 THOUSAND/UL (ref 4.31–10.16)

## 2025-05-25 PROCEDURE — C1713 ANCHOR/SCREW BN/BN,TIS/BN: HCPCS | Performed by: NEUROLOGICAL SURGERY

## 2025-05-25 PROCEDURE — 85610 PROTHROMBIN TIME: CPT | Performed by: PHYSICIAN ASSISTANT

## 2025-05-25 PROCEDURE — 82330 ASSAY OF CALCIUM: CPT | Performed by: PHYSICIAN ASSISTANT

## 2025-05-25 PROCEDURE — NC001 PR NO CHARGE: Performed by: SURGERY

## 2025-05-25 PROCEDURE — 61154 BURR HOLE W/EVAC&/DRG HMTMA: CPT | Performed by: NEUROLOGICAL SURGERY

## 2025-05-25 PROCEDURE — 85025 COMPLETE CBC W/AUTO DIFF WBC: CPT | Performed by: PHYSICIAN ASSISTANT

## 2025-05-25 PROCEDURE — 82948 REAGENT STRIP/BLOOD GLUCOSE: CPT

## 2025-05-25 PROCEDURE — 85730 THROMBOPLASTIN TIME PARTIAL: CPT | Performed by: PHYSICIAN ASSISTANT

## 2025-05-25 PROCEDURE — 83735 ASSAY OF MAGNESIUM: CPT | Performed by: PHYSICIAN ASSISTANT

## 2025-05-25 PROCEDURE — 84100 ASSAY OF PHOSPHORUS: CPT | Performed by: PHYSICIAN ASSISTANT

## 2025-05-25 PROCEDURE — 70450 CT HEAD/BRAIN W/O DYE: CPT

## 2025-05-25 PROCEDURE — 99233 SBSQ HOSP IP/OBS HIGH 50: CPT | Performed by: SURGERY

## 2025-05-25 PROCEDURE — 80053 COMPREHEN METABOLIC PANEL: CPT | Performed by: PHYSICIAN ASSISTANT

## 2025-05-25 PROCEDURE — 00C43ZZ EXTIRPATION OF MATTER FROM INTRACRANIAL SUBDURAL SPACE, PERCUTANEOUS APPROACH: ICD-10-PCS | Performed by: NEUROLOGICAL SURGERY

## 2025-05-25 DEVICE — IMPLANTABLE DEVICE
Type: IMPLANTABLE DEVICE | Status: FUNCTIONAL
Brand: THINFLAP SYSTEM

## 2025-05-25 DEVICE — IMPLANTABLE DEVICE
Type: IMPLANTABLE DEVICE | Site: CRANIAL | Status: FUNCTIONAL
Brand: THINFLAP

## 2025-05-25 RX ORDER — ROCURONIUM BROMIDE 10 MG/ML
INJECTION, SOLUTION INTRAVENOUS AS NEEDED
Status: DISCONTINUED | OUTPATIENT
Start: 2025-05-25 | End: 2025-05-25

## 2025-05-25 RX ORDER — SODIUM CHLORIDE 9 MG/ML
INJECTION, SOLUTION INTRAVENOUS CONTINUOUS PRN
Status: DISCONTINUED | OUTPATIENT
Start: 2025-05-25 | End: 2025-05-25

## 2025-05-25 RX ORDER — HYDRALAZINE HYDROCHLORIDE 20 MG/ML
5 INJECTION INTRAMUSCULAR; INTRAVENOUS EVERY 4 HOURS PRN
Status: DISCONTINUED | OUTPATIENT
Start: 2025-05-25 | End: 2025-06-03 | Stop reason: HOSPADM

## 2025-05-25 RX ORDER — ALBUTEROL SULFATE 0.83 MG/ML
2.5 SOLUTION RESPIRATORY (INHALATION) ONCE AS NEEDED
Status: DISCONTINUED | OUTPATIENT
Start: 2025-05-25 | End: 2025-05-25 | Stop reason: HOSPADM

## 2025-05-25 RX ORDER — CHLORHEXIDINE GLUCONATE ORAL RINSE 1.2 MG/ML
15 SOLUTION DENTAL ONCE
Status: COMPLETED | OUTPATIENT
Start: 2025-05-25 | End: 2025-05-25

## 2025-05-25 RX ORDER — CEFAZOLIN SODIUM 2 G/50ML
2000 SOLUTION INTRAVENOUS ONCE
Status: DISCONTINUED | OUTPATIENT
Start: 2025-05-25 | End: 2025-05-25 | Stop reason: HOSPADM

## 2025-05-25 RX ORDER — PHENYLEPHRINE HCL IN 0.9% NACL 1 MG/10 ML
SYRINGE (ML) INTRAVENOUS AS NEEDED
Status: DISCONTINUED | OUTPATIENT
Start: 2025-05-25 | End: 2025-05-25

## 2025-05-25 RX ORDER — ALBUMIN HUMAN 50 G/1000ML
SOLUTION INTRAVENOUS CONTINUOUS PRN
Status: DISCONTINUED | OUTPATIENT
Start: 2025-05-25 | End: 2025-05-25

## 2025-05-25 RX ORDER — CEFAZOLIN SODIUM 1 G/50ML
1000 SOLUTION INTRAVENOUS EVERY 8 HOURS
Status: DISPENSED | OUTPATIENT
Start: 2025-05-25 | End: 2025-05-26

## 2025-05-25 RX ORDER — SODIUM CHLORIDE, SODIUM LACTATE, POTASSIUM CHLORIDE, CALCIUM CHLORIDE 600; 310; 30; 20 MG/100ML; MG/100ML; MG/100ML; MG/100ML
INJECTION, SOLUTION INTRAVENOUS CONTINUOUS PRN
Status: DISCONTINUED | OUTPATIENT
Start: 2025-05-25 | End: 2025-05-25

## 2025-05-25 RX ORDER — LABETALOL HYDROCHLORIDE 5 MG/ML
INJECTION, SOLUTION INTRAVENOUS AS NEEDED
Status: DISCONTINUED | OUTPATIENT
Start: 2025-05-25 | End: 2025-05-25

## 2025-05-25 RX ORDER — ONDANSETRON 2 MG/ML
4 INJECTION INTRAMUSCULAR; INTRAVENOUS ONCE AS NEEDED
Status: DISCONTINUED | OUTPATIENT
Start: 2025-05-25 | End: 2025-05-25 | Stop reason: HOSPADM

## 2025-05-25 RX ORDER — HYDROMORPHONE HCL/PF 1 MG/ML
0.5 SYRINGE (ML) INJECTION
Status: DISCONTINUED | OUTPATIENT
Start: 2025-05-25 | End: 2025-05-25 | Stop reason: HOSPADM

## 2025-05-25 RX ORDER — LIDOCAINE HYDROCHLORIDE AND EPINEPHRINE 10; 10 MG/ML; UG/ML
INJECTION, SOLUTION INFILTRATION; PERINEURAL AS NEEDED
Status: DISCONTINUED | OUTPATIENT
Start: 2025-05-25 | End: 2025-05-25 | Stop reason: HOSPADM

## 2025-05-25 RX ORDER — EPHEDRINE SULFATE 50 MG/ML
INJECTION INTRAVENOUS AS NEEDED
Status: DISCONTINUED | OUTPATIENT
Start: 2025-05-25 | End: 2025-05-25

## 2025-05-25 RX ORDER — HYDRALAZINE HYDROCHLORIDE 20 MG/ML
10 INJECTION INTRAMUSCULAR; INTRAVENOUS EVERY 4 HOURS PRN
Status: DISCONTINUED | OUTPATIENT
Start: 2025-05-25 | End: 2025-05-25

## 2025-05-25 RX ORDER — PANTOPRAZOLE SODIUM 40 MG/10ML
40 INJECTION, POWDER, LYOPHILIZED, FOR SOLUTION INTRAVENOUS
Status: DISCONTINUED | OUTPATIENT
Start: 2025-05-25 | End: 2025-05-26

## 2025-05-25 RX ORDER — FENTANYL CITRATE 50 UG/ML
INJECTION, SOLUTION INTRAMUSCULAR; INTRAVENOUS AS NEEDED
Status: DISCONTINUED | OUTPATIENT
Start: 2025-05-25 | End: 2025-05-25

## 2025-05-25 RX ORDER — AMOXICILLIN 250 MG
1 CAPSULE ORAL
Status: DISCONTINUED | OUTPATIENT
Start: 2025-05-25 | End: 2025-06-03 | Stop reason: HOSPADM

## 2025-05-25 RX ORDER — MAGNESIUM HYDROXIDE 1200 MG/15ML
LIQUID ORAL AS NEEDED
Status: DISCONTINUED | OUTPATIENT
Start: 2025-05-25 | End: 2025-05-25 | Stop reason: HOSPADM

## 2025-05-25 RX ORDER — FENTANYL CITRATE/PF 50 MCG/ML
25 SYRINGE (ML) INJECTION
Status: DISCONTINUED | OUTPATIENT
Start: 2025-05-25 | End: 2025-05-25 | Stop reason: HOSPADM

## 2025-05-25 RX ORDER — LIDOCAINE HYDROCHLORIDE 10 MG/ML
INJECTION, SOLUTION EPIDURAL; INFILTRATION; INTRACAUDAL; PERINEURAL AS NEEDED
Status: DISCONTINUED | OUTPATIENT
Start: 2025-05-25 | End: 2025-05-25

## 2025-05-25 RX ORDER — ONDANSETRON 2 MG/ML
INJECTION INTRAMUSCULAR; INTRAVENOUS AS NEEDED
Status: DISCONTINUED | OUTPATIENT
Start: 2025-05-25 | End: 2025-05-25

## 2025-05-25 RX ORDER — CEFAZOLIN SODIUM 1 G/3ML
INJECTION, POWDER, FOR SOLUTION INTRAMUSCULAR; INTRAVENOUS AS NEEDED
Status: DISCONTINUED | OUTPATIENT
Start: 2025-05-25 | End: 2025-05-25

## 2025-05-25 RX ORDER — PROPOFOL 10 MG/ML
INJECTION, EMULSION INTRAVENOUS AS NEEDED
Status: DISCONTINUED | OUTPATIENT
Start: 2025-05-25 | End: 2025-05-25

## 2025-05-25 RX ORDER — BUPIVACAINE HYDROCHLORIDE AND EPINEPHRINE 5; 5 MG/ML; UG/ML
INJECTION, SOLUTION PERINEURAL AS NEEDED
Status: DISCONTINUED | OUTPATIENT
Start: 2025-05-25 | End: 2025-05-25 | Stop reason: HOSPADM

## 2025-05-25 RX ADMIN — ALBUMIN (HUMAN): 12.5 INJECTION, SOLUTION INTRAVENOUS at 15:14

## 2025-05-25 RX ADMIN — LABETALOL HYDROCHLORIDE 10 MG: 5 INJECTION, SOLUTION INTRAVENOUS at 00:11

## 2025-05-25 RX ADMIN — SUGAMMADEX 250 MG: 100 INJECTION, SOLUTION INTRAVENOUS at 15:57

## 2025-05-25 RX ADMIN — EPHEDRINE SULFATE 5 MG: 50 INJECTION, SOLUTION INTRAVENOUS at 14:19

## 2025-05-25 RX ADMIN — FENTANYL CITRATE 75 MCG: 50 INJECTION INTRAMUSCULAR; INTRAVENOUS at 14:14

## 2025-05-25 RX ADMIN — LABETALOL HYDROCHLORIDE 10 MG: 5 INJECTION, SOLUTION INTRAVENOUS at 16:06

## 2025-05-25 RX ADMIN — ONDANSETRON 4 MG: 2 INJECTION INTRAMUSCULAR; INTRAVENOUS at 15:53

## 2025-05-25 RX ADMIN — PROPOFOL 100 MG: 10 INJECTION, EMULSION INTRAVENOUS at 13:56

## 2025-05-25 RX ADMIN — LEVETIRACETAM 500 MG: 500 TABLET, FILM COATED ORAL at 20:54

## 2025-05-25 RX ADMIN — Medication 100 MCG: at 15:16

## 2025-05-25 RX ADMIN — ROCURONIUM 50 MG: 50 INJECTION, SOLUTION INTRAVENOUS at 13:56

## 2025-05-25 RX ADMIN — SODIUM CHLORIDE, SODIUM LACTATE, POTASSIUM CHLORIDE, AND CALCIUM CHLORIDE: .6; .31; .03; .02 INJECTION, SOLUTION INTRAVENOUS at 13:45

## 2025-05-25 RX ADMIN — PHENYLEPHRINE HYDROCHLORIDE 30 MCG/MIN: 50 INJECTION INTRAVENOUS at 14:21

## 2025-05-25 RX ADMIN — FENTANYL CITRATE 25 MCG: 50 INJECTION INTRAMUSCULAR; INTRAVENOUS at 13:56

## 2025-05-25 RX ADMIN — PROPOFOL 50 MG: 10 INJECTION, EMULSION INTRAVENOUS at 15:05

## 2025-05-25 RX ADMIN — Medication 100 MCG: at 14:08

## 2025-05-25 RX ADMIN — CEFAZOLIN SODIUM 1000 MG: 1 SOLUTION INTRAVENOUS at 21:01

## 2025-05-25 RX ADMIN — DICLOFENAC SODIUM 2 G: 10 GEL TOPICAL at 01:21

## 2025-05-25 RX ADMIN — LIDOCAINE HYDROCHLORIDE 50 MG: 10 INJECTION, SOLUTION EPIDURAL; INFILTRATION; INTRACAUDAL at 13:56

## 2025-05-25 RX ADMIN — ALLOPURINOL 300 MG: 100 TABLET ORAL at 08:53

## 2025-05-25 RX ADMIN — Medication 100 MCG: at 14:31

## 2025-05-25 RX ADMIN — CEFAZOLIN 2000 MG: 1 INJECTION, POWDER, FOR SOLUTION INTRAMUSCULAR; INTRAVENOUS at 14:10

## 2025-05-25 RX ADMIN — SODIUM CHLORIDE: 9 INJECTION, SOLUTION INTRAVENOUS at 14:40

## 2025-05-25 RX ADMIN — CHLORHEXIDINE GLUCONATE 15 ML: 1.2 SOLUTION ORAL at 09:03

## 2025-05-25 RX ADMIN — ALBUMIN (HUMAN): 12.5 INJECTION, SOLUTION INTRAVENOUS at 14:36

## 2025-05-25 RX ADMIN — PROPOFOL 50 MG: 10 INJECTION, EMULSION INTRAVENOUS at 14:24

## 2025-05-25 RX ADMIN — HYDRALAZINE HYDROCHLORIDE 5 MG: 20 INJECTION, SOLUTION INTRAMUSCULAR; INTRAVENOUS at 01:31

## 2025-05-25 RX ADMIN — PANTOPRAZOLE SODIUM 40 MG: 40 INJECTION, POWDER, FOR SOLUTION INTRAVENOUS at 08:54

## 2025-05-25 RX ADMIN — Medication 100 MCG: at 14:35

## 2025-05-25 RX ADMIN — LEVETIRACETAM 500 MG: 500 TABLET, FILM COATED ORAL at 08:54

## 2025-05-25 RX ADMIN — Medication 100 MCG: at 14:19

## 2025-05-25 RX ADMIN — CHLORHEXIDINE GLUCONATE 15 ML: 1.2 SOLUTION ORAL at 20:54

## 2025-05-25 NOTE — PLAN OF CARE
Problem: PAIN - ADULT  Goal: Verbalizes/displays adequate comfort level or baseline comfort level  Description: Interventions:  - Encourage patient to monitor pain and request assistance  - Assess pain using appropriate pain scale  - Administer analgesics as ordered based on type and severity of pain and evaluate response  - Implement non-pharmacological measures as appropriate and evaluate response  - Consider cultural and social influences on pain and pain management  - Notify physician/advanced practitioner if interventions unsuccessful or patient reports new pain  - Educate patient/family on pain management process including their role and importance of  reporting pain   - Provide non-pharmacologic/complimentary pain relief interventions  Outcome: Progressing     Problem: INFECTION - ADULT  Goal: Absence or prevention of progression during hospitalization  Description: INTERVENTIONS:  - Assess and monitor for signs and symptoms of infection  - Monitor lab/diagnostic results  - Monitor all insertion sites, i.e. indwelling lines, tubes, and drains  - Monitor endotracheal if appropriate and nasal secretions for changes in amount and color  - [place] appropriate cooling/warming therapies per order  - Administer medications as ordered  - Instruct and encourage patient and family to use good hand hygiene technique  - Identify and instruct in appropriate isolation precautions for identified infection/condition  Outcome: Progressing  Goal: Absence of fever/infection during neutropenic period  Description: INTERVENTIONS:  - Monitor WBC  - Perform strict hand hygiene  - Limit to healthy visitors only  - No plants, dried, fresh or silk flowers with zelaya in patient room  Outcome: Progressing     Problem: SAFETY ADULT  Goal: Patient will remain free of falls  Description: INTERVENTIONS:  - Educate patient/family on patient safety including physical limitations  - Instruct patient to call for assistance with activity   -  Consider consulting OT/PT to assist with strengthening/mobility based on AM PAC & JH-HLM score  - Consult OT/PT to assist with strengthening/mobility   - Keep Call bell within reach  - Keep bed low and locked with side rails adjusted as appropriate  - Keep care items and personal belongings within reach  - Initiate and maintain comfort rounds  - Make Fall Risk Sign visible to staff  - Offer Toileting every  Hours, in advance of need  - Initiate/Maintain alarm  - Obtain necessary fall risk management equipment:   - Apply yellow socks and bracelet for high fall risk patients  - Consider moving patient to room near nurses station  Outcome: Progressing  Goal: Maintain or return to baseline ADL function  Description: INTERVENTIONS:  -  Assess patient's ability to carry out ADLs; assess patient's baseline for ADL function and identify physical deficits which impact ability to perform ADLs (bathing, care of mouth/teeth, toileting, grooming, dressing, etc.)  - Assess/evaluate cause of self-care deficits   - Assess range of motion  - Assess patient's mobility; develop plan if impaired  - Assess patient's need for assistive devices and provide as appropriate  - Encourage maximum independence but intervene and supervise when necessary  - Involve family in performance of ADLs  - Assess for home care needs following discharge   - Consider OT consult to assist with ADL evaluation and planning for discharge  - Provide patient education as appropriate  - Monitor functional capacity and physical performance, use of AM PAC & JH-HLM   - Monitor gait, balance and fatigue with ambulation    Outcome: Progressing  Goal: Maintains/Returns to pre admission functional level  Description: INTERVENTIONS:  - Perform AM-PAC 6 Click Basic Mobility/ Daily Activity assessment daily.  - Set and communicate daily mobility goal to care team and patient/family/caregiver.   - Collaborate with rehabilitation services on mobility goals if consulted  -  Perform Range of Motion 3 times a day.  - Reposition patient every 2 hours.  - Dangle patient 3 times a day  - Stand patient 3 times a day  - Ambulate patient 3 times a day  - Out of bed to chair 3 times a day   - Out of bed for meals 3 times a day  - Out of bed for toileting  - Record patient progress and toleration of activity level   Outcome: Progressing     Problem: DISCHARGE PLANNING  Goal: Discharge to home or other facility with appropriate resources  Description: INTERVENTIONS:  - Identify barriers to discharge w/patient and caregiver  - Arrange for needed discharge resources and transportation as appropriate  - Identify discharge learning needs (meds, wound care, etc.)  - Arrange for interpretive services to assist at discharge as needed  - Refer to Case Management Department for coordinating discharge planning if the patient needs post-hospital services based on physician/advanced practitioner order or complex needs related to functional status, cognitive ability, or social support system  Outcome: Progressing     Problem: Knowledge Deficit  Goal: Patient/family/caregiver demonstrates understanding of disease process, treatment plan, medications, and discharge instructions  Description: Complete learning assessment and assess knowledge base.  Interventions:  - Provide teaching at level of understanding  - Provide teaching via preferred learning methods  Outcome: Progressing     Problem: Prexisting or High Potential for Compromised Skin Integrity  Goal: Skin integrity is maintained or improved  Description: INTERVENTIONS:  - Identify patients at risk for skin breakdown  - Assess and monitor skin integrity including under and around medical devices   - Assess and monitor nutrition and hydration status  - Monitor labs  - Assess for incontinence   - Turn and reposition patient  - Assist with mobility/ambulation  - Relieve pressure over verónica prominences   - Avoid friction and shearing  - Provide appropriate  hygiene as needed including keeping skin clean and dry  - Evaluate need for skin moisturizer/barrier cream  - Collaborate with interdisciplinary team  - Patient/family teaching  - Consider wound care consult    Assess:  - Review Zaid scale daily  - Clean and moisturize skin every   - Inspect skin when repositioning, toileting, and assisting with ADLS  - Assess under medical devices such as  every   - Assess extremities for adequate circulation and sensation     Bed Management:  - Have minimal linens on bed & keep smooth, unwrinkled  - Change linens as needed when moist or perspiring  - Avoid sitting or lying in one position for more than 2 hours while in bed  - Keep HOB at 30 degrees   - Toileting:  - Offer bedside commode  - Assess for incontinence every   - Use incontinent care products after each incontinent episode such as     Activity:  - Mobilize patient  times a day  - Encourage activity and walks on unit  - Encourage or provide ROM exercises   - Turn and reposition patient every  Hours  - Use appropriate equipment to lift or move patient in bed  - Instruct/ Assist with weight shifting every  when out of bed in chair  - Consider limitation of chair time  hour intervals    Skin Care:  - Avoid use of baby powder, tape, friction and shearing, hot water or constrictive clothing  - Relieve pressure over bony prominences using   - Do not massage red bony areas    Next Steps:  - Teach patient strategies to minimize risks such as   - Consider consults to  interdisciplinary teams such as   Outcome: Progressing

## 2025-05-25 NOTE — PROGRESS NOTES
Progress Note - Trauma   Name: Deonte Attarian 74 y.o. male I MRN: [id number]  Unit/Bed#: ICU 06 I Date of Admission: 5/24/2025   Date of Service: 5/25/2025 I Hospital Day: 1      Assessment & Plan   Active Hospital Problems    Diagnosis Date Noted POA    Subdural hematoma (HCC) 05/24/2025 Yes    Thrombocytopenia (HCC) 04/07/2025 Yes      Resolved Hospital Problems   No resolved problems to display.       Neuro:   Diagnosis: b/l SDHs (R>L) s/p 4/4 fall  ]appreciate nsg recs - planning b/l lake holes, MMA embo  Neurosurgery consultation, appreciate recommendations  Maintain goal plt count above 100, s/p 1 unit FFP  Likely OR today for lake holes vs mini craniotomy  Consideration for bilateral MMA embolization during admission  Continue to hold DVT ppx  Q1 neurochecks  Keppra 500 bid for 7d for seizure ppx  Got FFP last evening     CV:   Diagnosis: HTN, HLD  Plan: hold home valsartan, atenolol-chlorthalidone  Prn hydralazine/labetalol  Labile BP overnight, will discuss restarting atenolol today  Continue statin     Pulm:  No active issues  -Continue cardiopulmonary monitoring, maintain O2 level > 92%.      GI:   Diagnosis: hepatic steatosis, no acute issues  Plan: outpt f/u with PCP  MRI abdomen on 5/22 with results pending due to possible gallstone vs complex cyst, multiple pancreatic cysts.   GI ppx: Protonix 40mg daily  Bowel regimen: Senokot S     :   Diagnosis: CKD2 (baseline Cr 0.7-0.9)  trend Cr, IVF resuscitation prn  Most recent Cr stable, 0.9  F/u AM electrolytes      F/E/N:   F: none  E: replete prn  N: NPO for likely OR today     Heme/Onc:   DVT ppx: hold AC/AP meds until cleared by nsg  Diagnosis: chronic thrombocytopenia  Plan: per 5/16 [facility] office visit: mild fluctuating thrombocytopenia present for last 10 years, likely 2/2 mild thrombopoietin deficiency 2/2 hepatic steatosis  pt has also lost 60 pounds in last year 2/2 decreased oral intake from numerous teeth extractions  S/p 1 unit FFP  5/24  F/u AM labs, maintain plt count above 100 per NSurg  Diagnosis: gout  Plan: continue home allopurinol     Endo:   Diagnosis: DM  Plan: hold home metformin  Sliding scale insulin, goal -180     ID:   No active issues  -Continue to monitor WBC and fever curve     MSK/Skin:   Diagnosis: b/l lower extremity edema  Plan: f/u b/l LE venous duplex (given swelling and patient has been sedentary over last few weeks)  Duplex pending  Diagnosis: 4/4 fall, frequent falls  Plan: PT/OT, IS/OOB    Disposition: Critical care    ICU Core Measures     A: Assess, Prevent, and Manage Pain Has pain been assessed? Yes  Need for changes to pain regimen? No   B: Both SAT/SAT  N/A   C: Choice of Sedation RASS Goal: 0 Alert and Calm  Need for changes to sedation or analgesia regimen? No   D: Delirium CAM-ICU: Negative   E: Early Mobility  Plan for early mobility? Yes   F: Family Engagement Plan for family engagement today? Yes       Antibiotic Review: Antibiotics for surgical ppx      Prophylaxis:  VTE Contraindicated secondary to: OR with neurosurgery today for subdural hemorrhage   Stress Ulcer  not ordered         24 Hour Events : No acute events  Subjective       Review of Systems: See HPI for Review of Systems    Objective :                   Vitals I/O      Most Recent Min/Max in 24hrs   Temp 97.5 °F (36.4 °C) Temp  Min: 97.5 °F (36.4 °C)  Max: 98.2 °F (36.8 °C)   Pulse 82 Pulse  Min: 66  Max: 88   Resp (!) 28 Resp  Min: 16  Max: 28   /67 BP  Min: 99/55  Max: 199/89   O2 Sat 95 % SpO2  Min: 95 %  Max: 99 %      Intake/Output Summary (Last 24 hours) at 5/25/2025 0420  Last data filed at 5/25/2025 0201  Gross per 24 hour   Intake 1050 ml   Output 250 ml   Net 800 ml       Diet NPO; Sips with meds  Diet NPO; Sips with meds    Invasive Monitoring           Physical Exam   Physical Exam  Eyes:      Conjunctiva/sclera: Conjunctivae normal.      Pupils: Pupils are equal, round, and reactive to light.   Skin:     General:  Skin is warm and dry.   HENT:      Head: Normocephalic and atraumatic.      Mouth/Throat:      Mouth: Mucous membranes are moist.   Cardiovascular:      Rate and Rhythm: Normal rate.      Comments: Appears well perfused  Abdominal: General: There is no distension.      Palpations: Abdomen is soft.      Tenderness: There is no abdominal tenderness. There is no guarding.   Constitutional:       General: He is not in acute distress.     Appearance: He is well-developed and well-nourished.   Pulmonary:      Effort: Pulmonary effort is normal. No respiratory distress.   Neurological:      General: No focal deficit present.      Mental Status: He is alert and oriented to person, place and time. He is calm.      Sensory: No sensory deficit.          Diagnostic Studies        Lab Results: I have reviewed the following results:     Medications:  Scheduled PRN   allopurinol, 300 mg, Daily  [Held by provider] atenolol, 50 mg, Daily   And  [Held by provider] chlorthalidone, 25 mg, Daily  ceFAZolin, 2,000 mg, Once  cefazolin, 2,000 mg, Once  chlorhexidine, 15 mL, Q12H BRIDGET  chlorhexidine, 15 mL, Once  chlorhexidine, 15 mL, Once  insulin lispro, 2-12 Units, TID AC  insulin lispro, 2-12 Units, HS  levETIRAcetam, 500 mg, Q12H BRIDGET  [Held by provider] losartan, 100 mg, Daily      acetaminophen, 650 mg, Q6H PRN  Diclofenac Sodium, 2 g, 4x Daily PRN  hydrALAZINE, 5 mg, Q4H PRN  labetalol, 10 mg, Q4H PRN  ondansetron, 4 mg, Q6H PRN       Continuous          Labs:   CBC    Recent Labs     05/24/25  1226   WBC 8.82   HGB 11.9*   HCT 37.0        BMP    Recent Labs     05/24/25  1226   SODIUM 134*   K 4.7   CL 98   CO2 29   AGAP 7   BUN 24   CREATININE 0.90   CALCIUM 9.4       Coags    Recent Labs     05/24/25  1411   INR 1.13   PTT 32        Additional Electrolytes  No recent results       Blood Gas    No recent results  No recent results LFTs  Recent Labs     05/24/25  1226   ALT 8   AST 12*   ALKPHOS 80   ALB 3.8   TBILI 0.80        Infectious  No recent results  Glucose  Recent Labs     05/24/25  1226   GLUC 102

## 2025-05-25 NOTE — ANESTHESIA POSTPROCEDURE EVALUATION
Post-Op Assessment Note    CV Status:  Stable  Pain Score: 0    Pain management: adequate       Mental Status:  Sleepy and awake   Hydration Status:  Stable   PONV Controlled:  None   Airway Patency:  Patent     Post Op Vitals Reviewed: Yes    No anethesia notable event occurred.    Staff: CRNA           Last Filed PACU Vitals:  Vitals Value Taken Time   Temp 97.3    Pulse 74 05/25/25 16:29   /67    Resp 12 05/25/25 16:29   SpO2 100 % 05/25/25 16:29   Vitals shown include unfiled device data.

## 2025-05-25 NOTE — ANESTHESIA PREPROCEDURE EVALUATION
Procedure:  KYLIE HOLES (Bilateral: Head)    Relevant Problems   CARDIO   (+) Bradycardia, sinus   (+) Mixed hyperlipidemia   (+) PVD (peripheral vascular disease) (HCC)      HEMATOLOGY   (+) Normocytic anemia   (+) Thrombocytopenia (HCC)      MUSCULOSKELETAL   (+) Osteoarthritis      NEURO/PSYCH   (+) Subdural hematoma (HCC)      PULMONARY   (+) OPPPY (obstructive sleep apnea)        Physical Exam    Airway     Mallampati score: II  TM Distance: >3 FB  Neck ROM: full      Cardiovascular  Cardiovascular exam normal    Dental       Pulmonary  Pulmonary exam normal     Neurological      Other Findings        Anesthesia Plan  ASA Score- 3     Anesthesia Type- general with ASA Monitors.         Additional Monitors:     Airway Plan:            Plan Factors-Exercise tolerance (METS): >4 METS.    Chart reviewed. EKG reviewed. Imaging results reviewed. Existing labs reviewed. Patient summary reviewed.    Patient is not a current smoker.  Patient did not smoke on day of surgery.    Obstructive sleep apnea risk education given perioperatively.        Induction- intravenous.    Postoperative Plan- Plan for postoperative opioid use.   Monitoring Plan -   Post Operative Pain Plan - plan for postoperative opioid use    Perioperative Resuscitation Plan - Level 1 - Full Code.       Informed Consent- Anesthetic plan and risks discussed with patient.  I personally reviewed this patient with the CRNA. Discussed and agreed on the Anesthesia Plan with the CRNA..      NPO Status:  Vitals Value Taken Time   Date of last liquid 05/25/25 05/25/25 06:05   Time of last liquid 0000 05/25/25 06:05   Date of last solid 05/25/25 05/25/25 06:05   Time of last solid 0000 05/25/25 06:05

## 2025-05-25 NOTE — OP NOTE
Operative Note     Deonte Attarian  5/25/2025    Pre-op Diagnosis:   Subdural hematoma (HCC) [S06.5XAA]  Brain compression (HCC) [G93.5]    Post-op Dignosis:     Post-Op Diagnosis Codes:     * Subdural hematoma (HCC) [S06.5XAA]     * Brain compression (HCC) [G93.5]    Procedure:  Procedure(s):  Bilateral lake holes for evacuation of bilateral subdural hematomas    Surgeon: Surgeons and Role:     * Issac Grady MD - Primary     Anesthesia: General    Staff:   Circulator: Gisel Schulz RN  Relief Circulator: Toy Matamoros RN  Relief Scrub: Roberta Rebolledo RN  Scrub Person: Piero Pope RN  No qualified Resident was available.    Estimated Blood Loss: Minimal    Specimens:                No specimens collected during this procedure.    Drains:   Ventriculostomy/Subdural Ventricular drainage catheter Right Parietal region (Active)   Drain Status Capped 05/24/25 0009       Ventriculostomy/Subdural Left Parietal region (Active)   Drain Status Capped 05/24/25 0009       Ventriculostomy/Subdural Ventricular drainage catheter Right Parietal region (Active)   Drain Status Capped 05/24/25 0009       Ventriculostomy/Subdural Left Parietal region (Active)       Findings:  bilateral chronic SDHs under pressure    Complications:  None    Anesthesia: General Endotracheal Anesthesia      DETAILS OF PROCEDURE    The patient was brought to the OR and successfully induced with general endotracheal anesthesia.     Incisions were planned and marked bilateral frontal and right parietal. The area was prepped and draped in standard fashion. A timeout was performed. The patient received antibiotics per protocol.    The skin was infiltrated with 1% lidocaine with epinephrine at the incision site(s). Skin incision was made with a skin scalpel, and dissection carried down with the Bovie cautery. Self retaining retractors were applied.     Bur holes were made using the  bit on the [employer] Gall[employer] drill. Epidural  hemostasis was achieved with FloSeal, thrombin soaked Gelfoam, tamponade with cottonoid patties, etc. The dura was opened in a cruciate manner at the right frontal location first.      Motor oil like fluid emanated under pressure. The dural leaflets were bipolar coagulated. The burhole was irrigated. The parietal dura was opened. Here more plum colored fluid emanated. The burholes were irrigated, and this irrigation communicated between the burholes. The frontal was irrigated until the drainage ran clear.     I then performed the same steps on the left, with similar effect.    A SDD was placed posteriorly at the right parietal burhole, and irrigated and drained to remove posterior blood products. It was tunneled out a separate stab incision.     The same was done at the left parietal burhole.     The right posterior burhole was buttressed with thromin gelfoam, then a slotted burhole cover. The incision was irrigated and the galea closed with inverted interrupted Vicryl plus suture. The frontal burhole was then irrigated (along with bed repositioning into reverse trendelenberg, etc), to remove as much intracranial air as possible. The frontal burhole was also closed with thrombin gelfoam and a slotted burhole cover. The frontal drain was also tunneled out a separate stab incision. The galea there was closed with inverted, interrupted 2-0 Vicryl Plus suture.     The same steps and sequence was performed on the left.    The skin was closed with staples at both sides.     All instrument counts, sponge counts, and needle counts were correct prior to closure of the skin.     Drains were secured nylon suture and a strain relief loop secured with staples bilaterally. The incisions were blocked with Marcaine. The incisions were dressed with bacitracin and Telfa.     The drapes were withdrawn and the area cleansed. They were transferred to their ICU bed, awoken from general endotracheal anesthesia, extubated, and taken to the  postoperative anesthesia unit in cardio vascularly stable condition.     Drainage systems were affixed to the subdural drains in a sterile manner in the PACU.       SIGNATURE: Issac Grady MD, PhD  DATE: 5/25/2025   TIME: 3:58 PM

## 2025-05-25 NOTE — PROGRESS NOTES
Progress Note - Trauma   Name: Deonte Greenfield 74 y.o. male I MRN: [id number]  Unit/Bed#: ICU 06 I Date of Admission: 5/24/2025   Date of Service: 5/25/2025 I Hospital Day: 1       Post-Op Check  Deonte Greenfield 74 y.o. male MRN: [id number]  Unit/Bed#: ICU 06 Encounter: [id number]         Vitals:    05/25/25 1800   BP: 136/56   Pulse: 70   Resp: (!) 24   Temp:    SpO2: 99%     I/O         05/23 0701 05/24 0700 05/24 0701 05/25 0700 05/25 0701 05/26 0700    P.O.  480     I.V.   900    Blood  570     IV Piggyback   500    Total Intake  1050 1400    Urine  500 300    Drains   0    Stool   0    Total Output  500 300    Net  +550 +1100           Unmeasured Urine Occurrence  8 x 2 x    Unmeasured Stool Occurrence   1 x          PE:  Gen:  NAD  HENT: SDDx3 in place   CV:  warm, well-perfused  Lung:  normal effort  Abd:  soft, NT/ND  Neuro:  A&Ox3, M/S grossly intact     Lab Results   Component Value Date    WBC 11.13 (H) 05/25/2025    HGB 12.4 05/25/2025    HCT 37.8 05/25/2025    MCV 93 05/25/2025     05/25/2025     Lab Results   Component Value Date    CALCIUM 9.8 05/25/2025    K 4.3 05/25/2025    CO2 27 05/25/2025    CL 97 05/25/2025    BUN 20 05/25/2025    CREATININE 0.84 05/25/2025         A/P: 74 y.o. male  5/25 s/p Procedure(s) (LRB):  KYLIE HOLES (Bilateral)  Monitor subdural drain output  Follow-up a.m. CT head  Every hour neurochecks  Continue keppra for seizure ppx   Ancef for surgical ppx       JOSE ALEJANDRO Huffman

## 2025-05-26 ENCOUNTER — APPOINTMENT (INPATIENT)
Dept: RADIOLOGY | Facility: HOSPITAL | Age: 75
DRG: 025 | End: 2025-05-26
Payer: COMMERCIAL

## 2025-05-26 ENCOUNTER — APPOINTMENT (INPATIENT)
Dept: NON INVASIVE DIAGNOSTICS | Facility: HOSPITAL | Age: 75
DRG: 025 | End: 2025-05-26
Payer: COMMERCIAL

## 2025-05-26 LAB
ANION GAP SERPL CALCULATED.3IONS-SCNC: 7 MMOL/L (ref 4–13)
BUN SERPL-MCNC: 12 MG/DL (ref 5–25)
CA-I BLD-SCNC: 1.14 MMOL/L (ref 1.12–1.32)
CALCIUM SERPL-MCNC: 9.5 MG/DL (ref 8.4–10.2)
CHLORIDE SERPL-SCNC: 97 MMOL/L (ref 96–108)
CO2 SERPL-SCNC: 29 MMOL/L (ref 21–32)
CREAT SERPL-MCNC: 0.74 MG/DL (ref 0.6–1.3)
ERYTHROCYTE [DISTWIDTH] IN BLOOD BY AUTOMATED COUNT: 15 % (ref 11.6–15.1)
GFR SERPL CREATININE-BSD FRML MDRD: 90 ML/MIN/1.73SQ M
GLUCOSE SERPL-MCNC: 124 MG/DL (ref 65–140)
GLUCOSE SERPL-MCNC: 124 MG/DL (ref 65–140)
GLUCOSE SERPL-MCNC: 134 MG/DL (ref 65–140)
GLUCOSE SERPL-MCNC: 158 MG/DL (ref 65–140)
GLUCOSE SERPL-MCNC: 231 MG/DL (ref 65–140)
HCT VFR BLD AUTO: 37.9 % (ref 36.5–49.3)
HGB BLD-MCNC: 11.8 G/DL (ref 12–17)
MAGNESIUM SERPL-MCNC: 1.6 MG/DL (ref 1.9–2.7)
MCH RBC QN AUTO: 29.7 PG (ref 26.8–34.3)
MCHC RBC AUTO-ENTMCNC: 31.1 G/DL (ref 31.4–37.4)
MCV RBC AUTO: 96 FL (ref 82–98)
MRSA NOSE QL CULT: NORMAL
PHOSPHATE SERPL-MCNC: 3.4 MG/DL (ref 2.3–4.1)
PLATELET # BLD AUTO: 164 THOUSANDS/UL (ref 149–390)
PMV BLD AUTO: 12.6 FL (ref 8.9–12.7)
POTASSIUM SERPL-SCNC: 4.4 MMOL/L (ref 3.5–5.3)
RBC # BLD AUTO: 3.97 MILLION/UL (ref 3.88–5.62)
SODIUM SERPL-SCNC: 133 MMOL/L (ref 135–147)
WBC # BLD AUTO: 9.77 THOUSAND/UL (ref 4.31–10.16)

## 2025-05-26 PROCEDURE — 70496 CT ANGIOGRAPHY HEAD: CPT

## 2025-05-26 PROCEDURE — 70498 CT ANGIOGRAPHY NECK: CPT

## 2025-05-26 PROCEDURE — 82948 REAGENT STRIP/BLOOD GLUCOSE: CPT

## 2025-05-26 PROCEDURE — 84100 ASSAY OF PHOSPHORUS: CPT | Performed by: REGISTERED NURSE

## 2025-05-26 PROCEDURE — 99024 POSTOP FOLLOW-UP VISIT: CPT | Performed by: NEUROLOGICAL SURGERY

## 2025-05-26 PROCEDURE — 99233 SBSQ HOSP IP/OBS HIGH 50: CPT | Performed by: SURGERY

## 2025-05-26 PROCEDURE — 82330 ASSAY OF CALCIUM: CPT | Performed by: REGISTERED NURSE

## 2025-05-26 PROCEDURE — 80048 BASIC METABOLIC PNL TOTAL CA: CPT | Performed by: REGISTERED NURSE

## 2025-05-26 PROCEDURE — 93970 EXTREMITY STUDY: CPT

## 2025-05-26 PROCEDURE — 93970 EXTREMITY STUDY: CPT | Performed by: SURGERY

## 2025-05-26 PROCEDURE — 83735 ASSAY OF MAGNESIUM: CPT | Performed by: REGISTERED NURSE

## 2025-05-26 PROCEDURE — 85027 COMPLETE CBC AUTOMATED: CPT | Performed by: REGISTERED NURSE

## 2025-05-26 RX ORDER — SODIUM CHLORIDE, SODIUM GLUCONATE, SODIUM ACETATE, POTASSIUM CHLORIDE, MAGNESIUM CHLORIDE, SODIUM PHOSPHATE, DIBASIC, AND POTASSIUM PHOSPHATE .53; .5; .37; .037; .03; .012; .00082 G/100ML; G/100ML; G/100ML; G/100ML; G/100ML; G/100ML; G/100ML
500 INJECTION, SOLUTION INTRAVENOUS ONCE
Status: COMPLETED | OUTPATIENT
Start: 2025-05-26 | End: 2025-05-26

## 2025-05-26 RX ORDER — MAGNESIUM SULFATE HEPTAHYDRATE 40 MG/ML
2 INJECTION, SOLUTION INTRAVENOUS ONCE
Status: COMPLETED | OUTPATIENT
Start: 2025-05-26 | End: 2025-05-26

## 2025-05-26 RX ORDER — OXYCODONE HYDROCHLORIDE 5 MG/1
5 TABLET ORAL EVERY 4 HOURS PRN
Refills: 0 | Status: DISCONTINUED | OUTPATIENT
Start: 2025-05-26 | End: 2025-05-28

## 2025-05-26 RX ADMIN — MAGNESIUM SULFATE HEPTAHYDRATE 2 G: 40 INJECTION, SOLUTION INTRAVENOUS at 08:07

## 2025-05-26 RX ADMIN — SENNOSIDES AND DOCUSATE SODIUM 1 TABLET: 50; 8.6 TABLET ORAL at 21:09

## 2025-05-26 RX ADMIN — CHLORHEXIDINE GLUCONATE 15 ML: 1.2 SOLUTION ORAL at 20:13

## 2025-05-26 RX ADMIN — INSULIN LISPRO 2 UNITS: 100 INJECTION, SOLUTION INTRAVENOUS; SUBCUTANEOUS at 16:15

## 2025-05-26 RX ADMIN — ACETAMINOPHEN 650 MG: 325 TABLET ORAL at 11:27

## 2025-05-26 RX ADMIN — LEVETIRACETAM 500 MG: 500 TABLET, FILM COATED ORAL at 08:07

## 2025-05-26 RX ADMIN — ALLOPURINOL 300 MG: 100 TABLET ORAL at 08:07

## 2025-05-26 RX ADMIN — INSULIN LISPRO 4 UNITS: 100 INJECTION, SOLUTION INTRAVENOUS; SUBCUTANEOUS at 21:25

## 2025-05-26 RX ADMIN — PANTOPRAZOLE SODIUM 40 MG: 40 INJECTION, POWDER, FOR SOLUTION INTRAVENOUS at 08:07

## 2025-05-26 RX ADMIN — IOHEXOL 75 ML: 350 INJECTION, SOLUTION INTRAVENOUS at 04:58

## 2025-05-26 RX ADMIN — CHLORHEXIDINE GLUCONATE 15 ML: 1.2 SOLUTION ORAL at 08:07

## 2025-05-26 RX ADMIN — LEVETIRACETAM 500 MG: 500 TABLET, FILM COATED ORAL at 20:13

## 2025-05-26 RX ADMIN — SODIUM CHLORIDE, SODIUM GLUCONATE, SODIUM ACETATE, POTASSIUM CHLORIDE, MAGNESIUM CHLORIDE, SODIUM PHOSPHATE, DIBASIC, AND POTASSIUM PHOSPHATE 500 ML: .53; .5; .37; .037; .03; .012; .00082 INJECTION, SOLUTION INTRAVENOUS at 13:35

## 2025-05-26 NOTE — PLAN OF CARE
Problem: PAIN - ADULT  Goal: Verbalizes/displays adequate comfort level or baseline comfort level  Description: Interventions:  - Encourage patient to monitor pain and request assistance  - Assess pain using appropriate pain scale  - Administer analgesics as ordered based on type and severity of pain and evaluate response  - Implement non-pharmacological measures as appropriate and evaluate response  - Consider cultural and social influences on pain and pain management  - Notify physician/advanced practitioner if interventions unsuccessful or patient reports new pain  - Educate patient/family on pain management process including their role and importance of  reporting pain   - Provide non-pharmacologic/complimentary pain relief interventions  Outcome: Progressing     Problem: INFECTION - ADULT  Goal: Absence or prevention of progression during hospitalization  Description: INTERVENTIONS:  - Assess and monitor for signs and symptoms of infection  - Monitor lab/diagnostic results  - Monitor all insertion sites, i.e. indwelling lines, tubes, and drains  - Monitor endotracheal if appropriate and nasal secretions for changes in amount and color  - Revloc appropriate cooling/warming therapies per order  - Administer medications as ordered  - Instruct and encourage patient and family to use good hand hygiene technique  - Identify and instruct in appropriate isolation precautions for identified infection/condition  Outcome: Progressing  Goal: Absence of fever/infection during neutropenic period  Description: INTERVENTIONS:  - Monitor WBC  - Perform strict hand hygiene  - Limit to healthy visitors only  - No plants, dried, fresh or silk flowers with zelaya in patient room  Outcome: Progressing     Problem: SAFETY ADULT  Goal: Patient will remain free of falls  Description: INTERVENTIONS:  - Educate patient/family on patient safety including physical limitations  - Instruct patient to call for assistance with activity   -  Consider consulting OT/PT to assist with strengthening/mobility based on AM PAC & JH-HLM score  - Consult OT/PT to assist with strengthening/mobility   - Keep Call bell within reach  - Keep bed low and locked with side rails adjusted as appropriate  - Keep care items and personal belongings within reach  - Initiate and maintain comfort rounds  - Make Fall Risk Sign visible to staff  - Offer Toileting every  Hours, in advance of need  - Initiate/Maintain alarm  - Obtain necessary fall risk management equipment:   - Apply yellow socks and bracelet for high fall risk patients  - Consider moving patient to room near nurses station  Outcome: Progressing  Goal: Maintain or return to baseline ADL function  Description: INTERVENTIONS:  -  Assess patient's ability to carry out ADLs; assess patient's baseline for ADL function and identify physical deficits which impact ability to perform ADLs (bathing, care of mouth/teeth, toileting, grooming, dressing, etc.)  - Assess/evaluate cause of self-care deficits   - Assess range of motion  - Assess patient's mobility; develop plan if impaired  - Assess patient's need for assistive devices and provide as appropriate  - Encourage maximum independence but intervene and supervise when necessary  - Involve family in performance of ADLs  - Assess for home care needs following discharge   - Consider OT consult to assist with ADL evaluation and planning for discharge  - Provide patient education as appropriate  - Monitor functional capacity and physical performance, use of AM PAC & JH-HLM   - Monitor gait, balance and fatigue with ambulation    Outcome: Progressing  Goal: Maintains/Returns to pre admission functional level  Description: INTERVENTIONS:  - Perform AM-PAC 6 Click Basic Mobility/ Daily Activity assessment daily.  - Set and communicate daily mobility goal to care team and patient/family/caregiver.   - Collaborate with rehabilitation services on mobility goals if consulted  -  Perform Range of Motion 3 times a day.  - Reposition patient every 2 hours.  - Dangle patient 3 times a day  - Stand patient 3 times a day  - Ambulate patient 3 times a day  - Out of bed to chair 3 times a day   - Out of bed for meals 3 times a day  - Out of bed for toileting  - Record patient progress and toleration of activity level   Outcome: Progressing     Problem: DISCHARGE PLANNING  Goal: Discharge to home or other facility with appropriate resources  Description: INTERVENTIONS:  - Identify barriers to discharge w/patient and caregiver  - Arrange for needed discharge resources and transportation as appropriate  - Identify discharge learning needs (meds, wound care, etc.)  - Arrange for interpretive services to assist at discharge as needed  - Refer to Case Management Department for coordinating discharge planning if the patient needs post-hospital services based on physician/advanced practitioner order or complex needs related to functional status, cognitive ability, or social support system  Outcome: Progressing     Problem: Knowledge Deficit  Goal: Patient/family/caregiver demonstrates understanding of disease process, treatment plan, medications, and discharge instructions  Description: Complete learning assessment and assess knowledge base.  Interventions:  - Provide teaching at level of understanding  - Provide teaching via preferred learning methods  Outcome: Progressing     Problem: Prexisting or High Potential for Compromised Skin Integrity  Goal: Skin integrity is maintained or improved  Description: INTERVENTIONS:  - Identify patients at risk for skin breakdown  - Assess and monitor skin integrity including under and around medical devices   - Assess and monitor nutrition and hydration status  - Monitor labs  - Assess for incontinence   - Turn and reposition patient  - Assist with mobility/ambulation  - Relieve pressure over verónica prominences   - Avoid friction and shearing  - Provide appropriate  hygiene as needed including keeping skin clean and dry  - Evaluate need for skin moisturizer/barrier cream  - Collaborate with interdisciplinary team  - Patient/family teaching  - Consider wound care consult    Assess:  - Review Zaid scale daily  - Clean and moisturize skin every   - Inspect skin when repositioning, toileting, and assisting with ADLS  - Assess under medical devices such as  every   - Assess extremities for adequate circulation and sensation     Bed Management:  - Have minimal linens on bed & keep smooth, unwrinkled  - Change linens as needed when moist or perspiring  - Avoid sitting or lying in one position for more than  hours while in bed?Keep HOB at degrees   - Toileting:  - Offer bedside commode  - Assess for incontinence every   - Use incontinent care products after each incontinent episode such as     Activity:  - Mobilize patient  times a day  - Encourage activity and walks on unit  - Encourage or provide ROM exercises   - Turn and reposition patient every  Hours  - Use appropriate equipment to lift or move patient in bed  - Instruct/ Assist with weight shifting every  when out of bed in chair  - Consider limitation of chair time  hour intervals    Skin Care:  - Avoid use of baby powder, tape, friction and shearing, hot water or constrictive clothing  - Relieve pressure over bony prominences using   - Do not massage red bony areas    Next Steps:  - Teach patient strategies to minimize risks such as   - Consider consults to  interdisciplinary teams such as   Outcome: Progressing     Problem: Nutrition/Hydration-ADULT  Goal: Nutrient/Hydration intake appropriate for improving, restoring or maintaining nutritional needs  Description: Monitor and assess patient's nutrition/hydration status for malnutrition. Collaborate with interdisciplinary team and initiate plan and interventions as ordered.  Monitor patient's weight and dietary intake as ordered or per policy. Utilize nutrition screening  tool and intervene as necessary. Determine patient's food preferences and provide high-protein, high-caloric foods as appropriate.     INTERVENTIONS:  - Monitor oral intake, urinary output, labs, and treatment plans  - Assess nutrition and hydration status and recommend course of action  - Evaluate amount of meals eaten  - Assist patient with eating if necessary   - Allow adequate time for meals  - Recommend/ encourage appropriate diets, oral nutritional supplements, and vitamin/mineral supplements  - Order, calculate, and assess calorie counts as needed  - Recommend, monitor, and adjust tube feedings and TPN/PPN based on assessed needs  - Assess need for intravenous fluids  - Provide specific nutrition/hydration education as appropriate  - Include patient/family/caregiver in decisions related to nutrition  Outcome: Progressing

## 2025-05-26 NOTE — ASSESSMENT & PLAN NOTE
Blayne mixed density SDH   Reports a fall on April 4, 2025   Since fall, pt has had progressively worsening ambulatory dysfunction   No requiring use of a rolling walker   Family also reported some slower speech   Pt has been following with  neurology as an outpt and underwent MRI brain today which revealed blayne SDH.   Pt was advised he seek care in the ER - presented to [facility] ER on 5/24 and transferred to [facility] for further eval and recs   No reported AC/AP med use       Plan:   POD #1 B lake holes, placement of SDD  Postop CT with improvement.  SDDs with no output as yet - maintain for now  MMA embo planned for 5/27 - NPO p MN  Continue to closely monitor neuro exam   Frequent neuro checks per primary team   Repeat STAT CTH with any acute decline in GCS > 2pts or more in 1hr   Maintain normotensive BP goals, SBP < 160, MAP> 65   Continue keppra for seizure ppx   Hold all AC/AP meds   No reported use at home   Correct any coagulopathy   Maintain goal INR < 1.4, plt > 100 - see above   DVT ppx: SCDs, hold chem dvt ppx until cleared by nsgy team   Medical management per primary team   Pain control per primary team   PT/OT   Social work following for assistance with dispo once medically cleared     Neurosurgery will continue to follow. Please reach out with any further questions or concerns.

## 2025-05-26 NOTE — PROGRESS NOTES
Progress Note - Neurosurgery   Name: Deonte Attdallas 74 y.o. male I MRN: [id number]  Unit/Bed#: ICU 06 I Date of Admission: 5/24/2025   Date of Service: 5/26/2025 I Hospital Day: 2    Assessment & Plan  Thrombocytopenia (HCC)  Hx of mild thrombocytopenia - been fluctuating and present for the last 10 years  Following with  heme-onc - seen on 5/16/2025   Felt to be secondary to mild thrombopoietin deficiency secondary to his hepatic steatosis   5/24 plt: 171     Plan:   Maintain goal plt > 100   Pt received FFP per [facility] team   Ongoing correct of coagulopathy pending TEG results   [facility] attending to provide medical clearance for possible surgical intervention     Subdural hematoma (HCC)  Odalis mixed density SDH   Reports a fall on April 4, 2025   Since fall, pt has had progressively worsening ambulatory dysfunction   No requiring use of a rolling walker   Family also reported some slower speech   Pt has been following with  neurology as an outpt and underwent MRI brain today which revealed odalis SDH.   Pt was advised he seek care in the ER - presented to [facility] ER on 5/24 and transferred to [facility] for further eval and recs   No reported AC/AP med use       Plan:   POD #1 B lake holes, placement of SDD  Postop CT with improvement.  SDDs with no output as yet - maintain for now  MMA embo planned for 5/27 - NPO p MN  Continue to closely monitor neuro exam   Frequent neuro checks per primary team   Repeat STAT CTH with any acute decline in GCS > 2pts or more in 1hr   Maintain normotensive BP goals, SBP < 160, MAP> 65   Continue keppra for seizure ppx   Hold all AC/AP meds   No reported use at home   Correct any coagulopathy   Maintain goal INR < 1.4, plt > 100 - see above   DVT ppx: SCDs, hold chem dvt ppx until cleared by nsgy team   Medical management per primary team   Pain control per primary team   PT/OT   Social work following for assistance with dispo once medically cleared     Neurosurgery will continue to follow. Please  reach out with any further questions or concerns.         Subjective   Mild H/A this AM;     Objective :  Temp:  [97.2 °F (36.2 °C)-98 °F (36.7 °C)] 97.2 °F (36.2 °C)  HR:  [64-82] 80  BP: (123-167)/(56-78) 146/77  Resp:  [12-28] 24  SpO2:  [93 %-100 %] 99 %  O2 Device: Simple mask    I/O         05/24 0701 05/25 0700 05/25 0701 05/26 0700 05/26 0701 05/27 0700    P.O. 480 120     I.V.  900     Blood 570      IV Piggyback  600     Total Intake 1050 1620     Urine 500 900     Drains  0     Stool  0     Total Output 500 900     Net +550 +720            Unmeasured Urine Occurrence 8 x 3 x     Unmeasured Stool Occurrence  1 x           Physical Exam Neurological Exam    GCS 15  CDI  DAHL      Lab Results: I have reviewed the following results:  Recent Labs     05/25/25  0454 05/26/25  0524   WBC 11.13* 9.77   HGB 12.4 11.8*   HCT 37.8 37.9    164   SODIUM 135 133*   K 4.3 4.4   CL 97 97   CO2 27 29   BUN 20 12   CREATININE 0.84 0.74   GLUC 113 124   CAIONIZED 1.17 1.14   MG 1.7* 1.6*   PHOS 3.4 3.4   AST 14  --    ALT 10  --    ALB 4.0  --    TBILI 1.05*  --    ALKPHOS 77  --    PTT 29  --    INR 1.09  --      Imaging Results Review: I reviewed radiology reports from this admission including:     CTA head 5/26/25:    IMPRESSION:     CT Brain: Bilateral mixed density subdural collections are decreased in size status post. Yael hole drainage with subdural drain placement.     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  No significant stenosis of the cervical left internal carotid artery  Severe stenosis origin of the dominant left vertebral artery. Hypoplastic right vertebral artery.  No high-grade intracranial stenosis, large vessel occlusion or aneurysm.      VTE Pharmacologic Prophylaxis: Sequential compression device (Venodyne)

## 2025-05-26 NOTE — NURSING NOTE
No output noted in the chambers of the subdural drains throughout the day today. However, in the tubing itself, there is a mix of air and serosanguineous drainage in the superior, right parietal SDD, and some serosanguineous drainage in the tubing of the inferior, left parietal SDD. All drains appear to be patent, as evidenced by movement of fluid in the tubing when the heights of the drains are adjusted.

## 2025-05-26 NOTE — PROGRESS NOTES
Progress Note - Trauma   Name: Deonte Attdallas 74 y.o. male I MRN: [id number]  Unit/Bed#: ICU 06 I Date of Admission: 5/24/2025   Date of Service: 5/26/2025 I Hospital Day: 2       Assessment & Plan   Active Hospital Problems    Diagnosis Date Noted POA    Subdural hematoma (HCC) 05/24/2025 Yes    Thrombocytopenia (HCC) 04/07/2025 Yes      Resolved Hospital Problems   No resolved problems to display.       Neuro:   Diagnosis: Bilateral subdural hematoma, mass effect  Plan: 5/24 MRI-large bilateral subdural hematoma, right greater than left, mass effect on the bilateral cerebral hemispheres, effacement of the sulci, FLAIR signal hyperintensity along sulci right frontal and parietal convexities concerning for subarachnoid hemorrhage  5/25 Status post bilateral bur holes for evacuation of subdural hematoma  Monitor subdural drain output   Continue Keppra for seizure prophylaxis  Follow-up CTA head and neck for MMA embo planning  Neurosurgery following  Every hour [id number] to every 2 hour neurochecks today  Diagnosis: Postop pain  Plan: As needed Tylenol, Voltaren gel    CV:   Diagnosis: Hypertension, hyperlipidemia  Plan: Urine labetalol, hydralazine    Pulm:  No active issues    GI:   Diagnosis: Hepatic stenosis  Plan: Follow-up outpatient  Continue Protonix  Maintain bowel regimen    :   Diagnosis: CKD 2  Plan: Baseline creatinine 0.7-0.9  Trend BUN and creatinine  Strict intake and output    F/E/N:   Replete electrolytes as needed  Regular diet    Heme/Onc:   Diagnosis: Chronic thrombocytopenia  Plan: Follow-up outpatient    Endo:   Diagnosis: DM 2  Plan: Sliding scale insulin    ID:   Diagnosis: Leukocytosis  Plan: Suspect reactive in setting of surgery  Trend fever curve and WBC    MSK/Skin:   Diagnosis: Fall, ambulatory dysfunction, left lower extremity edema, gout  Plan: Maintain fall precautions  PT/OT  Follow-up duplex  Continue allopurinol    Disposition: Critical care    ICU Core Measures      A: Assess, Prevent, and Manage Pain Has pain been assessed? Yes  Need for changes to pain regimen? Yes   B: Both SAT/SAT  N/A   C: Choice of Sedation RASS Goal: 0 Alert and Calm  Need for changes to sedation or analgesia regimen? No   D: Delirium CAM-ICU: Negative   E: Early Mobility  Plan for early mobility? Yes   F: Family Engagement Plan for family engagement today? Yes       Antibiotic Review: Patient on appropriate coverage based on culture data.       Prophylaxis:  VTE Contraindicated secondary to: Trauma   Stress Ulcer  covered bypantoprazole (PROTONIX) injection 40 mg [[id number]]         24 Hour Events : Presented status post bur holes for evacuation of subdural drains bilateral, subdural drain placement  Subjective       Objective :                   Vitals I/O      Most Recent Min/Max in 24hrs   Temp 97.5 °F (36.4 °C) Temp  Min: 97.5 °F (36.4 °C)  Max: 98 °F (36.7 °C)   Pulse 78 Pulse  Min: 64  Max: 82   Resp 22 Resp  Min: 12  Max: 28   /65 BP  Min: 105/58  Max: 167/67   O2 Sat 98 % SpO2  Min: 93 %  Max: 100 %      Intake/Output Summary (Last 24 hours) at 5/26/2025 0407  Last data filed at 5/26/2025 0300  Gross per 24 hour   Intake 1620 ml   Output 550 ml   Net 1070 ml       Diet Regular; Regular House    Invasive Monitoring           Physical Exam   Physical Exam  Vitals and nursing note reviewed.   Eyes:      Pupils: Pupils are equal, round, and reactive to light.   Skin:     General: Skin is warm.      Capillary Refill: Capillary refill takes less than 2 seconds.   HENT:      Head:      Comments: SDD x4 intact      Mouth/Throat:      Mouth: Mucous membranes are moist.   Cardiovascular:      Rate and Rhythm: Normal rate.      Pulses: Normal pulses.   Musculoskeletal:         General: Normal range of motion.      Right lower leg: Edema present.      Left lower leg: Edema present.      Comments: Left > right lower extremity edema    Abdominal:      Palpations: Abdomen is soft.   Constitutional:        Appearance: He is well-developed.   Pulmonary:      Effort: Pulmonary effort is normal.      Breath sounds: Normal breath sounds.   Psychiatric:         Speech: Speech is not no receptive aphasia or no expressive aphasia.   Neurological:      General: No focal deficit present.      Mental Status: He is alert and oriented to person, place and time. Mental status is at baseline.      Cranial Nerves: No dysarthria or facial asymmetry.      Motor: gross motor function is at baseline for patient. Strength full and intact in all extremities.          Diagnostic Studies        Lab Results: I have reviewed the following results:     Medications:  Scheduled PRN   allopurinol, 300 mg, Daily  [Held by provider] atenolol, 50 mg, Daily   And  [Held by provider] chlorthalidone, 25 mg, Daily  cefazolin, 1,000 mg, Q8H  cefazolin, 2,000 mg, Once  chlorhexidine, 15 mL, Q12H BRIDGET  chlorhexidine, 15 mL, Once  insulin lispro, 2-12 Units, TID AC  insulin lispro, 2-12 Units, HS  levETIRAcetam, 500 mg, Q12H BRIDGET  [Held by provider] losartan, 100 mg, Daily  pantoprazole, 40 mg, Q24H BRIDGET  senna-docusate sodium, 1 tablet, HS      acetaminophen, 650 mg, Q6H PRN  Diclofenac Sodium, 2 g, 4x Daily PRN  hydrALAZINE, 5 mg, Q4H PRN  labetalol, 10 mg, Q4H PRN  ondansetron, 4 mg, Q6H PRN       Continuous          Labs:   CBC    Recent Labs     05/24/25  1226 05/25/25  0454   WBC 8.82 11.13*   HGB 11.9* 12.4   HCT 37.0 37.8    179     BMP    Recent Labs     05/24/25  1226 05/25/25  0454   SODIUM 134* 135   K 4.7 4.3   CL 98 97   CO2 29 27   AGAP 7 11   BUN 24 20   CREATININE 0.90 0.84   CALCIUM 9.4 9.8       Coags    Recent Labs     05/24/25  1411 05/25/25  0454   INR 1.13 1.09   PTT 32 29        Additional Electrolytes  Recent Labs     05/25/25  0454   MG 1.7*   PHOS 3.4   CAIONIZED 1.17          Blood Gas    No recent results  No recent results LFTs  Recent Labs     05/24/25  1226 05/25/25  0454   ALT 8 10   AST 12* 14   ALKPHOS 80 77   ALB  3.8 4.0   TBILI 0.80 1.05*       Infectious  No recent results  Glucose  Recent Labs     05/24/25  1226 05/25/25  0454   GLUC 102 113

## 2025-05-26 NOTE — UTILIZATION REVIEW
Initial Clinical Review    TRANSFER FROM [place] [facility]    Admission: Date/Time/Statement:   Admission Orders (From admission, onward)       Ordered        05/24/25 1620  Inpatient Admission  Once                          Orders Placed This Encounter   Procedures    Inpatient Admission     Standing Status:   Standing     Number of Occurrences:   1     Level of Care:   Critical Care [15]     Estimated length of stay:   More than 2 Midnights     Certification:   I certify that inpatient services are medically necessary for this patient for a duration of greater than two midnights. See H&P and MD Progress Notes for additional information about the patient's course of treatment.         Initial Presentation: 74 y.o. male initially presented to ED at  [facility] with  SDH.  Initially  fell on  4/4 resulting in small SDH, and F/U  exams  showed  resolution of  SDH.  Had frequent falls and  work up done by neuro  revealing  large  B/L  SDH and referred for admission.   Transferred to [facility] for further care.  GCS  15.  Admits to progressive weakness, has required a walker  in past few weeks. Additional PMH  is  CKD, DM, HTN, thrombocytopenia  and  B/L  KIANNA.  Admit  IP  Critical Care with  B/L  SDH  and plan is  neuro checks Q1 hr,  keppra, monitor labs,   and PT/OT when appropriate.    Neurosurgery consult  Monitor neuro exam closely.   Possible surgical intervention.      Date:   5/25   Day 2:   Surgery  Procedure(s):  Bilateral lake holes for evacuation of bilateral subdural hematomas  Findings:  bilateral chronic SDHs under pressure     Date:   5/26  Day 3: Has surpassed a 2nd midnight with active treatments and services.   Post op day #  1.    Continue close monitoring.    Monitor  SD  drain output.  Now  on neuro checks Q 2 hrs.  Maintain fall precautions.  Needs  PT/OT.   Continue all current meds.        Scheduled Medications:  allopurinol, 300 mg, Oral, Daily  atenolol, 50 mg, Oral, Daily   And  chlorthalidone, 25  mg, Oral, Daily  cefazolin, 2,000 mg, Intravenous, Once  chlorhexidine, 15 mL, Mouth/Throat, Q12H BRIDGET  insulin lispro, 2-12 Units, Subcutaneous, TID AC  insulin lispro, 2-12 Units, Subcutaneous, HS  levETIRAcetam, 500 mg, Oral, Q12H BRIDGET  [Held by provider] losartan, 100 mg, Oral, Daily  senna-docusate sodium, 1 tablet, Oral, HS      Continuous IV Infusions:     PRN Meds:  acetaminophen, 650 mg, Oral, Q6H PRN  Diclofenac Sodium, 2 g, Topical, 4x Daily PRN  hydrALAZINE, 5 mg, Intravenous, Q4H PRN  labetalol, 10 mg, Intravenous, Q4H PRN  ondansetron, 4 mg, Intravenous, Q6H PRN      ED Triage Vitals   Temperature Pulse Respirations Blood Pressure SpO2 Pain Score   05/24/25 1754 05/24/25 1625 05/24/25 1630 05/24/25 1630 05/24/25 1625 05/24/25 1645   97.5 °F (36.4 °C) 78 20 (!) 176/83 95 % 2           Vital Signs (last 3 days)       Date/Time Temp Pulse Resp BP MAP (mmHg) SpO2 O2 Flow Rate (L/min) O2 Device Cardiac (WDL) [place] Coma Scale Score Pain    05/26/25 1127 -- -- -- -- -- -- -- -- -- -- 3    05/26/25 1005 -- 74 21 147/64 89 96 % -- -- -- -- --    05/26/25 1000 -- 74 21 -- -- 97 % -- -- -- 15 --    05/26/25 0910 -- 80 23 146/67 99 99 % -- -- -- -- --    05/26/25 0900 -- -- -- -- -- -- -- -- -- 15 --    05/26/25 0800 97.5 °F (36.4 °C) 78 25 144/72 89 98 % -- -- -- 15 No Pain    05/26/25 0700 -- 80 23 147/72 90 97 % -- -- -- 15 --    05/26/25 0600 -- 80 24 146/77 90 99 % -- -- -- 15 --    05/26/25 0500 -- -- -- -- -- -- -- -- -- 15 --    05/26/25 0400 97.2 °F (36.2 °C) 78 22 139/61 88 97 % -- -- -- 15 --    05/26/25 0300 -- 78 22 150/65 89 98 % -- -- -- 14 --    05/26/25 0200 -- 78 20 150/67 110 97 % -- -- -- 14 --    05/26/25 0100 -- 82 20 160/74 122 98 % -- -- -- 14 --    05/26/25 0000 97.5 °F (36.4 °C) 78 21 149/62 90 98 % -- -- -- 14 --    05/25/25 2300 -- 78 20 167/67 91 98 % -- -- -- 14 --    05/25/25 2200 -- 76 20 148/66 94 98 % -- -- -- 14 --    05/25/25 2100 97.7 °F (36.5 °C) 74 17 123/60 91 98 % -- -- --  14 --    05/25/25 2000 -- 72 18 149/66 105 99 % -- -- -- 14 --    05/25/25 1928 -- -- -- -- -- -- -- -- -- -- No Pain    05/25/25 1900 -- -- -- -- -- -- -- -- -- 14 --    05/25/25 1800 97.8 °F (36.6 °C) 70 24 136/56 88 99 % -- -- -- 14 No Pain    05/25/25 1700 -- -- 20 -- -- -- -- -- WDL -- --    05/25/25 1645 -- 74 18 133/70 93 93 % -- -- WDL -- --    05/25/25 1628 97.6 °F (36.4 °C) 74 12 158/67 97 100 % 6 L/min Simple mask WDL -- No Pain    05/25/25 1615 -- -- -- -- -- -- -- -- WDL -- --    05/25/25 1300 -- 64 21 -- -- 97 % -- -- -- 15 --    05/25/25 1200 -- 76 27 -- -- 97 % -- -- -- 15 --    05/25/25 1100 -- 74 24 156/68 89 95 % -- -- -- 15 --    05/25/25 1000 -- 76 24 159/78 114 95 % -- -- -- 15 --    05/25/25 0900 98 °F (36.7 °C) 80 28 135/61 80 97 % -- -- -- 15 --    05/25/25 0800 -- 82 22 165/71 99 96 % -- -- -- 15 No Pain    05/25/25 0700 -- 74 20 152/64 87 95 % -- -- -- 15 --    05/25/25 0600 -- 74 23 105/58 66 95 % -- -- -- 15 --    05/25/25 0500 97.9 °F (36.6 °C) 76 21 145/72 108 97 % -- -- -- 15 --    05/25/25 0455 -- 80 26 125/66 91 98 % -- -- -- -- --    05/25/25 0400 -- 72 23 120/55 74 94 % -- -- -- 15 --    05/25/25 0330 -- 76 21 116/59 89 95 % -- -- -- -- --    05/25/25 0300 -- 82 28 130/67 84 95 % -- -- -- 15 --    05/25/25 0230 -- 78 26 114/59 71 95 % -- -- -- 14 --    05/25/25 0200 -- 78 25 99/55 66 96 % -- -- -- 15 --    05/25/25 0130 -- 80 26 167/81 110 98 % -- -- -- -- --    05/25/25 0100 -- 84 26 179/71 106 96 % -- -- -- 15 --    05/25/25 0030 -- 82 20 118/62 74 96 % -- -- -- -- --    05/25/25 0010 -- 78 24 175/80 116 97 % -- -- -- -- --    05/25/25 0000 -- -- -- -- -- -- -- -- -- 15 --    05/24/25 2330 -- 76 20 153/101 133 96 % -- -- -- -- --    05/24/25 2300 -- 84 28 162/89 125 97 % -- -- -- 15 --    05/24/25 2230 -- 78 25 133/65 97 96 % -- -- -- -- --    05/24/25 2200 97.5 °F (36.4 °C) 84 28 159/76 123 99 % -- -- -- 15 --    05/24/25 2130 -- 86 28 149/66 98 97 % -- -- -- -- --    05/24/25  2115 -- 82 26 188/87 116 97 % -- -- -- -- --    05/24/25 2110 -- 84 27 194/95 127 96 % -- -- -- -- --    05/24/25 2100 -- -- -- -- -- -- -- -- -- 15 --    05/24/25 2045 98.2 °F (36.8 °C) -- -- -- -- -- -- -- -- -- --    05/24/25 2030 -- 80 23 121/61 100 95 % -- -- -- -- --    05/24/25 2000 -- -- -- -- -- -- -- -- -- 15 --    05/24/25 1944 -- 88 22 162/76 103 97 % -- -- -- -- No Pain    05/24/25 1930 97.9 °F (36.6 °C) -- -- -- -- -- -- -- -- 15 --    05/24/25 1900 -- -- -- 172/70 91 -- -- -- -- 15 --    05/24/25 1830 97.5 °F (36.4 °C) 78 20 181/99 141 98 % -- -- -- -- --    05/24/25 1804 97.5 °F (36.4 °C) 72 18 168/79 118 98 % -- -- -- -- --    05/24/25 1800 -- 72 20 162/83 135 97 % -- -- -- 15 --    05/24/25 1754 97.5 °F (36.4 °C) 79 16 199/89 -- 98 % -- -- -- -- --    05/24/25 1730 -- 78 20 199/89 138 96 % -- -- -- -- --    05/24/25 1700 -- 78 20 168/79 118 96 % -- -- -- -- --    05/24/25 1645 -- -- -- -- -- -- -- -- -- 15 2    05/24/25 1640 -- 76 18 166/85 127 95 % -- -- -- -- --    05/24/25 1630 -- 80 20 176/83 133 97 % -- -- -- -- --    05/24/25 1625 -- 78 -- -- -- 95 % -- -- -- -- --              Pertinent Labs/Diagnostic Test Results:   Radiology:   VAS VENOUS DUPLEX - LOWER LIMB BILATERAL   Final Interpretation by Victor M Crowe MD (05/26 5431)      CTA head and neck w wo contrast   Final Interpretation by E. Alec Schoenberger, MD (05/26 3788)      CT Brain: Bilateral mixed density subdural collections are decreased in size status post. Yael hole drainage with subdural drain placement.      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   No significant stenosis of the cervical left internal carotid artery   Severe stenosis origin of the dominant left vertebral artery. Hypoplastic right vertebral artery.   No high-grade intracranial stenosis, large vessel occlusion or aneurysm.            Workstation performed:  [id number]         CT head wo contrast   Final Interpretation by Rk Fritz MD (05/25 0651)      No significant change in large bilateral mixed attenuation subdural hematomas containing blood products of varying ages, including recent, with underlying mass effect on the cerebral hemispheres. Recommend continued clinical and imaging surveillance.                  Workstation performed: [id number]           Cardiology:  GI:          Results from last 7 days   Lab Units 05/26/25  0524 05/25/25  0454 05/24/25  1226   WBC Thousand/uL 9.77 11.13* 8.82   HEMOGLOBIN g/dL 11.8* 12.4 11.9*   HEMATOCRIT % 37.9 37.8 37.0   PLATELETS Thousands/uL 164 179 171   TOTAL NEUT ABS Thousands/µL  --  8.49* 6.45         Results from last 7 days   Lab Units 05/26/25  0524 05/25/25  0454 05/24/25  1226   SODIUM mmol/L 133* 135 134*   POTASSIUM mmol/L 4.4 4.3 4.7   CHLORIDE mmol/L 97 97 98   CO2 mmol/L 29 27 29   ANION GAP mmol/L 7 11 7   BUN mg/dL 12 20 24   CREATININE mg/dL 0.74 0.84 0.90   EGFR ml/min/1.73sq m 90 86 83   CALCIUM mg/dL 9.5 9.8 9.4   CALCIUM, IONIZED mmol/L 1.14 1.17  --    MAGNESIUM mg/dL 1.6* 1.7*  --    PHOSPHORUS mg/dL 3.4 3.4  --      Results from last 7 days   Lab Units 05/25/25  0454 05/24/25  1226   AST U/L 14 12*   ALT U/L 10 8   ALK PHOS U/L 77 80   TOTAL PROTEIN g/dL 8.1 7.6   ALBUMIN g/dL 4.0 3.8   TOTAL BILIRUBIN mg/dL 1.05* 0.80     Results from last 7 days   Lab Units 05/26/25  1041 05/26/25  0723 05/25/25  2106 05/25/25  1633 05/25/25  1048 05/25/25  0722 05/25/25  0457 05/24/25  2110 05/24/25  1759   POC GLUCOSE mg/dl 134 124 97 105 123 110 112 130 69     Results from last 7 days   Lab Units 05/26/25  0524 05/25/25  0454 05/24/25  1226   GLUCOSE RANDOM mg/dL 124 113 102         Results from last 7 days   Lab Units 05/24/25  2014   HEMOGLOBIN A1C % 6.2*   EAG mg/dl 131       Results from last 7 days   Lab Units 05/25/25  0454 05/24/25  1411   PROTIME seconds 14.4 15.2*   INR  1.09 1.13   PTT seconds  29 32                   Results from last 7 days   Lab Units 05/25/25  0555   UNIT PRODUCT CODE  [id number]   UNIT NUMBER  [id number]   UNITABO  A   UNITRH  POS   UNIT DISPENSE STATUS  Presumed Trans   UNIT PRODUCT VOL ml 280         Past Medical History[1]  Present on Admission:   Thrombocytopenia (HCC)   Subdural hematoma (HCC)      Admitting Diagnosis: Subdural hematoma (HCC) [S06.5XAA]  Age/Sex: 74 y.o. male    Network Utilization Review Department  ATTENTION: Please call with any questions or concerns to [phone] and carefully listen to the prompts so that you are directed to the right person. All voicemails are confidential.   For Discharge needs, contact Care Management DC Support Team at [phone] opt. [phone]  Send all requests for admission clinical reviews, approved or denied determinations and any other requests to dedicated fax number below belonging to the campus where the patient is receiving treatment. List of dedicated fax numbers for the Facilities:  FACILITY NAME UR FAX NUMBER   ADMISSION DENIALS (Administrative/Medical Necessity) [phone]   DISCHARGE SUPPORT TEAM (NETWORK) [phone]   PARENT CHILD HEALTH (Maternity/NICU/Pediatrics) [phone]   [facility] [phone]   [facility] [phone]   [facility] [phone]   [facility] [phone]   [facility] [phone]   [facility] [phone]   [facility] [phone]   [facility] [phone]   [facility] [phone]   [facility] [phone]   [facility] [phone]   [facility] [phone]             [1]   Past Medical History:  Diagnosis Date    Diabetes mellitus (HCC)     History of  kidney stones 04/05/2025    Hypertension

## 2025-05-26 NOTE — CASE MANAGEMENT
Case Management Assessment & Discharge Planning Note    Patient name Deonte Attarian  Location ICU /ICU 06 MRN [id number]  : 1950 Date 2025       Current Admission Date: 2025  Current Admission Diagnosis:Subdural hematoma (HCC)   Patient Active Problem List    Diagnosis Date Noted    Subdural hematoma (HCC) 2025    Leg swelling 2025    Severe obesity (BMI 35.0-39.9) with comorbidity ([facility]) 2025    Bradycardia, sinus 2025    Atrial septal aneurysm 2025    Normocytic anemia 2025    Syncope 2025    Thrombocytopenia (HCC) 2025    Abnormal echocardiogram 2025    Groin pain, right 2025    Fall 2025    Hypocitraturia 2024    Neuropathy 2019    Hyperuricemia 2016    Mixed hyperlipidemia 2016    Onychomycosis 2015    PVD (peripheral vascular disease) ([facility]) 10/07/2015    POPPY (obstructive sleep apnea) 10/07/2015    Diabetes mellitus ([facility]) 2015    S/P hip replacement 2014    Osteoarthritis 2008      LOS (days): 2  Geometric Mean LOS (GMLOS) (days):   Days to GMLOS:     OBJECTIVE:    Risk of Unplanned Readmission Score: 14.78         Current admission status: Inpatient       Preferred Pharmacy:   [facility] - AYLEEN Jarquin - [facility]  [address] [facility] [address]  [place] [address]  Phone: [phone] Fax: [phone]    [facility]/pharmacy #[id number] - AYLEEN ALMAGUER - [facility]  [address] [place] [id number]  Phone: [phone] Fax: [phone]    Primary Care Provider: Garcia Cartwright MD    Primary Insurance: JEROME  REP  Secondary Insurance:     ASSESSMENT:  Active Health Care Proxies    There are no active Health Care Proxies on file.                 Readmission Root Cause  30 Day Readmission: No    Patient Information  Admitted from:: Home  Mental Status: Alert  During Assessment patient was accompanied by: Spouse, Son  Assessment information  provided by:: Patient, Son  Primary Caregiver: Self  Support Systems: Family members, Spouse/significant other  Type of Current Residence: 2 story home  Upon entering residence, is there a bedroom on the main floor (no further steps)?: Yes  Upon entering residence, is there a bathroom on the main floor (no further steps)?: Yes  Living Arrangements: Lives w/ Spouse/significant other, Lives w/ Son    Activities of Daily Living Prior to Admission  Functional Status: Independent  Completes ADLs independently?: Yes  Ambulates independently?: Yes  Does patient use assisted devices?: Yes  Assisted Devices (DME) used: Straight Cane, Walker  Does patient have a history of Outpatient Therapy (PT/OT)?: Yes  Does the patient have a history of Short-Term Rehab?: Yes  Does patient have a history of HHC?: No  Does patient currently have HHC?: No         Patient Information Continued  Income Source: SSI/SSD  Does patient have prescription coverage?: Yes  Can the patient afford their medications and any related supplies (such as glucometers or test strips)?: Yes  Does patient receive dialysis treatments?: No  Does patient have a history of substance abuse?: No  Does patient have a history of Mental Health Diagnosis?: No         Means of Transportation  Means of Transport to Appts:: Family transport          DISCHARGE DETAILS:    Discharge planning discussed with:: Patient, patient's S/O and son at bedside  Freedom of Choice: Yes     CM contacted family/caregiver?: Yes          Additional Comments: CM met with patient and family at bedside. Pateint lives in 2 story home with S/O and children. He reports that bedroom and bathroom on first floor. He reports that he uses a cane and walker as needed. Family provides transportation to appointments.

## 2025-05-27 LAB
ANION GAP SERPL CALCULATED.3IONS-SCNC: 8 MMOL/L (ref 4–13)
BUN SERPL-MCNC: 15 MG/DL (ref 5–25)
CA-I BLD-SCNC: 1.11 MMOL/L (ref 1.12–1.32)
CALCIUM SERPL-MCNC: 9 MG/DL (ref 8.4–10.2)
CHLORIDE SERPL-SCNC: 97 MMOL/L (ref 96–108)
CO2 SERPL-SCNC: 27 MMOL/L (ref 21–32)
CREAT SERPL-MCNC: 0.74 MG/DL (ref 0.6–1.3)
ERYTHROCYTE [DISTWIDTH] IN BLOOD BY AUTOMATED COUNT: 15.2 % (ref 11.6–15.1)
GFR SERPL CREATININE-BSD FRML MDRD: 90 ML/MIN/1.73SQ M
GLUCOSE SERPL-MCNC: 140 MG/DL (ref 65–140)
GLUCOSE SERPL-MCNC: 148 MG/DL (ref 65–140)
GLUCOSE SERPL-MCNC: 166 MG/DL (ref 65–140)
GLUCOSE SERPL-MCNC: 174 MG/DL (ref 65–140)
GLUCOSE SERPL-MCNC: 181 MG/DL (ref 65–140)
HCT VFR BLD AUTO: 35.5 % (ref 36.5–49.3)
HGB BLD-MCNC: 11.7 G/DL (ref 12–17)
MAGNESIUM SERPL-MCNC: 1.8 MG/DL (ref 1.9–2.7)
MCH RBC QN AUTO: 30.3 PG (ref 26.8–34.3)
MCHC RBC AUTO-ENTMCNC: 33 G/DL (ref 31.4–37.4)
MCV RBC AUTO: 92 FL (ref 82–98)
OSMOLALITY UR: 619 MMOL/KG (ref 250–900)
PHOSPHATE SERPL-MCNC: 2.8 MG/DL (ref 2.3–4.1)
PLATELET # BLD AUTO: 146 THOUSANDS/UL (ref 149–390)
PMV BLD AUTO: 12.4 FL (ref 8.9–12.7)
POTASSIUM SERPL-SCNC: 3.8 MMOL/L (ref 3.5–5.3)
RBC # BLD AUTO: 3.86 MILLION/UL (ref 3.88–5.62)
SODIUM SERPL-SCNC: 132 MMOL/L (ref 135–147)
SODIUM UR-SCNC: 55 MMOL/L
WBC # BLD AUTO: 11.16 THOUSAND/UL (ref 4.31–10.16)

## 2025-05-27 PROCEDURE — 84300 ASSAY OF URINE SODIUM: CPT

## 2025-05-27 PROCEDURE — 83735 ASSAY OF MAGNESIUM: CPT | Performed by: PHYSICIAN ASSISTANT

## 2025-05-27 PROCEDURE — 85027 COMPLETE CBC AUTOMATED: CPT | Performed by: PHYSICIAN ASSISTANT

## 2025-05-27 PROCEDURE — 80048 BASIC METABOLIC PNL TOTAL CA: CPT | Performed by: PHYSICIAN ASSISTANT

## 2025-05-27 PROCEDURE — 83935 ASSAY OF URINE OSMOLALITY: CPT

## 2025-05-27 PROCEDURE — 99024 POSTOP FOLLOW-UP VISIT: CPT | Performed by: NEUROLOGICAL SURGERY

## 2025-05-27 PROCEDURE — 99291 CRITICAL CARE FIRST HOUR: CPT | Performed by: EMERGENCY MEDICINE

## 2025-05-27 PROCEDURE — 97167 OT EVAL HIGH COMPLEX 60 MIN: CPT

## 2025-05-27 PROCEDURE — 82948 REAGENT STRIP/BLOOD GLUCOSE: CPT

## 2025-05-27 PROCEDURE — 84100 ASSAY OF PHOSPHORUS: CPT | Performed by: PHYSICIAN ASSISTANT

## 2025-05-27 PROCEDURE — 97163 PT EVAL HIGH COMPLEX 45 MIN: CPT

## 2025-05-27 PROCEDURE — 82330 ASSAY OF CALCIUM: CPT | Performed by: PHYSICIAN ASSISTANT

## 2025-05-27 RX ORDER — MAGNESIUM SULFATE HEPTAHYDRATE 40 MG/ML
2 INJECTION, SOLUTION INTRAVENOUS ONCE
Status: COMPLETED | OUTPATIENT
Start: 2025-05-27 | End: 2025-05-27

## 2025-05-27 RX ORDER — SODIUM CHLORIDE, SODIUM GLUCONATE, SODIUM ACETATE, POTASSIUM CHLORIDE, MAGNESIUM CHLORIDE, SODIUM PHOSPHATE, DIBASIC, AND POTASSIUM PHOSPHATE .53; .5; .37; .037; .03; .012; .00082 G/100ML; G/100ML; G/100ML; G/100ML; G/100ML; G/100ML; G/100ML
500 INJECTION, SOLUTION INTRAVENOUS ONCE
Status: COMPLETED | OUTPATIENT
Start: 2025-05-27 | End: 2025-05-27

## 2025-05-27 RX ORDER — POTASSIUM CHLORIDE 1500 MG/1
20 TABLET, EXTENDED RELEASE ORAL ONCE
Status: COMPLETED | OUTPATIENT
Start: 2025-05-27 | End: 2025-05-27

## 2025-05-27 RX ADMIN — POTASSIUM CHLORIDE 20 MEQ: 1500 TABLET, EXTENDED RELEASE ORAL at 08:53

## 2025-05-27 RX ADMIN — ALLOPURINOL 300 MG: 100 TABLET ORAL at 08:59

## 2025-05-27 RX ADMIN — SODIUM CHLORIDE 12 MMOL: 9 INJECTION, SOLUTION INTRAVENOUS at 11:52

## 2025-05-27 RX ADMIN — CHLORHEXIDINE GLUCONATE 15 ML: 1.2 SOLUTION ORAL at 20:39

## 2025-05-27 RX ADMIN — LEVETIRACETAM 500 MG: 500 TABLET, FILM COATED ORAL at 08:53

## 2025-05-27 RX ADMIN — MAGNESIUM SULFATE HEPTAHYDRATE 2 G: 40 INJECTION, SOLUTION INTRAVENOUS at 07:45

## 2025-05-27 RX ADMIN — ACETAMINOPHEN 650 MG: 325 TABLET ORAL at 14:03

## 2025-05-27 RX ADMIN — LEVETIRACETAM 500 MG: 500 TABLET, FILM COATED ORAL at 20:39

## 2025-05-27 RX ADMIN — INSULIN LISPRO 2 UNITS: 100 INJECTION, SOLUTION INTRAVENOUS; SUBCUTANEOUS at 11:27

## 2025-05-27 RX ADMIN — SODIUM CHLORIDE, SODIUM GLUCONATE, SODIUM ACETATE, POTASSIUM CHLORIDE, MAGNESIUM CHLORIDE, SODIUM PHOSPHATE, DIBASIC, AND POTASSIUM PHOSPHATE 500 ML: .53; .5; .37; .037; .03; .012; .00082 INJECTION, SOLUTION INTRAVENOUS at 19:51

## 2025-05-27 RX ADMIN — INSULIN LISPRO 2 UNITS: 100 INJECTION, SOLUTION INTRAVENOUS; SUBCUTANEOUS at 21:08

## 2025-05-27 RX ADMIN — CHLORHEXIDINE GLUCONATE 15 ML: 1.2 SOLUTION ORAL at 09:05

## 2025-05-27 RX ADMIN — SENNOSIDES AND DOCUSATE SODIUM 1 TABLET: 50; 8.6 TABLET ORAL at 21:09

## 2025-05-27 RX ADMIN — DICLOFENAC SODIUM 2 G: 10 GEL TOPICAL at 09:43

## 2025-05-27 RX ADMIN — INSULIN LISPRO 2 UNITS: 100 INJECTION, SOLUTION INTRAVENOUS; SUBCUTANEOUS at 17:44

## 2025-05-27 RX ADMIN — DICLOFENAC SODIUM 2 G: 10 GEL TOPICAL at 21:10

## 2025-05-27 NOTE — PLAN OF CARE
Problem: SAFETY ADULT  Goal: Patient will remain free of falls  Description: INTERVENTIONS:  - Educate patient/family on patient safety including physical limitations  - Instruct patient to call for assistance with activity   - Consider consulting OT/PT to assist with strengthening/mobility based on AM PAC & JH-HLM score  - Consult OT/PT to assist with strengthening/mobility   - Keep Call bell within reach  - Keep bed low and locked with side rails adjusted as appropriate  - Keep care items and personal belongings within reach  - Initiate and maintain comfort rounds  - Make Fall Risk Sign visible to staff  - Offer Toileting every 2 Hours, in advance of need  - Initiate/Maintain bed alarm  - Obtain necessary fall risk management equipment:   Problem: Nutrition/Hydration-ADULT  Goal: Nutrient/Hydration intake appropriate for improving, restoring or maintaining nutritional needs  Description: Monitor and assess patient's nutrition/hydration status for malnutrition. Collaborate with interdisciplinary team and initiate plan and interventions as ordered.  Monitor patient's weight and dietary intake as ordered or per policy. Utilize nutrition screening tool and intervene as necessary. Determine patient's food preferences and provide high-protein, high-caloric foods as appropriate.     INTERVENTIONS:  - Monitor oral intake, urinary output, labs, and treatment plans  - Assess nutrition and hydration status and recommend course of action  - Evaluate amount of meals eaten  - Assist patient with eating if necessary   - Allow adequate time for meals  - Recommend/ encourage appropriate diets, oral nutritional supplements, and vitamin/mineral supplements  - Order, calculate, and assess calorie counts as needed  - Recommend, monitor, and adjust tube feedings and TPN/PPN based on assessed needs  - Assess need for intravenous fluids  - Provide specific nutrition/hydration education as appropriate  - Include patient/family/caregiver  in decisions related to nutrition  Outcome: Progressing     Problem: Knowledge Deficit  Goal: Patient/family/caregiver demonstrates understanding of disease process, treatment plan, medications, and discharge instructions  Description: Complete learning assessment and assess knowledge base.  Interventions:  - Provide teaching at level of understanding  - Provide teaching via preferred learning methods  Outcome: Progressing     - Apply yellow socks and bracelet for high fall risk patients  - Consider moving patient to room near nurses station  Outcome: Progressing

## 2025-05-27 NOTE — PHYSICAL THERAPY NOTE
Physical Therapy Evaluation     Patient's Name: Deonte Attarian    Admitting Diagnosis  Subdural hematoma (HCC) [S06.5XAA]    Problem List  Problem List[1]    Past Medical History  Past Medical History[2]    Past Surgical History  Past Surgical History[3]       05/27/25 0971   PT Last Visit   PT Visit Date 05/27/25   Note Type   Note type Evaluation   Pain Assessment   Pain Assessment Tool 0-10   Pain Score 2   Pain Location/Orientation Orientation: Right;Location: Knee   Restrictions/Precautions   Weight Bearing Precautions Per Order No   Other Precautions Impulsive;Chair Alarm;Multiple lines;Fall Risk;Telemetry   Home Living   Type of Home House   Home Layout Two level;Able to live on main level with bedroom/bathroom   Bathroom Shower/Tub Walk-in shower   Bathroom Toilet Raised   Bathroom Equipment Grab bars in shower;Shower chair   Home Equipment Cane;Walker   Additional Comments Pt sleeps in chair at home due to discomfort in R hip   Prior Function   Level of [place] Independent with ADLs;Independent with functional mobility;Independent with IADLS   Lives With Spouse;Family   Receives Help From Family   IADLs Family/Friend/Other provides transportation   Falls in the last 6 months 1 to 4   Vocational Retired   Comments - . Pt tolerates sitting for ~20 minutes at a time before needed to stand and walk due to R hip discomfort.   General   Additional Pertinent History Reports recieving OP PT for OA.   Cognition   Overall Cognitive Status WFL   Arousal/Participation Alert   Attention Within functional limits   Orientation Level Oriented X4   Following Commands Follows one step commands with increased time or repetition   Comments Impulsive and decreased safety awareness.   Subjective   Subjective Pt willing and agreeable to PT evaluation.   RLE Assessment   RLE Assessment WFL  (grossly 3/5)   LLE Assessment   LLE Assessment X   LLE Overall AROM   L Hip Flexion 2/5   L Knee Extension 3-/5  (increased time  needed to reach near TKE)   L Ankle Dorsiflexion 2/5   L Ankle Plantar Flexion 3/5   Coordination   Movements are Fluid and Coordinated 0   Coordination and Movement Description slow and gaurded with increased pain, weakness, and balance compared to baseline   Bed Mobility   Supine to Sit 2  Maximal assistance   Additional items Assist x 2;LE management   Sit to Supine Unable to assess   Additional Comments OOB in bedside chair to end session.   Transfers   Sit to Stand 2  Maximal assistance   Additional items Assist x 2   Stand to Sit 2  Maximal assistance   Additional items Assist x 2   Stand pivot 2  Maximal assistance   Additional items Assist x 2   Ambulation/Elevation   Gait pattern Improper Weight shift;L Knee Racheal;Decreased foot clearance;Forward Flexion;Inconsistent osiel;Short stride;Excessively slow;Decreased hip extension;Decreased heel strike;Decreased toe off   Gait Assistance 2  Maximal assist   Additional items Assist x 2   Assistive Device None   Distance 2' to bedside chair   Stair Management Assistance Not tested   Balance   Static Sitting Poor +   Dynamic Sitting Poor   Static Standing Zero   Ambulatory Zero   Endurance Deficit   Endurance Deficit Yes   Endurance Deficit Description Limited by weakness, balance, and pain.   Activity Tolerance   Activity Tolerance Patient limited by fatigue;Patient limited by pain;Other (Comment)  (weakness)   Medical Staff Made Aware RN; OT, OTS   Nurse Made Aware Yes, nsg gave clearance to work with pt.   Assessment   Prognosis Good   Problem List Decreased strength;Decreased endurance;Impaired balance;Decreased mobility;Decreased safety awareness;Pain;Impaired judgement;Decreased range of motion   Assessment Patient is a 74 y.o. male seen for PT evaluation s/p admit to [facility] on  05/27/25 with Subdural hematoma (HCC). PT consulted to assess functional mobility status and discharge recommendations. PTA, pt was independent with ADLs and IADLs,  used RW for ambulation, and received assistance from family for shopping and transportation. Pt is retired, - . Pt required MaxA for bed mobility, transfers, and ambulation due to decreased strength, balance, endurance, and pain. Pt is currently functioning below his baseline with decreased functional mobility due to above outlined deficits. Pt currently high risk of falls with absent knowledge of current limitations and safety awareness. Poor insight post transfer requesting to have RW to ambulate in room despite inability to achieve fully upright and minimal foot clearance during step to chair. End of session, pt positioned in bedside chair with R LE elevated on chair with heel offloaded, chair alarm set, call bell, family present, and nursing present to address pt's R hip discomfort. Pt will benefit from continued skilled PT intervention throughout hospital stay to maximize functional mobility for increased safety upon d/c. From a PT/mobility standpoint, recommending post acute rehabilitation services at this time pending progress in order to facilitate return to PLOF.  The patient's AM-PAC Basic Mobility Inpatient Short Form Raw Score is 11. A Raw score of less than or equal to 16 suggests the patient may benefit from discharge to post-acute rehabilitation services. Please also refer to the recommendation of the Physical Therapist for safe discharge planning.   Goals   Patient Goals To go home   STG Expiration Date 06/10/25   Short Term Goal #1 1. Pt will complete STS transfers with modA and LRAD in order to decreased caregiver burden and improve safety. 2. Pt will ambulate household distances with modA and LRAD for improved independence and decreased caregiver burden. 3. Pt will improve LE strength to 4/5 B/L to improve transfer and technique. 4. Pt will improve balance to fair + during standing to complete standing ADLs without increased A. 5. PT to see for stairs as appropriate   Plan    Treatment/Interventions OT;Spoke to case management;Spoke to nursing;Gait training;Bed mobility;Patient/family training;Endurance training;LE strengthening/ROM;Functional transfer training   PT Frequency 3-5x/wk   Discharge Recommendation   Rehab Resource Intensity Level, PT I (Maximum Resource Intensity)   AM-PAC Basic Mobility Inpatient   Turning in Flat Bed Without Bedrails 2   Lying on Back to Sitting on Edge of Flat Bed Without Bedrails 2   Moving Bed to Chair 2   Standing Up From Chair Using Arms 2   Walk in Room 2   Climb 3-5 Stairs With Railing 1   Basic Mobility Inpatient Raw Score 11   Basic Mobility Standardized Score 30.25   [facility] Highest Level Of Mobility   -HL Goal 4: Move to chair/commode   -HLM Achieved 4: Move to chair/commode         Sun Armando, PT         [1]   Patient Active Problem List  Diagnosis    Fall    PVD (peripheral vascular disease) (HCC)    Diabetes mellitus (HCC)    Groin pain, right    Syncope    Thrombocytopenia (HCC)    Abnormal echocardiogram    Hyperuricemia    Hypocitraturia    Mixed hyperlipidemia    Neuropathy    Onychomycosis    POPPY (obstructive sleep apnea)    Osteoarthritis    S/P hip replacement    Atrial septal aneurysm    Normocytic anemia    Bradycardia, sinus    Severe obesity (BMI 35.0-39.9) with comorbidity (HCC)    Subdural hematoma (HCC)    Leg swelling   [2]   Past Medical History:  Diagnosis Date    Diabetes mellitus (HCC)     History of kidney stones 04/05/2025    Hypertension    [3]   Past Surgical History:  Procedure Laterality Date    KYLIE HOLE FOR SUBDURAL HEMATOMA Bilateral 5/25/2025    Procedure: KYLIE HOLES;  Surgeon: Issac Grady MD;  Location: [facility];  Service: Neurosurgery    TOTAL HIP ARTHROPLASTY Bilateral

## 2025-05-27 NOTE — PROGRESS NOTES
Progress Note - Critical Care/ICU   Name: Deonte Greenfield 74 y.o. male I MRN: [id number]  Unit/Bed#: ICU 06 I Date of Admission: 5/24/2025   Date of Service: 5/27/2025 I Hospital Day: 3      Assessment & Plan   Active Hospital Problems    Diagnosis Date Noted POA    Subdural hematoma (HCC) 05/24/2025 Yes    Thrombocytopenia (HCC) 04/07/2025 Yes      Resolved Hospital Problems   No resolved problems to display.       Neuro:   Diagnosis: Bilateral subdural hematoma, mass effect  Plan: 5/24 MRI-large bilateral subdural hematoma, right greater than left, mass effect on the bilateral cerebral hemispheres, effacement of the sulci, FLAIR signal hyperintensity along sulci right frontal and parietal convexities concerning for subarachnoid hemorrhage  5/25 Status post bilateral bur holes for evacuation of subdural hematoma  Monitor subdural drain output   Continue Keppra for seizure prophylaxis  Unable to do MMA secondary to anatomy  Neurosurgery following  Every 2 hour neurochecks, every 4 at bedtime  Diagnosis: Postop pain  Plan: As needed Tylenol, Voltaren gel, oxycodone     CV:   Diagnosis: Hypertension, hyperlipidemia  Plan: As needed labetalol, hydralazine     Pulm:  No active issues     GI:   Diagnosis: Hepatic stenosis  Plan: Follow-up outpatient  Maintain bowel regimen     :   Diagnosis: CKD 2  Plan: Baseline creatinine 0.7-0.9  Trend BUN and creatinine  Strict intake and output     F/E/N:   Replete electrolytes as needed  Regular diet     Heme/Onc:   Diagnosis: Chronic thrombocytopenia  Plan: Follow-up outpatient     Endo:   Diagnosis: DM 2  Plan: Sliding scale insulin     ID:   Diagnosis: Leukocytosis, improved  Plan: Suspect reactive in setting of surgery  Trend fever curve and WBC     MSK/Skin:   Diagnosis: Fall, ambulatory dysfunction, left lower extremity edema, gout  Plan: Maintain fall precautions  PT/OT  5/26 bilateral venous duplex-no DVT seen  Continue allopurinol      Disposition: Critical care    ICU  Core Measures     A: Assess, Prevent, and Manage Pain Has pain been assessed? Yes  Need for changes to pain regimen? No   B: Both SAT/SAT  N/A   C: Choice of Sedation RASS Goal: 0 Alert and Calm  Need for changes to sedation or analgesia regimen? No   D: Delirium CAM-ICU: Negative   E: Early Mobility  Plan for early mobility? Yes   F: Family Engagement Plan for family engagement today? Yes       Antibiotic Review: Patient on appropriate coverage based on culture data.       Prophylaxis:  VTE Contraindicated secondary to: Trauma   Stress Ulcer  not ordered         24 Hour Events : No acute events overnight  Subjective       Objective :                   Vitals I/O      Most Recent Min/Max in 24hrs   Temp 98.8 °F (37.1 °C) Temp  Min: 97.5 °F (36.4 °C)  Max: 98.8 °F (37.1 °C)   Pulse 80 Pulse  Min: 74  Max: 92   Resp (!) 23 Resp  Min: 18  Max: 26   /57 BP  Min: 91/41  Max: 147/64   O2 Sat 94 % SpO2  Min: 92 %  Max: 99 %      Intake/Output Summary (Last 24 hours) at 5/27/2025 0415  Last data filed at 5/27/2025 0400  Gross per 24 hour   Intake 850 ml   Output 1977 ml   Net -1127 ml       Diet Regular; Regular House    Invasive Monitoring           Physical Exam   Physical Exam  Vitals and nursing note reviewed.   Eyes:      Pupils: Pupils are equal, round, and reactive to light.   Skin:     General: Skin is warm.      Capillary Refill: Capillary refill takes less than 2 seconds.   HENT:      Head:      Comments: SDDx4 in place      Mouth/Throat:      Mouth: Mucous membranes are moist.   Cardiovascular:      Rate and Rhythm: Normal rate.      Pulses: Normal pulses.   Musculoskeletal:         General: Normal range of motion.      Right lower leg: Edema present.      Left lower leg: Edema present.      Comments: L>R   Abdominal:      Palpations: Abdomen is soft.   Constitutional:       Appearance: He is well-developed.   Pulmonary:      Breath sounds: Normal breath sounds.   Psychiatric:         Speech: Speech is not  no receptive aphasia or no expressive aphasia.   Neurological:      General: No focal deficit present.      Mental Status: He is alert and oriented to person, place and time. Mental status is at baseline.      Cranial Nerves: No dysarthria or facial asymmetry.      Motor: gross motor function is at baseline for patient. Strength full and intact in all extremities.          Diagnostic Studies        Lab Results: I have reviewed the following results:     Medications:  Scheduled PRN   allopurinol, 300 mg, Daily  [Held by provider] atenolol, 50 mg, Daily   And  [Held by provider] chlorthalidone, 25 mg, Daily  cefazolin, 2,000 mg, Once  chlorhexidine, 15 mL, Q12H BRIDGET  insulin lispro, 2-12 Units, TID AC  insulin lispro, 2-12 Units, HS  levETIRAcetam, 500 mg, Q12H BRIDGET  [Held by provider] losartan, 100 mg, Daily  senna-docusate sodium, 1 tablet, HS      acetaminophen, 650 mg, Q6H PRN  Diclofenac Sodium, 2 g, 4x Daily PRN  hydrALAZINE, 5 mg, Q4H PRN  labetalol, 10 mg, Q4H PRN  ondansetron, 4 mg, Q6H PRN  oxyCODONE, 2.5 mg, Q4H PRN   Or  oxyCODONE, 5 mg, Q4H PRN       Continuous          Labs:   CBC    Recent Labs     05/25/25 0454 05/26/25 0524   WBC 11.13* 9.77   HGB 12.4 11.8*   HCT 37.8 37.9    164     BMP    Recent Labs     05/25/25 0454 05/26/25 0524   SODIUM 135 133*   K 4.3 4.4   CL 97 97   CO2 27 29   AGAP 11 7   BUN 20 12   CREATININE 0.84 0.74   CALCIUM 9.8 9.5       Coags    Recent Labs     05/25/25 0454   INR 1.09   PTT 29        Additional Electrolytes  Recent Labs     05/25/25 0454 05/26/25 0524   MG 1.7* 1.6*   PHOS 3.4 3.4   CAIONIZED 1.17 1.14          Blood Gas    No recent results  No recent results LFTs  Recent Labs     05/25/25 0454   ALT 10   AST 14   ALKPHOS 77   ALB 4.0   TBILI 1.05*       Infectious  No recent results  Glucose  Recent Labs     05/25/25 0454 05/26/25 0524   [facility] [phone]

## 2025-05-27 NOTE — PLAN OF CARE
Problem: PHYSICAL THERAPY ADULT  Goal: Performs mobility at highest level of function for planned discharge setting.  See evaluation for individualized goals.  Description: Treatment/Interventions: OT, Spoke to case management, Spoke to nursing, Gait training, Bed mobility, Patient/family training, Endurance training, LE strengthening/ROM, Functional transfer training          See flowsheet documentation for full assessment, interventions and recommendations.  Note: Prognosis: Good  Problem List: Decreased strength, Decreased endurance, Impaired balance, Decreased mobility, Decreased safety awareness, Pain, Impaired judgement, Decreased range of motion  Assessment: Patient is a 74 y.o. male seen for PT evaluation s/p admit to [facility] on  05/27/25 with Subdural hematoma (HCC). PT consulted to assess functional mobility status and discharge recommendations. PTA, pt was independent with ADLs and IADLs, used RW for ambulation, and received assistance from family for shopping and transportation. Pt is retired, - . Pt required MaxA for bed mobility, transfers, and ambulation due to decreased strength, balance, endurance, and pain. Pt is currently functioning below his baseline with decreased functional mobility due to above outlined deficits. End of session, pt positioned in bedside chair with R LE elevated on chair with heel offloaded, chair alarm set, call bell, family present, and nursing present to address pt's R hip discomfort. Pt will benefit from continued skilled PT intervention throughout hospital stay to maximize functional mobility for increased safety upon d/c. From a PT/mobility standpoint, recommending post acute rehabilitation services at this time pending progress in order to facilitate return to PLOF.  The patient's AM-PAC Basic Mobility Inpatient Short Form Raw Score is 11. A Raw score of less than or equal to 16 suggests the patient may benefit from discharge to post-acute  rehabilitation services. Please also refer to the recommendation of the Physical Therapist for safe discharge planning.        Rehab Resource Intensity Level, PT: I (Maximum Resource Intensity)    See flowsheet documentation for full assessment.

## 2025-05-27 NOTE — ASSESSMENT & PLAN NOTE
Hx of mild thrombocytopenia - been fluctuating and present for the last 10 years  Following with [facility] heme-onc - seen on 5/16/2025   Felt to be secondary to mild thrombopoietin deficiency secondary to his hepatic steatosis     Plan:   Maintain goal plt > 100   Pt received FFP per [facility] team   Currently 146

## 2025-05-27 NOTE — PLAN OF CARE
Problem: OCCUPATIONAL THERAPY ADULT  Goal: Performs self-care activities at highest level of function for planned discharge setting.  See evaluation for individualized goals.  Description: Treatment Interventions: ADL retraining, Functional transfer training, UE strengthening/ROM, Endurance training, Patient/family training, Equipment evaluation/education, Compensatory technique education, Continued evaluation, Energy conservation, Activityengagement          See flowsheet documentation for full assessment, interventions and recommendations.   Note: Limitation: Decreased ADL status, Decreased UE ROM, Decreased UE strength, Decreased Safe judgement during ADL, Decreased endurance, Decreased self-care trans, Decreased high-level ADLs  Prognosis: Fair  Assessment: Pt is a 73 y/o M who presents to [facility] s/p fall at the beginning of April w/ a headstrike and has since had ambulatory dysfunction, balance deficits, and decreased self-care independence. Pt dx'd w/ b/l subdural hematomas w/ mass effect. Pt s/p b/l lake holes 5/25. Pt  has a past medical history of Diabetes mellitus (HCC), History of kidney stones, and Hypertension. At baseline, pt lives w/ his spouse and children in a 2SH w/ a SU. Pt previously I w/ ADLs and IADLs, I w/ functional mobility, and receives assistance for transfers. Pt does not drive. Pt has active OT consult and OOB orders. Upon OT entry, pt found supine in bed and agreeable to participate in therapy. Pt is currently performing at [place] for UB ADLs, maxA for LB ADLs, and maxA x 2 for functional transfers. Upon OT evaluation, pt demonstrates impairments w/ UE ROM, UE strength, endurance, balance, standing tolerance, activity tolerance, safety awareness, insight, and judgement. OT to address the listed impairments in the following occupational performance areas: bathing, dressing, toileting, functional mobility, and functional transfers. Pt is currently functioning below baseline performance and  demonstrates need for OT services. Pt will be seen 3-5x/week w/ goals to  within 10-14 days. OT to recommend maximum resource intensity upon d/c.  Recommendation: Physiatry Consult  Rehab Resource Intensity Level, OT: I (Maximum Resource Intensity)     Mimi Wray OTS

## 2025-05-27 NOTE — PROCEDURES
Arterial Line Insertion    Date/Time: 5/27/2025 7:36 PM    Performed by: Jose Delaney DO  Authorized by: Jose Delaney DO    Patient location:  ICU  Consent:     Consent obtained:  Verbal    Consent given by: daughter.  Universal protocol:     Patient identity confirmed:  Hospital-assigned identification number  Indications:     Indications: hemodynamic monitoring and continuous blood pressure monitoring    Pre-procedure details:     Skin preparation:  Chlorhexidine    Preparation: Patient was prepped and draped in sterile fashion    Anesthesia (see MAR for exact dosages):     Anesthesia method:  Local infiltration    Local anesthetic:  Lidocaine 1% w/o epi  Procedure details:   Line Type: Arterial Line    Location / Tip of Catheter:  Radial    Laterality:  Right    Miguelangel's test performed: yes      Miguelangel's test abnormal: no      Needle gauge:  20 G    Placement technique:  Ultrasound guided    Ultrasound image availability:  Not saved    Sterile ultrasound techniques: Sterile gel and sterile probe covers were used      Number of attempts:  1    Successful placement: yes      Transducer: waveform confirmed    Post-procedure details:     Post-procedure:  Sutured, wrist guard applied and sterile dressing applied    Patient tolerance of procedure:  Tolerated well, no immediate complications

## 2025-05-27 NOTE — PROGRESS NOTES
Progress Note - Neurosurgery   Name: Deonte Greenfield 74 y.o. male I MRN: [id number]  Unit/Bed#: ICU 06 I Date of Admission: 5/24/2025   Date of Service: 5/27/2025 I Hospital Day: 3    Addendum:  Right posterior and left anterior drains removed; patient tolerated well.  Two other drains remain in at 0mmHg, continue to monitor their output.  Will continue to follow.    Assessment & Plan  Subdural hematoma (HCC)  POD#2 bilateral KYLIE HOLES for SDH evac (Dr. Grady, 5/25/2025)  Reports a fall on April 4, 2025 with progressively worsening gait dysfunction; also with concern for slowed speech per family  Outpatient work up noted bilateral SDH for which he was advised to go to the ED for evaluation  No reported AC/AP med use     Imaging:  CTA head and neck w wo contrast 5/26/2025: Bilateral mixed density subdural collections are decreased in size status post. Kylie hole drainage with subdural drain placement.     Plan:   Continue to closely monitor neuro exam   As discussed with Dr. Frank, he is not an MMA candidate given poor vessel quality and lack of right MMA, he is at high risk for complication from procedure and therefore will defer this  Drain care - all with essentially no output; when dropped able to get right anterior and left posterior drain to drain so will keep those in place for now and plan to remove right posterior and left anterior drains later today  Repeat STAT CTH with any acute decline in GCS > 2pts or more in 1hr   Maintain normotensive BP goals, SBP < 160, MAP > 65   Continue keppra for seizure ppx x 1 week postop  Hold all AC/AP meds   No reported use at home   Correct any coagulopathy   Maintain goal INR < 1.4, plt > 100 - see above   DVT ppx: SCDs, hold chem dvt ppx until cleared by nsgy team   Medical management per primary team   Pain control per primary team   PT/OT   Social work following for assistance with dispo once medically cleared     Neurosurgery will continue to follow. Please  reach out with any further questions or concerns.   Thrombocytopenia (HCC)  Hx of mild thrombocytopenia - been fluctuating and present for the last 10 years  Following with [facility] - seen on 5/16/2025   Felt to be secondary to mild thrombopoietin deficiency secondary to his hepatic steatosis     Plan:   Maintain goal plt > 100   Pt received FFP per [facility] team   Currently 146      Subjective   Patient is sitting up in bed, eating breakfast, NAD.  Subdural drains in place with minimal output noted however was able to get right anterior and left posterior drain to drain when dropped to the ground.  Will remove nonworking drains later today.  He otherwise denies any complaints at this time.  Wife and son updated at the bedside.    Objective :  Temp:  [97.8 °F (36.6 °C)-100.4 °F (38 °C)] 100.4 °F (38 °C)  HR:  [74-98] 98  BP: ()/(41-67) 144/65  Resp:  [18-26] 20  SpO2:  [92 %-99 %] 95 %  O2 Device: None (Room air)    I/O         05/25 0701 05/26 0700 05/26 0701  05/27 0700 05/27 0701  05/28 0700    P.O. 120 270     I.V. 900 530     Blood       IV Piggyback 600 50     Total Intake 1620 850     Urine 900 1550     Drains 0 2     Stool 0      Total Output 900 1552     Net +720 -702            Unmeasured Urine Occurrence 3 x      Unmeasured Stool Occurrence 1 x              General appearance: alert, appears stated age, cooperative and no distress  Head: dressings in place, 4 SDD in place with minimal output although was able to elicit drainage of right anterior and left posterior drain when dropped to the ground  Eyes: Conjugate gaze  Neck: supple, symmetrical, trachea midline   Lungs: non labored breathing  Heart: regular heart rate  Neurologic:   Mental status: Alert, oriented x 3, follows commands, thought content appropriate  Cranial nerves: grossly intact (Cranial nerves II-XII)  Motor: moving all extremities with mild left shoulder weakness that he states is chronic  Coordination: finger to nose normal  bilaterally, no drift bilaterally        Lab Results: I have reviewed the following results:  Recent Labs     05/25/25  0454 05/26/25  0524 05/27/25  0514 05/27/25  0602   WBC 11.13*   < > 11.16*  --    HGB 12.4   < > 11.7*  --    HCT 37.8   < > 35.5*  --       < > 146*  --    SODIUM 135   < > 132*  --    K 4.3   < > 3.8  --    CL 97   < > 97  --    CO2 27   < > 27  --    BUN 20   < > 15  --    CREATININE 0.84   < > 0.74  --    GLUC 113   < > 140  --    CAIONIZED 1.17   < >  --  1.11*   MG 1.7*   < > 1.8*  --    PHOS 3.4   < > 2.8  --    AST 14  --   --   --    ALT 10  --   --   --    ALB 4.0  --   --   --    TBILI 1.05*  --   --   --    ALKPHOS 77  --   --   --    PTT 29  --   --   --    INR 1.09  --   --   --     < > = values in this interval not displayed.       Imaging Results Review: I personally reviewed the following image studies in PACS and associated radiology reports: CTA. My interpretation of the radiology images/reports is: as noted above.  Other Study Results Review: No additional pertinent studies reviewed.    VTE Pharmacologic Prophylaxis: VTE covered by:    None    and Sequential compression device (Venodyne)

## 2025-05-27 NOTE — ASSESSMENT & PLAN NOTE
POD#2 bilateral KYLIE HOLES for SDH evac (Dr. Grady, 5/25/2025)  Reports a fall on April 4, 2025 with progressively worsening gait dysfunction; also with concern for slowed speech per family  Outpatient work up noted bilateral SDH for which he was advised to go to the ED for evaluation  No reported AC/AP med use     Imaging:  CTA head and neck w wo contrast 5/26/2025: Bilateral mixed density subdural collections are decreased in size status post. [place] hole drainage with subdural drain placement.     Plan:   Continue to closely monitor neuro exam   As discussed with Dr. Frank, he is not an MMA candidate given poor vessel quality and lack of right MMA, he is at high risk for complication from procedure and therefore will defer this  Drain care - all with essentially no output; when dropped able to get right anterior and left posterior drain to drain so will keep those in place for now and plan to remove right posterior and left anterior drains later today  Repeat STAT CTH with any acute decline in GCS > 2pts or more in 1hr   Maintain normotensive BP goals, SBP < 160, MAP > 65   Continue keppra for seizure ppx x 1 week postop  Hold all AC/AP meds   No reported use at home   Correct any coagulopathy   Maintain goal INR < 1.4, plt > 100 - see above   DVT ppx: SCDs, hold chem dvt ppx until cleared by nsgy team   Medical management per primary team   Pain control per primary team   PT/OT   Social work following for assistance with dispo once medically cleared     Neurosurgery will continue to follow. Please reach out with any further questions or concerns.

## 2025-05-27 NOTE — OCCUPATIONAL THERAPY NOTE
Occupational Therapy Evaluation     Patient Name: Deonte Greenfield  Today's Date: 5/27/2025  Problem List  Principal Problem:    Subdural hematoma (HCC)  Active Problems:    Thrombocytopenia (HCC)    Past Medical History  Past Medical History[1]  Past Surgical History  Past Surgical History[2]        05/27/25 0944   OT Last Visit   OT Visit Date 05/27/25   Note Type   Note type Evaluation   Pain Assessment   Pain Assessment Tool 0-10   Pain Score 2   Pain Location/Orientation Orientation: Right;Location: Knee   Restrictions/Precautions   Weight Bearing Precautions Per Order No   Other Precautions Chair Alarm;Bed Alarm;Multiple lines;Telemetry;Fall Risk;Pain  (4 SDD, A-line)   Home Living   Type of Home House   Home Layout Two level;Able to live on main level with bedroom/bathroom;Other (Comment)  (Pt sleeps in living room chair at baseline due to R knee/groin pain)   Bathroom Shower/Tub Walk-in shower   Bathroom Toilet Raised   Bathroom Equipment Grab bars in shower;Shower chair   Home Equipment Cane;Walker;Sock aid  (Uses cane and walker prn)   Additional Comments Pt lives w/ his spouse and children in a 2SH w/ a FFSU; pt uses cane and walker prn at baseline   Prior Function   Level of [place] Independent with ADLs;Independent with functional mobility;Independent with IADLS  (Pt reports independence w/ IADLs but reports his wife and children do the grocery shopping, as he does not drive)   Lives With Spouse;Family   Receives Help From Family   IADLs Independent with meal prep;Independent with medication management;Family/Friend/Other provides transportation   Falls in the last 6 months 1 to 4  (Pt reports 1 fall leading to current admission)   Vocational Retired   Comments PTA, pt I w/ ADLs and IADLs; pt does not drive and receives rides from family prn   Lifestyle   Autonomy I w/ ADLs, I w/ IADLs, I w/ functional mobility, receives assistance w/ transfers   Reciprocal Relationships Spouse, son, daughter,  grandchildren   Service to Others Retired   Intrinsic Gratification Watching TV   ADL   Eating Assistance 5  Supervision/Setup   Grooming Assistance 5  Supervision/Setup   UB Bathing Assistance 4  Minimal Assistance   LB Bathing Assistance 2  Maximal Assistance   UB Dressing Assistance 4  Minimal Assistance   LB Dressing Assistance 2  Maximal Assistance   Toileting Assistance  2  Maximal Assistance   Functional Assistance 2  Maximal Assistance   Additional Comments Pt Froy for UB ADLs and maxA for LB ADLs   Bed Mobility   Supine to Sit 2  Maximal assistance   Additional items Assist x 2;LE management   Sit to Supine Unable to assess   Additional Comments Pt left OOB in recliner w/ chair alarm activated and all needs within reach at EOS   Transfers   Sit to Stand 2  Maximal assistance   Additional items Assist x 2   Stand to Sit 2  Maximal assistance   Additional items Assist x 2   Stand pivot 2  Maximal assistance   Additional items Assist x 2   Additional Comments Pt maxA x 2 w/ HHA for STS and stand pivot transfer to drop-arm bedside chair   Functional Mobility   Additional Comments Unable to assess   Balance   Static Sitting Poor +   Dynamic Sitting Poor   Static Standing Poor -   Dynamic Standing Poor -   Activity Tolerance   Activity Tolerance Patient limited by pain   Medical Staff Made Aware RN; PT, Sun; SPTJose   Nurse Made Aware Yes   RUE Assessment   RUE Assessment WFL   LUE Assessment   LUE Assessment X  (Pt w/ decreased L shoulder flexion; WFL distal ROM)   LUE Overall AROM   L Shoulder Flexion Pt w/ ~100 degrees L shoulder flexion   Hand Function   Gross Motor Coordination Functional   Fine Motor Coordination Functional   Sensation   Light Touch No apparent deficits   Cognition   Overall Cognitive Status WFL   Arousal/Participation Alert;Responsive;Cooperative   Attention Within functional limits   Orientation Level Oriented X4   Memory Decreased recall of precautions   Following Commands Follows  one step commands with increased time or repetition   Comments Pt pleasant and cooperative; demonstrates decreased safety awareness and insight into deficits   Assessment   Limitation Decreased ADL status;Decreased UE ROM;Decreased UE strength;Decreased Safe judgement during ADL;Decreased endurance;Decreased self-care trans;Decreased high-level ADLs   Prognosis Fair   Assessment Pt is a 73 y/o M who presents to [facility] s/p fall at the beginning of April w/ a headstrike and has since had ambulatory dysfunction, balance deficits, and decreased self-care independence. Pt dx'd w/ b/l subdural hematomas w/ mass effect. Pt s/p b/l lake holes . Pt  has a past medical history of Diabetes mellitus (HCC), History of kidney stones, and Hypertension. At baseline, pt lives w/ his spouse and children in a 2SH w/ a FFSU. Pt previously I w/ ADLs and IADLs, I w/ functional mobility, and receives assistance for transfers. Pt does not drive. Pt has active OT consult and OOB orders. Upon OT entry, pt found supine in bed and agreeable to participate in therapy. Pt is currently performing at [place] for UB ADLs, maxA for LB ADLs, and maxA x 2 for functional transfers. Upon OT evaluation, pt demonstrates impairments w/ UE ROM, UE strength, endurance, balance, standing tolerance, activity tolerance, safety awareness, insight, and judgement. OT to address the listed impairments in the following occupational performance areas: bathing, dressing, toileting, functional mobility, and functional transfers. Pt is currently functioning below baseline performance and demonstrates need for OT services. Pt will be seen 3-5x/week w/ goals to  within 10-14 days. OT to recommend maximum resource intensity upon d/c.   Goals   Patient Goals To go home   LTG Time Frame 10-14   Long Term Goal #1 See below defined goals   Plan   Treatment Interventions ADL retraining;Functional transfer training;UE strengthening/ROM;Endurance training;Patient/family  training;Equipment evaluation/education;Compensatory technique education;Continued evaluation;Energy conservation;Activityengagement   Goal Expiration Date 06/10/25   OT Frequency 3-5x/wk   Discharge Recommendation   Recommendation Physiatry Consult   Rehab Resource Intensity Level, OT I (Maximum Resource Intensity)   Additional Comments  Pt seen as a co-session due to the patient's co-morbidities, clinically unstable presentation, and present impairments which are a regression from the patient's baseline.   Additional Comments 2 The patient's raw score on the AM-PAC Daily Activity Inpatient Short Form is 16. A raw score of less than 19 suggests the patient may benefit from discharge to post-acute rehabilitation services. Please refer to the recommendation of the Occupational Therapist for safe discharge planning.   AM-PAC Daily Activity Inpatient   Lower Body Dressing 2   Bathing 2   Toileting 2   Upper Body Dressing 3   Grooming 3   Eating 4   Daily Activity Raw Score 16   Daily Activity Standardized Score (Calc for Raw Score >=11) 35.96   -PAC Applied Cognition Inpatient   Following a Speech/Presentation 3   Understanding Ordinary Conversation 4   Taking Medications 3   Remembering Where Things Are Placed or Put Away 3   Remembering List of 4-5 Errands 2   Taking Care of Complicated Tasks 2   Applied Cognition Raw Score 17   Applied Cognition Standardized Score 36.52   End of Consult   Education Provided Yes   Patient Position at End of Consult Bedside chair;Bed/Chair alarm activated;All needs within reach   Nurse Communication Nurse aware of consult   End of Consult Comments Pt's family present at EOS     Goals   1) Pt will complete UB ADLs w/ mod I to increase functional independence.     2) Pt will complete LB ADLs w/ Froy to increase functional independence.      3) Pt will complete all functional transfers w/ Froy x 1, with or without use of AD/DME, to increase independence and safety w/ functional  transfers.      4) Pt will tolerate 5 minutes of standing w/ Froy x 1, with or without use of AD/DME, to improve endurance to participate in functional activities.     5) Pt will complete toileting w/ Froy x 1, with or without use of AD/DME, to increase independence w/ self-care.     6) Pt will complete bed mobility w/ mod I and sit EOB w/ G balance as a prerequisite for seated ADLs.     7) Pt will be 100% attentive throughout formal cognitive assessment.    OTR to see pt for further evaluation of functional mobility.     JUNIOR Barros            [1]   Past Medical History:  Diagnosis Date    Diabetes mellitus (HCC)     History of kidney stones 04/05/2025    Hypertension    [2]   Past Surgical History:  Procedure Laterality Date    KYLIE HOLE FOR SUBDURAL HEMATOMA Bilateral 5/25/2025    Procedure: KYLIE HOLES;  Surgeon: Issac Grady MD;  Location: [facility];  Service: Neurosurgery    TOTAL HIP ARTHROPLASTY Bilateral

## 2025-05-28 ENCOUNTER — TELEPHONE (OUTPATIENT)
Dept: NEUROSURGERY | Facility: CLINIC | Age: 75
End: 2025-05-28

## 2025-05-28 PROBLEM — Z91.89 AT RISK FOR DELIRIUM: Status: ACTIVE | Noted: 2025-05-28

## 2025-05-28 PROBLEM — R54 FRAILTY: Status: ACTIVE | Noted: 2025-05-28

## 2025-05-28 PROBLEM — R52 ACUTE PAIN: Status: ACTIVE | Noted: 2025-05-28

## 2025-05-28 PROBLEM — G47.00 INSOMNIA: Status: ACTIVE | Noted: 2025-05-28

## 2025-05-28 PROBLEM — R63.4 WEIGHT LOSS: Status: ACTIVE | Noted: 2025-05-28

## 2025-05-28 LAB
ANION GAP SERPL CALCULATED.3IONS-SCNC: 8 MMOL/L (ref 4–13)
BUN SERPL-MCNC: 26 MG/DL (ref 5–25)
CALCIUM SERPL-MCNC: 8.6 MG/DL (ref 8.4–10.2)
CHLORIDE SERPL-SCNC: 100 MMOL/L (ref 96–108)
CO2 SERPL-SCNC: 25 MMOL/L (ref 21–32)
CORTIS AM PEAK SERPL-MCNC: 15.6 UG/DL (ref 6.7–22.6)
CREAT SERPL-MCNC: 0.86 MG/DL (ref 0.6–1.3)
ERYTHROCYTE [DISTWIDTH] IN BLOOD BY AUTOMATED COUNT: 15.2 % (ref 11.6–15.1)
GFR SERPL CREATININE-BSD FRML MDRD: 85 ML/MIN/1.73SQ M
GLUCOSE SERPL-MCNC: 121 MG/DL (ref 65–140)
GLUCOSE SERPL-MCNC: 126 MG/DL (ref 65–140)
GLUCOSE SERPL-MCNC: 136 MG/DL (ref 65–140)
GLUCOSE SERPL-MCNC: 140 MG/DL (ref 65–140)
GLUCOSE SERPL-MCNC: 177 MG/DL (ref 65–140)
HCT VFR BLD AUTO: 31.7 % (ref 36.5–49.3)
HGB BLD-MCNC: 10.4 G/DL (ref 12–17)
MAGNESIUM SERPL-MCNC: 2.2 MG/DL (ref 1.9–2.7)
MCH RBC QN AUTO: 30.3 PG (ref 26.8–34.3)
MCHC RBC AUTO-ENTMCNC: 32.8 G/DL (ref 31.4–37.4)
MCV RBC AUTO: 92 FL (ref 82–98)
PHOSPHATE SERPL-MCNC: 3 MG/DL (ref 2.3–4.1)
PLATELET # BLD AUTO: 141 THOUSANDS/UL (ref 149–390)
PMV BLD AUTO: 12.5 FL (ref 8.9–12.7)
POTASSIUM SERPL-SCNC: 3.9 MMOL/L (ref 3.5–5.3)
RBC # BLD AUTO: 3.43 MILLION/UL (ref 3.88–5.62)
SODIUM SERPL-SCNC: 133 MMOL/L (ref 135–147)
TSH SERPL DL<=0.05 MIU/L-ACNC: 0.71 UIU/ML (ref 0.45–4.5)
WBC # BLD AUTO: 10.35 THOUSAND/UL (ref 4.31–10.16)

## 2025-05-28 PROCEDURE — 99223 1ST HOSP IP/OBS HIGH 75: CPT | Performed by: NURSE PRACTITIONER

## 2025-05-28 PROCEDURE — 82948 REAGENT STRIP/BLOOD GLUCOSE: CPT

## 2025-05-28 PROCEDURE — 84100 ASSAY OF PHOSPHORUS: CPT | Performed by: PHYSICIAN ASSISTANT

## 2025-05-28 PROCEDURE — 84443 ASSAY THYROID STIM HORMONE: CPT | Performed by: STUDENT IN AN ORGANIZED HEALTH CARE EDUCATION/TRAINING PROGRAM

## 2025-05-28 PROCEDURE — 80048 BASIC METABOLIC PNL TOTAL CA: CPT | Performed by: PHYSICIAN ASSISTANT

## 2025-05-28 PROCEDURE — 83735 ASSAY OF MAGNESIUM: CPT | Performed by: PHYSICIAN ASSISTANT

## 2025-05-28 PROCEDURE — NC001 PR NO CHARGE: Performed by: EMERGENCY MEDICINE

## 2025-05-28 PROCEDURE — 82533 TOTAL CORTISOL: CPT | Performed by: STUDENT IN AN ORGANIZED HEALTH CARE EDUCATION/TRAINING PROGRAM

## 2025-05-28 PROCEDURE — 99291 CRITICAL CARE FIRST HOUR: CPT | Performed by: EMERGENCY MEDICINE

## 2025-05-28 PROCEDURE — 99024 POSTOP FOLLOW-UP VISIT: CPT | Performed by: NEUROLOGICAL SURGERY

## 2025-05-28 PROCEDURE — 85027 COMPLETE CBC AUTOMATED: CPT | Performed by: PHYSICIAN ASSISTANT

## 2025-05-28 RX ORDER — ENOXAPARIN SODIUM 100 MG/ML
30 INJECTION SUBCUTANEOUS EVERY 12 HOURS SCHEDULED
Status: DISCONTINUED | OUTPATIENT
Start: 2025-05-29 | End: 2025-05-30

## 2025-05-28 RX ORDER — POLYETHYLENE GLYCOL 3350 17 G/17G
17 POWDER, FOR SOLUTION ORAL DAILY
Status: DISCONTINUED | OUTPATIENT
Start: 2025-05-28 | End: 2025-06-03 | Stop reason: HOSPADM

## 2025-05-28 RX ADMIN — CHLORHEXIDINE GLUCONATE 15 ML: 1.2 SOLUTION ORAL at 08:34

## 2025-05-28 RX ADMIN — INSULIN LISPRO 2 UNITS: 100 INJECTION, SOLUTION INTRAVENOUS; SUBCUTANEOUS at 21:40

## 2025-05-28 RX ADMIN — SENNOSIDES AND DOCUSATE SODIUM 1 TABLET: 50; 8.6 TABLET ORAL at 21:39

## 2025-05-28 RX ADMIN — ALLOPURINOL 300 MG: 100 TABLET ORAL at 08:28

## 2025-05-28 RX ADMIN — LEVETIRACETAM 500 MG: 500 TABLET, FILM COATED ORAL at 21:39

## 2025-05-28 RX ADMIN — CHLORHEXIDINE GLUCONATE 15 ML: 1.2 SOLUTION ORAL at 21:39

## 2025-05-28 RX ADMIN — POLYETHYLENE GLYCOL 3350 17 G: 17 POWDER, FOR SOLUTION ORAL at 08:23

## 2025-05-28 RX ADMIN — LEVETIRACETAM 500 MG: 500 TABLET, FILM COATED ORAL at 08:26

## 2025-05-28 NOTE — TELEPHONE ENCOUNTER
05/28/2025- PT IS IN [facility]  06/09/2025- 2 WK POV incision check W/ AP and CTH   07/09/2025- 6 WK POV W/ HDM W/ CTH     Trista Wilkes PA-C  [facility]  Surgery:  bilateral lake holes for SDH evac  Date:  5/25  MD:  KRISTIE    Plan:    - 2 week incision check with AP and CTH  - 6 week POV with HDM with CTH    Please make follow up from surgery date.

## 2025-05-28 NOTE — ASSESSMENT & PLAN NOTE
Lab Results   Component Value Date    HGBA1C 6.2 (H) 05/24/2025     Metformin decreased from 1000 mg po BID to 500 mg po BID on 5/14/25

## 2025-05-28 NOTE — ASSESSMENT & PLAN NOTE
S/p fall on 4/4/26  Seen on follow up MRI done as outpatient  Neurosurgery on consult  S/p lake hole with drain  5/27/25 drains removed  Follow up with neurosurgery as outpatient

## 2025-05-28 NOTE — ASSESSMENT & PLAN NOTE
Pt reports an unintentional weight loss of 50-60 pounds over 6 months after several dental extractions  Ongoing evaluation  Maintain protein in diet  Albumin 4.0

## 2025-05-28 NOTE — QUICK NOTE
2 subdural drains removed, patient tolerated well, closed with stay sutures.  No further neurosurgical needs at this time, see previous note for additional recommendations.  Neurosurgery signing off, please reach out with questions or concerns.

## 2025-05-28 NOTE — CONSULTS
PHYSICAL MEDICINE AND REHABILITATION CONSULT NOTE  Deonte Greenfield 74 y.o. male MRN: [id number]  Unit/Bed#: ICU 06 Encounter: [id number]    Requested by (Physician/Service): Cassidy Raza DO  Reason for Consultation:  Assessment of rehabilitation needs        Assessment & Plan  Subdural hematoma (HCC)  Originally presented 4/4 at [facility] after fall and was found to have small right posterior SAH. Scans on 4/5 and 4/6 showed resolution.   Outpt MRI ordered by neurology showed large bilateral high convexity SDH with mass effect.   Referred to hospital where CTH confirmed finding.  S/P lake holes and bilateral drain placement.   Continue PT/OT/SLP while on acute care.   Recommend acute inpatient rehabilitation once medically stable. He is able to tolerate 3 hours of therapy a day 5 to 6 days a week.   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  Do not hesitate to contact service with further questions.      JOSE ALEJANDRO Stein  PM&R    I have spent a total time of 30 minutes on 05/28/25 in caring for this patient including Patient and family education, Counseling / Coordination of care, Documenting in the medical record, Reviewing/placing orders in the medical record (including tests, medications, and/or procedures), Obtaining or reviewing history  , and Communicating with other healthcare professionals .    History of Present Illness:  Deonte Greenfield is a 74 y.o. male with a PMH of diabetes, CKD stage 2, HTN, HLD, thrombocytopenia, gout, bilateral total hip arthroplasty who presented to the [facility]  [facility] on 4/4/2025 after falling due to possible syncope. He was found to have small right posterior falx SDH. Follow up scans on 4/5 and 4/6 revealed resolution of SDH. He had frequent falls and work up was being done by neurology. MRI on 5/24 outpt showed large bilateral high convexity SDHs with mass effect. Family did report progressive decline at home and needing to use a rolling walker at all times. He was sent to the [facility] where CTH confirmed findings on CT scan. He was admitted to ICU and underwent lake hole placement with subdural drain placement. CTA on 5/26 showed bilateral mixed density subdural collections are decreased in size s/p lake hole drainage with subdural drain placement. He was not a canididate for MMA given poor vessel quality and lack of right MMA, he was high risk for complication. PM&R are consulted for rehabilitation recommendations.    The patient was seen in his room. He currently denies any pain, headache, blurred vision, double vision, numbness, tingling or dizziness. He did state that he worked with therapy today and it was hard but he is motivated to move and get better.     Review of Systems: 10 point ROS negative except for what is noted in HPI    Function:  Prior level of function and living situation: The patient lives in a two level home with his spouse, children and granddchildren. He was independent for ADLs and functional mobility He sleeps in chair at home due to right hip discomfort.       Current level of function:  PT: bed mobility/transfers/ambulation 2, stair management 1  OT: LBD/bathing, toileting 2, UBD/grooming 3, eating 4    AM-PAC (Activity Measure of Post-Acute Care) - \"6 Clicks\" - is a standardized assessment tool used in IP rehab and acute care settings to measure a patient's functional mobility, daily activity, and cognition and helps guide discharge planning and therapy needs. Scale (1-4)  1 - Total assist (including 2 " person assist)    2 - Mod to Max assist (significant assistance)  3 - Min assist to supervision   4 - Independent or mod independent            Physical Exam:  /60 (BP Location: Right arm)   Pulse 98   Temp 99.6 °F (37.6 °C) (Oral)   Resp (!) 24   Wt 101 kg (223 lb 5.2 oz)   SpO2 93%   BMI 37.16 kg/m²        Intake/Output Summary (Last 24 hours) at 5/28/2025 1208  Last data filed at 5/28/2025 0836  Gross per 24 hour   Intake 1080 ml   Output 736 ml   Net 344 ml       Body mass index is 37.16 kg/m².      Physical Exam  Constitutional:       General: He is not in acute distress.     Appearance: He is not toxic-appearing.   HENT:      Head:      Comments: Bilateral drains in place     Eyes:      Extraocular Movements: Extraocular movements intact.     Pulmonary:      Effort: Pulmonary effort is normal. No respiratory distress.   Abdominal:      General: There is no distension.     Musculoskeletal:      Comments: RUE/RLE: 4/5 throughout with RUE drift  LUE/LLE: 5/5 throughout      Neurological:      Mental Status: He is alert and oriented to person, place, and time.     Psychiatric:         Mood and Affect: Mood normal.        Social History:    Social History[1]     Family History:    Family History[2]      Medications:   Current Medications[3]    Past Medical History:     Past Medical History[4]     Past Surgical History:     Past Surgical History[5]      Allergies:     No Known Allergies        LABORATORY RESULTS:      Lab Results   Component Value Date    HGB 10.4 (L) 05/28/2025    HCT 31.7 (L) 05/28/2025    WBC 10.35 (H) 05/28/2025     Lab Results   Component Value Date    BUN 26 (H) 05/28/2025    K 3.9 05/28/2025     05/28/2025    CREATININE 0.86 05/28/2025     Lab Results   Component Value Date    PROTIME 14.4 05/25/2025    INR 1.09 05/25/2025        DIAGNOSTIC STUDIES: Reviewed  CTA head and neck w wo contrast  Result Date: 5/26/2025  Impression: CT Brain: Bilateral mixed density subdural  collections are decreased in size status post. Yael hole drainage with subdural drain placement. 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 No significant stenosis of the cervical left internal carotid artery Severe stenosis origin of the dominant left vertebral artery. Hypoplastic right vertebral artery. No high-grade intracranial stenosis, large vessel occlusion or aneurysm. Workstation performed: [id number]     CT head wo contrast  Result Date: 5/25/2025  Impression: No significant change in large bilateral mixed attenuation subdural hematomas containing blood products of varying ages, including recent, with underlying mass effect on the cerebral hemispheres. Recommend continued clinical and imaging surveillance. Workstation performed: [id number]     CT head without contrast  Result Date: 5/24/2025  Impression: Stable, large bilateral mixed attenuating, high convexity subdural hematomas, right greater than left, likely predominantly subacute in timing, with mass effect on the right greater than left cerebral hemispheres near the vertex. No midline shift noted. Workstation performed: [id number]     MRI brain w wo contrast  Result Date: 5/24/2025  Impression: 1.  New, large bilateral high convexity subdural hematomas, right greater than left, which are predominantly subacute in timing, with mass effect on the bilateral cerebral hemispheres, and effacement of the sulci. No significant midline shift noted. 2.  FLAIR signal hyperintensity along the sulci of the right frontal and parietal convexities, which may represent trace associated subarachnoid hemorrhage. 3.  No acute infarct. Mild chronic white matter microangiopathic changes involving both cerebral hemispheres. 4.  No intracranial enhancing lesion noted. I personally discussed this study with LAVINIA OLEARY on 5/24/2025 at 12:04 p.m. Workstation performed: [id number]           [1]   Social History  Socioeconomic History    Marital status: /Civil Union   Tobacco Use    Smoking status: Former     Types: Cigarettes    Smokeless tobacco: Never   Vaping Use    Vaping status: Never Used   Substance and Sexual Activity    Alcohol use: Not Currently    Drug use: Never     Social Drivers of Health     Financial Resource Strain: Low Risk  (11/26/2024)    Received from [facility]    Overall Financial Resource Strain (CARDIA)     Difficulty of Paying Living Expenses: Not hard at all   Food Insecurity: No Food Insecurity (5/14/2025)    Nursing - Inadequate Food Risk Classification     Worried About Running Out of Food in the Last Year: Never true     Ran Out of Food in the Last Year: Never true   Transportation Needs: No Transportation Needs (5/14/2025)    PRAPARE - Transportation     Lack of Transportation (Medical): No     Lack of Transportation (Non-Medical): No   Physical Activity: Inactive (11/26/2024)    Received from [facility]    Exercise Vital Sign     On average, how many days per week do you engage in moderate to strenuous exercise (like a brisk walk)?: 0 days     On average, how many minutes do you engage in exercise at this level?: 0 min   Stress: No Stress Concern Present (4/9/2025)    [employer] - Occupational Stress Questionnaire     Feeling of Stress : Not at all   Housing Stability: Low Risk  (5/14/2025)    Housing Stability Vital Sign     Unable to Pay for Housing in the Last Year: No     Number of Times Moved in the Last Year: 0     Homeless in the Last Year: No   [2] No family history on file.  [3]   Current Facility-Administered Medications:     acetaminophen (TYLENOL) tablet 650 mg, 650 mg, Oral, Q6H PRN, JOSE ALEJANDRO Huffman, 650 mg at 05/27/25 1403    allopurinol (ZYLOPRIM) tablet 300 mg, 300 mg, Oral, Daily, AMMON HuffmanNP, 300 mg at 05/28/25 0828    [Held by provider] atenolol (TENORMIN)  tablet 50 mg, 50 mg, Oral, Daily **AND** [Held by provider] chlorthalidone tablet 25 mg, 25 mg, Oral, Daily, Adolfo Alfonso PA-C    chlorhexidine (PERIDEX) 0.12 % oral rinse 15 mL, 15 mL, Mouth/Throat, Q12H BRIDGET, JOSE ALEJANDRO Huffman, 15 mL at 05/28/25 0834    Diclofenac Sodium (VOLTAREN) 1 % topical gel 2 g, 2 g, Topical, 4x Daily PRN, JOSE ALEJANDRO Huffman, 2 g at 05/27/25 2110    hydrALAZINE (APRESOLINE) injection 5 mg, 5 mg, Intravenous, Q4H PRN, JOSE ALEJANDRO Huffman, 5 mg at 05/25/25 0131    insulin lispro (HumALOG/ADMELOG) 100 units/mL subcutaneous injection 2-12 Units, 2-12 Units, Subcutaneous, TID AC, 2 Units at 05/27/25 1744 **AND** Fingerstick Glucose (POCT), , , 4x Daily AC and at bedtime, JOSE ALEJANDRO Huffman    insulin lispro (HumALOG/ADMELOG) 100 units/mL subcutaneous injection 2-12 Units, 2-12 Units, Subcutaneous, HS, JOSE ALEJANDRO Huffman, 2 Units at 05/27/25 2108    labetalol (NORMODYNE) injection 10 mg, 10 mg, Intravenous, Q4H PRN, JOSE ALEJANDRO Huffman, 10 mg at 05/25/25 0011    levETIRAcetam (KEPPRA) tablet 500 mg, 500 mg, Oral, Q12H BRIDGET, JOSE ALEJANDRO Huffman, 500 mg at 05/28/25 0826    [Held by provider] losartan (COZAAR) tablet 100 mg, 100 mg, Oral, Daily, Adolfo Alfonso PA-C    ondansetron (ZOFRAN) injection 4 mg, 4 mg, Intravenous, Q6H PRN, JOSE ALEJANDRO Huffman    polyethylene glycol (MIRALAX) packet 17 g, 17 g, Oral, Daily, Consuelo Castrejon MD, 17 g at 05/28/25 0823    senna-docusate sodium (SENOKOT S) 8.6-50 mg per tablet 1 tablet, 1 tablet, Oral, HS, JOSE ALEJANDRO Huffman, 1 tablet at 05/27/25 2109  [4]   Past Medical History:  Diagnosis Date    Diabetes mellitus (HCC)     History of kidney stones 04/05/2025    Hypertension    [5]   Past Surgical History:  Procedure Laterality Date    KYLIE HOLE FOR SUBDURAL HEMATOMA Bilateral 5/25/2025    Procedure: KYLIE HOLES;  Surgeon: Issac Grady MD;  Location: [facility];  Service:  Neurosurgery    TOTAL HIP ARTHROPLASTY Bilateral

## 2025-05-28 NOTE — PROGRESS NOTES
Progress Note - Neurosurgery   Name: Deonte Greenfield 74 y.o. male I MRN: [id number]  Unit/Bed#: ICU 06 I Date of Admission: 5/24/2025   Date of Service: 5/28/2025 I Hospital Day: 4    Assessment & Plan  Subdural hematoma (HCC)  POD#3 bilateral KYLIE HOLES for SDH evac (Dr. Grady, 5/25/2025)  Reports a fall on April 4, 2025 with progressively worsening gait dysfunction; also with concern for slowed speech per family  Outpatient work up noted bilateral SDH for which he was advised to go to the ED for evaluation  No reported AC/AP med use     Imaging:  CTA head and neck w wo contrast 5/26/2025: Bilateral mixed density subdural collections are decreased in size status post. [place] hole drainage with subdural drain placement.     Plan:   Continue to closely monitor neuro exam   As discussed with Dr. Frank, he is not an MMA candidate given poor vessel quality and lack of right MMA, he is at high risk for complication from procedure and therefore will defer this  Drain care  Right posterior and left anterior drain removed 5/27  Left posterior drain with 9cc / 24 hours with minimal drainage when dropped --> plan to remove later today  Right anterior drain with 34cc / 24 hours with no drainage when dropped --> plan to remove later today  Repeat STAT CTH with any acute decline in GCS > 2pts or more in 1hr   Maintain normotensive BP goals, SBP < 160, MAP > 65   Continue keppra for seizure ppx x 1 week postop  Hold all AC/AP meds   No reported use at home   Correct any coagulopathy   Maintain goal INR < 1.4, plt > 100 - see above   DVT ppx: SCDs, consider dvt ppx tomorrow   Medical management per primary team   Pain control per primary team   PT/OT   Social work following for assistance with dispo once medically cleared     Neurosurgery will follow peripherally after drain removal.  Plan to see back in 2 weeks for incision check and in 6 weeks with MD, CT head prior to each appointment. Please reach out with any further  questions or concerns.   Thrombocytopenia (HCC)  Hx of mild thrombocytopenia - been fluctuating and present for the last 10 years  Following with [facility] - seen on 5/16/2025   Felt to be secondary to mild thrombopoietin deficiency secondary to his hepatic steatosis     Plan:   Maintain goal plt > 100   Pt received FFP per [facility] team   Currently 141      Subjective   Patient is sitting up in bed, NAD.  Drains with minimal to no output when dropped to the ground bilaterally.  RN at the bedside with some concerns for tachycardia earlier today although currently WNL.  Reports temp of 101.2 yesterday however has been WNL since then.  He offers no complaints and is eager to have drains removed today.    Objective :  Temp:  [98.6 °F (37 °C)-101.2 °F (38.4 °C)] 99.6 °F (37.6 °C)  HR:  [] 98  BP: ()/(47-78) 125/60  Resp:  [19-24] 24  SpO2:  [92 %-96 %] 93 %  O2 Device: None (Room air)    I/O         05/26 0701  05/27 0700 05/27 0701  05/28 0700 05/28 0701  05/29 0700    P.O. 270 360 240    I.V. (mL/kg) 530 500 (5)     IV Piggyback 50 150     Total Intake(mL/kg) 850 1010 (10) 240 (2.4)    Urine (mL/kg/hr) 1550 725 (0.3)     Drains 2 43     Stool       Total Output 1552 768     Net -702 +242 +240                   General appearance: alert, appears stated age, cooperative and no distress  Head: gauze over right incision, left incisions intact, drains in place with minimal to no output  Eyes: EOMI  Neck: supple, symmetrical, trachea midline and NT  Back: no kyphosis present, no tenderness to percussion or palpation  Lungs: non labored breathing  Heart: regular heart rate  Neurologic:   Mental status: Alert, oriented x3, follows commands, thought content appropriate  Cranial nerves: grossly intact (Cranial nerves II-XII)  Motor: moving all extremities with mild weakness in proximal left arm, stable  Coordination: finger to nose normal bilaterally, no drift bilaterally        Lab Results: I have reviewed the  following results:  Recent Labs     05/27/25  0602 05/28/25  0456   WBC  --  10.35*   HGB  --  10.4*   HCT  --  31.7*   PLT  --  141*   SODIUM  --  133*   K  --  3.9   CL  --  100   CO2  --  25   BUN  --  26*   CREATININE  --  0.86   GLUC  --  126   CAIONIZED 1.11*  --    MG  --  2.2   PHOS  --  3.0       Imaging Results Review: I personally reviewed the following image studies in PACS and associated radiology reports: CTA. My interpretation of the radiology images/reports is: as noted above.  Other Study Results Review: No additional pertinent studies reviewed.    VTE Pharmacologic Prophylaxis: VTE covered by:    None    and Sequential compression device (Venodyne)

## 2025-05-28 NOTE — ASSESSMENT & PLAN NOTE
Lab Results   Component Value Date    HGBA1C 6.2 (H) 05/24/2025       Recent Labs     05/27/25  1742 05/27/25  2107 05/28/25  0752 05/28/25  1205   POCGLU 166* 174* 136 140       Blood Sugar Average: Last 72 hrs:  (P) 142.6

## 2025-05-28 NOTE — ANESTHESIA POSTPROCEDURE EVALUATION
Post-Op Assessment Note    CV Status:  Stable    Pain management: adequate    Multimodal analgesia used between 6 hours prior to anesthesia start to PACU discharge    Mental Status:  Alert   PONV Controlled:  Controlled   Airway Patency:  Patent  Two or more mitigation strategies used for obstructive sleep apnea   Post Op Vitals Reviewed: Yes    No anethesia notable event occurred.    Staff: Anesthesiologist           Last Filed PACU Vitals:  Vitals Value Taken Time   Temp 97.6 °F (36.4 °C) 05/25/25 16:28   Pulse 71 05/25/25 17:06   /73 05/25/25 17:01   Resp 22 05/25/25 17:06   SpO2 98 % 05/25/25 17:05   Vitals shown include unfiled device data.    Modified Yeison:     Vitals Value Taken Time   Activity 2 05/25/25 16:28   Respiration 2 05/25/25 16:28   Circulation 2 05/25/25 16:28   Consciousness 2 05/25/25 16:28   Oxygen Saturation 1 05/25/25 16:28     Modified Yeison Score: 9

## 2025-05-28 NOTE — ASSESSMENT & PLAN NOTE
Clinical Frail Scale: 5- Mildly Frail  Lives with supportive family  PT/OT  50-60 pound weight loss  Rehab post hospitalization

## 2025-05-28 NOTE — DISCHARGE INSTR - OTHER ORDERS
Skin care plans:  1-Cleanse B/L Heels and Sacro-Buttocks with soap and water. Pat dry. Apply Silicone Border Foam (Mepilex) to areas. [place] with P for Prevention and change every 3 days or PRN soilage/displacement. Peel back and inspect Q-shift.  2-Elevate heels to offload pressure.  3-Ehob cushion in chair when out of bed.  4-Moisturize skin daily with skin nourishing cream.  5-Turn/reposition q2h for pressure re-distribution on skin.  6-Left Pretibial leg wound: cleanse left lower leg wound with soap and water, pat dry, apply xeroform to wound bed and cover with foam dressing. Change every other day.

## 2025-05-28 NOTE — PROGRESS NOTES
Patient report was called to the [facility] 8 Nursing unit. The patient will be transferred to Room 826 as a Medical-Surgical status patient. The patient's family is aware of the order to transfer.

## 2025-05-28 NOTE — ASSESSMENT & PLAN NOTE
Hx of mild thrombocytopenia - been fluctuating and present for the last 10 years  Following with [facility] - seen on 5/16/2025   Felt to be secondary to mild thrombopoietin deficiency secondary to his hepatic steatosis     Plan:   Maintain goal plt > 100   Pt received FFP per [facility] team   Currently 141

## 2025-05-28 NOTE — ASSESSMENT & PLAN NOTE
Originally presented 4/4 at [facility] after fall and was found to have small right posterior SAH. Scans on 4/5 and 4/6 showed resolution.   Outpt MRI ordered by neurology showed large bilateral high convexity SDH with mass effect.   Referred to hospital where CTH confirmed finding.  S/P lake holes and bilateral drain placement.   Continue PT/OT/SLP while on acute care.   Recommend acute inpatient rehabilitation once medically stable. He is able to tolerate 3 hours of therapy a day 5 to 6 days a week.

## 2025-05-28 NOTE — CASE MANAGEMENT
Case Management Discharge Planning Note    Patient name Deonte Attarian  Location ICU 06/ICU 06 MRN [id number]  : 1950 Date 2025       Current Admission Date: 2025  Current Admission Diagnosis:Subdural hematoma (HCC)   Patient Active Problem List    Diagnosis Date Noted    Subdural hematoma (HCC) 2025    Leg swelling 2025    Severe obesity (BMI 35.0-39.9) with comorbidity (HCC) 2025    Bradycardia, sinus 2025    Atrial septal aneurysm 2025    Normocytic anemia 2025    Syncope 2025    Thrombocytopenia (HCC) 2025    Abnormal echocardiogram 2025    Groin pain, right 2025    Fall 2025    Hypocitraturia 2024    Neuropathy 2019    Hyperuricemia 2016    Mixed hyperlipidemia 2016    Onychomycosis 2015    PVD (peripheral vascular disease) ([facility]) 10/07/2015    POPPY (obstructive sleep apnea) 10/07/2015    Diabetes mellitus ([facility]) 2015    S/P hip replacement 2014    Osteoarthritis 2008      LOS (days): 4  Geometric Mean LOS (GMLOS) (days): 6.5  Days to GMLOS:2.6     OBJECTIVE:  Risk of Unplanned Readmission Score: 16.99         Current admission status: Inpatient   Preferred Pharmacy:   [facility] - [place] - [facility]  [address]  [place] [address]  Phone: [phone] Fax: [phone]    [facility]/pharmacy #[id number] - [place] ALMAGUER - [facility] [address]  [address] [place] [id number]  Phone: [phone] Fax: [phone]    Primary Care Provider: Garcia Cartwright MD    Primary Insurance: ANTWON  REP  Secondary Insurance:     DISCHARGE DETAILS:    CM spoke to pt's spouse  Recommendation is IP rehab  Pt's wife is amenable to [facility], [facility], and [facility]

## 2025-05-28 NOTE — CONSULTS
Consultation - Geriatric Medicine   Name: Deonte Greenfield 74 y.o. male I MRN: [id number]  Unit/Bed#: ICU 06 I Date of Admission: 5/24/2025   Date of Service: 5/28/2025 I Hospital Day: 4   Inpatient consult to Gerontology  Consult performed by: JOSE ALEJANDRO Coombs  Consult ordered by: Adolfo Alfonso PA-C        Physician Requesting Evaluation: Cassidy Raza DO   Reason for Evaluation / Principal Problem: ISAR greater than 2    Assessment & Plan  Subdural hematoma (HCC)  S/p fall on 4/4/26  Seen on follow up MRI done as outpatient  Neurosurgery on consult  S/p lake hole with drain  5/27/25 drains removed  Follow up with neurosurgery as outpatient  Fall  Unwitnessed fall on 4/4/25  At risk for falls secondary to age, medications, gait instabiltiy  Fall precautions  PT/OT  Diabetes mellitus (HCC)  Lab Results   Component Value Date    HGBA1C 6.2 (H) 05/24/2025     Metformin decreased from 1000 mg po BID to 500 mg po BID on 5/14/25  Thrombocytopenia (HCC)  Follows with hemoonc as outpatient, last OV 5/16/25  Vitamin B 12 level 271 (5/24/24)  Weight loss  Pt reports an unintentional weight loss of 50-60 pounds over 6 months after several dental extractions  Ongoing evaluation  Maintain protein in diet  Albumin 4.0  Frailty  Clinical Frail Scale: 5- Mildly Frail  Lives with supportive family  PT/OT  50-60 pound weight loss  Rehab post hospitalization  Acute pain  Geriatric pain protocol  Scheduled acetaminophen 975 mg po Q8  Oxycodone 2.5 mg po Q 4 prn moderate pain  Oxycodone 5 mg po Q 4 prn severe pain   Monitor for constipation  Lidoderm patch   Insomnia  Reports last night was the first night he slept since he was in the hospital  Reports at home he sleeps in a recliner chair or the couch  States he does not use medications or sleep aids  First line is behavioral therapy  Avoid sedative hypnotics such as benzodiazepines and benadryl  Encourage staying awake during the day  Encourage daytime activity, morning  exercise  Decrease or eliminate day time naps  Avoid caffiene, alcohol especially during late afternoon and evening hours  Establish a night time routine  Recommend melatonin 3mg po QHS   At risk for delirium  Oriented x 4, no signs of delirium  Provide frequent redirection, reorientation, distraction techniques  Avoid deliriogenic medications such as tramadol, benzodiazepines, anticholinergics,  Benadryl  Treat pain, See geriatric pain protocol  Monitor for constipation - no bm this admission, miralax ordered  Monitor for urinary retention  Encourage early and frequent moblization, OOB  Encourage Hydration/ Nutrition  Implement sleep hygiene, limit night time interuptions, group activities    Cognitive screening  No reported memory loss  Word finding noted on exam  TSH 0.707 (5/28/25)  Vitamin B 12 level 271 (5/24/25) recommend supplementation goal level greater than 400  maintain neuro protective lifestyle :   Keep physically, mentally and socially active   Lower BMI   Increase physical exercise   Recommend Mediterranean/MIND diet   Monitor good control of blood pressure, blood sugar and cholestrerol                  History of Present Illness   Hx and PE limited by: NA  HPI: Deonte Greenfield is a 74 y.o. year old male who presents to [facility] with frequent falls. He had fall on 4/4/25. Pt was watching TV then found by son on his back on the floor. Pt was unaware of what happended. He was initially unresponsive and then confused when he awoke.. He was seen by neurology who ordered an MRI. Pt requested MRI be done as outpatient.  MRI was done which showed large bilateral SDH. He is admitted to [facility].    He has DM2, CKD2, HTN, HLD, gout, thrombocytopenia.    Prior to arrival he lives at home with his family. He reports there are 7 people in the house. . Family assists with IADLs, tranport and shopping. He is independent with ADLs. He has prior fall. He denies issues with memory. He states he  sleeps on the couch or a recliner chair at home.     He is lying in bed, oriented x 3.     Rounded with nursing     Review of Systems   Constitutional:  Negative for unexpected weight change.   HENT:  Positive for hearing loss.    Eyes:  Negative for visual disturbance.   Respiratory:  Positive for cough.    Cardiovascular:  Negative for chest pain.   Gastrointestinal:  Positive for constipation.   Genitourinary:  Negative for difficulty urinating.   Musculoskeletal:  Positive for gait problem.   Skin:  Negative for color change.   Neurological:  Positive for weakness.   Psychiatric/Behavioral:  Negative for sleep disturbance.            Historical Information   Past Medical History[1]  Past Surgical History[2]  Social History[3]  E-Cigarette/Vaping    E-Cigarette Use Never User      E-Cigarette/Vaping Substances     Family History[4]  Social History[5]    Current Facility-Administered Medications:     acetaminophen (TYLENOL) tablet 650 mg, Q6H PRN    allopurinol (ZYLOPRIM) tablet 300 mg, Daily    [Held by provider] atenolol (TENORMIN) tablet 50 mg, Daily **AND** [Held by provider] chlorthalidone tablet 25 mg, Daily    chlorhexidine (PERIDEX) 0.12 % oral rinse 15 mL, Q12H BRIDGET    Diclofenac Sodium (VOLTAREN) 1 % topical gel 2 g, 4x Daily PRN    [START ON 5/29/2025] enoxaparin (LOVENOX) subcutaneous injection 30 mg, Q12H BRIDGET    hydrALAZINE (APRESOLINE) injection 5 mg, Q4H PRN    insulin lispro (HumALOG/ADMELOG) 100 units/mL subcutaneous injection 2-12 Units, TID AC **AND** Fingerstick Glucose (POCT), 4x Daily AC and at bedtime    insulin lispro (HumALOG/ADMELOG) 100 units/mL subcutaneous injection 2-12 Units, HS    labetalol (NORMODYNE) injection 10 mg, Q4H PRN    levETIRAcetam (KEPPRA) tablet 500 mg, Q12H BRIDGET    [Held by provider] losartan (COZAAR) tablet 100 mg, Daily    ondansetron (ZOFRAN) injection 4 mg, Q6H PRN    polyethylene glycol (MIRALAX) packet 17 g, Daily    senna-docusate sodium (SENOKOT S) 8.6-50 mg per  tablet 1 tablet, HS  Patient has no known allergies.    Meds/Allergies   Home medication review  [facility]  Ferrous sulfate 325 mg po daily, last refill 4/16/25   Metformin 500 mg po BID, last refill 4/15/25 # 90 days  Valsartan 320 mg po daily, last refill 4/15/25 # 90 days  Allopurinol 300 mg po daily, last refill 2/7/25 # 90 days      [facility]  Atenolol-chlorthalidone 50-25 mg po daily, last refill 4/15/25 # 90 days  Lipitor 10 mg po daily, last refill 4/16/25 # 90 days    OTC  Metamucil TID          Objective :  Temp:  [98 °F (36.7 °C)-99.6 °F (37.6 °C)] 98 °F (36.7 °C)  HR:  [] 88  BP: ()/(47-78) 127/66  Resp:  [19-24] 20  SpO2:  [92 %-97 %] 97 %  O2 Device: None (Room air)    Physical Exam  Vitals and nursing note reviewed.   HENT:      Head:      Comments: Staples to head CDI     Nose: No congestion.      Mouth/Throat:      Mouth: Mucous membranes are moist.     Eyes:      General:         Right eye: No discharge.         Left eye: No discharge.       Cardiovascular:      Rate and Rhythm: Normal rate and regular rhythm.      Pulses: Normal pulses.   Pulmonary:      Effort: Pulmonary effort is normal.      Breath sounds: Normal breath sounds.   Abdominal:      General: There is no distension.     Musculoskeletal:         General: Normal range of motion.      Cervical back: Normal range of motion.     Skin:     General: Skin is warm.     Neurological:      Mental Status: He is alert. Mental status is at baseline.     Psychiatric:         Mood and Affect: Mood normal.           Lab Results: I have reviewed the following results:CBC/BMP:   .     05/28/25  0456   WBC 10.35*   HGB 10.4*   HCT 31.7*   *   SODIUM 133*   K 3.9      CO2 25   BUN 26*   CREATININE 0.86   GLUC 126   MG 2.2   PHOS 3.0        Imaging Results Review: I reviewed radiology reports from this admission including: MRI brain.  Other Study Results Review: EKG was reviewed.     Therapies:   Basic Mobility Inpatient Raw Score: 11  JH-M  Goal: 4: Move to chair/commode  [facility] Achieved: 2: Bed activities/Dependent transfer    VTE Prophylaxis: Sequential compression device (Venodyne)     Code Status: Level 1 - Full Code      Family and Social Support:   No data recorded    Goals of Care: full code    I have spent a total time of 75 minutes in caring for this patient on the day of the visit/encounter including Diagnostic results, Prognosis, Risks and benefits of tx options, Instructions for management, Patient and family education, Importance of tx compliance, Risk factor reductions, Impressions, Counseling / Coordination of care, Documenting in the medical record, Reviewing/placing orders in the medical record (including tests, medications, and/or procedures), Obtaining or reviewing history  , and Communicating with other healthcare professionals .          [1]   Past Medical History:  Diagnosis Date    Diabetes mellitus (HCC)     History of kidney stones 04/05/2025    Hypertension    [2]   Past Surgical History:  Procedure Laterality Date    KYLIE HOLE FOR SUBDURAL HEMATOMA Bilateral 5/25/2025    Procedure: KYLIE HOLES;  Surgeon: Issac Grady MD;  Location: [facility];  Service: Neurosurgery    TOTAL HIP ARTHROPLASTY Bilateral    [3]   Social History  Tobacco Use    Smoking status: Former     Types: Cigarettes    Smokeless tobacco: Never   Vaping Use    Vaping status: Never Used   Substance and Sexual Activity    Alcohol use: Not Currently    Drug use: Never   [4] No family history on file.  [5]   Social History  Tobacco Use    Smoking status: Former     Types: Cigarettes    Smokeless tobacco: Never   Vaping Use    Vaping status: Never Used   Substance and Sexual Activity    Alcohol use: Not Currently    Drug use: Never

## 2025-05-28 NOTE — PLAN OF CARE
Problem: INFECTION - ADULT  Goal: Absence or prevention of progression during hospitalization  Description: INTERVENTIONS:  - Assess and monitor for signs and symptoms of infection  - Monitor lab/diagnostic results  - Monitor all insertion sites, i.e. indwelling lines, tubes, and drains  - Monitor endotracheal if appropriate and nasal secretions for changes in amount and color  - [place] appropriate cooling/warming therapies per order  - Administer medications as ordered  - Instruct and encourage patient and family to use good hand hygiene technique  - Identify and instruct in appropriate isolation precautions for identified infection/condition  Outcome: Progressing     Problem: SAFETY ADULT  Goal: Patient will remain free of falls  Description: INTERVENTIONS:  - Educate patient/family on patient safety including physical limitations  - Instruct patient to call for assistance with activity   - Consider consulting OT/PT to assist with strengthening/mobility based on AM PAC & JH-HLM score  - Consult OT/PT to assist with strengthening/mobility   - Keep Call bell within reach  - Keep bed low and locked with side rails adjusted as appropriate  - Keep care items and personal belongings within reach  - Initiate and maintain comfort rounds  - Make Fall Risk Sign visible to staff  - Offer Toileting every 2 Hours, in advance of need  - Initiate/Maintain bed alarm  - Obtain necessary fall risk management equipment:   Problem: Knowledge Deficit  Goal: Patient/family/caregiver demonstrates understanding of disease process, treatment plan, medications, and discharge instructions  Description: Complete learning assessment and assess knowledge base.  Interventions:  - Provide teaching at level of understanding  - Provide teaching via preferred learning methods  Outcome: Progressing     - Apply yellow socks and bracelet for high fall risk patients  - Consider moving patient to room near nurses station  Outcome: Progressing

## 2025-05-28 NOTE — ASSESSMENT & PLAN NOTE
POD#3 bilateral KYLIE HOLES for SDH evac (Dr. Grady, 5/25/2025)  Reports a fall on April 4, 2025 with progressively worsening gait dysfunction; also with concern for slowed speech per family  Outpatient work up noted bilateral SDH for which he was advised to go to the ED for evaluation  No reported AC/AP med use     Imaging:  CTA head and neck w wo contrast 5/26/2025: Bilateral mixed density subdural collections are decreased in size status post. [place] hole drainage with subdural drain placement.     Plan:   Continue to closely monitor neuro exam   As discussed with Dr. Frank, he is not an MMA candidate given poor vessel quality and lack of right MMA, he is at high risk for complication from procedure and therefore will defer this  Drain care  Right posterior and left anterior drain removed 5/27  Left posterior drain with 9cc / 24 hours with minimal drainage when dropped --> plan to remove later today  Right anterior drain with 34cc / 24 hours with no drainage when dropped --> plan to remove later today  Repeat STAT CTH with any acute decline in GCS > 2pts or more in 1hr   Maintain normotensive BP goals, SBP < 160, MAP > 65   Continue keppra for seizure ppx x 1 week postop  Hold all AC/AP meds   No reported use at home   Correct any coagulopathy   Maintain goal INR < 1.4, plt > 100 - see above   DVT ppx: SCDs, consider dvt ppx tomorrow   Medical management per primary team   Pain control per primary team   PT/OT   Social work following for assistance with dispo once medically cleared     Neurosurgery will follow peripherally after drain removal.  Plan to see back in 2 weeks for incision check and in 6 weeks with MD, CT head prior to each appointment. Please reach out with any further questions or concerns.

## 2025-05-28 NOTE — ASSESSMENT & PLAN NOTE
Reports last night was the first night he slept since he was in the hospital  Reports at home he sleeps in a recliner chair or the couch  States he does not use medications or sleep aids  First line is behavioral therapy  Avoid sedative hypnotics such as benzodiazepines and benadryl  Encourage staying awake during the day  Encourage daytime activity, morning exercise  Decrease or eliminate day time naps  Avoid caffiene, alcohol especially during late afternoon and evening hours  Establish a night time routine  Recommend melatonin 3mg po QHS

## 2025-05-28 NOTE — ASSESSMENT & PLAN NOTE
Unwitnessed fall on 4/4/25  At risk for falls secondary to age, medications, gait instabiltiy  Fall precautions  PT/OT

## 2025-05-28 NOTE — WOUND OSTOMY CARE
Consult Note - Wound   Deonte Attarian 74 y.o. male MRN: [id number]  Unit/Bed#: ICU 06 Encounter: [id number]        History and Present Illness:  Patient is a 74 yo male that was admitted to [facility] for treatment of subdural hematoma. Patient has a PMH of DM, CKD2, HTN, HLD, thrombocytopenia, gout, b/l total hip arthroplasty. Patient is alert and oriented x4 and agreeable to assessment. Patient was seen laying on critical care low air loss mattress and assist of 1 for turning and repositioning. Patient is continent of bowel and male external urinary catheter in place for urine management. Independent with meals.     Wound Care was consulted for skin tear to left lower extremity.     Assessment Findings:   B/L heels are dry intact and latisha with no skin loss or wounds present. Recommend preventative Hydraguard Cream and proper offloading/ repositioning.      B/L sacro-buttocks is dry, intact, pink in color and blanches. No skin loss or wounds present. Recommend preventative hydraguard to area.     Traumatic Skin Tear Left Lateral Pretibial Leg- Circular in shape partial thickness skin loss with pink fragile tissue. Scant amount of serous drainage. Per-wound is dry and intact with hyperpigmentation.     New dressings applied per orders listed below. Patient tolerated well- no s/s of non-verbal pain or discomfort observed during the encounter. Bedside nurse aware of plan of care. See flow sheets for more detailed assessment findings.      Orders listed below and wound care will continue to follow, call or Secure Chat with questions.     Skin care plans:  1-Cleanse B/L Heels and Sacro-Buttocks with soap and water. Pat dry. Apply Silicone Border Foam (Mepilex) to areas. [place]aston with P for Prevention and change every 3 days or PRN soilage/displacement. Peel back and inspect Q-shift.  2-Elevate heels to offload pressure.  3-Ehob cushion in chair when out of bed.  4-Moisturize skin daily with skin nourishing  cream.  5-Turn/reposition q2h for pressure re-distribution on skin.  6-Left Pretibial leg wound: cleanse left lower leg wound with soap and water, pat dry, apply xeroform to wound bed and cover with foam dressing. Change every other day.     Wounds:  Wound 05/24/25 Traumatic Skin Tear Pretibial Left;Lateral (Active)   Wound Image   05/28/25 1017   Wound Description Fragile;Pink 05/28/25 1017   Non-staged Wound Description Partial thickness 05/28/25 1017   Wound Length (cm) 1.5 cm 05/28/25 1017   Wound Width (cm) 1.5 cm 05/28/25 1017   Wound Depth (cm) 0.1 cm 05/28/25 1017   Wound Surface Area (cm^2) 1.77 cm^2 05/28/25 1017   Wound Volume (cm^3) 0.118 cm^3 05/28/25 1017   Calculated Wound Volume (cm^3) 0.23 cm^3 05/28/25 1017   Drainage Amount Scant 05/28/25 1017   Drainage Description Serous 05/28/25 1017   Antonia-wound Assessment Dry;Intact;Hyperpigmented 05/28/25 1017   Treatments Cleansed;Site care 05/28/25 1017   Wound Site Closure None 05/28/25 1017   Dressing Xeroform;Foam 05/28/25 1017   Wound packed? No 05/28/25 1017   Dressing Changed New 05/28/25 1017   Patient Tolerance Tolerated well 05/28/25 1017   Dressing Status Clean;Dry;Intact 05/28/25 1017               Blanca Eason RN, BSN  Patient assessed with Heather Crawford RN, BSN, CWOCN

## 2025-05-28 NOTE — PLAN OF CARE
Problem: PAIN - ADULT  Goal: Verbalizes/displays adequate comfort level or baseline comfort level  Description: Interventions:  - Encourage patient to monitor pain and request assistance  - Assess pain using appropriate pain scale  - Administer analgesics as ordered based on type and severity of pain and evaluate response  - Implement non-pharmacological measures as appropriate and evaluate response  - Consider cultural and social influences on pain and pain management  - Notify physician/advanced practitioner if interventions unsuccessful or patient reports new pain  - Educate patient/family on pain management process including their role and importance of  reporting pain   - Provide non-pharmacologic/complimentary pain relief interventions  Outcome: Progressing     Problem: INFECTION - ADULT  Goal: Absence or prevention of progression during hospitalization  Description: INTERVENTIONS:  - Assess and monitor for signs and symptoms of infection  - Monitor lab/diagnostic results  - Monitor all insertion sites, i.e. indwelling lines, tubes, and drains  - Monitor endotracheal if appropriate and nasal secretions for changes in amount and color  - [place] appropriate cooling/warming therapies per order  - Administer medications as ordered  - Instruct and encourage patient and family to use good hand hygiene technique  - Identify and instruct in appropriate isolation precautions for identified infection/condition  Outcome: Progressing  Goal: Absence of fever/infection during neutropenic period  Description: INTERVENTIONS:  - Monitor WBC  - Perform strict hand hygiene  - Limit to healthy visitors only  - No plants, dried, fresh or silk flowers with zelaya in patient room  Outcome: Progressing     Problem: SAFETY ADULT  Goal: Patient will remain free of falls  Description: INTERVENTIONS:  - Educate patient/family on patient safety including physical limitations  - Instruct patient to call for assistance with activity   -  Consider consulting OT/PT to assist with strengthening/mobility based on AM PAC & JH-HLM score  - Consult OT/PT to assist with strengthening/mobility   - Keep Call bell within reach  - Keep bed low and locked with side rails adjusted as appropriate  - Keep care items and personal belongings within reach  - Initiate and maintain comfort rounds  - Make Fall Risk Sign visible to staff  - Offer Toileting every 2 Hours, in advance of need  - Initiate/Maintain Bed/Chair alarm  - Obtain necessary fall risk management equipment  - Apply yellow socks and bracelet for high fall risk patients  - Consider moving patient to room near nurses station  Outcome: Progressing  Goal: Maintain or return to baseline ADL function  Description: INTERVENTIONS:  -  Assess patient's ability to carry out ADLs; assess patient's baseline for ADL function and identify physical deficits which impact ability to perform ADLs (bathing, care of mouth/teeth, toileting, grooming, dressing, etc.)  - Assess/evaluate cause of self-care deficits   - Assess range of motion  - Assess patient's mobility; develop plan if impaired  - Assess patient's need for assistive devices and provide as appropriate  - Encourage maximum independence but intervene and supervise when necessary  - Involve family in performance of ADLs  - Assess for home care needs following discharge   - Consider OT consult to assist with ADL evaluation and planning for discharge  - Provide patient education as appropriate  - Monitor functional capacity and physical performance, use of AM PAC & JH-HLM   - Monitor gait, balance and fatigue with ambulation    Outcome: Progressing  Goal: Maintains/Returns to pre admission functional level  Description: INTERVENTIONS:  - Perform AM-PAC 6 Click Basic Mobility/ Daily Activity assessment daily.  - Set and communicate daily mobility goal to care team and patient/family/caregiver.   - Collaborate with rehabilitation services on mobility goals if  consulted  - Perform Range of Motion 10 times a day.  - Reposition patient every 2 hours.  - Dangle patient 2 times a day  - Stand patient 2 times a day  - Out of bed to chair 2 times a day   - Out of bed for meals 2 times a day  - Out of bed for toileting  - Record patient progress and toleration of activity level   Outcome: Progressing     Problem: DISCHARGE PLANNING  Goal: Discharge to home or other facility with appropriate resources  Description: INTERVENTIONS:  - Identify barriers to discharge w/patient and caregiver  - Arrange for needed discharge resources and transportation as appropriate  - Identify discharge learning needs (meds, wound care, etc.)  - Arrange for interpretive services to assist at discharge as needed  - Refer to Case Management Department for coordinating discharge planning if the patient needs post-hospital services based on physician/advanced practitioner order or complex needs related to functional status, cognitive ability, or social support system  Outcome: Progressing     Problem: Knowledge Deficit  Goal: Patient/family/caregiver demonstrates understanding of disease process, treatment plan, medications, and discharge instructions  Description: Complete learning assessment and assess knowledge base.  Interventions:  - Provide teaching at level of understanding  - Provide teaching via preferred learning methods  Outcome: Progressing     Problem: Prexisting or High Potential for Compromised Skin Integrity  Goal: Skin integrity is maintained or improved  Description: INTERVENTIONS:  - Identify patients at risk for skin breakdown  - Assess and monitor skin integrity including under and around medical devices   - Assess and monitor nutrition and hydration status  - Monitor labs  - Assess for incontinence   - Turn and reposition patient  - Assist with mobility/ambulation  - Relieve pressure over verónica prominences   - Avoid friction and shearing  - Provide appropriate hygiene as needed  including keeping skin clean and dry  - Evaluate need for skin moisturizer/barrier cream  - Collaborate with interdisciplinary team  - Patient/family teaching  - Consider wound care consult    Assess:  - Review Azid scale daily  - Clean and moisturize skin every shift   - Inspect skin when repositioning, toileting, and assisting with ADLS  - Assess under medical devices every shift   - Assess extremities for adequate circulation and sensation     Bed Management:  - Have minimal linens on bed & keep smooth, unwrinkled  - Change linens as needed when moist or perspiring  - Avoid sitting or lying in one position for more than 2 hours while in bed  Keep HOB at 30 degrees   - Toileting:  - Offer bedside commode  - Assess for incontinence every 2 hours  - Use incontinent care products after each incontinent episode     Activity:  - Mobilize patient 2 times a day  - Encourage activity and walks on unit  - Encourage or provide ROM exercises   - Turn and reposition patient every 2 Hours  - Use appropriate equipment to lift or move patient in bed  - Instruct/ Assist with weight shifting every 30 minutes when out of bed in chair  - Consider limitation of chair time 3 hour intervals    Skin Care:  - Avoid use of baby powder, tape, friction and shearing, hot water or constrictive clothing  - Relieve pressure over bony prominences   - Do not massage red bony areas    Next Steps:  - Teach patient strategies to minimize risks   - Consider consults to  interdisciplinary teams   Outcome: Progressing     Problem: Nutrition/Hydration-ADULT  Goal: Nutrient/Hydration intake appropriate for improving, restoring or maintaining nutritional needs  Description: Monitor and assess patient's nutrition/hydration status for malnutrition. Collaborate with interdisciplinary team and initiate plan and interventions as ordered.  Monitor patient's weight and dietary intake as ordered or per policy. Utilize nutrition screening tool and intervene as  necessary. Determine patient's food preferences and provide high-protein, high-caloric foods as appropriate.     INTERVENTIONS:  - Monitor oral intake, urinary output, labs, and treatment plans  - Assess nutrition and hydration status and recommend course of action  - Evaluate amount of meals eaten  - Assist patient with eating if necessary   - Allow adequate time for meals  - Recommend/ encourage appropriate diets, oral nutritional supplements, and vitamin/mineral supplements  - Order, calculate, and assess calorie counts as needed  - Recommend, monitor, and adjust tube feedings and TPN/PPN based on assessed needs  - Assess need for intravenous fluids  - Provide specific nutrition/hydration education as appropriate  - Include patient/family/caregiver in decisions related to nutrition  Outcome: Progressing     Problem: NEUROSENSORY - ADULT  Goal: Achieves stable or improved neurological status  Description: INTERVENTIONS  - Monitor and report changes in neurological status  - Monitor vital signs such as temperature, blood pressure, glucose, and any other labs ordered   - Initiate measures to prevent increased intracranial pressure  - Monitor for seizure activity and implement precautions if appropriate      Outcome: Progressing  Goal: Achieves maximal functionality and self care  Description: INTERVENTIONS  - Monitor swallowing and airway patency with patient fatigue and changes in neurological status  - Encourage and assist patient to increase activity and self care.   - Encourage visually impaired, hearing impaired and aphasic patients to use assistive/communication devices  Outcome: Progressing     Problem: CARDIOVASCULAR - ADULT  Goal: Maintains optimal cardiac output and hemodynamic stability  Description: INTERVENTIONS:  - Monitor I/O, vital signs and rhythm  - Monitor for S/S and trends of decreased cardiac output  - Administer and titrate ordered vasoactive medications to optimize hemodynamic stability  -  Assess quality of pulses, skin color and temperature  - Assess for signs of decreased coronary artery perfusion  - Instruct patient to report change in severity of symptoms  Outcome: Progressing  Goal: Absence of cardiac dysrhythmias or at baseline rhythm  Description: INTERVENTIONS:  - Continuous cardiac monitoring, vital signs, obtain 12 lead EKG if ordered  - Administer antiarrhythmic and heart rate control medications as ordered  - Monitor electrolytes and administer replacement therapy as ordered  Outcome: Progressing     Problem: RESPIRATORY - ADULT  Goal: Achieves optimal ventilation and oxygenation  Description: INTERVENTIONS:  - Assess for changes in respiratory status  - Assess for changes in mentation and behavior  - Position to facilitate oxygenation and minimize respiratory effort  - Oxygen administered by appropriate delivery if ordered  - Initiate smoking cessation education as indicated  - Encourage broncho-pulmonary hygiene including cough, deep breathe, Incentive Spirometry  - Assess the need for suctioning and aspirate as needed  - Assess and instruct to report SOB or any respiratory difficulty  - Respiratory Therapy support as indicated  Outcome: Progressing     Problem: GASTROINTESTINAL - ADULT  Goal: Minimal or absence of nausea and/or vomiting  Description: INTERVENTIONS:  - Administer IV fluids if ordered to ensure adequate hydration  - Maintain NPO status until nausea and vomiting are resolved  - Nasogastric tube if ordered  - Administer ordered antiemetic medications as needed  - Provide nonpharmacologic comfort measures as appropriate  - Advance diet as tolerated, if ordered  - Consider nutrition services referral to assist patient with adequate nutrition and appropriate food choices  Outcome: Progressing  Goal: Maintains or returns to baseline bowel function  Description: INTERVENTIONS:  - Assess bowel function  - Encourage oral fluids to ensure adequate hydration  - Administer IV fluids  if ordered to ensure adequate hydration  - Administer ordered medications as needed  - Encourage mobilization and activity  - Consider nutritional services referral to assist patient with adequate nutrition and appropriate food choices  Outcome: Progressing  Goal: Maintains adequate nutritional intake  Description: INTERVENTIONS:  - Monitor percentage of each meal consumed  - Identify factors contributing to decreased intake, treat as appropriate  - Assist with meals as needed  - Monitor I&O, weight, and lab values if indicated  - Obtain nutrition services referral as needed  Outcome: Progressing     Problem: GENITOURINARY - ADULT  Goal: Maintains or returns to baseline urinary function  Description: INTERVENTIONS:  - Assess urinary function  - Encourage oral fluids to ensure adequate hydration if ordered  - Administer IV fluids as ordered to ensure adequate hydration  - Administer ordered medications as needed  - Offer frequent toileting  - Follow urinary retention protocol if ordered  Outcome: Progressing  Goal: Absence of urinary retention  Description: INTERVENTIONS:  - Assess patient’s ability to void and empty bladder  - Monitor I/O  - Bladder scan as needed  - Discuss with physician/AP medications to alleviate retention as needed  - Discuss catheterization for long term situations as appropriate  Outcome: Progressing     Problem: METABOLIC, FLUID AND ELECTROLYTES - ADULT  Goal: Electrolytes maintained within normal limits  Description: INTERVENTIONS:  - Monitor labs and assess patient for signs and symptoms of electrolyte imbalances  - Administer electrolyte replacement as ordered  - Monitor response to electrolyte replacements, including repeat lab results as appropriate  - Instruct patient on fluid and nutrition as appropriate  Outcome: Progressing  Goal: Fluid balance maintained  Description: INTERVENTIONS:  - Monitor labs   - Monitor I/O and WT  - Instruct patient on fluid and nutrition as appropriate  -  Assess for signs & symptoms of volume excess or deficit  Outcome: Progressing  Goal: Glucose maintained within target range  Description: INTERVENTIONS:  - Monitor Blood Glucose as ordered  - Assess for signs and symptoms of hyperglycemia and hypoglycemia  - Administer ordered medications to maintain glucose within target range  - Assess nutritional intake and initiate nutrition service referral as needed  Outcome: Progressing     Problem: SKIN/TISSUE INTEGRITY - ADULT  Goal: Skin Integrity remains intact(Skin Breakdown Prevention)  Description: Assess:  -Perform Zaid assessment every shift  -Clean and moisturize skin every shift  -Inspect skin when repositioning, toileting, and assisting with ADLS  -Assess under medical devices every shift  -Assess extremities for adequate circulation and sensation     Bed Management:  -Have minimal linens on bed & keep smooth, unwrinkled  -Change linens as needed when moist or perspiring  -Avoid sitting or lying in one position for more than 2 hours while in bed  -Keep HOB at 30 degrees     Toileting:  -Offer bedside commode  -Assess for incontinence every 2 hours  -Use incontinent care products after each incontinent episode     Activity:  -Mobilize patient 2 times a day  -Encourage or provide ROM exercises   -Turn and reposition patient every 2 Hours  -Use appropriate equipment to lift or move patient in bed  -Instruct/ Assist with weight shifting every 30 minutes when out of bed in chair  -Consider limitation of chair time 3 hour intervals    Skin Care:  -Avoid use of baby powder, tape, friction and shearing, hot water or constrictive clothing  -Relieve pressure over bony prominences   -Do not massage red bony areas    Next Steps:  -Teach patient strategies to minimize risks    -Consider consults to  interdisciplinary teams   Outcome: Progressing  Goal: Incision(s), wounds(s) or drain site(s) healing without S/S of infection  Description: INTERVENTIONS  - Assess and document  dressing, incision, wound bed, drain sites and surrounding tissue  - Provide patient and family education  - Perform skin care/dressing changes every shift and/or PRN  Outcome: Progressing     Problem: HEMATOLOGIC - ADULT  Goal: Maintains hematologic stability  Description: INTERVENTIONS  - Assess for signs and symptoms of bleeding or hemorrhage  - Monitor labs  - Administer supportive blood products/factors as ordered and appropriate  Outcome: Progressing     Problem: MUSCULOSKELETAL - ADULT  Goal: Maintain or return mobility to safest level of function  Description: INTERVENTIONS:  - Assess patient's ability to carry out ADLs; assess patient's baseline for ADL function and identify physical deficits which impact ability to perform ADLs (bathing, care of mouth/teeth, toileting, grooming, dressing, etc.)  - Assess/evaluate cause of self-care deficits   - Assess range of motion  - Assess patient's mobility  - Assess patient's need for assistive devices and provide as appropriate  - Encourage maximum independence but intervene and supervise when necessary  - Involve family in performance of ADLs  - Assess for home care needs following discharge   - Consider OT consult to assist with ADL evaluation and planning for discharge  - Provide patient education as appropriate  Outcome: Progressing     Problem: Potential for Falls  Goal: Patient will remain free of falls  Description: INTERVENTIONS:  - Educate patient/family on patient safety including physical limitations  - Instruct patient to call for assistance with activity   - Consider consulting OT/PT to assist with strengthening/mobility based on AM PAC & JH-HLM score  - Consult OT/PT to assist with strengthening/mobility   - Keep Call bell within reach  - Keep bed low and locked with side rails adjusted as appropriate  - Keep care items and personal belongings within reach  - Initiate and maintain comfort rounds  - Make Fall Risk Sign visible to staff  - Offer  Toileting every 2 Hours, in advance of need  - Initiate/Maintain Bed/Chair alarm  - Obtain necessary fall risk management equipment  - Apply yellow socks and bracelet for high fall risk patients  - Consider moving patient to room near nurses station  Outcome: Progressing     Problem: COPING  Goal: Pt/Family able to verbalize concerns and demonstrate effective coping strategies  Description: INTERVENTIONS:  - Assist patient/family to identify coping skills, available support systems and cultural and spiritual values  - Provide emotional support, including active listening and acknowledgement of concerns of patient and caregivers  - Reduce environmental stimuli, as able  - Provide patient education  - Assess for spiritual pain/suffering and initiate spiritual care, including notification of Pastoral Care or pawan based community as needed  - Assess effectiveness of coping strategies  Outcome: Progressing  Goal: Will report anxiety at manageable levels  Description: INTERVENTIONS:  - Administer medication as ordered  - Teach and encourage coping skills  - Provide emotional support  - Assess patient/family for anxiety and ability to cope  Outcome: Progressing

## 2025-05-28 NOTE — DISCHARGE INSTR - AVS FIRST PAGE
Discharge Instructions  [place] holes for evacuation of subdural hematoma    Surgical incision care:  Keep dressings in place for 3 days.  After 3 days, incisions may be left open to air, but should remain clean.  Keep incisions dry for 3 days.  Please shower every other day starting on day 3 from surgery.  Use a baby shampoo on body AND head incision.   Rinse off shampoo and pat dry with a clean towel every other day.    Wipe one TAISHA wipe over incision daily; if you showered that day, use wipe after shower   Avoid rubbing the incision but gently massage hair.   Do not immerse the incisions in water for 6 weeks.  Staples/suture will be removed at your 2 week postoperative visit.   Do not apply any creams or ointments to the incision, unless otherwise instructed by [facility].  Do not use a hair dryer, and avoid hair products such as mousse, oils, and gels as well as dyes / perms. Do not brush your hair away from the incision since this will put strain on the suture line.  Monitor incision daily. Contact office if increasing redness, drainage, pain or swelling occurs around the incisions or if you develop a fever greater than 101F    Activity:  Do not lift, push or pull more than 10 pounds for 2 weeks.  Avoid bending, lifting and twisting for 2 weeks. No running. No athletic activities until cleared.   No driving for at least 2 weeks or until cleared by Neurosurgery.   Continue to change bed linens and pajamas more frequently. Wear clean clothes daily.   May walk as tolerated. Recommend 4 short walks daily.     Postoperative medication:  [facility] will provide pain medication in the postoperative period. All prescriptions must come from a single practice.   Take medications as prescribed.  Call office with any questions/concerns.  May use over the counter Tylenol. No NSAIDs (ie. Ibuprofen, Aleve, Advil, Naproxen)  Please contact office for questions regarding dosage  and modifications.  No antiplatelet or anticoagulation medication (ie. Coumadin, Aspirin, Plavix) until cleared by . [facility], unless otherwise instructed. Please contact [facility] if you have any questions about the effects of any of your medications on blood clotting.  Do not operate heavy machinery or vehicles while taking sedating medications.  Use a bowel regimen while on opioids as they induce constipation. Ie. Senokot-S, Miralax, Colace, etc. Increase fiber and water intake.       Follow-up for a 2 week incision check and staple removal as scheduled.    Follow-up for a 6 week post-operative visit with a repeat CT head to be completed prior to visit.     ** Please notify the office if incision becomes red, swollen, tender, or has increased drainage, and temp>[id number].  Return to the ER if you experience increased headache, drowsiness, weakness, nausea/vomiting, or seizures. **

## 2025-05-28 NOTE — ASSESSMENT & PLAN NOTE
- Neuro exam: GCS 15, non-focal  - Continue neurologic checks: Every 4 hours  - Appreciate Neurosurgery evaluation -signed off at this time  - Complete 7 day course of Keppra for seizure prophylaxis  - Chemical DVT prophylaxis: Lovenox  - Hold all anticoagulants and anti platelet medications for 2 weeks and/or until cleared by Neurosurgery to resume.  - PT and OT (including cognitive evaluation) evaluation and treatment as indicated.

## 2025-05-28 NOTE — ED PROVIDER NOTES
Time reflects when diagnosis was documented in both MDM as applicable and the Disposition within this note       Time User Action Codes Description Comment    5/24/2025  1:44 PM Corrine Huggins Add [S06.5XAA] Subdural hematoma (HCC)     5/24/2025  1:46 PM Corrine Huggins Add [W19.XXXA] Fall, initial encounter     5/24/2025  2:47 PM Corrine Huggins Modify [S06.5XAA] Subdural hematoma (HCC)           ED Disposition       ED Disposition   Transfer to Another Facility-In Network    Condition   --    Date/Time   Sat May 24, 2025  2:47 PM    Comment   Deonte Attarian should be transferred out to [facility].               Assessment & Plan       Medical Decision Making  Patient presents with:  Evaluation of Abnormal Diagnostic Test: Patient to ED after having an MRI this morning of the head and was informed that there was acute bleeding and to come immediately to the ED for admission. Patient denies any symptoms  DDx includes ICH, CVA, cerebral edema, increased ICP or other unknown process  Workup per ED course: Patient able to transfer with assistance.  Denies new or worsening symptoms.  Nonfocal exam with no motor or sensory deficit patient discussed with trauma who accepted admission pending final MRI read.   Patient care transition to Dr. Vela pending MRI final radiology read.    Amount and/or Complexity of Data Reviewed  Labs: ordered. Decision-making details documented in ED Course.  Radiology: ordered.        ED Course as of 05/28/25 0906   Sat May 24, 2025   1145 Presents from outpatient MRI radiology per technician who noted interval change on surveillance MRI brain following admission 4/4/25 after unwitnessed fall.  Presents with son who notes mild ambulatory difficulty requiring assistive device since fall however denies new or worsening symptoms since incident.  On initial assessment GCS 15, cranial nerves intact, moves all extremities spontaneously, SILT distally.     Will investigate surveillance MRI  read with final disposition pending interval change    Tentative plan blood work, coags will consider additional imaging and subspecialty consult pending MRI report   1313 CBC and differential(!)  No leukocytosis, hemoglobin stable   1313 Comprehensive metabolic panel(!)  No electrolyte derangement, anion gap, Transaminitis, BALDEMAR         Medications - No data to display    ED Risk Strat Scores                    No data recorded                            History of Present Illness       Chief Complaint   Patient presents with    Evaluation of Abnormal Diagnostic Test     Patient to ED after having an MRI this morning of the head and was informed that there was acute bleeding and to come immediately to the ED for admission. Patient denies any symptoms         Past Medical History[1]   Past Surgical History[2]   Family History[3]   Social History[4]   E-Cigarette/Vaping    E-Cigarette Use Never User       E-Cigarette/Vaping Substances      I have reviewed and agree with the history as documented.     Presents from outpatient MRI radiology per technician who noted interval change on surveillance MRI brain following admission 4/4/25 after unwitnessed fall.  Presents with son who notes mild ambulatory difficulty requiring assistive device since fall however denies new or worsening symptoms since incident.  On initial assessment GCS 15, cranial nerves intact, moves all extremities spontaneously, SILT distally.         Evaluation of Abnormal Diagnostic Test      Review of Systems        Objective       ED Triage Vitals   Temperature Pulse Blood Pressure Respirations SpO2 Patient Position - Orthostatic VS   05/24/25 1147 05/24/25 1136 05/24/25 1136 05/24/25 1136 05/24/25 1136 05/24/25 1136   97.6 °F (36.4 °C) 74 151/71 18 95 % Lying      Temp Source Heart Rate Source BP Location FiO2 (%) Pain Score    05/24/25 1147 05/24/25 1136 05/24/25 1136 -- 05/24/25 1136    Oral Monitor Right arm  3      Vitals      Date and Time Temp  Pulse SpO2 Resp BP Pain Score FACES Pain Rating User   05/24/25 1500 -- 70 96 % 18 186/87 3 --    05/24/25 1423 -- 70 95 % -- 176/78 -- --    05/24/25 1315 -- 66 97 % 18 150/66 -- --    05/24/25 1147 97.6 °F (36.4 °C) -- -- -- -- -- --    05/24/25 1136 -- 74 95 % 18 151/71 3 -- AP            Physical Exam    General appearance: resting comfortably, no acute distress    HENT: Normocephalic, atraumatic, hearing grossly intact, and mucous membranes moist   Eyes: Conjunctiva normal,  Lungs/Chest: Respirations even and unlabored, breath sounds normal  Cardiovascular: Normal rate, regular rhythm  Abdomen: soft, non-distended, non-tender  Musculoskeletal: extremities normal, atraumatic, no cyanosis or edema\; ambulates with walker, transfers with assistance  Skin: warm and dry   Neurologic: Awake and alert, no apparent focal deficit  Face symmetric, PERRL. EOM Intact  Speaks spontaneously, actively following commands.   Full strength, moving all extremities equally. Sensation intact to light touch B/L UE and LE.  Psych: Mood consistent with affect     Results Reviewed       Procedure Component Value Units Date/Time    TEG 6s Platelet Mapping [[id number]]  (Abnormal) Collected: 05/24/25 1411    Lab Status: Final result Specimen: Blood from Arm, Right Updated: 05/24/25 1616     HKHMA(MAX AMPLITUDE KAOLIN) 68.6 mm      ACTFMA(MAX AMPLITUDE ACTF) 20.4 mm      ADPMA(MAX AMPLITUDE ADP) 67.1 mm      AAMA (MAX AMPLITUDE AA) 68.3 mm      ADPADPINHIBITION 3.1 %      AAASAINHIBITION 0.6 %      ADPADPAGGREGATION 96.9 %      AAASAAGGREGATION 99.4 %     TEG 6 Global Hemostasis with Lysis [[id number]]  (Abnormal) Collected: 05/24/25 1411    Lab Status: Final result Specimen: Blood from Arm, Right Updated: 05/24/25 1550     CKR(REACTION TIME) 10.3 Min      CKLY30 0.1 %      CRTMA(RAPIDTEG MAX AMPLITUDE) 55.5 mm      CFFMA (FUNCTIONAL FIBRINOGEN MAX AMPLITUDE) 20.1 mm     Protime-INR [[id number]]  (Abnormal) Collected: 05/24/25 1416     Lab Status: Final result Specimen: Blood from Arm, Right Updated: 05/24/25 1441     Protime 15.2 seconds      INR 1.13    Narrative:      INR Therapeutic Range    Indication                                             INR Range      Atrial Fibrillation                                               2.0-3.0  Hypercoagulable State                                    2.0.2.3  Left Ventricular Asist Device                            2.0-3.0  Mechanical Heart Valve                                  -    Aortic(with afib, MI, embolism, HF, LA enlargement,    and/or coagulopathy)                                     2.0-3.0 (2.5-3.5)     Mitral                                                             2.5-3.5  Prosthetic/Bioprosthetic Heart Valve               2.0-3.0  Venous thromboembolism (VTE: VT, PE        2.0-3.0    APTT [[id number]]  (Normal) Collected: 05/24/25 1411    Lab Status: Final result Specimen: Blood from Arm, Right Updated: 05/24/25 1441     PTT 32 seconds     Comprehensive metabolic panel [[id number]]  (Abnormal) Collected: 05/24/25 1226    Lab Status: Final result Specimen: Blood from Arm, Right Updated: 05/24/25 1259     Sodium 134 mmol/L      Potassium 4.7 mmol/L      Chloride 98 mmol/L      CO2 29 mmol/L      ANION GAP 7 mmol/L      BUN 24 mg/dL      Creatinine 0.90 mg/dL      Glucose 102 mg/dL      Calcium 9.4 mg/dL      AST 12 U/L      ALT 8 U/L      Alkaline Phosphatase 80 U/L      Total Protein 7.6 g/dL      Albumin 3.8 g/dL      Total Bilirubin 0.80 mg/dL      eGFR 83 ml/min/1.73sq m     Narrative:      National Kid[employer] Foundation guidelines for Chronic Kidney Disease (CKD):     Stage 1 with normal or high GFR (GFR > 90 mL/min/1.73 square meters)    Stage 2 Mild CKD (GFR = 60-89 mL/min/1.73 square meters)    Stage 3A Moderate CKD (GFR = 45-59 mL/min/1.73 square meters)    Stage 3B Moderate CKD (GFR = 30-44 mL/min/1.73 square meters)    Stage 4 Severe CKD (GFR = 15-29 mL/min/1.73 square  meters)    Stage 5 End Stage CKD (GFR <15 mL/min/1.73 square meters)  Note: GFR calculation is accurate only with a steady state creatinine    CBC and differential [[id number]]  (Abnormal) Collected: 05/24/25 1226    Lab Status: Final result Specimen: Blood from Arm, Right Updated: 05/24/25 1234     WBC 8.82 Thousand/uL      RBC 3.98 Million/uL      Hemoglobin 11.9 g/dL      Hematocrit 37.0 %      MCV 93 fL      MCH 29.9 pg      MCHC 32.2 g/dL      RDW 14.9 %      MPV 12.1 fL      Platelets 171 Thousands/uL      nRBC 0 /100 WBCs      Segmented % 74 %      Immature Grans % 0 %      Lymphocytes % 15 %      Monocytes % 9 %      Eosinophils Relative 1 %      Basophils Relative 1 %      Absolute Neutrophils 6.45 Thousands/µL      Absolute Immature Grans 0.03 Thousand/uL      Absolute Lymphocytes 1.34 Thousands/µL      Absolute Monocytes 0.79 Thousand/µL      Eosinophils Absolute 0.11 Thousand/µL      Basophils Absolute 0.10 Thousands/µL             CT head without contrast   Final Interpretation by Cee Moreno MD (05/24 1850)      Stable, large bilateral mixed attenuating, high convexity subdural hematomas, right greater than left, likely predominantly subacute in timing, with mass effect on the right greater than left cerebral hemispheres near the vertex. No midline shift noted.                  Workstation performed: [id number]             Procedures    ED Medication and Procedure Management   Prior to Admission Medications   Prescriptions Last Dose Informant Patient Reported? Taking?   LORazepam (ATIVAN) 0.5 mg tablet   No No   Sig: Take 1 tablet (0.5 mg total) by mouth once as needed (Take 30 minutes prior to MRI) for up to 1 dose   LORazepam (Ativan) 0.5 mg tablet   No No   Sig: Take 1 tablet (0.5 mg total) by mouth 1 (one) time for 1 dose Take 1 tablet 0.5 mg 30mins prior to MRI   Lancets (OneTouch Delica Plus [id number]) MISC   Yes No   allopurinol (ZYLOPRIM) 300 mg tablet   Yes No   Sig: Take 300 mg by mouth in the  morning.   atenolol-chlorthalidone (TENORETIC) 50-25 mg per tablet   Yes No   Sig: Take 1 tablet by mouth in the morning.   atorvastatin (LIPITOR) 10 mg tablet   Yes No   Sig: Take 10 mg by mouth daily at bedtime   Patient not taking: Reported on 4/9/2025   citric acid-potassium citrate (POLYCITRA K) 1,100-334 mg/5 mL solution   Yes No   Sig: Take 5 mL by mouth in the morning and 5 mL at noon and 5 mL in the evening. Take with meals.   ferrous sulfate 325 (65 Fe) mg tablet   No No   Sig: Take 1 tablet (325 mg total) by mouth every other day for 10 days   metFORMIN (GLUCOPHAGE) 1000 MG tablet   Yes No   Sig: Take 1,000 mg by mouth in the morning and 1,000 mg in the evening. Take with meals.   valsartan (DIOVAN) 320 MG tablet   Yes No   Sig: Take 320 mg by mouth in the morning.      Facility-Administered Medications: None     Discharge Medication List as of 5/24/2025  4:03 PM        CONTINUE these medications which have NOT CHANGED    Details   allopurinol (ZYLOPRIM) 300 mg tablet Take 300 mg by mouth in the morning., Historical Med      atenolol-chlorthalidone (TENORETIC) 50-25 mg per tablet Take 1 tablet by mouth in the morning., Historical Med      atorvastatin (LIPITOR) 10 mg tablet Take 10 mg by mouth daily at bedtime, Historical Med      citric acid-potassium citrate (POLYCITRA K) 1,100-334 mg/5 mL solution Take 5 mL by mouth in the morning and 5 mL at noon and 5 mL in the evening. Take with meals., Historical Med      ferrous sulfate 325 (65 Fe) mg tablet Take 1 tablet (325 mg total) by mouth every other day for 10 days, Starting Wed 4/16/2025, Until Wed 5/14/2025, Normal      Lancets (OneTouch Delica Plus [id number]) MISC Historical Med      LORazepam (ATIVAN) 0.5 mg tablet Take 1 tablet (0.5 mg total) by mouth once as needed (Take 30 minutes prior to MRI) for up to 1 dose, Starting Wed 5/14/2025, Normal      metFORMIN (GLUCOPHAGE) 1000 MG tablet Take 1,000 mg by mouth in the morning and 1,000 mg in the  evening. Take with meals., Starting Tue 11/26/2024, Historical Med      valsartan (DIOVAN) 320 MG tablet Take 320 mg by mouth in the morning., Starting Tue 11/26/2024, Historical Med           No discharge procedures on file.  ED SEPSIS DOCUMENTATION   Time reflects when diagnosis was documented in both MDM as applicable and the Disposition within this note       Time User Action Codes Description Comment    5/24/2025  1:44 PM Corrine Huggins Add [S06.5XAA] Subdural hematoma (HCC)     5/24/2025  1:46 PM Corrine Huggins Add [W19.XXXA] Fall, initial encounter     5/24/2025  2:47 PM Corrine Huggins Modify [S06.5XAA] Subdural hematoma (HCC)                    [1]   Past Medical History:  Diagnosis Date    Diabetes mellitus (HCC)     History of kidney stones 04/05/2025    Hypertension    [2]   Past Surgical History:  Procedure Laterality Date    KYLIE HOLE FOR SUBDURAL HEMATOMA Bilateral 5/25/2025    Procedure: KYLIE HOLES;  Surgeon: Issac Grady MD;  Location: [facility];  Service: Neurosurgery    TOTAL HIP ARTHROPLASTY Bilateral    [3] No family history on file.  [4]   Social History  Tobacco Use    Smoking status: Former     Types: Cigarettes    Smokeless tobacco: Never   Vaping Use    Vaping status: Never Used   Substance Use Topics    Alcohol use: Not Currently    Drug use: Never        Sarath Jones MD  05/28/25 0906

## 2025-05-28 NOTE — PROGRESS NOTES
"Progress Note - Trauma   Name: Deonte Attdallas 74 y.o. male I MRN: [id number]  Unit/Bed#: ICU 06 I Date of Admission: 5/24/2025   Date of Service: 5/28/2025 I Hospital Day: 4       Assessment & Plan  Fall  Fall at home  PT OT  Geriatrics consult  Multimodal pain control  Subdural hematoma (HCC)  - Neuro exam: GCS 15, non-focal  - Continue neurologic checks: Every 4 hours  - Appreciate Neurosurgery evaluation -signed off at this time  - Complete 7 day course of Keppra for seizure prophylaxis  - Chemical DVT prophylaxis: Lovenox  - Hold all anticoagulants and anti platelet medications for 2 weeks and/or until cleared by Neurosurgery to resume.  - PT and OT (including cognitive evaluation) evaluation and treatment as indicated.    Diabetes mellitus (HCC)  Continue sliding scale insulin    Thrombocytopenia (HCC)  Chronic, outpatient follow-up      TRAUMA TRANSFER FROM ICU AND TERTIARY SURVEY NOTE    VTE Prophylaxis:Enoxaparin (Lovenox)     Disposition: Med Surge    Code status:  Level 1 - Full Code    Consultants: IP CONSULT TO CASE MANAGEMENT  IP CONSULT TO NEUROSURGERY  IP CONSULT TO PHYSICAL MEDICINE REHAB    History of Present Illness   Mechanism of Injury:Fall  Summary of Diagnosed Injuries: Bilateral subdural hematoma    HPI/Last 24 hour events: Per Dr. Kim \"74 y.o. with hx of DM, CKD2, HTN, HLD, thrombocytopenia, gout, b/l total hip arthroplasty, who presents as transfer from [facility] due to SDH. Had a fall on 4/4 resulting in small R posterior falx SDH. Follow-up exams on 4/5 and 4/6 revealed resolution of SDH. A workup was initiated for frequent falls by neurology including an MRI which was performed 5/24 revealing large b/l high convexity SDHs with mass effect. He was referred to the hospital where CTH confirmed these findings.  Patient arrives to the ICU with no complaints. Patient does note progressive weakness, requiring a walker to ambulate in last couple weeks. No headache, n/v, or recent new falls. " "GCS is 15\".     Reason for ICU admission: SDH    Summary of ICU clinical course: The patient was admitted to the ICU for close neurologic monitoring secondary to subdural hematoma.  5/25 patient underwent lake holes for evacuation.  Arrived back to the ICU with 4 subdural drains neurologically intact.  On 5/27 2 subdural drains were removed, and on 5/28 the remainder of subdural drains were removed.  Patient ramined neurologically intact.  Resumed on all home medications aside from antihypertensives due to normotension.  Patient is neurologically intact, GCS 15.    Recent or scheduled procedures:   5/25: Bilateral bur holes for SDH EVAC    Outstanding/pending diagnostics: Not a candidate for MMA    Objective :  Temp:  [98 °F (36.7 °C)-99.6 °F (37.6 °C)] 98 °F (36.7 °C)  HR:  [] 88  BP: ()/(47-78) 127/66  Resp:  [19-24] 20  SpO2:  [92 %-97 %] 97 %  O2 Device: None (Room air)    Physical Exam  Vitals and nursing note reviewed.   Constitutional:       Appearance: He is normal weight.   HENT:      Head: Normocephalic and atraumatic.      Nose: Nose normal.      Mouth/Throat:      Mouth: Mucous membranes are moist.     Eyes:      Extraocular Movements: Extraocular movements intact.      Pupils: Pupils are equal, round, and reactive to light.       Cardiovascular:      Rate and Rhythm: Normal rate and regular rhythm.   Pulmonary:      Effort: Pulmonary effort is normal.   Abdominal:      General: Abdomen is flat.     Musculoskeletal:         General: Normal range of motion.      Cervical back: Normal range of motion.     Skin:     General: Skin is warm.     Neurological:      Mental Status: He is alert.      Comments: GCS 15, 5-5 strength bilateral upper and lower extremities.  No focal deficits       ISAR Score: Did you order a geriatric consult if the score was 2 or greater?: yes No data recorded     Patient seen and evaluated by Critical Care today and deemed to be appropriate for transfer to Med Surg. " Spoke to Lit DEJSEUS from Trauma service regarding transfer. Critical Care can be contacted via SecureChat with any questions or concerns.

## 2025-05-28 NOTE — ASSESSMENT & PLAN NOTE
Oriented x 4, no signs of delirium  Provide frequent redirection, reorientation, distraction techniques  Avoid deliriogenic medications such as tramadol, benzodiazepines, anticholinergics,  Benadryl  Treat pain, See geriatric pain protocol  Monitor for constipation - no bm this admission, miralax ordered  Monitor for urinary retention  Encourage early and frequent moblization, OOB  Encourage Hydration/ Nutrition  Implement sleep hygiene, limit night time interuptions, group activities

## 2025-05-28 NOTE — PROGRESS NOTES
Progress Note - Critical Care/ICU   Name: Deonte Greenfield 74 y.o. male I MRN: [id number]  Unit/Bed#: ICU 06 I Date of Admission: 5/24/2025   Date of Service: 5/28/2025 I Hospital Day: 4       Assessment & Plan   Active Hospital Problems    Diagnosis Date Noted POA    Subdural hematoma (HCC) 05/24/2025 Yes    Thrombocytopenia (HCC) 04/07/2025 Yes      Resolved Hospital Problems   No resolved problems to display.       Neuro:   Diagnosis: Bilateral subdural hematoma with associated mass effect  5/24 MRI-large bilateral subdural hematoma, right greater than left, mass effect on the bilateral cerebral hemispheres, effacement of the sulci, FLAIR signal hyperintensity along sulci right frontal and parietal convexities concerning for subarachnoid hemorrhage  5/25 Status post bilateral bur holes for evacuation of subdural hematoma  Plan:   NS following   2 SD drains removed 5/27  Continue to monitor SD x 2 that remain in place  Continue Keppra for seizure prophylaxis  Unable to do MMA secondary to anatomy  Frequent neuro checks   Diagnosis: Acute postoperative pain  Plan: Tylenol PRN   Oxy PRN      CV:   Diagnosis: Hypertension, hyperlipidemia  Plan:   Holding home medications: Tenoretic 50-25  Labetalol PRN   Goal SBP <160     Pulm:  No active issues     GI:   Diagnosis: Hepatic stenosis  Plan: Follow-up outpatient  Maintain bowel regimen     :   Diagnosis: CKD 2  Plan: Baseline creatinine 0.7-0.9  Trend BUN and creatinine  Trend I/O     F/E/N:   F: No IVF  E: Trend and replete electrolytes as needed  N: Regular diet     Heme/Onc:   Diagnosis: Chronic thrombocytopenia  Plan: Follow-up outpatient  Trend CBC     Endo:   Diagnosis: DM 2  Plan: Sliding scale insulin  Goal -180     ID:   Diagnosis: Leukocytosis, improved  Plan: Suspect reactive in setting of surgery  Trend fever curve and WBC     MSK/Skin:   Diagnosis: Fall, ambulatory dysfunction, left lower extremity edema, gout  Plan: Maintain fall  precautions  PT/OT  5/26 bilateral venous duplex-no DVT seen  Continue allopurinol     Disposition: Stepdown Level 1    ICU Core Measures     A: Assess, Prevent, and Manage Pain Has pain been assessed? Yes  Need for changes to pain regimen? No   B: Both SAT/SAT  N/A   C: Choice of Sedation RASS Goal: N/A patient not on sedation  Need for changes to sedation or analgesia regimen? N/A   D: Delirium CAM-ICU: Negative   E: Early Mobility  Plan for early mobility? Yes   F: Family Engagement Plan for family engagement today? Yes       Antibiotic Review: Post op requirements     Review of Invasive Devices:        Becki Plan: Discontinue arterial line    Prophylaxis:  VTE Contraindicated secondary to: Discuss starting today    Stress Ulcer  not ordered         24 Hour Events : Patient had 2 of the 4 SD drains removed yesterday   Subjective   Review of Systems: See HPI for Review of Systems  Patient c/o mild Ha    Objective :                   Vitals I/O      Most Recent Min/Max in 24hrs   Temp 98.8 °F (37.1 °C) Temp  Min: 98.6 °F (37 °C)  Max: 101.2 °F (38.4 °C)   Pulse 74 Pulse  Min: 68  Max: 108   Resp 22 Resp  Min: 19  Max: 24   /64 BP  Min: 97/51  Max: 156/76   O2 Sat 93 % SpO2  Min: 92 %  Max: 96 %      Intake/Output Summary (Last 24 hours) at 5/28/2025 0518  Last data filed at 5/28/2025 0400  Gross per 24 hour   Intake 1010 ml   Output 793 ml   Net 217 ml       Diet Regular; Regular House; Consistent Carbohydrate Diet Level 3 (6 carb servings/90 grams CHO/meal)    Invasive Monitoring   Arterial Line  Becki BP    No data recorded   MAP    No data recorded           Physical Exam   Physical Exam  Vitals reviewed.   Eyes:      Pupils: Pupils are equal, round, and reactive to light.   Skin:     General: Skin is warm.      Capillary Refill: Capillary refill takes less than 2 seconds.   HENT:      Head:      Comments: SD drain x 2     Mouth/Throat:      Mouth: Mucous membranes are moist.   Cardiovascular:      Rate  and Rhythm: Normal rate.   Abdominal:      Palpations: Abdomen is soft.   Constitutional:       General: He is not in acute distress.  Pulmonary:      Effort: Pulmonary effort is normal.   Neurological:      General: No focal deficit present.      Mental Status: He is alert. He is disoriented to time and disoriented to situation.      Cranial Nerves: No facial asymmetry.      Motor: Strength full and intact in all extremities. No motor deficit.          Diagnostic Studies        Lab Results: I have reviewed the following results:     Medications:  Scheduled PRN   allopurinol, 300 mg, Daily  [Held by provider] atenolol, 50 mg, Daily   And  [Held by provider] chlorthalidone, 25 mg, Daily  cefazolin, 2,000 mg, Once  chlorhexidine, 15 mL, Q12H BRIDGET  insulin lispro, 2-12 Units, TID AC  insulin lispro, 2-12 Units, HS  levETIRAcetam, 500 mg, Q12H BRIDGET  [Held by provider] losartan, 100 mg, Daily  senna-docusate sodium, 1 tablet, HS      acetaminophen, 650 mg, Q6H PRN  Diclofenac Sodium, 2 g, 4x Daily PRN  hydrALAZINE, 5 mg, Q4H PRN  labetalol, 10 mg, Q4H PRN  ondansetron, 4 mg, Q6H PRN  oxyCODONE, 2.5 mg, Q4H PRN   Or  oxyCODONE, 5 mg, Q4H PRN       Continuous          Labs:   CBC    Recent Labs     05/27/25  0514 05/28/25  0456   WBC 11.16* 10.35*   HGB 11.7* 10.4*   HCT 35.5* 31.7*   * 141*     BMP    Recent Labs     05/26/25  0524 05/27/25  0514   SODIUM 133* 132*   K 4.4 3.8   CL 97 97   CO2 29 27   AGAP 7 8   BUN 12 15   CREATININE 0.74 0.74   CALCIUM 9.5 9.0       Coags    No recent results     Additional Electrolytes  Recent Labs     05/26/25 0524 05/27/25  0514 05/27/25  0602   MG 1.6* 1.8*  --    PHOS 3.4 2.8  --    CAIONIZED 1.14  --  1.11*          Blood Gas    No recent results  No recent results LFTs  No recent results    Infectious  No recent results  Glucose  Recent Labs     05/26/25 0524 05/27/25  0514   GLUC 124 140        Osmel Barone PA-C     Quality 400a: One-Time Screening For Hepatitis C Virus (Hcv) For All Patients: Patient received one-time screening for HCV infection

## 2025-05-29 LAB
ANION GAP SERPL CALCULATED.3IONS-SCNC: 6 MMOL/L (ref 4–13)
BUN SERPL-MCNC: 25 MG/DL (ref 5–25)
CALCIUM SERPL-MCNC: 8.9 MG/DL (ref 8.4–10.2)
CHLORIDE SERPL-SCNC: 101 MMOL/L (ref 96–108)
CO2 SERPL-SCNC: 27 MMOL/L (ref 21–32)
CREAT SERPL-MCNC: 0.74 MG/DL (ref 0.6–1.3)
ERYTHROCYTE [DISTWIDTH] IN BLOOD BY AUTOMATED COUNT: 14.9 % (ref 11.6–15.1)
GFR SERPL CREATININE-BSD FRML MDRD: 90 ML/MIN/1.73SQ M
GLUCOSE SERPL-MCNC: 127 MG/DL (ref 65–140)
GLUCOSE SERPL-MCNC: 136 MG/DL (ref 65–140)
GLUCOSE SERPL-MCNC: 140 MG/DL (ref 65–140)
GLUCOSE SERPL-MCNC: 151 MG/DL (ref 65–140)
GLUCOSE SERPL-MCNC: 164 MG/DL (ref 65–140)
HCT VFR BLD AUTO: 35.3 % (ref 36.5–49.3)
HGB BLD-MCNC: 11.3 G/DL (ref 12–17)
MCH RBC QN AUTO: 30.1 PG (ref 26.8–34.3)
MCHC RBC AUTO-ENTMCNC: 32 G/DL (ref 31.4–37.4)
MCV RBC AUTO: 94 FL (ref 82–98)
PLATELET # BLD AUTO: 172 THOUSANDS/UL (ref 149–390)
PMV BLD AUTO: 13 FL (ref 8.9–12.7)
POTASSIUM SERPL-SCNC: 4.1 MMOL/L (ref 3.5–5.3)
RBC # BLD AUTO: 3.75 MILLION/UL (ref 3.88–5.62)
SODIUM SERPL-SCNC: 134 MMOL/L (ref 135–147)
WBC # BLD AUTO: 8.94 THOUSAND/UL (ref 4.31–10.16)

## 2025-05-29 PROCEDURE — 97110 THERAPEUTIC EXERCISES: CPT

## 2025-05-29 PROCEDURE — 99232 SBSQ HOSP IP/OBS MODERATE 35: CPT | Performed by: NURSE PRACTITIONER

## 2025-05-29 PROCEDURE — 82948 REAGENT STRIP/BLOOD GLUCOSE: CPT

## 2025-05-29 PROCEDURE — 97530 THERAPEUTIC ACTIVITIES: CPT

## 2025-05-29 PROCEDURE — 97535 SELF CARE MNGMENT TRAINING: CPT

## 2025-05-29 PROCEDURE — 99232 SBSQ HOSP IP/OBS MODERATE 35: CPT | Performed by: SURGERY

## 2025-05-29 PROCEDURE — 97116 GAIT TRAINING THERAPY: CPT

## 2025-05-29 PROCEDURE — 85027 COMPLETE CBC AUTOMATED: CPT | Performed by: PHYSICIAN ASSISTANT

## 2025-05-29 PROCEDURE — 80048 BASIC METABOLIC PNL TOTAL CA: CPT | Performed by: PHYSICIAN ASSISTANT

## 2025-05-29 RX ORDER — ATORVASTATIN CALCIUM 10 MG/1
10 TABLET, FILM COATED ORAL
Status: DISCONTINUED | OUTPATIENT
Start: 2025-05-29 | End: 2025-06-03 | Stop reason: HOSPADM

## 2025-05-29 RX ORDER — FERROUS SULFATE 325(65) MG
325 TABLET ORAL EVERY OTHER DAY
Status: DISCONTINUED | OUTPATIENT
Start: 2025-05-29 | End: 2025-06-03 | Stop reason: HOSPADM

## 2025-05-29 RX ADMIN — LEVETIRACETAM 500 MG: 500 TABLET, FILM COATED ORAL at 21:07

## 2025-05-29 RX ADMIN — FERROUS SULFATE TAB 325 MG (65 MG ELEMENTAL FE) 325 MG: 325 (65 FE) TAB at 09:55

## 2025-05-29 RX ADMIN — CHLORHEXIDINE GLUCONATE 15 ML: 1.2 SOLUTION ORAL at 21:07

## 2025-05-29 RX ADMIN — ALLOPURINOL 300 MG: 100 TABLET ORAL at 09:55

## 2025-05-29 RX ADMIN — SENNOSIDES AND DOCUSATE SODIUM 1 TABLET: 50; 8.6 TABLET ORAL at 21:07

## 2025-05-29 RX ADMIN — INSULIN LISPRO 2 UNITS: 100 INJECTION, SOLUTION INTRAVENOUS; SUBCUTANEOUS at 12:00

## 2025-05-29 RX ADMIN — POLYETHYLENE GLYCOL 3350 17 G: 17 POWDER, FOR SOLUTION ORAL at 09:55

## 2025-05-29 RX ADMIN — ATORVASTATIN CALCIUM 10 MG: 10 TABLET, FILM COATED ORAL at 21:07

## 2025-05-29 RX ADMIN — CHLORHEXIDINE GLUCONATE 15 ML: 1.2 SOLUTION ORAL at 09:55

## 2025-05-29 RX ADMIN — ENOXAPARIN SODIUM 30 MG: 30 INJECTION SUBCUTANEOUS at 21:07

## 2025-05-29 RX ADMIN — LEVETIRACETAM 500 MG: 500 TABLET, FILM COATED ORAL at 09:52

## 2025-05-29 RX ADMIN — DICLOFENAC SODIUM 2 G: 10 GEL TOPICAL at 21:08

## 2025-05-29 RX ADMIN — ATENOLOL 50 MG: 50 TABLET ORAL at 09:52

## 2025-05-29 RX ADMIN — INSULIN LISPRO 2 UNITS: 100 INJECTION, SOLUTION INTRAVENOUS; SUBCUTANEOUS at 16:29

## 2025-05-29 RX ADMIN — ENOXAPARIN SODIUM 30 MG: 30 INJECTION SUBCUTANEOUS at 09:56

## 2025-05-29 NOTE — PLAN OF CARE
Problem: PAIN - ADULT  Goal: Verbalizes/displays adequate comfort level or baseline comfort level  Description: Interventions:  - Encourage patient to monitor pain and request assistance  - Assess pain using appropriate pain scale  - Administer analgesics as ordered based on type and severity of pain and evaluate response  - Implement non-pharmacological measures as appropriate and evaluate response  - Consider cultural and social influences on pain and pain management  - Notify physician/advanced practitioner if interventions unsuccessful or patient reports new pain  - Educate patient/family on pain management process including their role and importance of  reporting pain   - Provide non-pharmacologic/complimentary pain relief interventions  Outcome: Progressing     Problem: INFECTION - ADULT  Goal: Absence or prevention of progression during hospitalization  Description: INTERVENTIONS:  - Assess and monitor for signs and symptoms of infection  - Monitor lab/diagnostic results  - Monitor all insertion sites, i.e. indwelling lines, tubes, and drains  - Monitor endotracheal if appropriate and nasal secretions for changes in amount and color  - [place] appropriate cooling/warming therapies per order  - Administer medications as ordered  - Instruct and encourage patient and family to use good hand hygiene technique  - Identify and instruct in appropriate isolation precautions for identified infection/condition  Outcome: Progressing  Goal: Absence of fever/infection during neutropenic period  Description: INTERVENTIONS:  - Monitor WBC  - Perform strict hand hygiene  - Limit to healthy visitors only  - No plants, dried, fresh or silk flowers with zelaya in patient room  Outcome: Progressing     Problem: SAFETY ADULT  Goal: Patient will remain free of falls  Description: INTERVENTIONS:  - Educate patient/family on patient safety including physical limitations  - Instruct patient to call for assistance with activity   -  Consider consulting OT/PT to assist with strengthening/mobility based on AM PAC & JH-HLM score  - Consult OT/PT to assist with strengthening/mobility   - Keep Call bell within reach  - Keep bed low and locked with side rails adjusted as appropriate  - Keep care items and personal belongings within reach  - Initiate and maintain comfort rounds  - Make Fall Risk Sign visible to staff  - Offer Toileting every 2 Hours, in advance of need  - Initiate/Maintain bed/chair alarm  - Obtain necessary fall risk management equipment:   - Apply yellow socks and bracelet for high fall risk patients  - Consider moving patient to room near nurses station  Outcome: Progressing  Goal: Maintain or return to baseline ADL function  Description: INTERVENTIONS:  -  Assess patient's ability to carry out ADLs; assess patient's baseline for ADL function and identify physical deficits which impact ability to perform ADLs (bathing, care of mouth/teeth, toileting, grooming, dressing, etc.)  - Assess/evaluate cause of self-care deficits   - Assess range of motion  - Assess patient's mobility; develop plan if impaired  - Assess patient's need for assistive devices and provide as appropriate  - Encourage maximum independence but intervene and supervise when necessary  - Involve family in performance of ADLs  - Assess for home care needs following discharge   - Consider OT consult to assist with ADL evaluation and planning for discharge  - Provide patient education as appropriate  - Monitor functional capacity and physical performance, use of AM PAC & JH-HLM   - Monitor gait, balance and fatigue with ambulation    Outcome: Progressing  Goal: Maintains/Returns to pre admission functional level  Description: INTERVENTIONS:  - Perform AM-PAC 6 Click Basic Mobility/ Daily Activity assessment daily.  - Set and communicate daily mobility goal to care team and patient/family/caregiver.   - Collaborate with rehabilitation services on mobility goals if  consulted  - Perform Range of Motion 3 times a day.  - Reposition patient every 2 hours.  - Dangle patient 3 times a day  - Stand patient 3 times a day  - Ambulate patient 3 times a day  - Out of bed to chair 3 times a day   - Out of bed for meals 3 times a day  - Out of bed for toileting  - Record patient progress and toleration of activity level   Outcome: Progressing     Problem: DISCHARGE PLANNING  Goal: Discharge to home or other facility with appropriate resources  Description: INTERVENTIONS:  - Identify barriers to discharge w/patient and caregiver  - Arrange for needed discharge resources and transportation as appropriate  - Identify discharge learning needs (meds, wound care, etc.)  - Arrange for interpretive services to assist at discharge as needed  - Refer to Case Management Department for coordinating discharge planning if the patient needs post-hospital services based on physician/advanced practitioner order or complex needs related to functional status, cognitive ability, or social support system  Outcome: Progressing     Problem: Knowledge Deficit  Goal: Patient/family/caregiver demonstrates understanding of disease process, treatment plan, medications, and discharge instructions  Description: Complete learning assessment and assess knowledge base.  Interventions:  - Provide teaching at level of understanding  - Provide teaching via preferred learning methods  Outcome: Progressing     Problem: Prexisting or High Potential for Compromised Skin Integrity  Goal: Skin integrity is maintained or improved  Description: INTERVENTIONS:  - Identify patients at risk for skin breakdown  - Assess and monitor skin integrity including under and around medical devices   - Assess and monitor nutrition and hydration status  - Monitor labs  - Assess for incontinence   - Turn and reposition patient  - Assist with mobility/ambulation  - Relieve pressure over verónica prominences   - Avoid friction and shearing  - Provide  appropriate hygiene as needed including keeping skin clean and dry  - Evaluate need for skin moisturizer/barrier cream  - Collaborate with interdisciplinary team  - Patient/family teaching  - Consider wound care consult    Assess:  - Review Zaid scale daily  - Clean and moisturize skin every shift  - Inspect skin when repositioning, toileting, and assisting with ADLS  - Assess under medical devices such as venodynes every 2 hours  - Assess extremities for adequate circulation and sensation     Bed Management:  - Have minimal linens on bed & keep smooth, unwrinkled  - Change linens as needed when moist or perspiring  - Avoid sitting or lying in one position for more than 2 hours while in bed Keep HOB at 20degrees   - Toileting:  - Offer bedside commode  - Assess for incontinence every 2 hours  - Use incontinent care products after each incontinent episode such as skin protectant    Activity:  - Mobilize patient 3 times a day  - Encourage activity and walks on unit  - Encourage or provide ROM exercises   - Turn and reposition patient every 2 Hours  - Use appropriate equipment to lift or move patient in bed  - Instruct/ Assist with weight shifting every 30 minutes when out of bed in chair  - Consider limitation of chair time 2 hour intervals    Skin Care:  - Avoid use of baby powder, tape, friction and shearing, hot water or constrictive clothing  - Relieve pressure over bony prominences using pillows/wedges  - Do not massage red bony areas    Next Steps:  - Teach patient strategies to minimize risks such as weight shift/turn and reposition  - Consider consults to  interdisciplinary teams such as PT/OT  Outcome: Progressing     Problem: Nutrition/Hydration-ADULT  Goal: Nutrient/Hydration intake appropriate for improving, restoring or maintaining nutritional needs  Description: Monitor and assess patient's nutrition/hydration status for malnutrition. Collaborate with interdisciplinary team and initiate plan and  interventions as ordered.  Monitor patient's weight and dietary intake as ordered or per policy. Utilize nutrition screening tool and intervene as necessary. Determine patient's food preferences and provide high-protein, high-caloric foods as appropriate.     INTERVENTIONS:  - Monitor oral intake, urinary output, labs, and treatment plans  - Assess nutrition and hydration status and recommend course of action  - Evaluate amount of meals eaten  - Assist patient with eating if necessary   - Allow adequate time for meals  - Recommend/ encourage appropriate diets, oral nutritional supplements, and vitamin/mineral supplements  - Order, calculate, and assess calorie counts as needed  - Recommend, monitor, and adjust tube feedings and TPN/PPN based on assessed needs  - Assess need for intravenous fluids  - Provide specific nutrition/hydration education as appropriate  - Include patient/family/caregiver in decisions related to nutrition  Outcome: Progressing     Problem: NEUROSENSORY - ADULT  Goal: Achieves stable or improved neurological status  Description: INTERVENTIONS  - Monitor and report changes in neurological status  - Monitor vital signs such as temperature, blood pressure, glucose, and any other labs ordered   - Initiate measures to prevent increased intracranial pressure  - Monitor for seizure activity and implement precautions if appropriate      Outcome: Progressing  Goal: Achieves maximal functionality and self care  Description: INTERVENTIONS  - Monitor swallowing and airway patency with patient fatigue and changes in neurological status  - Encourage and assist patient to increase activity and self care.   - Encourage visually impaired, hearing impaired and aphasic patients to use assistive/communication devices  Outcome: Progressing     Problem: CARDIOVASCULAR - ADULT  Goal: Maintains optimal cardiac output and hemodynamic stability  Description: INTERVENTIONS:  - Monitor I/O, vital signs and rhythm  -  Monitor for S/S and trends of decreased cardiac output  - Administer and titrate ordered vasoactive medications to optimize hemodynamic stability  - Assess quality of pulses, skin color and temperature  - Assess for signs of decreased coronary artery perfusion  - Instruct patient to report change in severity of symptoms  Outcome: Progressing  Goal: Absence of cardiac dysrhythmias or at baseline rhythm  Description: INTERVENTIONS:  - Continuous cardiac monitoring, vital signs, obtain 12 lead EKG if ordered  - Administer antiarrhythmic and heart rate control medications as ordered  - Monitor electrolytes and administer replacement therapy as ordered  Outcome: Progressing     Problem: RESPIRATORY - ADULT  Goal: Achieves optimal ventilation and oxygenation  Description: INTERVENTIONS:  - Assess for changes in respiratory status  - Assess for changes in mentation and behavior  - Position to facilitate oxygenation and minimize respiratory effort  - Oxygen administered by appropriate delivery if ordered  - Initiate smoking cessation education as indicated  - Encourage broncho-pulmonary hygiene including cough, deep breathe, Incentive Spirometry  - Assess the need for suctioning and aspirate as needed  - Assess and instruct to report SOB or any respiratory difficulty  - Respiratory Therapy support as indicated  Outcome: Progressing     Problem: GASTROINTESTINAL - ADULT  Goal: Minimal or absence of nausea and/or vomiting  Description: INTERVENTIONS:  - Administer IV fluids if ordered to ensure adequate hydration  - Maintain NPO status until nausea and vomiting are resolved  - Nasogastric tube if ordered  - Administer ordered antiemetic medications as needed  - Provide nonpharmacologic comfort measures as appropriate  - Advance diet as tolerated, if ordered  - Consider nutrition services referral to assist patient with adequate nutrition and appropriate food choices  Outcome: Progressing  Goal: Maintains or returns to  baseline bowel function  Description: INTERVENTIONS:  - Assess bowel function  - Encourage oral fluids to ensure adequate hydration  - Administer IV fluids if ordered to ensure adequate hydration  - Administer ordered medications as needed  - Encourage mobilization and activity  - Consider nutritional services referral to assist patient with adequate nutrition and appropriate food choices  Outcome: Progressing  Goal: Maintains adequate nutritional intake  Description: INTERVENTIONS:  - Monitor percentage of each meal consumed  - Identify factors contributing to decreased intake, treat as appropriate  - Assist with meals as needed  - Monitor I&O, weight, and lab values if indicated  - Obtain nutrition services referral as needed  Outcome: Progressing     Problem: GENITOURINARY - ADULT  Goal: Maintains or returns to baseline urinary function  Description: INTERVENTIONS:  - Assess urinary function  - Encourage oral fluids to ensure adequate hydration if ordered  - Administer IV fluids as ordered to ensure adequate hydration  - Administer ordered medications as needed  - Offer frequent toileting  - Follow urinary retention protocol if ordered  Outcome: Progressing  Goal: Absence of urinary retention  Description: INTERVENTIONS:  - Assess patient’s ability to void and empty bladder  - Monitor I/O  - Bladder scan as needed  - Discuss with physician/AP medications to alleviate retention as needed  - Discuss catheterization for long term situations as appropriate  Outcome: Progressing     Problem: METABOLIC, FLUID AND ELECTROLYTES - ADULT  Goal: Electrolytes maintained within normal limits  Description: INTERVENTIONS:  - Monitor labs and assess patient for signs and symptoms of electrolyte imbalances  - Administer electrolyte replacement as ordered  - Monitor response to electrolyte replacements, including repeat lab results as appropriate  - Instruct patient on fluid and nutrition as appropriate  Outcome:  Progressing  Goal: Fluid balance maintained  Description: INTERVENTIONS:  - Monitor labs   - Monitor I/O and WT  - Instruct patient on fluid and nutrition as appropriate  - Assess for signs & symptoms of volume excess or deficit  Outcome: Progressing  Goal: Glucose maintained within target range  Description: INTERVENTIONS:  - Monitor Blood Glucose as ordered  - Assess for signs and symptoms of hyperglycemia and hypoglycemia  - Administer ordered medications to maintain glucose within target range  - Assess nutritional intake and initiate nutrition service referral as needed  Outcome: Progressing     Problem: SKIN/TISSUE INTEGRITY - ADULT  Goal: Skin Integrity remains intact(Skin Breakdown Prevention)  Description: Assess:  -Perform Zaid assessment every shift  -Clean and moisturize skin every shift  -Inspect skin when repositioning, toileting, and assisting with ADLS  -Assess under medical devices such as venodynes every 2 hours  -Assess extremities for adequate circulation and sensation     Bed Management:  -Have minimal linens on bed & keep smooth, unwrinkled  -Change linens as needed when moist or perspiring  -Avoid sitting or lying in one position for more than 2 hours while in bed  -Keep HOB at 20degrees     Toileting:  -Offer bedside commode  -Assess for incontinence every 2 hours  -Use incontinent care products after each incontinent episode such as skin protectant    Activity:  -Mobilize patient 3 times a day  -Encourage activity and walks on unit  -Encourage or provide ROM exercises   -Turn and reposition patient every 2 Hours  -Use appropriate equipment to lift or move patient in bed  -Instruct/ Assist with weight shifting every 30 minutes when out of bed in chair  -Consider limitation of chair time 2 hour intervals    Skin Care:  -Avoid use of baby powder, tape, friction and shearing, hot water or constrictive clothing  -Relieve pressure over bony prominences using pillows/wedges  -Do not massage red  bony areas    Next Steps:  -Teach patient strategies to minimize risks such as weight shift/turn and reposition   -Consider consults to  interdisciplinary teams such as PT/OT  Outcome: Progressing  Goal: Incision(s), wounds(s) or drain site(s) healing without S/S of infection  Description: INTERVENTIONS  - Assess and document dressing, incision, wound bed, drain sites and surrounding tissue  - Provide patient and family education  - Perform skin care/dressing changes as ordered  Outcome: Progressing     Problem: HEMATOLOGIC - ADULT  Goal: Maintains hematologic stability  Description: INTERVENTIONS  - Assess for signs and symptoms of bleeding or hemorrhage  - Monitor labs  - Administer supportive blood products/factors as ordered and appropriate  Outcome: Progressing     Problem: MUSCULOSKELETAL - ADULT  Goal: Maintain or return mobility to safest level of function  Description: INTERVENTIONS:  - Assess patient's ability to carry out ADLs; assess patient's baseline for ADL function and identify physical deficits which impact ability to perform ADLs (bathing, care of mouth/teeth, toileting, grooming, dressing, etc.)  - Assess/evaluate cause of self-care deficits   - Assess range of motion  - Assess patient's mobility  - Assess patient's need for assistive devices and provide as appropriate  - Encourage maximum independence but intervene and supervise when necessary  - Involve family in performance of ADLs  - Assess for home care needs following discharge   - Consider OT consult to assist with ADL evaluation and planning for discharge  - Provide patient education as appropriate  Outcome: Progressing     Problem: Potential for Falls  Goal: Patient will remain free of falls  Description: INTERVENTIONS:  - Educate patient/family on patient safety including physical limitations  - Instruct patient to call for assistance with activity   - Consider consulting OT/PT to assist with strengthening/mobility based on AM PAC &  JH-HLM score  - Consult OT/PT to assist with strengthening/mobility   - Keep Call bell within reach  - Keep bed low and locked with side rails adjusted as appropriate  - Keep care items and personal belongings within reach  - Initiate and maintain comfort rounds  - Make Fall Risk Sign visible to staff  - Offer Toileting every 2 Hours, in advance of need  - Initiate/Maintain bed/chair alarm  - Obtain necessary fall risk management equipment:   - Apply yellow socks and bracelet for high fall risk patients  - Consider moving patient to room near nurses station  Outcome: Progressing     Problem: COPING  Goal: Pt/Family able to verbalize concerns and demonstrate effective coping strategies  Description: INTERVENTIONS:  - Assist patient/family to identify coping skills, available support systems and cultural and spiritual values  - Provide emotional support, including active listening and acknowledgement of concerns of patient and caregivers  - Reduce environmental stimuli, as able  - Provide patient education  - Assess for spiritual pain/suffering and initiate spiritual care, including notification of Pastoral Care or pawan based community as needed  - Assess effectiveness of coping strategies  Outcome: Progressing  Goal: Will report anxiety at manageable levels  Description: INTERVENTIONS:  - Administer medication as ordered  - Teach and encourage coping skills  - Provide emotional support  - Assess patient/family for anxiety and ability to cope  Outcome: Progressing

## 2025-05-29 NOTE — PROGRESS NOTES
Progress Note - Geriatric Medicine   Name: Deonte Greenfield 74 y.o. male I MRN: [id number]  Unit/Bed#: [facility] 826-01 I Date of Admission: 5/24/2025   Date of Service: 5/29/2025 I Hospital Day: 5    Assessment & Plan  Subdural hematoma (HCC)  S/p fall on 4/4/26  Seen on follow up MRI done as outpatient  Neurosurgery on consult  S/p lake hole with drain  5/27/25 drains removed  Follow up with neurosurgery as outpatient  Fall  Unwitnessed fall on 4/4/25  At risk for falls secondary to age, medications, gait instabiltiy  Fall precautions  PT/OT  Diabetes mellitus (HCC)  Lab Results   Component Value Date    HGBA1C 6.2 (H) 05/24/2025     Metformin decreased from 1000 mg po BID to 500 mg po BID on 5/14/25  Thrombocytopenia (HCC)  Follows with hemoonc as outpatient, last OV 5/16/25  Vitamin B 12 level 271 (5/24/24)  Weight loss  Pt reports an unintentional weight loss of 50-60 pounds over 6 months after several dental extractions  Ongoing evaluation  Maintain protein in diet  Albumin 4.0  Frailty  Clinical Frail Scale: 5- Mildly Frail  Lives with supportive family  PT/OT  50-60 pound weight loss  Rehab post hospitalization  Acute pain  Geriatric pain protocol  Scheduled acetaminophen 975 mg po Q8  Oxycodone 2.5 mg po Q 4 prn moderate pain  Oxycodone 5 mg po Q 4 prn severe pain   Monitor for constipation  Lidoderm patch   Insomnia  Reports last night was the first night he slept since he was in the hospital  Reports at home he sleeps in a recliner chair or the couch  States he does not use medications or sleep aids  First line is behavioral therapy  Avoid sedative hypnotics such as benzodiazepines and benadryl  Encourage staying awake during the day  Encourage daytime activity, morning exercise  Decrease or eliminate day time naps  Avoid caffiene, alcohol especially during late afternoon and evening hours  Establish a night time routine  Recommend melatonin 3mg po QHS   At risk for delirium  Oriented x 4, no signs of  delirium  Provide frequent redirection, reorientation, distraction techniques  Avoid deliriogenic medications such as tramadol, benzodiazepines, anticholinergics,  Benadryl  Treat pain, See geriatric pain protocol  Monitor for constipation - no bm this admission, miralax ordered  Monitor for urinary retention  Encourage early and frequent moblization, OOB  Encourage Hydration/ Nutrition  Implement sleep hygiene, limit night time interuptions, group activities      Subjective   He is oob in recliner chair, oriented x 3, falling asleep in chair, wife at bedside, he reports he slept ok last night.     Objective :  Temp:  [97.7 °F (36.5 °C)-100.2 °F (37.9 °C)] 98.3 °F (36.8 °C)  HR:  [70-90] 82  BP: ()/(56-74) 93/56  Resp:  [16-24] 16  SpO2:  [92 %-99 %] 99 %  O2 Device: None (Room air)    Physical Exam  Vitals and nursing note reviewed.   HENT:      Head: Normocephalic.      Nose: No congestion.      Mouth/Throat:      Mouth: Mucous membranes are moist.     Eyes:      General:         Right eye: No discharge.         Left eye: No discharge.       Cardiovascular:      Rate and Rhythm: Normal rate and regular rhythm.      Pulses: Normal pulses.   Pulmonary:      Effort: Pulmonary effort is normal.      Breath sounds: Normal breath sounds.   Abdominal:      General: Abdomen is flat. There is no distension.     Musculoskeletal:         General: Normal range of motion.      Cervical back: Normal range of motion.     Skin:     General: Skin is warm.     Neurological:      Mental Status: He is alert and oriented to person, place, and time. Mental status is at baseline.     Psychiatric:         Mood and Affect: Mood normal.           Lab Results: I have reviewed the following results:CBC/BMP:   .     05/29/25  0612   WBC 8.94   HGB 11.3*   HCT 35.3*      SODIUM 134*   K 4.1      CO2 27   BUN 25   CREATININE 0.74   GLUC 136          Therapies:   Basic Mobility Inpatient Raw Score: 11  -[facility] Goal: 4: Move to  chair/commode  -HLM Achieved: 4: Move to chair/commode      VTE Prophylaxis: Sequential compression device (Venodyne)     Code Status: Level 1 - Full Code      Family and Social Support:   No data recorded    Goals of Care: full code

## 2025-05-29 NOTE — ARC ADMISSION
Referral received for consideration of patient for inpatient acute rehab.    Reviewed patient's case - patient is likely acute rehab appropriate pending medical stability, functional progress, insurance authorization and bed availability. CM has been updated. Will continue to follow at this time.

## 2025-05-29 NOTE — PHYSICAL THERAPY NOTE
PHYSICAL THERAPY NOTE          Patient Name: Deonte Greenfield  Today's Date: 5/29/2025 05/29/25 0950   PT Last Visit   PT Visit Date 05/29/25   Note Type   Note Type Treatment   Pain Assessment   Pain Assessment Tool 0-10   Pain Score No Pain   Restrictions/Precautions   Weight Bearing Precautions Per Order No   Other Precautions Chair Alarm;Bed Alarm;Cognitive;Multiple lines;Fall Risk;Pain   General   Chart Reviewed Yes   Cognition   Arousal/Participation Alert;Responsive   Attention Attends with cues to redirect   Orientation Level Oriented to person;Oriented to place;Oriented to time   Following Commands Follows one step commands with increased time or repetition   Comments Pt cooperative and pleasant during session. Intermittent confusion , repeating tasks and needing redirection but overall oriented   Subjective   Subjective Agreeable to mobilize   Bed Mobility   Supine to Sit 3  Moderate assistance   Additional items Assist x 1;HOB elevated;Increased time required;Verbal cues;LE management   Sit to Supine Unable to assess   Additional Comments Pt in bed upon arrival. Mod Ax 1 for supine to sit , EOB sitting with min assist ,R lean progressing to Close Sup, Improved posture /trunk mx with verbal cues   Transfers   Sit to Stand 3  Moderate assistance   Additional items Assist x 2;Increased time required;Verbal cues;Armrests   Stand to Sit 3  Moderate assistance   Additional items Assist x 2;Armrests;Increased time required;Verbal cues   Additional Comments Initial STS form bed with mod Ax 2, Lauryn HHA + knee block , progressing to mod aX 2 from recliner with RW   Ambulation/Elevation   Gait pattern Wide JESÚS;Excessively slow;Step to;Short stride;Foward flexed   Gait Assistance 3  Moderate assist   Additional items Assist x 2;Verbal cues;Tactile cues   Assistive Device Other (Comment)  (Lauryn HHA)   Distance 3 ft from bed to drop arm  "recliner   Ambulation/Elevation Additional Comments Pt progressing with ambulation, does still require AX 2 for safe moibility   Balance   Static Sitting Fair   Dynamic Sitting Fair -   Static Standing Poor +   Dynamic Standing Poor   Ambulatory Poor   Endurance Deficit   Endurance Deficit Yes   Activity Tolerance   Activity Tolerance Patient limited by fatigue   Medical Staff Made Aware OT Chao   Nurse Made Aware RN cleared ptf or therapy   Exercises   Knee AROM Long Arc Quad Sitting;10 reps;Bilateral   Ankle Pumps Sitting;10 reps;Bilateral   Marching Sitting;10 reps;AAROM;Bilateral   Balance training  EOB sitting ~ 6 minutes, STS X3 , Upright sitting completing hygiene care with UE reaches/movt   Assessment   Prognosis Good   Problem List Decreased strength;Decreased endurance;Impaired balance;Decreased mobility   Assessment Pt seen for PT treatment session this date.Pt motivated to participate, does get frustrated at times stating \"need to do better\". Pt does demonstrate improvement in overall functional level this session. Completes multiple functional transfers, improved sitting tolerance and alertness, improved transfer levels and ambulation attempt. Pt was left in recliner at the end of PT session with all needs in reach. Pt would benefit from continued PT services while in hospital to address remaining limitations. The patient's AM-PAC Basic Mobility Inpatient Short Form Raw Score is 11. A Raw score of less than or equal to 16 suggests the patient may benefit from discharge to post-acute rehabilitation services. Please also refer to the recommendation of the Physical Therapist for safe discharge planning. Post dc recommendation is Level I at this time.   Goals   Patient Goals to get better   STG Expiration Date 06/10/25   PT Treatment Day 1   Plan   Treatment/Interventions Functional transfer training;Therapeutic exercise;Bed mobility;Gait training;Spoke to nursing;Spoke to case management;OT   Progress " Progressing toward goals   PT Frequency 3-5x/wk   Discharge Recommendation   Rehab Resource Intensity Level, PT I (Maximum Resource Intensity)   Equipment Recommended Walker  (TBD)   AM-PAC Basic Mobility Inpatient   Turning in Flat Bed Without Bedrails 2   Lying on Back to Sitting on Edge of Flat Bed Without Bedrails 2   Moving Bed to Chair 2   Standing Up From Chair Using Arms 2   Walk in Room 2   Climb 3-5 Stairs With Railing 1   Basic Mobility Inpatient Raw Score 11   Basic Mobility Standardized Score 30.25   [facility] Highest Level Of Mobility   -HL Goal 4: Move to chair/commode   -HLM Achieved 4: Move to chair/commode   Education   Education Provided Mobility training;Home exercise program;Assistive device   Patient Reinforcement needed   End of Consult   Patient Position at End of Consult All needs within reach;Bed/Chair alarm activated;Bedside chair   Elissa Catherine PT DPT    75 y/o F with a PMH significant for recently diagnosed HTN and HLD who presents to the hospital with high blood pressure, admitted for hypertensive urgency.

## 2025-05-29 NOTE — OCCUPATIONAL THERAPY NOTE
Occupational Therapy Progress Note     Patient Name: Deonet Greenfield  Today's Date: 5/29/2025  Problem List  Principal Problem:    Subdural hematoma (HCC)  Active Problems:    Fall    PVD (peripheral vascular disease) (HCC)    Diabetes mellitus (HCC)    Thrombocytopenia (HCC)    Neuropathy    Weight loss    Frailty    Acute pain    Insomnia    At risk for delirium              05/29/25 0951   OT Last Visit   OT Visit Date 05/29/25   Note Type   Note Type Treatment   Pain Assessment   Pain Assessment Tool 0-10   Pain Score No Pain   Restrictions/Precautions   Weight Bearing Precautions Per Order No   Other Precautions Impulsive;Cognitive;Chair Alarm;Bed Alarm;Multiple lines;Fall Risk;Pain   Lifestyle   Autonomy I w/ ADLs, I w/ IADLs, I w/ functional mobility, receives assistance w/ transfers   Reciprocal Relationships Spouse, son, daughter, grandchildren   Service to Others Retired   Intrinsic Gratification Watching TV   ADL   Where Assessed Chair   Eating Assistance 4  Minimal Assistance   Eating Deficit Setup;Supervision/safety;Bringing food to mouth assist;Thickened liquids;Beverage management;Scoop assist   Eating Comments Assist 2/2 R hand ataxia/incoordination   Grooming Assistance 4  Minimal Assistance   Grooming Deficit Wash/dry hands;Wash/dry face   Grooming Comments Assist +VCs required to wash face 2/2 decreased motor planning and attention   UB Bathing Assistance 4  Minimal Assistance   UB Bathing Deficit Chest;Right arm;Left arm;Abdomen   LB Bathing Assistance 2  Maximal Assistance   LB Bathing Deficit Perineal area;Buttocks;Right upper leg;Left upper leg;Right lower leg including foot;Left lower leg including foot   UB Dressing Assistance 3  Moderate Assistance   UB Dressing Deficit Thread RUE;Thread LUE;Pull around back   LB Dressing Assistance 2  Maximal Assistance   LB Dressing Deficit Don/doff R sock;Don/doff L sock   Bed Mobility   Supine to Sit 3  Moderate assistance   Additional items Assist x  "1;HOB elevated;Bedrails;Increased time required;Verbal cues;LE management   Sit to Supine Unable to assess   Additional Comments Pt in bed upon arrival. Required Mod Ax1 for Sup>sit with min A progressing to close SUP for seated balance at EOB. Pt OOB in recliner post session, all needs met, call bell within reach. +chair alarm on   Transfers   Sit to Stand 3  Moderate assistance   Additional items Assist x 2;Increased time required;Verbal cues   Stand to Sit 3  Moderate assistance   Additional items Assist x 2;Increased time required;Verbal cues   Additional Comments Initial stand mod Ax2 with b/l HHA and knee block, progressed to mod Ax2 with RW from chair.   Functional Mobility   Functional Mobility 3  Moderate assistance   Additional Comments Ax2, steps from bed>chair. b/l HHA.   Additional items Hand hold assistance   Subjective   Subjective \"I'm just frustrated\"   Cognition   Overall Cognitive Status Impaired   Arousal/Participation Alert;Cooperative   Attention Attends with cues to redirect   Orientation Level Oriented X4   Memory Decreased recall of precautions   Following Commands Follows one step commands with increased time or repetition   Comments Pt very pleasant and cooperative, motivated to participate. Pt very friendly and able to joke t/o session with family and staff. Pt with significant difficulty on this date motor planning , specifically with R UE, required frequent VCs and tactile cues for motor planning daily tasks such as washing face, picking up juice cup, etc. Pt also at times requires VCs for attention to task. VCs for insight.   Activity Tolerance   Activity Tolerance Patient limited by fatigue   Medical Staff Made Aware RN cleared for therapy, PT Elissa   Assessment   Assessment Patient participated in Skilled OT session this date with interventions consisting of ADL re training with the use of correct body mechnaics, Energy Conservation techniques, safety awareness and fall prevention " techniques,  therapeutic activities to: increase activity tolerance, and increase OOB/ sitting tolerance . Patient agreeable to OT treatment session, upon arrival patient was found supine in bed, alert, responsive , and in no apparent distress.  In comparison to previous session, patient with improvements in ADL completion, OOB tolerance, functional mobility. Patient requiring one step directives, frequent rest periods, and ocassional safety reminders. Pt completed functional STS txfs and functional mobility with mod Ax2, HHA progressing to RW. Pt required Min A for eating and min A for grooming 2/2 RUE impairments. Mod A for UB dressing and bathing. Max A for LB dressing and bathing. Patient continues to be functioning below baseline level, occupational performance remains limited secondary to factors listed above and increased risk for falls and injury. The patient's raw score on the -PAC Daily Activity Inpatient Short Form is 14. A raw score of less than 19 suggests the patient may benefit from discharge to post-acute rehabilitation services. Please refer to the recommendation of the Occupational Therapist for safe discharge planning. From OT standpoint, recommendation at time of d/c would be Level 1 resources.  Patient to benefit from continued Occupational Therapy treatment while in the hospital to address deficits as defined above and maximize level of functional independence with ADLs and functional mobility.   Plan   Treatment Interventions ADL retraining;Functional transfer training;Endurance training;Cognitive reorientation;Patient/family training;Equipment evaluation/education;Compensatory technique education;Continued evaluation;Energy conservation;Activityengagement   Goal Expiration Date 06/10/25   OT Treatment Day 1   OT Frequency 3-5x/wk   Discharge Recommendation   Rehab Resource Intensity Level, OT I (Maximum Resource Intensity)   AM-PAC Daily Activity Inpatient   Lower Body Dressing 2   Bathing  2   Toileting 2   Upper Body Dressing 2   Grooming 3   Eating 3   Daily Activity Raw Score 14   Daily Activity Standardized Score (Calc for Raw Score >=11) 33.39   AM-PAC Applied Cognition Inpatient   Following a Speech/Presentation 3   Understanding Ordinary Conversation 4   Taking Medications 3   Remembering Where Things Are Placed or Put Away 3   Remembering List of 4-5 Errands 2   Taking Care of Complicated Tasks 2   Applied Cognition Raw Score 17   Applied Cognition Standardized Score 36.52   End of Consult   Education Provided Yes;Family or social support of family present for education by provider   Patient Position at End of Consult Bedside chair;All needs within reach;Bed/Chair alarm activated   Nurse Communication Nurse aware of consult         Chao Briones MS, OTR/L     [facility] CERTIFIED RATER ID [id number]

## 2025-05-29 NOTE — CASE MANAGEMENT
Case Management Discharge Planning Note    Patient name Deonte Attarian  Location [facility] 826/[facility] 826-01 MRN [id number]  : 1950 Date 2025       Current Admission Date: 2025  Current Admission Diagnosis:Subdural hematoma (HCC)   Patient Active Problem List    Diagnosis Date Noted    Weight loss 2025    Frailty 2025    Acute pain 2025    Insomnia 2025    At risk for delirium 2025    Subdural hematoma (HCC) 2025    Leg swelling 2025    Severe obesity (BMI 35.0-39.9) with comorbidity ([facility]) 2025    Bradycardia, sinus 2025    Atrial septal aneurysm 2025    Normocytic anemia 2025    Syncope 2025    Thrombocytopenia ([facility]) 2025    Abnormal echocardiogram 2025    Groin pain, right 2025    Fall 2025    Hypocitraturia 2024    Neuropathy 2019    Hyperuricemia 2016    Mixed hyperlipidemia 2016    Onychomycosis 2015    PVD (peripheral vascular disease) ([facility]) 10/07/2015    POPPY (obstructive sleep apnea) 10/07/2015    Diabetes mellitus ([facility]) 2015    S/P hip replacement 2014    Osteoarthritis 2008      LOS (days): 5  Geometric Mean LOS (GMLOS) (days): 6.5  Days to GMLOS:1.5     OBJECTIVE:  Risk of Unplanned Readmission Score: 16.69         Current admission status: Inpatient   Preferred Pharmacy:   [facility] - [place] - Located within [facility]  [address] [facility]  [place] [address]  Phone: [phone] Fax: [phone]    [facility]/pharmacy #[id number] - [place] - [address]  [address] [place] [address]  [place] [address]  Phone: [phone] Fax: [phone]    Primary Care Provider: Garcia Cartwright MD    Primary Insurance: [facility]  Secondary Insurance:     DISCHARGE DETAILS:    Pt clinically accepted to [facility]  CM will submit for auth  Of note, there are no available beds until next week in [place]

## 2025-05-29 NOTE — ASSESSMENT & PLAN NOTE
- Initially noted to have large acute on chronic bilateral subdural hematomas   - Appreciate Neurosurgery evaluation -signed off at this time   - s/p bilateral [place] 5/25 with 4 subdural drains    - 2 drains removed 5/27   - 2 drains removed 5/28  - MMA embolization unable to be completed secondary to anatomy   - Neuro exam: GCS 15, non-focal  - Continue neurologic checks: Every 4 hours  - Complete 7 day course of Keppra for seizure prophylaxis  - Chemical DVT prophylaxis: Lovenox  - Hold all anticoagulants and anti platelet medications for 2 weeks and/or until cleared by Neurosurgery to resume.  - PT and OT (including cognitive evaluation) evaluation and treatment as indicated.

## 2025-05-29 NOTE — PROGRESS NOTES
Patient:    MRN:  [id number]    Adilia Request ID:  [id number]    Level of care reserved:  Inpatient Rehab Facility    Partner Reserved:  [facility] - ([place]), [place] [id number] [phone]    Clinical needs requested:    Geography searched:  10 miles around [id number]    Start of Service:    Request sent:  11:07am EDT on 5/29/2025 by Alxeey Reaves    Partner reserved:  2:44pm EDT on 5/29/2025 by Alexey Reaves    Choice list shared:  2:44pm EDT on 5/29/2025 by Alexey Reaves

## 2025-05-29 NOTE — PLAN OF CARE
Problem: PAIN - ADULT  Goal: Verbalizes/displays adequate comfort level or baseline comfort level  Description: Interventions:  - Encourage patient to monitor pain and request assistance  - Assess pain using appropriate pain scale  - Administer analgesics as ordered based on type and severity of pain and evaluate response  - Implement non-pharmacological measures as appropriate and evaluate response  - Consider cultural and social influences on pain and pain management  - Notify physician/advanced practitioner if interventions unsuccessful or patient reports new pain  - Educate patient/family on pain management process including their role and importance of  reporting pain   - Provide non-pharmacologic/complimentary pain relief interventions  Outcome: Progressing     Problem: INFECTION - ADULT  Goal: Absence or prevention of progression during hospitalization  Description: INTERVENTIONS:  - Assess and monitor for signs and symptoms of infection  - Monitor lab/diagnostic results  - Monitor all insertion sites, i.e. indwelling lines, tubes, and drains  - Monitor endotracheal if appropriate and nasal secretions for changes in amount and color  - [place] appropriate cooling/warming therapies per order  - Administer medications as ordered  - Instruct and encourage patient and family to use good hand hygiene technique  - Identify and instruct in appropriate isolation precautions for identified infection/condition  Outcome: Progressing  Goal: Absence of fever/infection during neutropenic period  Description: INTERVENTIONS:  - Monitor WBC  - Perform strict hand hygiene  - Limit to healthy visitors only  - No plants, dried, fresh or silk flowers with zelaya in patient room  Outcome: Progressing     Problem: SAFETY ADULT  Goal: Patient will remain free of falls  Description: INTERVENTIONS:  - Educate patient/family on patient safety including physical limitations  - Instruct patient to call for assistance with activity   -  Consider consulting OT/PT to assist with strengthening/mobility based on AM PAC & JH-HLM score  - Consult OT/PT to assist with strengthening/mobility   - Keep Call bell within reach  - Keep bed low and locked with side rails adjusted as appropriate  - Keep care items and personal belongings within reach  - Initiate and maintain comfort rounds  - Make Fall Risk Sign visible to staff  - Offer Toileting every 2 Hours, in advance of need  - Initiate/Maintain bed/chair alarm  - Obtain necessary fall risk management equipment:   - Apply yellow socks and bracelet for high fall risk patients  - Consider moving patient to room near nurses station  Outcome: Progressing  Goal: Maintain or return to baseline ADL function  Description: INTERVENTIONS:  -  Assess patient's ability to carry out ADLs; assess patient's baseline for ADL function and identify physical deficits which impact ability to perform ADLs (bathing, care of mouth/teeth, toileting, grooming, dressing, etc.)  - Assess/evaluate cause of self-care deficits   - Assess range of motion  - Assess patient's mobility; develop plan if impaired  - Assess patient's need for assistive devices and provide as appropriate  - Encourage maximum independence but intervene and supervise when necessary  - Involve family in performance of ADLs  - Assess for home care needs following discharge   - Consider OT consult to assist with ADL evaluation and planning for discharge  - Provide patient education as appropriate  - Monitor functional capacity and physical performance, use of AM PAC & JH-HLM   - Monitor gait, balance and fatigue with ambulation    Outcome: Progressing  Goal: Maintains/Returns to pre admission functional level  Description: INTERVENTIONS:  - Perform AM-PAC 6 Click Basic Mobility/ Daily Activity assessment daily.  - Set and communicate daily mobility goal to care team and patient/family/caregiver.   - Collaborate with rehabilitation services on mobility goals if  consulted  - Perform Range of Motion 3 times a day.  - Reposition patient every 2 hours.  - Dangle patient 3 times a day  - Stand patient 3 times a day  - Ambulate patient 3 times a day  - Out of bed to chair 3 times a day   - Out of bed for meals 3 times a day  - Out of bed for toileting  - Record patient progress and toleration of activity level   Outcome: Progressing     Problem: DISCHARGE PLANNING  Goal: Discharge to home or other facility with appropriate resources  Description: INTERVENTIONS:  - Identify barriers to discharge w/patient and caregiver  - Arrange for needed discharge resources and transportation as appropriate  - Identify discharge learning needs (meds, wound care, etc.)  - Arrange for interpretive services to assist at discharge as needed  - Refer to Case Management Department for coordinating discharge planning if the patient needs post-hospital services based on physician/advanced practitioner order or complex needs related to functional status, cognitive ability, or social support system  Outcome: Progressing     Problem: Knowledge Deficit  Goal: Patient/family/caregiver demonstrates understanding of disease process, treatment plan, medications, and discharge instructions  Description: Complete learning assessment and assess knowledge base.  Interventions:  - Provide teaching at level of understanding  - Provide teaching via preferred learning methods  Outcome: Progressing     Problem: Prexisting or High Potential for Compromised Skin Integrity  Goal: Skin integrity is maintained or improved  Description: INTERVENTIONS:  - Identify patients at risk for skin breakdown  - Assess and monitor skin integrity including under and around medical devices   - Assess and monitor nutrition and hydration status  - Monitor labs  - Assess for incontinence   - Turn and reposition patient  - Assist with mobility/ambulation  - Relieve pressure over verónica prominences   - Avoid friction and shearing  - Provide  appropriate hygiene as needed including keeping skin clean and dry  - Evaluate need for skin moisturizer/barrier cream  - Collaborate with interdisciplinary team  - Patient/family teaching  - Consider wound care consult    Assess:  - Review Zaid scale daily  - Clean and moisturize skin every shift  - Inspect skin when repositioning, toileting, and assisting with ADLS  - Assess under medical devices such as venodynes every 2 hours  - Assess extremities for adequate circulation and sensation     Bed Management:  - Have minimal linens on bed & keep smooth, unwrinkled  - Change linens as needed when moist or perspiring  - Avoid sitting or lying in one position for more than 2 hours while in bed Keep HOB at 20degrees   - Toileting:  - Offer bedside commode  - Assess for incontinence every 2 hours  - Use incontinent care products after each incontinent episode such as skin protectant    Activity:  - Mobilize patient 3 times a day  - Encourage activity and walks on unit  - Encourage or provide ROM exercises   - Turn and reposition patient every 2 Hours  - Use appropriate equipment to lift or move patient in bed  - Instruct/ Assist with weight shifting every 30 minutes when out of bed in chair  - Consider limitation of chair time 2 hour intervals    Skin Care:  - Avoid use of baby powder, tape, friction and shearing, hot water or constrictive clothing  - Relieve pressure over bony prominences using pillows/wedges  - Do not massage red bony areas    Next Steps:  - Teach patient strategies to minimize risks such as weight shift/turn and reposition  - Consider consults to  interdisciplinary teams such as PT/OT  Outcome: Progressing     Problem: Nutrition/Hydration-ADULT  Goal: Nutrient/Hydration intake appropriate for improving, restoring or maintaining nutritional needs  Description: Monitor and assess patient's nutrition/hydration status for malnutrition. Collaborate with interdisciplinary team and initiate plan and  interventions as ordered.  Monitor patient's weight and dietary intake as ordered or per policy. Utilize nutrition screening tool and intervene as necessary. Determine patient's food preferences and provide high-protein, high-caloric foods as appropriate.     INTERVENTIONS:  - Monitor oral intake, urinary output, labs, and treatment plans  - Assess nutrition and hydration status and recommend course of action  - Evaluate amount of meals eaten  - Assist patient with eating if necessary   - Allow adequate time for meals  - Recommend/ encourage appropriate diets, oral nutritional supplements, and vitamin/mineral supplements  - Order, calculate, and assess calorie counts as needed  - Recommend, monitor, and adjust tube feedings and TPN/PPN based on assessed needs  - Assess need for intravenous fluids  - Provide specific nutrition/hydration education as appropriate  - Include patient/family/caregiver in decisions related to nutrition  Outcome: Progressing     Problem: NEUROSENSORY - ADULT  Goal: Achieves stable or improved neurological status  Description: INTERVENTIONS  - Monitor and report changes in neurological status  - Monitor vital signs such as temperature, blood pressure, glucose, and any other labs ordered   - Initiate measures to prevent increased intracranial pressure  - Monitor for seizure activity and implement precautions if appropriate      Outcome: Progressing  Goal: Achieves maximal functionality and self care  Description: INTERVENTIONS  - Monitor swallowing and airway patency with patient fatigue and changes in neurological status  - Encourage and assist patient to increase activity and self care.   - Encourage visually impaired, hearing impaired and aphasic patients to use assistive/communication devices  Outcome: Progressing     Problem: CARDIOVASCULAR - ADULT  Goal: Maintains optimal cardiac output and hemodynamic stability  Description: INTERVENTIONS:  - Monitor I/O, vital signs and rhythm  -  Monitor for S/S and trends of decreased cardiac output  - Administer and titrate ordered vasoactive medications to optimize hemodynamic stability  - Assess quality of pulses, skin color and temperature  - Assess for signs of decreased coronary artery perfusion  - Instruct patient to report change in severity of symptoms  Outcome: Progressing  Goal: Absence of cardiac dysrhythmias or at baseline rhythm  Description: INTERVENTIONS:  - Continuous cardiac monitoring, vital signs, obtain 12 lead EKG if ordered  - Administer antiarrhythmic and heart rate control medications as ordered  - Monitor electrolytes and administer replacement therapy as ordered  Outcome: Progressing     Problem: RESPIRATORY - ADULT  Goal: Achieves optimal ventilation and oxygenation  Description: INTERVENTIONS:  - Assess for changes in respiratory status  - Assess for changes in mentation and behavior  - Position to facilitate oxygenation and minimize respiratory effort  - Oxygen administered by appropriate delivery if ordered  - Initiate smoking cessation education as indicated  - Encourage broncho-pulmonary hygiene including cough, deep breathe, Incentive Spirometry  - Assess the need for suctioning and aspirate as needed  - Assess and instruct to report SOB or any respiratory difficulty  - Respiratory Therapy support as indicated  Outcome: Progressing     Problem: GASTROINTESTINAL - ADULT  Goal: Minimal or absence of nausea and/or vomiting  Description: INTERVENTIONS:  - Administer IV fluids if ordered to ensure adequate hydration  - Maintain NPO status until nausea and vomiting are resolved  - Nasogastric tube if ordered  - Administer ordered antiemetic medications as needed  - Provide nonpharmacologic comfort measures as appropriate  - Advance diet as tolerated, if ordered  - Consider nutrition services referral to assist patient with adequate nutrition and appropriate food choices  Outcome: Progressing  Goal: Maintains or returns to  baseline bowel function  Description: INTERVENTIONS:  - Assess bowel function  - Encourage oral fluids to ensure adequate hydration  - Administer IV fluids if ordered to ensure adequate hydration  - Administer ordered medications as needed  - Encourage mobilization and activity  - Consider nutritional services referral to assist patient with adequate nutrition and appropriate food choices  Outcome: Progressing  Goal: Maintains adequate nutritional intake  Description: INTERVENTIONS:  - Monitor percentage of each meal consumed  - Identify factors contributing to decreased intake, treat as appropriate  - Assist with meals as needed  - Monitor I&O, weight, and lab values if indicated  - Obtain nutrition services referral as needed  Outcome: Progressing     Problem: GENITOURINARY - ADULT  Goal: Maintains or returns to baseline urinary function  Description: INTERVENTIONS:  - Assess urinary function  - Encourage oral fluids to ensure adequate hydration if ordered  - Administer IV fluids as ordered to ensure adequate hydration  - Administer ordered medications as needed  - Offer frequent toileting  - Follow urinary retention protocol if ordered  Outcome: Progressing  Goal: Absence of urinary retention  Description: INTERVENTIONS:  - Assess patient’s ability to void and empty bladder  - Monitor I/O  - Bladder scan as needed  - Discuss with physician/AP medications to alleviate retention as needed  - Discuss catheterization for long term situations as appropriate  Outcome: Progressing     Problem: METABOLIC, FLUID AND ELECTROLYTES - ADULT  Goal: Electrolytes maintained within normal limits  Description: INTERVENTIONS:  - Monitor labs and assess patient for signs and symptoms of electrolyte imbalances  - Administer electrolyte replacement as ordered  - Monitor response to electrolyte replacements, including repeat lab results as appropriate  - Instruct patient on fluid and nutrition as appropriate  Outcome:  Progressing  Goal: Fluid balance maintained  Description: INTERVENTIONS:  - Monitor labs   - Monitor I/O and WT  - Instruct patient on fluid and nutrition as appropriate  - Assess for signs & symptoms of volume excess or deficit  Outcome: Progressing  Goal: Glucose maintained within target range  Description: INTERVENTIONS:  - Monitor Blood Glucose as ordered  - Assess for signs and symptoms of hyperglycemia and hypoglycemia  - Administer ordered medications to maintain glucose within target range  - Assess nutritional intake and initiate nutrition service referral as needed  Outcome: Progressing     Problem: SKIN/TISSUE INTEGRITY - ADULT  Goal: Skin Integrity remains intact(Skin Breakdown Prevention)  Description: Assess:  -Perform Zaid assessment every shift  -Clean and moisturize skin every shift  -Inspect skin when repositioning, toileting, and assisting with ADLS  -Assess under medical devices such as venodynes every 2 hours  -Assess extremities for adequate circulation and sensation     Bed Management:  -Have minimal linens on bed & keep smooth, unwrinkled  -Change linens as needed when moist or perspiring  -Avoid sitting or lying in one position for more than 2 hours while in bed  -Keep HOB at 20degrees     Toileting:  -Offer bedside commode  -Assess for incontinence every 2 hours  -Use incontinent care products after each incontinent episode such as skin protectant    Activity:  -Mobilize patient 3 times a day  -Encourage activity and walks on unit  -Encourage or provide ROM exercises   -Turn and reposition patient every 2 Hours  -Use appropriate equipment to lift or move patient in bed  -Instruct/ Assist with weight shifting every 30 minutes when out of bed in chair  -Consider limitation of chair time 2 hour intervals    Skin Care:  -Avoid use of baby powder, tape, friction and shearing, hot water or constrictive clothing  -Relieve pressure over bony prominences using pillows/wedges  -Do not massage red  bony areas    Next Steps:  -Teach patient strategies to minimize risks such as weight shift/turn and reposition   -Consider consults to  interdisciplinary teams such as PT/OT  Outcome: Progressing  Goal: Incision(s), wounds(s) or drain site(s) healing without S/S of infection  Description: INTERVENTIONS  - Assess and document dressing, incision, wound bed, drain sites and surrounding tissue  - Provide patient and family education  - Perform skin care/dressing changes as ordered  Outcome: Progressing     Problem: HEMATOLOGIC - ADULT  Goal: Maintains hematologic stability  Description: INTERVENTIONS  - Assess for signs and symptoms of bleeding or hemorrhage  - Monitor labs  - Administer supportive blood products/factors as ordered and appropriate  Outcome: Progressing     Problem: MUSCULOSKELETAL - ADULT  Goal: Maintain or return mobility to safest level of function  Description: INTERVENTIONS:  - Assess patient's ability to carry out ADLs; assess patient's baseline for ADL function and identify physical deficits which impact ability to perform ADLs (bathing, care of mouth/teeth, toileting, grooming, dressing, etc.)  - Assess/evaluate cause of self-care deficits   - Assess range of motion  - Assess patient's mobility  - Assess patient's need for assistive devices and provide as appropriate  - Encourage maximum independence but intervene and supervise when necessary  - Involve family in performance of ADLs  - Assess for home care needs following discharge   - Consider OT consult to assist with ADL evaluation and planning for discharge  - Provide patient education as appropriate  Outcome: Progressing     Problem: Potential for Falls  Goal: Patient will remain free of falls  Description: INTERVENTIONS:  - Educate patient/family on patient safety including physical limitations  - Instruct patient to call for assistance with activity   - Consider consulting OT/PT to assist with strengthening/mobility based on AM PAC &  JH-HLM score  - Consult OT/PT to assist with strengthening/mobility   - Keep Call bell within reach  - Keep bed low and locked with side rails adjusted as appropriate  - Keep care items and personal belongings within reach  - Initiate and maintain comfort rounds  - Make Fall Risk Sign visible to staff  - Offer Toileting every 2 Hours, in advance of need  - Initiate/Maintain bed/chair alarm  - Obtain necessary fall risk management equipment:   - Apply yellow socks and bracelet for high fall risk patients  - Consider moving patient to room near nurses station  Outcome: Progressing     Problem: COPING  Goal: Pt/Family able to verbalize concerns and demonstrate effective coping strategies  Description: INTERVENTIONS:  - Assist patient/family to identify coping skills, available support systems and cultural and spiritual values  - Provide emotional support, including active listening and acknowledgement of concerns of patient and caregivers  - Reduce environmental stimuli, as able  - Provide patient education  - Assess for spiritual pain/suffering and initiate spiritual care, including notification of Pastoral Care or pawan based community as needed  - Assess effectiveness of coping strategies  Outcome: Progressing  Goal: Will report anxiety at manageable levels  Description: INTERVENTIONS:  - Administer medication as ordered  - Teach and encourage coping skills  - Provide emotional support  - Assess patient/family for anxiety and ability to cope  Outcome: Progressing

## 2025-05-29 NOTE — PLAN OF CARE
"  Problem: PHYSICAL THERAPY ADULT  Goal: Performs mobility at highest level of function for planned discharge setting.  See evaluation for individualized goals.  Description: Treatment/Interventions: OT, Spoke to case management, Spoke to nursing, Gait training, Bed mobility, Patient/family training, Endurance training, LE strengthening/ROM, Functional transfer training          See flowsheet documentation for full assessment, interventions and recommendations.  Outcome: Progressing  Note: Prognosis: Good  Problem List: Decreased strength, Decreased endurance, Impaired balance, Decreased mobility  Assessment: Pt seen for PT treatment session this date.Pt motivated to participate, does get frustrated at times stating \"need to do better\". Pt does demonstrate improvement in overall functional level this session. Completes multiple functional transfers, improved sitting tolerance and alertness, improved transfer levels and ambulation attempt. Pt was left in recliner at the end of PT session with all needs in reach. Pt would benefit from continued PT services while in hospital to address remaining limitations. The patient's AM-PAC Basic Mobility Inpatient Short Form Raw Score is 11. A Raw score of less than or equal to 16 suggests the patient may benefit from discharge to post-acute rehabilitation services. Please also refer to the recommendation of the Physical Therapist for safe discharge planning. Post dc recommendation is Level I at this time.        Rehab Resource Intensity Level, PT: I (Maximum Resource Intensity)    See flowsheet documentation for full assessment.        "

## 2025-05-29 NOTE — DISCHARGE SUPPORT
Case Management Assessment & Discharge Planning Note    Patient name Deonte Attarian  Location [facility] 826/[facility] 826-01 MRN [id number]  : 1950 Date 2025       Current Admission Date: 2025  Current Admission Diagnosis:Subdural hematoma (HCC)   Patient Active Problem List    Diagnosis Date Noted    Weight loss 2025    Frailty 2025    Acute pain 2025    Insomnia 2025    At risk for delirium 2025    Subdural hematoma (HCC) 2025    Leg swelling 2025    Severe obesity (BMI 35.0-39.9) with comorbidity ([facility]) 2025    Bradycardia, sinus 2025    Atrial septal aneurysm 2025    Normocytic anemia 2025    Syncope 2025    Thrombocytopenia ([facility]) 2025    Abnormal echocardiogram 2025    Groin pain, right 2025    Fall 2025    Hypocitraturia 2024    Neuropathy 2019    Hyperuricemia 2016    Mixed hyperlipidemia 2016    Onychomycosis 2015    PVD (peripheral vascular disease) ([facility]) 10/07/2015    POPPY (obstructive sleep apnea) 10/07/2015    Diabetes mellitus ([facility]) 2015    S/P hip replacement 2014    Osteoarthritis 2008      LOS (days): 5  Geometric Mean LOS (GMLOS) (days): 6.5  Days to GMLOS:1.5   Facility Authorization Initiated  [facility] received request for auth from : Will A  Authorization Request Submitted for: Acute Rehab  Requested Start of Care Date: 25  Facility Name: [facility]  Facility NPI: [id number]  Facility MD: Ashley DePadua  Facility MD NPI: [id number]  Authorization initiated by contacting insurance: [employer]  Via: [employer]  Clinicals submitted via: Portal Attachment  Pending reference #: [id number]   notified: Will A     Updates to authorization status will be noted in chart.    Please reach out to CM for updates on any clinical information.

## 2025-05-29 NOTE — WOUND OSTOMY CARE
Consult Note - Wound   Deonte Attarian 74 y.o. male MRN: [id number]  Unit/Bed#: [facility] 826-01 Encounter: [id number]        Assessment :   Patient admitted to hospitals due to subdural hematoma. Wound care nurse re-consulted for stage 1 pressure injury to buttock. Patient is agreeable to assessment, assist of 2 to stand for assessment, is OOB to chair with EHOB waffle cushion in place, is an assist with care, continent of bowel and bladder. Wound care team completed full skin and wound assessment 5/28, today's assessment was of the buttock region.     1. Bilateral buttock- Skin is dry, intact, blanchable pink and red skin.     Educated patient on importance of frequent offloading of pressure via turning, repositioning, and weight-shifting. Verbalized understanding of plan of care.    See flow sheets for more detailed assessment findings. Will follow along.    Skin care plans:  1-Cleanse B/L Heels and Sacro-Buttocks with soap and water. Pat dry. Apply Silicone Border Foam (Mepilex) to areas. Gaston with P for Prevention and change every 3 days or PRN soilage/displacement. Peel back and inspect Q-shift.  2-Elevate heels to offload pressure.  3-Ehob cushion in chair when out of bed.  4-Moisturize skin daily with skin nourishing cream.  5-Turn/reposition q2h for pressure re-distribution on skin.  6-Left Pretibial leg wound: cleanse left lower leg wound with soap and water, pat dry, apply xeroform to wound bed and cover with foam dressing. Change every other day.       Wound 05/28/25 Pressure Injury Buttocks Right (Active)   Wound Image   05/29/25 0954   Wound Description Dry;Intact 05/29/25 0954        Wound Length (cm) 0 cm 05/29/25 0954   Wound Width (cm) 0 cm 05/29/25 0954   Wound Depth (cm) 0 cm 05/29/25 0954   Wound Surface Area (cm^2) 0 cm^2 05/29/25 0954   Wound Volume (cm^3) 0 cm^3 05/29/25 0954   Calculated Wound Volume (cm^3) 0 cm^3 05/29/25 0954       Contact through [employer] Secure Chat with any questions  Wound Care will  continue to follow while inpatient    Marisa Stanley BSN RN [facility]  Wound and Ostomy care

## 2025-05-29 NOTE — PLAN OF CARE
Problem: OCCUPATIONAL THERAPY ADULT  Goal: Performs self-care activities at highest level of function for planned discharge setting.  See evaluation for individualized goals.  Description: Treatment Interventions: ADL retraining, Functional transfer training, UE strengthening/ROM, Endurance training, Patient/family training, Equipment evaluation/education, Compensatory technique education, Continued evaluation, Energy conservation, Activityengagement          See flowsheet documentation for full assessment, interventions and recommendations.   Outcome: Progressing  Note: Limitation: Decreased ADL status, Decreased UE ROM, Decreased UE strength, Decreased Safe judgement during ADL, Decreased endurance, Decreased self-care trans, Decreased high-level ADLs  Prognosis: Fair  Assessment: Patient participated in Skilled OT session this date with interventions consisting of ADL re training with the use of correct body mechnaics, Energy Conservation techniques, safety awareness and fall prevention techniques,  therapeutic activities to: increase activity tolerance, and increase OOB/ sitting tolerance . Patient agreeable to OT treatment session, upon arrival patient was found supine in bed, alert, responsive , and in no apparent distress.  In comparison to previous session, patient with improvements in ADL completion, OOB tolerance, functional mobility. Patient requiring one step directives, frequent rest periods, and ocassional safety reminders. Pt completed functional STS txfs and functional mobility with mod Ax2, HHA progressing to RW. Pt required Min A for eating and min A for grooming 2/2 RUE impairments. Mod A for UB dressing and bathing. Max A for LB dressing and bathing. Patient continues to be functioning below baseline level, occupational performance remains limited secondary to factors listed above and increased risk for falls and injury. The patient's raw score on the AM-PAC Daily Activity Inpatient Short Form  is 14. A raw score of less than 19 suggests the patient may benefit from discharge to post-acute rehabilitation services. Please refer to the recommendation of the Occupational Therapist for safe discharge planning. From OT standpoint, recommendation at time of d/c would be Level 1 resources.  Patient to benefit from continued Occupational Therapy treatment while in the hospital to address deficits as defined above and maximize level of functional independence with ADLs and functional mobility.  Recommendation: Physiatry Consult  Rehab Resource Intensity Level, OT: I (Maximum Resource Intensity)

## 2025-05-29 NOTE — ASSESSMENT & PLAN NOTE
- Hold home metformin   - Continue SSI for coverage  - Adjust and start basal bolus coverage as needed

## 2025-05-29 NOTE — PROGRESS NOTES
Progress Note - Trauma   Name: Deonte Greenfield 74 y.o. male I MRN: [id number]  Unit/Bed#: [facility] 826-01 I Date of Admission: 5/24/2025   Date of Service: 5/29/2025 I Hospital Day: 5    Assessment & Plan  Fall  - Status post fall with the below noted injuries.  - Fall precautions.  - Geriatric Medicine consultation for evaluation, medication review and recommendations.  - PT and OT evaluation and treatment as indicated.  - Case Management consultation for disposition planning.    Subdural hematoma (HCC)  - Initially noted to have large acute on chronic bilateral subdural hematomas   - Appreciate Neurosurgery evaluation -signed off at this time   - s/p bilateral [place] 5/25 with 4 subdural drains    - 2 drains removed 5/27   - 2 drains removed 5/28  - MMA embolization unable to be completed secondary to anatomy   - Neuro exam: GCS 15, non-focal  - Continue neurologic checks: Every 4 hours  - Complete 7 day course of Keppra for seizure prophylaxis  - Chemical DVT prophylaxis: Lovenox  - Hold all anticoagulants and anti platelet medications for 2 weeks and/or until cleared by Neurosurgery to resume.  - PT and OT (including cognitive evaluation) evaluation and treatment as indicated.  Diabetes mellitus (HCC)  - Hold home metformin   - Continue SSI for coverage  - Adjust and start basal bolus coverage as needed  Thrombocytopenia (HCC)  - Chronic thrombocytopenia   - Outpatient workup     Bowel Regimen: Miralax, Senokot  VTE Prophylaxis:VTE covered by:  enoxaparin, Subcutaneous        Disposition: Continue med/surg status with ongoing medical management  Please contact the SecureChat role for the Trauma service with any questions/concerns.    24 Hour Events : Transfer from ICU   Subjective : Patient reports he is frustrated with his neuro deficits, other wise he feels good and is tolerating his diet. He was able to get out of bed this morning and feed himself.     Objective :  Temp:  [98 °F (36.7 °C)-100.2 °F (37.9 °C)]  98.4 °F (36.9 °C)  HR:  [75-98] 75  BP: (116-160)/(48-74) 141/74  Resp:  [16-24] 20  SpO2:  [92 %-99 %] 92 %  O2 Device: None (Room air)    I/O         05/27 0701 05/28 0700 05/28 0701 05/29 0700 05/29 0701 05/30 0700    P.O. 360 720     I.V. (mL/kg) 500 (5)      IV Piggyback 150      Total Intake(mL/kg) 1010 (10) 720 (7.1)     Urine (mL/kg/hr) 725 (0.3) 1450 (0.6)     Drains 43 0     Stool  0     Total Output 768 1450     Net +242 -730            Unmeasured Stool Occurrence  0 x           Lines/Drains/Airways       Active Status       Name Placement date Placement time Site Days    External Urinary Catheter 05/25/25  1800  -- 3                  Gen: No acute distress resting comfortably in bed  Neuro: AAOx3, GCS 15, Right upper extremity weakness with absent extension at fingers  HEENT: PERRLA, EOMI, mucous membranes moist  Cards: RRR, S1, S2 without murmur rub or gallop  Pulm: Clear to auscultation bilaterally without wheezes rales or rhonchi  GI: Soft, nontender, nondistended  : Voiding independently  MSK: Extremities nontender without deformity  Skin: Warm, dry, scalp incisions CDI with sutures in place, no erythema, induration, or drainage.            Lab Results: I have reviewed the following results:  Recent Labs     05/27/25  0602 05/28/25  0456 05/29/25  0612   WBC  --  10.35* 8.94   HGB  --  10.4* 11.3*   HCT  --  31.7* 35.3*   PLT  --  141* 172   SODIUM  --  133* 134*   K  --  3.9 4.1   CL  --  100 101   CO2  --  25 27   BUN  --  26* 25   CREATININE  --  0.86 0.74   GLUC  --  126 136   CAIONIZED 1.11*  --   --    MG  --  2.2  --    PHOS  --  3.0  --

## 2025-05-30 ENCOUNTER — APPOINTMENT (INPATIENT)
Dept: RADIOLOGY | Facility: HOSPITAL | Age: 75
DRG: 025 | End: 2025-05-30
Payer: COMMERCIAL

## 2025-05-30 PROBLEM — I63.9 ACUTE CVA (CEREBROVASCULAR ACCIDENT) (HCC): Status: ACTIVE | Noted: 2025-05-30

## 2025-05-30 LAB
GLUCOSE SERPL-MCNC: 123 MG/DL (ref 65–140)
GLUCOSE SERPL-MCNC: 123 MG/DL (ref 65–140)
GLUCOSE SERPL-MCNC: 152 MG/DL (ref 65–140)
GLUCOSE SERPL-MCNC: 170 MG/DL (ref 65–140)
GLUCOSE SERPL-MCNC: 97 MG/DL (ref 65–140)

## 2025-05-30 PROCEDURE — 99232 SBSQ HOSP IP/OBS MODERATE 35: CPT | Performed by: SURGERY

## 2025-05-30 PROCEDURE — 82948 REAGENT STRIP/BLOOD GLUCOSE: CPT

## 2025-05-30 PROCEDURE — 99223 1ST HOSP IP/OBS HIGH 75: CPT | Performed by: STUDENT IN AN ORGANIZED HEALTH CARE EDUCATION/TRAINING PROGRAM

## 2025-05-30 PROCEDURE — 70450 CT HEAD/BRAIN W/O DYE: CPT

## 2025-05-30 RX ORDER — LORAZEPAM 1 MG/1
1 TABLET ORAL ONCE AS NEEDED
Status: COMPLETED | OUTPATIENT
Start: 2025-05-30 | End: 2025-05-31

## 2025-05-30 RX ADMIN — ATENOLOL 50 MG: 50 TABLET ORAL at 07:57

## 2025-05-30 RX ADMIN — SENNOSIDES AND DOCUSATE SODIUM 1 TABLET: 50; 8.6 TABLET ORAL at 22:17

## 2025-05-30 RX ADMIN — ATORVASTATIN CALCIUM 10 MG: 10 TABLET, FILM COATED ORAL at 22:16

## 2025-05-30 RX ADMIN — DICLOFENAC SODIUM 2 G: 10 GEL TOPICAL at 16:44

## 2025-05-30 RX ADMIN — POLYETHYLENE GLYCOL 3350 17 G: 17 POWDER, FOR SOLUTION ORAL at 07:53

## 2025-05-30 RX ADMIN — LEVETIRACETAM 500 MG: 500 TABLET, FILM COATED ORAL at 22:17

## 2025-05-30 RX ADMIN — CHLORHEXIDINE GLUCONATE 15 ML: 1.2 SOLUTION ORAL at 07:53

## 2025-05-30 RX ADMIN — INSULIN LISPRO 2 UNITS: 100 INJECTION, SOLUTION INTRAVENOUS; SUBCUTANEOUS at 22:25

## 2025-05-30 RX ADMIN — ENOXAPARIN SODIUM 30 MG: 30 INJECTION SUBCUTANEOUS at 07:53

## 2025-05-30 RX ADMIN — ALLOPURINOL 300 MG: 100 TABLET ORAL at 07:57

## 2025-05-30 RX ADMIN — CHLORHEXIDINE GLUCONATE 15 ML: 1.2 SOLUTION ORAL at 22:17

## 2025-05-30 RX ADMIN — LEVETIRACETAM 500 MG: 500 TABLET, FILM COATED ORAL at 07:53

## 2025-05-30 NOTE — PLAN OF CARE
Problem: PAIN - ADULT  Goal: Verbalizes/displays adequate comfort level or baseline comfort level  Description: Interventions:  - Encourage patient to monitor pain and request assistance  - Assess pain using appropriate pain scale  - Administer analgesics as ordered based on type and severity of pain and evaluate response  - Implement non-pharmacological measures as appropriate and evaluate response  - Consider cultural and social influences on pain and pain management  - Notify physician/advanced practitioner if interventions unsuccessful or patient reports new pain  - Educate patient/family on pain management process including their role and importance of  reporting pain   - Provide non-pharmacologic/complimentary pain relief interventions  Outcome: Progressing     Problem: INFECTION - ADULT  Goal: Absence or prevention of progression during hospitalization  Description: INTERVENTIONS:  - Assess and monitor for signs and symptoms of infection  - Monitor lab/diagnostic results  - Monitor all insertion sites, i.e. indwelling lines, tubes, and drains  - Monitor endotracheal if appropriate and nasal secretions for changes in amount and color  - [place] appropriate cooling/warming therapies per order  - Administer medications as ordered  - Instruct and encourage patient and family to use good hand hygiene technique  - Identify and instruct in appropriate isolation precautions for identified infection/condition  Outcome: Progressing  Goal: Absence of fever/infection during neutropenic period  Description: INTERVENTIONS:  - Monitor WBC  - Perform strict hand hygiene  - Limit to healthy visitors only  - No plants, dried, fresh or silk flowers with zelaya in patient room  Outcome: Progressing     Problem: SAFETY ADULT  Goal: Patient will remain free of falls  Description: INTERVENTIONS:  - Educate patient/family on patient safety including physical limitations  - Instruct patient to call for assistance with activity   -  Consider consulting OT/PT to assist with strengthening/mobility based on AM PAC & JH-HLM score  - Consult OT/PT to assist with strengthening/mobility   - Keep Call bell within reach  - Keep bed low and locked with side rails adjusted as appropriate  - Keep care items and personal belongings within reach  - Initiate and maintain comfort rounds  - Make Fall Risk Sign visible to staff  - Offer Toileting every 2 Hours, in advance of need  - Initiate/Maintain bed/chair alarm  - Obtain necessary fall risk management equipment:   - Apply yellow socks and bracelet for high fall risk patients  - Consider moving patient to room near nurses station  Outcome: Progressing  Goal: Maintain or return to baseline ADL function  Description: INTERVENTIONS:  -  Assess patient's ability to carry out ADLs; assess patient's baseline for ADL function and identify physical deficits which impact ability to perform ADLs (bathing, care of mouth/teeth, toileting, grooming, dressing, etc.)  - Assess/evaluate cause of self-care deficits   - Assess range of motion  - Assess patient's mobility; develop plan if impaired  - Assess patient's need for assistive devices and provide as appropriate  - Encourage maximum independence but intervene and supervise when necessary  - Involve family in performance of ADLs  - Assess for home care needs following discharge   - Consider OT consult to assist with ADL evaluation and planning for discharge  - Provide patient education as appropriate  - Monitor functional capacity and physical performance, use of AM PAC & JH-HLM   - Monitor gait, balance and fatigue with ambulation    Outcome: Progressing  Goal: Maintains/Returns to pre admission functional level  Description: INTERVENTIONS:  - Perform AM-PAC 6 Click Basic Mobility/ Daily Activity assessment daily.  - Set and communicate daily mobility goal to care team and patient/family/caregiver.   - Collaborate with rehabilitation services on mobility goals if  consulted  - Perform Range of Motion 3 times a day.  - Reposition patient every 2 hours.  - Dangle patient 3 times a day  - Stand patient 3 times a day  - Ambulate patient 3 times a day  - Out of bed to chair 3 times a day   - Out of bed for meals 3 times a day  - Out of bed for toileting  - Record patient progress and toleration of activity level   Outcome: Progressing     Problem: DISCHARGE PLANNING  Goal: Discharge to home or other facility with appropriate resources  Description: INTERVENTIONS:  - Identify barriers to discharge w/patient and caregiver  - Arrange for needed discharge resources and transportation as appropriate  - Identify discharge learning needs (meds, wound care, etc.)  - Arrange for interpretive services to assist at discharge as needed  - Refer to Case Management Department for coordinating discharge planning if the patient needs post-hospital services based on physician/advanced practitioner order or complex needs related to functional status, cognitive ability, or social support system  Outcome: Progressing     Problem: Knowledge Deficit  Goal: Patient/family/caregiver demonstrates understanding of disease process, treatment plan, medications, and discharge instructions  Description: Complete learning assessment and assess knowledge base.  Interventions:  - Provide teaching at level of understanding  - Provide teaching via preferred learning methods  Outcome: Progressing     Problem: Prexisting or High Potential for Compromised Skin Integrity  Goal: Skin integrity is maintained or improved  Description: INTERVENTIONS:  - Identify patients at risk for skin breakdown  - Assess and monitor skin integrity including under and around medical devices   - Assess and monitor nutrition and hydration status  - Monitor labs  - Assess for incontinence   - Turn and reposition patient  - Assist with mobility/ambulation  - Relieve pressure over verónica prominences   - Avoid friction and shearing  - Provide  appropriate hygiene as needed including keeping skin clean and dry  - Evaluate need for skin moisturizer/barrier cream  - Collaborate with interdisciplinary team  - Patient/family teaching  - Consider wound care consult    Assess:  - Review Zaid scale daily  - Clean and moisturize skin every shift  - Inspect skin when repositioning, toileting, and assisting with ADLS  - Assess under medical devices such as venodynes every 2 hours  - Assess extremities for adequate circulation and sensation     Bed Management:  - Have minimal linens on bed & keep smooth, unwrinkled  - Change linens as needed when moist or perspiring  - Avoid sitting or lying in one position for more than 2 hours while in bed Keep HOB at 20degrees   - Toileting:  - Offer bedside commode  - Assess for incontinence every 2 hours  - Use incontinent care products after each incontinent episode such as skin protectant    Activity:  - Mobilize patient 3 times a day  - Encourage activity and walks on unit  - Encourage or provide ROM exercises   - Turn and reposition patient every 2 Hours  - Use appropriate equipment to lift or move patient in bed  - Instruct/ Assist with weight shifting every 30 minutes when out of bed in chair  - Consider limitation of chair time 2 hour intervals    Skin Care:  - Avoid use of baby powder, tape, friction and shearing, hot water or constrictive clothing  - Relieve pressure over bony prominences using pillows/wedges  - Do not massage red bony areas    Next Steps:  - Teach patient strategies to minimize risks such as weight shift/turn and reposition  - Consider consults to  interdisciplinary teams such as PT/OT  Outcome: Progressing     Problem: Nutrition/Hydration-ADULT  Goal: Nutrient/Hydration intake appropriate for improving, restoring or maintaining nutritional needs  Description: Monitor and assess patient's nutrition/hydration status for malnutrition. Collaborate with interdisciplinary team and initiate plan and  interventions as ordered.  Monitor patient's weight and dietary intake as ordered or per policy. Utilize nutrition screening tool and intervene as necessary. Determine patient's food preferences and provide high-protein, high-caloric foods as appropriate.     INTERVENTIONS:  - Monitor oral intake, urinary output, labs, and treatment plans  - Assess nutrition and hydration status and recommend course of action  - Evaluate amount of meals eaten  - Assist patient with eating if necessary   - Allow adequate time for meals  - Recommend/ encourage appropriate diets, oral nutritional supplements, and vitamin/mineral supplements  - Order, calculate, and assess calorie counts as needed  - Recommend, monitor, and adjust tube feedings and TPN/PPN based on assessed needs  - Assess need for intravenous fluids  - Provide specific nutrition/hydration education as appropriate  - Include patient/family/caregiver in decisions related to nutrition  Outcome: Progressing     Problem: NEUROSENSORY - ADULT  Goal: Achieves stable or improved neurological status  Description: INTERVENTIONS  - Monitor and report changes in neurological status  - Monitor vital signs such as temperature, blood pressure, glucose, and any other labs ordered   - Initiate measures to prevent increased intracranial pressure  - Monitor for seizure activity and implement precautions if appropriate      Outcome: Progressing  Goal: Achieves maximal functionality and self care  Description: INTERVENTIONS  - Monitor swallowing and airway patency with patient fatigue and changes in neurological status  - Encourage and assist patient to increase activity and self care.   - Encourage visually impaired, hearing impaired and aphasic patients to use assistive/communication devices  Outcome: Progressing     Problem: CARDIOVASCULAR - ADULT  Goal: Maintains optimal cardiac output and hemodynamic stability  Description: INTERVENTIONS:  - Monitor I/O, vital signs and rhythm  -  Monitor for S/S and trends of decreased cardiac output  - Administer and titrate ordered vasoactive medications to optimize hemodynamic stability  - Assess quality of pulses, skin color and temperature  - Assess for signs of decreased coronary artery perfusion  - Instruct patient to report change in severity of symptoms  Outcome: Progressing  Goal: Absence of cardiac dysrhythmias or at baseline rhythm  Description: INTERVENTIONS:  - Continuous cardiac monitoring, vital signs, obtain 12 lead EKG if ordered  - Administer antiarrhythmic and heart rate control medications as ordered  - Monitor electrolytes and administer replacement therapy as ordered  Outcome: Progressing     Problem: RESPIRATORY - ADULT  Goal: Achieves optimal ventilation and oxygenation  Description: INTERVENTIONS:  - Assess for changes in respiratory status  - Assess for changes in mentation and behavior  - Position to facilitate oxygenation and minimize respiratory effort  - Oxygen administered by appropriate delivery if ordered  - Initiate smoking cessation education as indicated  - Encourage broncho-pulmonary hygiene including cough, deep breathe, Incentive Spirometry  - Assess the need for suctioning and aspirate as needed  - Assess and instruct to report SOB or any respiratory difficulty  - Respiratory Therapy support as indicated  Outcome: Progressing     Problem: GASTROINTESTINAL - ADULT  Goal: Minimal or absence of nausea and/or vomiting  Description: INTERVENTIONS:  - Administer IV fluids if ordered to ensure adequate hydration  - Maintain NPO status until nausea and vomiting are resolved  - Nasogastric tube if ordered  - Administer ordered antiemetic medications as needed  - Provide nonpharmacologic comfort measures as appropriate  - Advance diet as tolerated, if ordered  - Consider nutrition services referral to assist patient with adequate nutrition and appropriate food choices  Outcome: Progressing  Goal: Maintains or returns to  baseline bowel function  Description: INTERVENTIONS:  - Assess bowel function  - Encourage oral fluids to ensure adequate hydration  - Administer IV fluids if ordered to ensure adequate hydration  - Administer ordered medications as needed  - Encourage mobilization and activity  - Consider nutritional services referral to assist patient with adequate nutrition and appropriate food choices  Outcome: Progressing  Goal: Maintains adequate nutritional intake  Description: INTERVENTIONS:  - Monitor percentage of each meal consumed  - Identify factors contributing to decreased intake, treat as appropriate  - Assist with meals as needed  - Monitor I&O, weight, and lab values if indicated  - Obtain nutrition services referral as needed  Outcome: Progressing     Problem: GENITOURINARY - ADULT  Goal: Maintains or returns to baseline urinary function  Description: INTERVENTIONS:  - Assess urinary function  - Encourage oral fluids to ensure adequate hydration if ordered  - Administer IV fluids as ordered to ensure adequate hydration  - Administer ordered medications as needed  - Offer frequent toileting  - Follow urinary retention protocol if ordered  Outcome: Progressing  Goal: Absence of urinary retention  Description: INTERVENTIONS:  - Assess patient’s ability to void and empty bladder  - Monitor I/O  - Bladder scan as needed  - Discuss with physician/AP medications to alleviate retention as needed  - Discuss catheterization for long term situations as appropriate  Outcome: Progressing     Problem: METABOLIC, FLUID AND ELECTROLYTES - ADULT  Goal: Electrolytes maintained within normal limits  Description: INTERVENTIONS:  - Monitor labs and assess patient for signs and symptoms of electrolyte imbalances  - Administer electrolyte replacement as ordered  - Monitor response to electrolyte replacements, including repeat lab results as appropriate  - Instruct patient on fluid and nutrition as appropriate  Outcome:  Progressing  Goal: Fluid balance maintained  Description: INTERVENTIONS:  - Monitor labs   - Monitor I/O and WT  - Instruct patient on fluid and nutrition as appropriate  - Assess for signs & symptoms of volume excess or deficit  Outcome: Progressing  Goal: Glucose maintained within target range  Description: INTERVENTIONS:  - Monitor Blood Glucose as ordered  - Assess for signs and symptoms of hyperglycemia and hypoglycemia  - Administer ordered medications to maintain glucose within target range  - Assess nutritional intake and initiate nutrition service referral as needed  Outcome: Progressing     Problem: SKIN/TISSUE INTEGRITY - ADULT  Goal: Skin Integrity remains intact(Skin Breakdown Prevention)  Description: Assess:  -Perform Zaid assessment every shift  -Clean and moisturize skin every shift  -Inspect skin when repositioning, toileting, and assisting with ADLS  -Assess under medical devices such as venodynes every 2 hours  -Assess extremities for adequate circulation and sensation     Bed Management:  -Have minimal linens on bed & keep smooth, unwrinkled  -Change linens as needed when moist or perspiring  -Avoid sitting or lying in one position for more than 2 hours while in bed  -Keep HOB at 20degrees     Toileting:  -Offer bedside commode  -Assess for incontinence every 2 hours  -Use incontinent care products after each incontinent episode such as skin protectant    Activity:  -Mobilize patient 3 times a day  -Encourage activity and walks on unit  -Encourage or provide ROM exercises   -Turn and reposition patient every 2 Hours  -Use appropriate equipment to lift or move patient in bed  -Instruct/ Assist with weight shifting every 30 minutes when out of bed in chair  -Consider limitation of chair time 2 hour intervals    Skin Care:  -Avoid use of baby powder, tape, friction and shearing, hot water or constrictive clothing  -Relieve pressure over bony prominences using pillows/wedges  -Do not massage red  bony areas    Next Steps:  -Teach patient strategies to minimize risks such as weight shift/turn and reposition   -Consider consults to  interdisciplinary teams such as PT/OT  Outcome: Progressing  Goal: Incision(s), wounds(s) or drain site(s) healing without S/S of infection  Description: INTERVENTIONS  - Assess and document dressing, incision, wound bed, drain sites and surrounding tissue  - Provide patient and family education  - Perform skin care/dressing changes as ordered  Outcome: Progressing     Problem: HEMATOLOGIC - ADULT  Goal: Maintains hematologic stability  Description: INTERVENTIONS  - Assess for signs and symptoms of bleeding or hemorrhage  - Monitor labs  - Administer supportive blood products/factors as ordered and appropriate  Outcome: Progressing     Problem: MUSCULOSKELETAL - ADULT  Goal: Maintain or return mobility to safest level of function  Description: INTERVENTIONS:  - Assess patient's ability to carry out ADLs; assess patient's baseline for ADL function and identify physical deficits which impact ability to perform ADLs (bathing, care of mouth/teeth, toileting, grooming, dressing, etc.)  - Assess/evaluate cause of self-care deficits   - Assess range of motion  - Assess patient's mobility  - Assess patient's need for assistive devices and provide as appropriate  - Encourage maximum independence but intervene and supervise when necessary  - Involve family in performance of ADLs  - Assess for home care needs following discharge   - Consider OT consult to assist with ADL evaluation and planning for discharge  - Provide patient education as appropriate  Outcome: Progressing     Problem: Potential for Falls  Goal: Patient will remain free of falls  Description: INTERVENTIONS:  - Educate patient/family on patient safety including physical limitations  - Instruct patient to call for assistance with activity   - Consider consulting OT/PT to assist with strengthening/mobility based on AM PAC &  JH-HLM score  - Consult OT/PT to assist with strengthening/mobility   - Keep Call bell within reach  - Keep bed low and locked with side rails adjusted as appropriate  - Keep care items and personal belongings within reach  - Initiate and maintain comfort rounds  - Make Fall Risk Sign visible to staff  - Offer Toileting every 2 Hours, in advance of need  - Initiate/Maintain bed/chair alarm  - Obtain necessary fall risk management equipment:   - Apply yellow socks and bracelet for high fall risk patients  - Consider moving patient to room near nurses station  Outcome: Progressing     Problem: COPING  Goal: Pt/Family able to verbalize concerns and demonstrate effective coping strategies  Description: INTERVENTIONS:  - Assist patient/family to identify coping skills, available support systems and cultural and spiritual values  - Provide emotional support, including active listening and acknowledgement of concerns of patient and caregivers  - Reduce environmental stimuli, as able  - Provide patient education  - Assess for spiritual pain/suffering and initiate spiritual care, including notification of Pastoral Care or pawan based community as needed  - Assess effectiveness of coping strategies  Outcome: Progressing  Goal: Will report anxiety at manageable levels  Description: INTERVENTIONS:  - Administer medication as ordered  - Teach and encourage coping skills  - Provide emotional support  - Assess patient/family for anxiety and ability to cope  Outcome: Progressing

## 2025-05-30 NOTE — DISCHARGE SUPPORT
Case Management Assessment & Discharge Planning Note    Patient name Deonte Attarian  Location [facility] 826/[facility] 826-01 MRN [id number]  : 1950 Date 2025       Current Admission Date: 2025  Current Admission Diagnosis:Subdural hematoma (HCC)   Patient Active Problem List    Diagnosis Date Noted    Acute CVA (cerebrovascular accident) (HCC) 2025    Weight loss 2025    Frailty 2025    Acute pain 2025    Insomnia 2025    At risk for delirium 2025    Subdural hematoma (HCC) 2025    Leg swelling 2025    Severe obesity (BMI 35.0-39.9) with comorbidity (formerly [facility]) 2025    Bradycardia, sinus 2025    Atrial septal aneurysm 2025    Normocytic anemia 2025    Syncope 2025    Thrombocytopenia (formerly [facility]) 2025    Abnormal echocardiogram 2025    Groin pain, right 2025    Fall 2025    Hypocitraturia 2024    Neuropathy 2019    Hyperuricemia 2016    Mixed hyperlipidemia 2016    Onychomycosis 2015    PVD (peripheral vascular disease) (formerly [facility]) 10/07/2015    POPPY (obstructive sleep apnea) 10/07/2015    Diabetes mellitus (formerly [facility]) 2015    S/P hip replacement 2014    Osteoarthritis 2008      LOS (days): 6  Geometric Mean LOS (GMLOS) (days): 6.5  Days to GMLOS:0.5   Facility Auth Adverse Decision  DC Support Center has received: Denial  For Level of Care: Acute Rehab  Insurance: [employer]  Information obtained via : Phone  Name/Phone # of Rep who called in information: Consuelo  Reason for Adverse Decision: Does not meet criteria  Reference Number: [id number]  Facility Name: [facility]  Peer to Peer?: Offered  Peer to Peer Phone #: [phone]  P2P Deadline:  12:00 pm  For P2P Completion:: Information sent to Physician advisor group to complete P2P   notified: Will A     Updates to authorization status will be noted in chart.    Please reach out to CM for updates on any  clinical information.

## 2025-05-30 NOTE — DISCHARGE SUPPORT
Per Availity, Acute Rehab auth still pending.     Escalation email sent to Replaced by [facility] Escalation Team requesting expedite of determination.     CM notified:  Will A

## 2025-05-30 NOTE — CASE MANAGEMENT
Case Management Discharge Planning Note    Patient name Deonte Attarian  Location [facility] 826/[facility] 826-01 MRN [id number]  : 1950 Date 2025       Current Admission Date: 2025  Current Admission Diagnosis:Subdural hematoma (HCC)   Patient Active Problem List    Diagnosis Date Noted    Acute CVA (cerebrovascular accident) (HCC) 2025    Weight loss 2025    Frailty 2025    Acute pain 2025    Insomnia 2025    At risk for delirium 2025    Subdural hematoma (HCC) 2025    Leg swelling 2025    Severe obesity (BMI 35.0-39.9) with comorbidity ([facility]) 2025    Bradycardia, sinus 2025    Atrial septal aneurysm 2025    Normocytic anemia 2025    Syncope 2025    Thrombocytopenia ([facility]) 2025    Abnormal echocardiogram 2025    Groin pain, right 2025    Fall 2025    Hypocitraturia 2024    Neuropathy 2019    Hyperuricemia 2016    Mixed hyperlipidemia 2016    Onychomycosis 2015    PVD (peripheral vascular disease) ([facility]) 10/07/2015    POPPY (obstructive sleep apnea) 10/07/2015    Diabetes mellitus ([facility]) 2015    S/P hip replacement 2014    Osteoarthritis 2008      LOS (days): 6  Geometric Mean LOS (GMLOS) (days): 6.5  Days to GMLOS:0.5     OBJECTIVE:  Risk of Unplanned Readmission Score: 16.94         Current admission status: Inpatient   Preferred Pharmacy:   [facility] - [place] - [address] [facility]  [address]  [place] [address]  Phone: [phone] Fax: [phone]    [facility]/pharmacy #[id number] - [place] - [address] [address] [facility]  [place] [address]  Phone: [phone] Fax: [phone]    Primary Care Provider: Garcia Cartwright MD    Primary Insurance: ANTWON  REP  Secondary Insurance:     DISCHARGE DETAILS:    CM aware that pt is no longer stable for d/c  Auth was denied for acute  Will have therapy see on  and  then attempt a new auth on Monday for acute

## 2025-05-30 NOTE — ASSESSMENT & PLAN NOTE
Patient sustained an unwitnessed fall in April in which he had a head strike with resolution previously now with admission for bilateral subdural hematomas s/p drainage by Neurosurgery  DVT ppx held. Patient was not on AP/AC previously. History of thrombocytopenia  Keppra 500 mg q12 hr seizure ppx for 7 days. No reported further seizure like activity since April  Continue management per primary team and neurosurgery. Slight interval increase in volume of hyperdense blood products 5/30

## 2025-05-30 NOTE — PROGRESS NOTES
Progress Note - Trauma   Name: Deonte Greenfield 74 y.o. male I MRN: [id number]  Unit/Bed#: [facility] 826-01 I Date of Admission: 5/24/2025   Date of Service: 5/30/2025 I Hospital Day: 6    Assessment & Plan  Fall  - Status post fall with the below noted injuries.  - Fall precautions.  - Geriatric Medicine consultation for evaluation, medication review and recommendations.  - PT and OT evaluation and treatment as indicated.  - Case Management consultation for disposition planning.  Subdural hematoma (HCC)  - Initially noted to have large acute on chronic bilateral subdural hematomas   - Appreciate Neurosurgery evaluation -signed off at this time   - s/p bilateral [place] 5/25 with 4 subdural drains    - 2 drains removed 5/27   - 2 drains removed 5/28  - MMA embolization unable to be completed secondary to anatomy   - Neuro exam: GCS 15, non-focal  - Continue neurologic checks: Every 4 hours  - Complete 7 day course of Keppra for seizure prophylaxis  - Chemical DVT prophylaxis: Lovenox  - Hold all anticoagulants and anti platelet medications for 2 weeks and/or until cleared by Neurosurgery to resume.  - PT and OT (including cognitive evaluation) evaluation and treatment as indicated.  Diabetes mellitus (HCC)  - Hold home metformin   - Continue SSI for coverage  - Adjust and start basal bolus coverage as needed  Thrombocytopenia (HCC)  - Chronic thrombocytopenia   - Outpatient workup     Bowel Regimen: miralax, senna   VTE Prophylaxis:VTE covered by:  enoxaparin, Subcutaneous, 30 mg at 05/29/25 2107        Disposition: pending rehab    24 Hour Events : No acute events overnight.  Subjective : This morning, patient reports feeling well. Denies any headache, nausea, vomiting, shortness of breath, and abdominal pain. His primary complaint is chronic left shoulder pain, which he has had prior to admission.     Objective :  Temp:  [97.3 °F (36.3 °C)-98.9 °F (37.2 °C)] 97.3 °F (36.3 °C)  HR:  [62-82] 70  BP: ()/(53-74)  130/57  Resp:  [16-18] 18  SpO2:  [93 %-99 %] 93 %    I/O         05/28 0701  05/29 0700 05/29 0701 05/30 0700    P.O. 720 600    Total Intake(mL/kg) 720 (7.1) 600 (5.9)    Urine (mL/kg/hr) 1450 (0.6) 650 (0.3)    Drains 0     Stool 0     Total Output 1450 650    Net -730 -50          Unmeasured Urine Occurrence  1 x    Unmeasured Stool Occurrence 0 x             Physical Exam  Vitals reviewed.   Constitutional:       General: He is not in acute distress.     Appearance: Normal appearance.   HENT:      Head: Normocephalic.     Eyes:      Extraocular Movements: Extraocular movements intact.      Pupils: Pupils are equal, round, and reactive to light.       Cardiovascular:      Rate and Rhythm: Normal rate and regular rhythm.      Pulses: Normal pulses.   Pulmonary:      Effort: Pulmonary effort is normal. No respiratory distress.      Breath sounds: Normal breath sounds.   Abdominal:      General: There is no distension.      Palpations: Abdomen is soft.      Tenderness: There is no abdominal tenderness.     Neurological:      General: No focal deficit present.      Mental Status: He is alert and oriented to person, place, and time.          Lab Results: I have reviewed the following results:  Recent Labs     05/27/25  0602 05/28/25  0456 05/29/25  0612   WBC  --  10.35* 8.94   HGB  --  10.4* 11.3*   HCT  --  31.7* 35.3*   PLT  --  141* 172   SODIUM  --  133* 134*   K  --  3.9 4.1   CL  --  100 101   CO2  --  25 27   BUN  --  26* 25   CREATININE  --  0.86 0.74   GLUC  --  126 136   CAIONIZED 1.11*  --   --    MG  --  2.2  --    PHOS  --  3.0  --        Imaging Results Review: No pertinent imaging studies reviewed.  Other Study Results Review: No additional pertinent studies reviewed.

## 2025-05-30 NOTE — PROGRESS NOTES
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 SS#   Marital Status: /Civil Union  Ethnicity:   Employment Status: retired  Extended Emergency Contact Information  Primary Emergency Contact: Jing Greenfield  Mobile Phone: [phone]  Relation: Spouse  Secondary Emergency Contact: Jing Greenfield  Address: [address] [place] [address] [place]  Mobile Phone: [phone]  Relation: Significant Other  Advance Directive: Level 1 Full Code - unknown advanced directive    INSURANCE/COVERAGE:     Primary Payor: [facility] REP / Plan: [facility] MEDICARE PPO  REP / Product Type: Medicare PPO /  Secondary Payer: Private Pay   Payer Contact: Consuelo Payer Contact:   Contact Phone: [phone]  Contact Fax: [phone] Contact Phone:     Authorization #: [id number]   Coverage Dates: 6/2 - 6/9  LCD: 6/9 with review   **Voicemail left for becka Cordero care coordinator at [facility], requesting patient's start of care date for inpatient acute rehab be updated to 6/3/2025. Next review date remains 6/9/25.     MEDICARE #: [id number]     Medical Record #: [id number]    **Confirmed patient's benefits for inpatient acute rehab as follows: Copay $0, Coins: 0%, Deductible: $0, OOP: $930 remaining (Confirmed in [employer])     REFERRAL SOURCE:   Referring provider: Cassidy Raza DO  Referring facility: [facility] [place]  [facility]  Room: [facility] 826/[facility] 826-01  PCP: Garcia Cartwright MD PCP phone number: [phone]    MEDICAL INFORMATION  HPI: Patient is a 74 year old male that presented to [facility] on 5/24/2025, with a PMH of diabetes, CKD stage 2, HTN, HLD, thrombocytopenia, gout, bilateral total hip arthroplasty,  after falling due to possible syncope. He was found to have small right posterior falx SDH. Follow up scans on 4/5 and 4/6 revealed resolution of SDH. He had frequent falls and work up was being done by Neurology. MRI on 5/24 outpt showed large bilateral high convexity SDHs with mass effect. Family did report progressive decline at home and needing to use a rolling walker at all times. He was sent to the [facility] where CTH confirmed findings on CT scan. He was admitted to ICU and underwent lake hole placement with subdural drain placement. CTA on 5/26 showed bilateral mixed density subdural collections are decreased in size s/p lake hole drainage with subdural drain placement. He was not a candidate for MMA given poor vessel quality and lack of right MMA, he was high risk for complication. Completed 7 day course Keppra for seizure prophylaxis. All AC/AP is to remain on hold for 2 weeks. On 5/30, patient with change in MS. Repeat CTH with acute appearing infarct right superior cerebellum. Neurology was consulted, with recommendations for loop recorder outpatient, initiate ASA once cleared by Neurosurgery. MRI brain confirmed stroke. Of note, patient had MRI abdomen completed outpatient (5/22, d/t jaundice & thrombocytopenia), with numerous pancreatic cystic lesions concerning malignancy. After discussion with Surgery, patient is recommended for outpatient f/u with Surgical Oncology for possible biopsy. Patient is overall hemodynamically stable and medically cleared for discharge to [facility] PT/OT therapies and PM&R were consulted, as well as patient's case reviewed by [facility]  physician, and they are recommending patient for inpatient Acute Rehab. He has demonstrated that he can tolerate and participate in 3 hours of therapy per day.    Past Medical History:   Past Surgical History:   Allergies:     Past Medical History[1] Past Surgical History[2]  No Known Allergies      Medical/functional conditions requiring inpatient rehabilitation: acute on chronic B/L SDH s/p fall, s/p B/L burrholes for evacuation, right superior cerebellum infarct, acute pain, hyponatremia, impaired mobility and self care, decreased strength/endurance, impaired balance, impaired cognition    Risk for medical/clinical complications: risk for falls, risk for skin breakdown, risk for uncontrolled pain, risk for DVT/PE, risk for infection, risk for seizures, risk for aspiration, risk for hypo/hyperglycemia episodes    Comorbidities/Surgeries in the last 100 days: DM, thrombocytopenia, PVD, neuropathy    5/25/2025 Bilateral lake holes for evacuation of bilateral subdural hematomas     CURRENT VITAL SIGNS:   Temp:  [97.5 °F (36.4 °C)-98.3 °F (36.8 °C)] 98.3 °F (36.8 °C)  HR:  [65-79] 79  BP: (131-159)/(66-72) 131/66  Resp:  [16-22] 16  SpO2:  [94 %-97 %] 97 %  O2 Device: None (Room air)   Intake/Output Summary (Last 24 hours) at 6/3/2025 1421  Last data filed at 6/3/2025 1300  Gross per 24 hour   Intake 518 ml   Output --   Net 518 ml        LABORATORY RESULTS:      Lab Results   Component Value Date    HGB 10.6 (L) 06/03/2025    HCT 33.1 (L) 06/03/2025    WBC 7.45 06/03/2025     Lab Results   Component Value Date    BUN 21 06/03/2025    K 4.1 06/03/2025     06/03/2025    CREATININE 0.68 06/03/2025     Lab Results   Component Value Date    PROTIME 15.5 (H) 05/31/2025    INR 1.20 (H) 05/31/2025        DIAGNOSTIC STUDIES:  CTA head and neck w wo contrast  Result Date: 5/26/2025  Impression: CT Brain: Bilateral mixed density subdural collections are decreased in size status post. Lake hole drainage with subdural drain  placement. 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 No significant stenosis of the cervical left internal carotid artery Severe stenosis origin of the dominant left vertebral artery. Hypoplastic right vertebral artery. No high-grade intracranial stenosis, large vessel occlusion or aneurysm. Workstation performed: [id number]     CT head wo contrast  Result Date: 5/25/2025  Impression: No significant change in large bilateral mixed attenuation subdural hematomas containing blood products of varying ages, including recent, with underlying mass effect on the cerebral hemispheres. Recommend continued clinical and imaging surveillance. Workstation performed: [id number]     CT head without contrast  Result Date: 5/24/2025  Impression: Stable, large bilateral mixed attenuating, high convexity subdural hematomas, right greater than left, likely predominantly subacute in timing, with mass effect on the right greater than left cerebral hemispheres near the vertex. No midline shift noted. Workstation performed: [id number]     MRI brain w wo contrast  Result Date: 5/24/2025  Impression: 1.  New, large bilateral high convexity subdural hematomas, right greater than left, which are predominantly subacute in timing, with mass effect on the bilateral cerebral hemispheres, and effacement of the sulci. No significant midline shift noted. 2.  FLAIR signal hyperintensity along the sulci of the right frontal and parietal convexities, which may represent trace associated subarachnoid hemorrhage. 3.  No acute infarct. Mild chronic white matter microangiopathic changes involving both cerebral hemispheres. 4.  No intracranial enhancing lesion noted. I personally discussed this study with LAVINIA OLEARY on 5/24/2025 at 12:04 p.m. Workstation performed: [id number]       PRECAUTIONS/SPECIAL NEEDS:  Tobacco: Tobacco Use History[3], Alcohol:    Social  History     Substance and Sexual Activity   Alcohol Use Not Currently   , Anticoagulation:  Lovenox SQ, Blood Sugar Management: as per MD recommendations, Edema Management, Safety Concerns, Pain Management, Aspiration Risk/Precautions, Dietary Restrictions: Diabetic diet, Language Preference: English, Seizure Precautions and Fall Precautions.    MEDICATIONS:   Current Medications[4]    SKIN INTEGRITY:   Left calf skin tear with Xeroform, Pressure Ulcer: Buttocks R gluteal Stage I, Incision: healing well, no significant drainage, no dehiscence, no significant erythema, B/L head incisions with staples/sutures and DSD    PRIOR LEVEL OF FUNCTION:  He lives in a(n) single family home  Deonte Attarian is  and lives with their family (spouse, children and grandchildren).  Self Care: Independent, Indoor Mobility: Modified Independent, Stairs (in/outdoor): Modified Independent, and Cognition: Independent  Prior to patient's admission, patient was fully Independent with ADLs and received assistance with IADLs. Family provides transportation. He was Modified Independent with use of cane vs RW as needed for mobility.    FALLS IN THE LAST 6 MONTHS: one    HOME ENVIRONMENT:  The living area: can live on one level; patient sleeps in a chair d/t right hip discomfort.  There is 1 step to enter the home.    The patient will not have 24 hour supervision/physical assistance available upon discharge.  Patient's family is supportive and able to assist as needed.    PREVIOUS DME:  Equipment in home (previous DME): Shower Chair, Grab Bars, Rolling Walker, Single Point Cane, and Raised Toilet    FUNCTIONAL STATUS:  Physical Therapy Occupational Therapy Speech Therapy   6/1/2025, per PT    Cognition   Overall Cognitive Status Impaired   Arousal/Participation Cooperative   Attention Attends with cues to redirect   Orientation Level Oriented to person;Disoriented to place;Disoriented to situation;Disoriented to time  (month only)    Memory Decreased recall of recent events   Following Commands Follows one step commands with increased time or repetition   Comments overall pleasant, requires redirection at time   Subjective   Subjective agreeable   Bed Mobility   Supine to Sit Unable to assess   Sit to Supine Unable to assess   Additional Comments presented standing in bathroom with aide upon entry   Transfers   Sit to Stand 4  Minimal assistance   Additional items Assist x 1;Increased time required;Verbal cues   Stand to Sit 4  Minimal assistance   Additional items Assist x 1;Increased time required;Verbal cues   Additional Comments c RW   Ambulation/Elevation   Gait pattern Improper Weight shift;Decreased foot clearance;Short stride;Excessively slow   Gait Assistance 4  Minimal assist   Additional items Assist x 1;Verbal cues   Assistive Device Rolling walker   Distance 5'+10'+40'x2   Balance   Static Sitting Fair   Dynamic Sitting Fair -   Static Standing Poor +   Dynamic Standing Poor +   Ambulatory Poor +   Endurance Deficit   Endurance Deficit Yes   Activity Tolerance   Activity Tolerance Patient limited by fatigue   Medical Staff Made Aware co-tx with OT BERNARD and OTS due to medical complexity and increased A/poor activity tolerance last session   Nurse Made Aware yes   Exercises   Neuro re-ed stepping up onto scale to be weighed minAx1   Balance training  static standing at sink with 1 vs no UE support and X x10 min   Assessment   Prognosis Good   Problem List Decreased strength;Decreased endurance;Impaired balance;Decreased mobility;Decreased coordination;Decreased cognition;Impaired judgement;Decreased safety awareness;Obesity   Assessment Pt agreeable to participate in PT session. Pt performed functional mobility as outlined above. +confusion noted during session and requires cues to redirect. Functional status is improving and Ax1 for mobility. Remains high fall risk. Pt left seated in chair with chair alarm, call bell, phone, and  all personal needs within reach. Pt will continue to benefit from skilled acute care PT to further address their functional mobility limitations.    6/1/2025, per OT    ADL   Grooming Assistance 5  Supervision/Setup   Grooming Deficit Teeth care;Wash/dry face;Wash/dry hands;Standing with assistive device   Grooming Comments Pt setup A for oral hygiene, washing hands, and washing face while standing at sink w/ RW for ~10 minutes; pt able to stand at sink w/ S; pt requires min VC's for pacing and maintaing attention to task   UB Dressing Assistance 4  Minimal Assistance   UB Dressing Deficit Verbal cueing;Increased time to complete   UB Dressing Comments Pt Froy for UB dressing while seated in recliner; pt requires VC's for pacing and maintaining attention to task   Toileting Assistance  3  Moderate Assistance   Toileting Deficit Perineal hygiene   Toileting Comments Pt modA for toileting post continent BM   Functional Standing Tolerance   Time ~10 minutes   Activity Grooming tasks while standing at the sink   Comments Pt setup A for grooming tasks while standing at the sink w/ RW for ~10 minutes; pt able to stand at sink w/ S   Transfers   Sit to Stand 4  Minimal assistance   Additional items Assist x 1;Increased time required;Verbal cues   Stand to Sit 4  Minimal assistance   Additional items Assist x 1;Increased time required;Verbal cues   Toilet transfer 4  Minimal assistance   Additional items Assist x 1;Increased time required;Verbal cues;Standard toilet   Additional Comments Pt Froy x 1 w/ RW for STS transfers; pt requires min VC's to maintain attention to task as well as increased time to complete transfers   Functional Mobility   Functional Mobility 4  Minimal assistance   Additional Comments Assist x 1; pt Froy x 1 w/ RW to ambulate household distance   Additional items Rolling walker   Toilet Transfers   Toilet Transfer From Bed   Toilet Transfer Type To   Toilet Transfer to Standard toilet   Toilet  Transfer Technique Ambulating   Toilet Transfers Minimal assistance   Toilet Transfers Comments Pt Froy x 1 w/ RW to ambulate to standard toilet   Cognition   Overall Cognitive Status Impaired   Arousal/Participation Alert;Cooperative   Attention Attends with cues to redirect   Orientation Level Oriented to person;Disoriented to place;Disoriented to time;Disoriented to situation  (Pt oriented to month only; states year is 1983)   Memory Decreased recall of precautions;Decreased recall of recent events;Decreased short term memory   Following Commands Follows one step commands with increased time or repetition   Comments Pt is pleasant and cooperative; pt requires VC's for pacing and maintaing attention to tasks; pt demonstrates decreased safety awareness and insight into deficits   Activity Tolerance   Activity Tolerance Patient tolerated treatment well   Medical Staff Made Aware RN; PT, Rajeev   Assessment   Assessment Pt seen this am for OT treatment session w/ focus on UB dressing, grooming, toileting, toilet transfers, functional mobility, standing tolerance, and attention to task. Upon OT entrance, pt found on the commode post continent bm. Pt modA for perineal hygiene and Froy x 1 w/ RW for STS transfer off of the commode. Pt setup A for grooming activities while standing at the sink w/ S and use of the RW ~10 minutes. Pt demonstrates improved standing tolerance w/ F balance. Pt Froy x 1 w/ RW to ambulate to bedside chair. Pt Froy to complete UB dressing while seated in recliner. Pt requires min VC's throughout all functional tasks for pacing and maintaining attention to task. Pt continues to demonstrate decreased safety awareness and insight into deficits. Pt left OOB in recliner w/ chair alarm activated and all needs within reach at EOS. OT to continue to recommend maximum resource intensity upon d/c. OT continue POC.    6/1/2025, per SLP    Summary    Pt presents with mild oral and suspected mild pharyngeal  dysphagia. This is characterized by prolonged mastication, with food tending to stick to pt's teeth (pt able to clear independently with extra swallows or sips of liquid), and ?reduced airway protection with liquids. There was inconsistent slight throat clearing/slight coughing immediately after sips of thin liquid. Overall this was minimal, and respiratory status and SpO2 appeared stable. Intermittent slightly wet vocal quality also observed during the assessment. Pt denies dysphagia, and states he has phlegm, with frequent throat clearing throughout the day. There is no current chest imaging in the chart. At this time pt appears appropriate to continue current diet, with ST to follow up for dysphagia tx, to determine need for further instrumental testing.       Recommendations:   Diet: regular diet and thin liquids   Meds: whole with liquid and whole with puree   Frequent Oral care  Aspiration precautions and compensatory swallowing strategies: upright posture, slow rate of feeding, small bites/sips, and alternating bites and sips  Other Recommendations/ considerations: ST to see for dysphagia tx, determine need for MBS to further assess.          Current Medical Status  Copied from admission/physician notes:  Deonte Greenfield is a 74 y.o. with hx of DM, CKD2, HTN, HLD, thrombocytopenia, gout, b/l total hip arthroplasty, who presents as transfer from [facility] due to SDH. Had a fall on 4/4 resulting in small R posterior falx SDH. Follow-up exams on 4/5 and 4/6 revealed resolution of SDH. A workup was initiated for frequent falls by neurology including an MRI which was performed 5/24 revealing large b/l high convexity SDHs with mass effect. He was referred to the hospital where CTH confirmed these findings.  Patient arrives to the ICU with no complaints. Patient does note progressive weakness, requiring a walker to ambulate in last couple weeks. No headache, n/v, or recent new falls. GCS is 15.      Past medical  history:   Please see H&P for details     Special Studies:  MRI brain-  Within the confines of an extensively motion-degraded examination, which significantly reduces diagnostic sensitivity and results in a limited examination:     1.  Evolving recent right cerebellar infarct with mild local mass effect.  2.  No significant change in mixed density bilateral cerebral convexity subdural hematomas.        Social/Education/Vocational Hx:  Pt lives with family    Swallow Information   Current Risks for Dysphagia & Aspiration: CVA and recent surgery  Current Symptoms/Concerns: throat clearing, coughing with PO  Current Diet: regular diet and thin liquids   Baseline Diet: regular diet and thin liquids    Baseline Assessment   Behavior/Cognition: alert  Speech/Language Status: able to participate in conversation and able to follow commands  Patient Positioning: upright in bed      Swallow Mechanism Exam   Facial: symmetrical  Labial: WFL  Lingual: WFL  Velum: symmetrical  Mandible: adequate ROM  Dentition: adequate  Vocal quality:intermittently slightly wet   Volitional Cough: strong/productive   Respiratory: RA     Consistencies Assessed and Performance   Consistencies Administered: thin liquids and regular solids  -pt ate a portion of breakfast, including an omelette with vegetables, and thin liquids via straw    Oral Stage: Adequate bolus retrieval and draw from straw. Mastication was prolonged, but functional. Noted food sticking to pt's teeth, but he cleared independently with extra swallows and/or liquid wash. No pocketing observed. Adequate lip seal.      Pharyngeal Stage: Hyolaryngeal elevation observed and palpated. Occasional slight throat clear/slight cough observed immediately after sips of thin liquid, both single and consecutive sips.       Esophageal Concerns: none reported       Results Reviewed with: patient and RN   Dysphagia Goals: 1. Pt will tolerate regular diet and thin liquids with prompt  oropharyngeal swallows and no overt s/s of aspiration. 2. Pt will tolerate LRD without s/s of aspiration across all meals and snacks.   Discharge recommendation: likely no follow up needed for swallowing     Speech Therapy Prognosis   Prognosis: good     Prognosis Considerations: medical status, cognitive status, and therapeutic potential     CARE SCORES:   Self Care:  Eatin: Not applicable  Oral hygiene: 04: Supervision or touching  assistance  Shower/bathing self: 09: Not applicable  Upper body dressin: Partial/moderate assistance  Lower body dressin: Not applicable  Putting on/taking off footwear: 09: Not applicable  Toilet hygiene: 03: Partial/moderate assistance   Transfers:  Roll left and right: 09: Not applicable  Sit to lyin: Not applicable  Lying to sitting on side of bed: 09: Not applicable  Sit to stand: 03: Partial/moderate assistance  Chair/bed to chair transfer: 03: Partial/moderate assistance  Toilet transfer: 09: Not applicable  Mobility:  Walk 10 ft: 03: Partial/moderate assistance  Walk 50 ft with two turns: 10: Not attempted due to environmental limitations  Walk 150ft: 88: Not attempted due to medical conditions or safety concerns    CURRENT GAP IN FUNCTION  Prior to Admission: Functional Status: Patient was not independent with mobility/ambulation, transfers, ADL's, IADL's.    Expected functional outcomes: It is expected that with skilled acute rehabilitation services the patient will progress to Supervision for self care and Independent for mobility     Estimated length of stay: 10 to 14 days    Anticipated Post-Discharge Disposition/Treatment  Disposition: Return to previous home/apartment.  Outpatient Services: Physical Therapy (PT), Occupational Therapy (OT), and Speech Therapy    BARRIERS TO DISCHARGE  Lovenox, Weakness, Pain, Diminished cognition/Mentation change, Balance Difficulty, Fatigue, Home Accessibility, Caregiver Accessibility, Financial Resources, Equipment  Needs, and Resource Availability    INTERVENTIONS FOR DISCHARGE  Adaptive equipment, Patient/Family/Caregiver Education, Community Resources, Financial Assistance, Arrange DME needs, Medication Changes as per MD recommendations, Therapy exercises, Center of balance support , and Energy conservation education     REQUIRED THERAPY:  Patient will require PT, OT and ST 60 minutes each per day, five days per week to achieve rehab goals.     REQUIRED FUNCTIONAL AND MEDICAL MANAGEMENT FOR INPATIENT REHABILITATION:  Skin:  Pressure Ulcer: Buttocks R gluteal Stage I, Treatment recommendation for the pressure sore(s) include:  Turn patient every 2 hours while in bed and perform pressure relief in wheelchair every 20 minutes for 20 seconds., Nurses and therapists will teach the patient and/or family skin inspection and pressure sore prevention., and Allevyn foam dressing, left calf skin tear with Xeroform, B/L head incisions with staples/sutures & DSD, Pain Management: Overall pain is well controlled, Deep Vein Thrombosis (DVT) Prophylaxis:  Lovenox SQ, Diabetes Management: continue sliding scale insulin, patient to do finger sticks as ordered, SLIM to continue to manage diabetes, and further IM management of additional medical conditions while on ARC, he needs PT/OT/ST intervention, patient/family education and training, possible Neuropsych and Neurosurgery consults with any other needed consults prn, nursing medication review and management of bowel/bladder function.    RECOMMENDED LEVEL OF CARE:   Patient is a 74 year old male that presented to [facility] on [facility], with a PMH of diabetes, CKD stage 2, HTN, HLD, thrombocytopenia, gout, bilateral total hip arthroplasty,  after falling due to possible syncope. He was found to have small right posterior falx SDH. Follow up scans on 4/5 and 4/6 revealed resolution of SDH. He had frequent falls and work up was being done by Neurology. MRI on 5/24 outpt showed  large bilateral high convexity SDHs with mass effect. Family did report progressive decline at home and needing to use a rolling walker at all times. He was sent to the [facility] ER where CTH confirmed findings on CT scan. He was admitted to ICU and underwent lake hole placement with subdural drain placement. CTA on 5/26 showed bilateral mixed density subdural collections are decreased in size s/p lake hole drainage with subdural drain placement. He was not a candidate for MMA given poor vessel quality and lack of right MMA, he was high risk for complication. Completed 7 day course Keppra for seizure prophylaxis. All AC/AP is to remain on hold for 2 weeks. On 5/30, patient with change in MS. Repeat CTH with acute appearing infarct right superior cerebellum. Neurology was consulted, with recommendations for loop recorder outpatient, initiate ASA once cleared by Neurosurgery. MRI brain confirmed stroke. Of note, patient had MRI abdomen completed outpatient (5/22, d/t jaundice & thrombocytopenia), with numerous pancreatic cystic lesions concerning malignancy. After discussion with Surgery, patient is recommended for outpatient f/u with Surgical Oncology for possible biopsy. Prior to patient's admission, patient was fully Independent with ADLs and received assistance with IADLs. Family provides transportation. He was Modified Independent with use of cane vs RW as needed for mobility. Currently, patient is Min assist with use of RW for gait and transfers, and Min assist for UB ADLs, Mod assist for Toileting. Close medical management and PM&R management is recommended at this time while patient is on the ARC. Inpatient acute rehab is recommended for patient to maximize overall strength and mobility upon discharge to home with support of family.         [1]   Past Medical History:  Diagnosis Date    Diabetes mellitus (HCC)     History of kidney stones 04/05/2025    Hypertension    [2]   Past Surgical History:  Procedure  Laterality Date    KYLIE HOLE FOR SUBDURAL HEMATOMA Bilateral 5/25/2025    Procedure: KYLIE HOLES;  Surgeon: Issac Grady MD;  Location: [facility];  Service: Neurosurgery    TOTAL HIP ARTHROPLASTY Bilateral    [3]   Social History  Tobacco Use   Smoking Status Former    Types: Cigarettes   Smokeless Tobacco Never   [4]   Current Facility-Administered Medications:     acetaminophen (TYLENOL) tablet 650 mg, 650 mg, Oral, Q6H PRN, Adolfo Alfonso PA-C, 650 mg at 05/27/25 1403    allopurinol (ZYLOPRIM) tablet 300 mg, 300 mg, Oral, Daily, Adolfo Alfonso PA-C, 300 mg at 06/03/25 0907    atenolol (TENORMIN) tablet 50 mg, 50 mg, Oral, Daily, 50 mg at 06/03/25 0907 **AND** [Held by provider] chlorthalidone tablet 25 mg, 25 mg, Oral, Daily, Adolfo Alfonso PA-C    atorvastatin (LIPITOR) tablet 10 mg, 10 mg, Oral, HS, Najma Alberto PA-C, 10 mg at 06/02/25 2059    chlorhexidine (PERIDEX) 0.12 % oral rinse 15 mL, 15 mL, Mouth/Throat, Q12H BRIDGET, Adolfo Alfonso PA-C, 15 mL at 06/03/25 0906    Diclofenac Sodium (VOLTAREN) 1 % topical gel 2 g, 2 g, Topical, 4x Daily PRN, Adolfo Alfonso PA-C, 2 g at 05/31/25 1759    enoxaparin (LOVENOX) subcutaneous injection 30 mg, 30 mg, Subcutaneous, Q12H BRIDGET, Najma Alberto PA-C, 30 mg at 06/03/25 0906    ferrous sulfate tablet 325 mg, 325 mg, Oral, Every Other Day, Najma Alberto PA-C, 325 mg at 06/02/25 0848    hydrALAZINE (APRESOLINE) injection 5 mg, 5 mg, Intravenous, Q4H PRN, Adolfo Alfonso PA-C, 5 mg at 05/25/25 0131    insulin lispro (HumALOG/ADMELOG) 100 units/mL subcutaneous injection 2-12 Units, 2-12 Units, Subcutaneous, TID AC, 2 Units at 06/01/25 1615 **AND** Fingerstick Glucose (POCT), , , 4x Daily AC and at bedtime, Adolfo Alfonso PA-C    insulin lispro (HumALOG/ADMELOG) 100 units/mL subcutaneous injection 2-12 Units, 2-12 Units, Subcutaneous, HS, Adolfo Alfonso PA-C, 2 Units at 05/30/25 2225    labetalol (NORMODYNE) injection 10 mg, 10 mg, Intravenous,  Q4H PRN, Adolfo Alfonso PA-C, 10 mg at 05/25/25 0011    [Held by provider] losartan (COZAAR) tablet 100 mg, 100 mg, Oral, Daily, Adolfo Alfonso PA-C    ondansetron (ZOFRAN) injection 4 mg, 4 mg, Intravenous, Q6H PRN, Adolfo Alfonso PA-C    polyethylene glycol (MIRALAX) packet 17 g, 17 g, Oral, Daily, Adolfo Alfonso PA-C, 17 g at 06/03/25 0906    senna-docusate sodium (SENOKOT S) 8.6-50 mg per tablet 1 tablet, 1 tablet, Oral, HS, Adoflo Alfonso PA-C, 1 tablet at 05/31/25 6325

## 2025-05-30 NOTE — TELEPHONE ENCOUNTER
5/30/25 - PT STILL IN HOSPITAL    05/28/2025- PT IS IN [facility]  06/09/2025- 2 WK POV incision check W/ AP and CTH   07/09/2025- 6 WK POV W/ HDM W/ CTH     Trista Wilkes PA-C  [facility] [place] [facility]  Surgery:  bilateral lake holes for SDH evac  Date:  5/25  MD:  HDM    Plan:    - 2 week incision check with AP and CTH  - 6 week POV with HDM with CTH    Please make follow up from surgery date.   The skin at the access site was anesthetized. Using a flashback needle with the Modified Seldinger technique the right femoral artery was succesfully accessed in a retrograde fashion over the guidewire using a Sheath Darcie 6f 11cm.

## 2025-05-30 NOTE — CONSULTS
Consultation - Neurology   Name: Deonte Greenfield 74 y.o. male I MRN: [id number]  Unit/Bed#: [facility] 826-01 I Date of Admission: 5/24/2025   Date of Service: 5/30/2025 I Hospital Day: 6   Inpatient consult to Neurology  Consult performed by: Guera Olivier MD  Consult ordered by: Najma Alberto PA-C        Physician Requesting Evaluation: Cassidy Raza DO   Reason for Evaluation / Principal Problem: Acute infarct found on CT head imaging    Assessment & Plan  Acute CVA (cerebrovascular accident) (HCC)  Patient initially presented in April due to unwitnessed fall and found to have small subdural hemorrhage which subsequently appeared to resolve upon repeat CT head imaging. Patient opted to have MRI performed in outpatient setting and MRI team sent him to ER for concerning findings. MRI brain earlier this admission showing new large bilateral high convexity subdural hematomas R > L subacute in timing. No acute infarct at the time. S/p subdural drains and removal  Neurology consulted 5/30 due to findings of acute appearing infarct in right superior cerebellum on CT imaging   Now with right sided UE incoordination and clumsiness per patient which began 2-3 days ago (around 5/27-5/28 he reports). Wife now helping to feed him as he is unable to do so with dexterity  Cardiac monitor outpatient (due to unwitnessed fall in April) showing overall normal heart rhythm in sinus rhythm with few APCs/PVCs noted. No other abnormal arrythmias.   Other risk factors for stroke: HTN history, T2DM, family history of CVA in mother, prior tobacco user, age. Patient not previously on AP/AC prior to admission. Patient does have pancreatic cysts requiring follow-up imaging with consideration for EUS with regards to malignancy concerns      --MRI brain 5/24:  New, large bilateral high convexity subdural hematomas, right greater than left, which are predominantly subacute in timing, with mass effect on the bilateral cerebral hemispheres, and  effacement of the sulci. No significant midline shift noted. FLAIR signal hyperintensity along the sulci of the right frontal and parietal convexities, which may represent trace associated subarachnoid hemorrhage. No acute infarct. Mild chronic white matter microangiopathic changes involving both cerebral hemispheres.  --CT head 5/30: Acute appearing infarct in right superior cerebellum. Increase in volume of hyperdense blood products bilaterally  --Echo 4/2025: EF 65%. Normal systolic function. Grade 1 diastolic relaxation. Small mobile septal aneurysm without associated thrombus      Plan:  Discussed case with Neurology attending Dr. Wilson  Start Stroke pathway  Obtain MRI brain to evaluate acute infarct seen on CT imaging. Patient with severe claustrophobia but amenable to ativan before if deemed appropriate per primary team  Obtain lipid panel. Hemoglobin A1c obtained this admission and was 6.2  Consider starting Lipitor 40 mg, f/u lipid panel and MRI  Obtain repeat Echocardiogram to rule out any changes since prior one  Symptom onset of right hand clumsiness around 5/27-5/28 per patient and wife. No need for permissive HTN  Monitor on telemetry   Recommend further workup when appropriate for pancreatic cystic lesions to rule out malignancy   Will eventually need consideration for aspirin if stroke found on MRI when clinically appropriate to start in setting of subdural hematomas  PT/OT/ST if appropriate     Subdural hematoma (HCC)  Patient sustained an unwitnessed fall in April in which he had a head strike with resolution previously now with admission for bilateral subdural hematomas s/p drainage by Neurosurgery  DVT ppx held. Patient was not on AP/AC previously. History of thrombocytopenia  Keppra 500 mg q12 hr seizure ppx for 7 days. No reported further seizure like activity since April  Continue management per primary team and neurosurgery. Slight interval increase in volume of hyperdense blood products  5/30  Thrombocytopenia (HCC)  Follows with Hem Onc in outpatient setting   Recommend continuing to monitor with daily labs    Neurology service will follow.    Recommendations for outpatient neurological follow up have yet to be determined.    History of Present Illness   Deonte Greenfield is a 74 y.o. right handed  male who presents with acute CVA noted on MRI imaging. To review, he presented 5/24 to the ER due to being sent in from the results of an outpatient MRI study. He was previously admitted in April for a fall unwitnessed with head strike. NC CTH showed a small subdural hemorrhage along the R posterior falx measuring up to 4 mm thickness however upon repeat CTH it appears to have spontaneously resolved in short interval follow up. Son and wife at bedside today who report that they heard a loud noise when he fell and saw him on the ground shaking extremities with eyes open. He had incontinence and was confused after for a few hours. Patient denies prodromal symptoms and does not remember the event at all. No prior history of seizures. No further seizure like activity since. He was seen by the Neurology team and recommended to obtain brain MRI but patient stated he was claustrophobic and requested an open MRI outpatient. He was maintained off AEDs. He was sent to the ER by outpatient MRI team once study performed. CT head on arrival showed stable, large bilateral mixed attenuating, high convexity subdural hematomas, right greater than left, likely predominantly subacute in timing, with mass effect on the right greater than left cerebral hemispheres near the vertex. No midline shift noted. Patient and family deny any falls since the initial one in April. He was experiencing some ambulatory weakness requiring use of rollator but no other focal deficits they noted.     For the past 2-3 days he reports experiencing clumsiness and incoordination in his right hand which is completely new. His wife has to help feed  him now. He reports some frustration with the symptoms. He has been able to ambulate without unsteadiness however with the use of a walker. No speech or visual symptoms. He denies any headaches. He reports having a history of HTN and DM. He quit tobacco 25 years ago and only drinks alcohol in the summer. Family history of stroke notable in his mother in her 70s. There have been no additional seizure like activity while he is here in the hospital.     Review of Systems   Neurological:  Positive for weakness. Negative for tremors, speech difficulty, light-headedness, numbness and headaches.        Objective :  Temp:  [97.3 °F (36.3 °C)-98.9 °F (37.2 °C)] 98 °F (36.7 °C)  HR:  [58-92] 58  BP: (117-164)/(53-74) 164/65  Resp:  [17-18] 18  SpO2:  [93 %-96 %] 95 %    Physical Exam  Constitutional:       General: He is awake.     Eyes:      General: Lids are normal.      Extraocular Movements: No nystagmus.      Pupils: Pupils are equal, round, and reactive to light.       Neurological:      Mental Status: He is alert.      Cranial Nerves: No dysarthria.      Deep Tendon Reflexes:      Reflex Scores:       Bicep reflexes are 1+ on the right side and 1+ on the left side.       Brachioradialis reflexes are 1+ on the right side and 1+ on the left side.       Patellar reflexes are 1+ on the right side and 1+ on the left side.    Neurological Exam  Mental Status  Awake and alert. no dysarthria present.  Able to demonstrate how to brush his teeth and use utensils.    Cranial Nerves  CN II: Visual acuity is normal. Visual fields full to confrontation.  CN III, IV, VI: No nystagmus. Normal lids and orbits bilaterally. Pupils equal round and reactive to light bilaterally.  CN V: Facial sensation is normal.  CN VII: Full and symmetric facial movement.  CN VIII: Hearing is normal.  CN IX, X: Palate elevates symmetrically. Normal gag reflex.  CN XI: Shoulder shrug strength is normal.  CN XII: Tongue midline without atrophy or  fasciculations.    Motor                                               Right                     Left  Elbow flexion                         5                          5  Elbow extension                    5                          5  Hip flexion                              4+                          4+  Knee flexion                           5                          5  Knee extension                      5                          5  Plantarflexion                         5                          5  Dorsiflexion                            5                          5  No tremors.    Sensory  Light touch is normal in upper and lower extremities.     Reflexes                                            Right                      Left  Brachioradialis                    1+                         1+  Biceps                                 1+                         1+  Patellar                                1+                         1+    Right pathological reflexes: Ankle clonus absent.  Left pathological reflexes: Ankle clonus absent.    Coordination  Right: Finger-to-nose abnormality:Left: Finger-to-nose normal.  Some mild dysmetria on finger to nose testing on the right  Patient struggled to move legs in a position to perform full heel-shin testing but able to move heel to lower shin and slide down without significant incoordination.        Lab Results: I have reviewed the following results:  Recent Labs     05/28/25  0456 05/29/25  0612   WBC 10.35* 8.94   HGB 10.4* 11.3*   HCT 31.7* 35.3*   * 172   SODIUM 133* 134*   K 3.9 4.1    101   CO2 25 27   BUN 26* 25   CREATININE 0.86 0.74   GLUC 126 136   MG 2.2  --    PHOS 3.0  --      Imaging Results Review: I reviewed radiology reports from this admission including: CT head.  Outpatient zio patch reviewed.     VTE Prophylaxis: Per primary team, held currently

## 2025-05-30 NOTE — TREATMENT PLAN
Alerted by trauma that patient had altered mental status and had a repeat scan.    CT head with acute appearing infarct in the right superior cerebellum.  Interval removal of bilateral subdural drain catheters.  Compared to prior exam there is interval increase in the volume of hyperdense blood products and slight interval increase in size of bilateral subdural hematomas.  No significant midline shift.  There is ongoing pneumocephalus which looks more or less unchanged.    No plans for repeat surgery at this time.  Hold DVT prophylaxis and repeat CT head in the morning for stability.  Repeat CT head stat if GCS declines more than 2 points in 1 hour.  Systolic blood pressure less than 160.  Continue AED management postoperatively.  Continue to hold other therapeutic doses of AC/AP therapy.  Consult neurology for acute right cerebellar stroke.  Neurosurgery will review imaging as it becomes available.  Please reach out with questions or concerns.

## 2025-05-30 NOTE — RESTORATIVE TECHNICIAN NOTE
Restorative Technician Note      Patient Name: Deonte Greenfield     Restorative Tech Visit Date: 05/30/25  Note Type: Mobility  Patient Position Upon Consult: Supine  Activity Performed: Ambulated (Ambulated with Nurse)  Assistive Device: Roller walker (Chair Follow)  Patient Position at End of Consult: Bedside chair; All needs within reach; Bed/Chair alarm activated  Nurse Communication: -- (Nurse aware of consult)    Nanda Cho Restorative Tech

## 2025-05-30 NOTE — RESTORATIVE TECHNICIAN NOTE
Restorative Technician Note      Patient Name: Deonte Gin     Restorative Tech Visit Date: 05/30/25  Note Type: Mobility  Patient Position Upon Consult: Supine  Activity Performed: Transferred (with Patient Transport)  Assistive Device: Other (Comment) (HHAx2)  Patient Position at End of Consult: Supine; All needs within reach (On Stretcher)  Nurse Communication: -- (Nurse aware of consult)    Nanda Cho Restorative Tech

## 2025-05-30 NOTE — ASSESSMENT & PLAN NOTE
Patient initially presented in April due to unwitnessed fall and found to have small subdural hemorrhage which subsequently appeared to resolve upon repeat CT head imaging. Patient opted to have MRI performed in outpatient setting and MRI team sent him to ER for concerning findings. MRI brain earlier this admission showing new large bilateral high convexity subdural hematomas R > L subacute in timing. No acute infarct at the time. S/p subdural drains and removal  Neurology consulted 5/30 due to findings of acute appearing infarct in right superior cerebellum on CT imaging   Now with right sided UE incoordination and clumsiness per patient which began 2-3 days ago (around 5/27-5/28 he reports). Wife now helping to feed him as he is unable to do so with dexterity  Cardiac monitor outpatient (due to unwitnessed fall in April) showing overall normal heart rhythm in sinus rhythm with few APCs/PVCs noted. No other abnormal arrythmias.   Other risk factors for stroke: HTN history, T2DM, family history of CVA in mother, prior tobacco user, age. Patient not previously on AP/AC prior to admission. Patient does have pancreatic cysts requiring follow-up imaging with consideration for EUS with regards to malignancy concerns      --MRI brain 5/24:  New, large bilateral high convexity subdural hematomas, right greater than left, which are predominantly subacute in timing, with mass effect on the bilateral cerebral hemispheres, and effacement of the sulci. No significant midline shift noted. FLAIR signal hyperintensity along the sulci of the right frontal and parietal convexities, which may represent trace associated subarachnoid hemorrhage. No acute infarct. Mild chronic white matter microangiopathic changes involving both cerebral hemispheres.  --CT head 5/30: Acute appearing infarct in right superior cerebellum. Increase in volume of hyperdense blood products bilaterally  --Echo 4/2025: EF 65%. Normal systolic function. Grade 1  diastolic relaxation. Small mobile septal aneurysm without associated thrombus      Plan:  Discussed case with Neurology attending Dr. Wilson  Start Stroke pathway  Obtain MRI brain to evaluate acute infarct seen on CT imaging. Patient with severe claustrophobia but amenable to ativan before if deemed appropriate per primary team  Obtain lipid panel. Hemoglobin A1c obtained this admission and was 6.2  Consider starting Lipitor 40 mg, f/u lipid panel and MRI  Obtain repeat Echocardiogram to rule out any changes since prior one  Symptom onset of right hand clumsiness around 5/27-5/28 per patient and wife. No need for permissive HTN  Monitor on telemetry   Recommend further workup when appropriate for pancreatic cystic lesions to rule out malignancy   Will eventually need consideration for aspirin if stroke found on MRI when clinically appropriate to start in setting of subdural hematomas  PT/OT/ST if appropriate

## 2025-05-30 NOTE — CASE MANAGEMENT
Case Management Discharge Planning Note    Patient name Deonte Attarian  Location [facility] 826/[facility] 826-01 MRN [id number]  : 1950 Date 2025       Current Admission Date: 2025  Current Admission Diagnosis:Subdural hematoma (HCC)   Patient Active Problem List    Diagnosis Date Noted    Weight loss 2025    Frailty 2025    Acute pain 2025    Insomnia 2025    At risk for delirium 2025    Subdural hematoma (HCC) 2025    Leg swelling 2025    Severe obesity (BMI 35.0-39.9) with comorbidity ([facility]) 2025    Bradycardia, sinus 2025    Atrial septal aneurysm 2025    Normocytic anemia 2025    Syncope 2025    Thrombocytopenia ([facility]) 2025    Abnormal echocardiogram 2025    Groin pain, right 2025    Fall 2025    Hypocitraturia 2024    Neuropathy 2019    Hyperuricemia 2016    Mixed hyperlipidemia 2016    Onychomycosis 2015    PVD (peripheral vascular disease) ([facility]) 10/07/2015    POPPY (obstructive sleep apnea) 10/07/2015    Diabetes mellitus ([facility]) 2015    S/P hip replacement 2014    Osteoarthritis 2008      LOS (days): 6  Geometric Mean LOS (GMLOS) (days): 6.5  Days to GMLOS:0.7     OBJECTIVE:  Risk of Unplanned Readmission Score: 16.9         Current admission status: Inpatient   [facility]erred Pharmacy:   [facility] [place] [facility] - [facility]  [facility]  [place] [address]  Phone: [phone] Fax: [phone]    [facility] #[id number] - [place] ALMAGUER - [address]  [address] [place] [address]  [place] [address]  Phone: [phone] Fax: [phone]    Primary Care Provider: Garcia Cartwright MD    Primary Insurance: [facility]  Secondary Insurance:     DISCHARGE DETAILS:    Pt's auth is pending for [facility]C  If denied, pt's family is agreeable to [facility] acute, and CM will need to secure auth for that location

## 2025-05-31 ENCOUNTER — APPOINTMENT (INPATIENT)
Dept: RADIOLOGY | Facility: HOSPITAL | Age: 75
DRG: 025 | End: 2025-05-31
Payer: COMMERCIAL

## 2025-05-31 LAB
ALBUMIN SERPL BCG-MCNC: 3.6 G/DL (ref 3.5–5)
ALP SERPL-CCNC: 102 U/L (ref 34–104)
ALT SERPL W P-5'-P-CCNC: 17 U/L (ref 7–52)
ANION GAP SERPL CALCULATED.3IONS-SCNC: 7 MMOL/L (ref 4–13)
APTT PPP: 32 SECONDS (ref 23–34)
AST SERPL W P-5'-P-CCNC: 18 U/L (ref 13–39)
BILIRUB SERPL-MCNC: 1.19 MG/DL (ref 0.2–1)
BUN SERPL-MCNC: 29 MG/DL (ref 5–25)
CALCIUM SERPL-MCNC: 9.7 MG/DL (ref 8.4–10.2)
CHLORIDE SERPL-SCNC: 102 MMOL/L (ref 96–108)
CHOLEST SERPL-MCNC: 100 MG/DL (ref ?–200)
CO2 SERPL-SCNC: 28 MMOL/L (ref 21–32)
CREAT SERPL-MCNC: 0.78 MG/DL (ref 0.6–1.3)
ERYTHROCYTE [DISTWIDTH] IN BLOOD BY AUTOMATED COUNT: 14.7 % (ref 11.6–15.1)
GFR SERPL CREATININE-BSD FRML MDRD: 88 ML/MIN/1.73SQ M
GLUCOSE SERPL-MCNC: 107 MG/DL (ref 65–140)
GLUCOSE SERPL-MCNC: 140 MG/DL (ref 65–140)
GLUCOSE SERPL-MCNC: 145 MG/DL (ref 65–140)
GLUCOSE SERPL-MCNC: 150 MG/DL (ref 65–140)
GLUCOSE SERPL-MCNC: 161 MG/DL (ref 65–140)
GLUCOSE SERPL-MCNC: 169 MG/DL (ref 65–140)
HCT VFR BLD AUTO: 33.9 % (ref 36.5–49.3)
HDLC SERPL-MCNC: 21 MG/DL
HGB BLD-MCNC: 10.9 G/DL (ref 12–17)
INR PPP: 1.2 (ref 0.85–1.19)
LDLC SERPL CALC-MCNC: 62 MG/DL (ref 0–100)
MAGNESIUM SERPL-MCNC: 1.8 MG/DL (ref 1.9–2.7)
MCH RBC QN AUTO: 30 PG (ref 26.8–34.3)
MCHC RBC AUTO-ENTMCNC: 32.2 G/DL (ref 31.4–37.4)
MCV RBC AUTO: 93 FL (ref 82–98)
NONHDLC SERPL-MCNC: 79 MG/DL
PHOSPHATE SERPL-MCNC: 3.4 MG/DL (ref 2.3–4.1)
PLATELET # BLD AUTO: 226 THOUSANDS/UL (ref 149–390)
PMV BLD AUTO: 12.2 FL (ref 8.9–12.7)
POTASSIUM SERPL-SCNC: 4.2 MMOL/L (ref 3.5–5.3)
PROT SERPL-MCNC: 7.9 G/DL (ref 6.4–8.4)
PROTHROMBIN TIME: 15.5 SECONDS (ref 12.3–15)
RBC # BLD AUTO: 3.63 MILLION/UL (ref 3.88–5.62)
SODIUM SERPL-SCNC: 137 MMOL/L (ref 135–147)
TRIGL SERPL-MCNC: 85 MG/DL (ref ?–150)
WBC # BLD AUTO: 7.1 THOUSAND/UL (ref 4.31–10.16)

## 2025-05-31 PROCEDURE — 70551 MRI BRAIN STEM W/O DYE: CPT

## 2025-05-31 PROCEDURE — 85730 THROMBOPLASTIN TIME PARTIAL: CPT

## 2025-05-31 PROCEDURE — 80053 COMPREHEN METABOLIC PANEL: CPT

## 2025-05-31 PROCEDURE — 80061 LIPID PANEL: CPT | Performed by: PHYSICIAN ASSISTANT

## 2025-05-31 PROCEDURE — 85027 COMPLETE CBC AUTOMATED: CPT

## 2025-05-31 PROCEDURE — 70450 CT HEAD/BRAIN W/O DYE: CPT

## 2025-05-31 PROCEDURE — 84100 ASSAY OF PHOSPHORUS: CPT

## 2025-05-31 PROCEDURE — 99232 SBSQ HOSP IP/OBS MODERATE 35: CPT | Performed by: SURGERY

## 2025-05-31 PROCEDURE — 85610 PROTHROMBIN TIME: CPT

## 2025-05-31 PROCEDURE — 83735 ASSAY OF MAGNESIUM: CPT

## 2025-05-31 PROCEDURE — 82948 REAGENT STRIP/BLOOD GLUCOSE: CPT

## 2025-05-31 RX ADMIN — LEVETIRACETAM 500 MG: 500 TABLET, FILM COATED ORAL at 08:52

## 2025-05-31 RX ADMIN — ATENOLOL 50 MG: 50 TABLET ORAL at 08:52

## 2025-05-31 RX ADMIN — LORAZEPAM 1 MG: 1 TABLET ORAL at 11:46

## 2025-05-31 RX ADMIN — ATORVASTATIN CALCIUM 10 MG: 10 TABLET, FILM COATED ORAL at 22:54

## 2025-05-31 RX ADMIN — DICLOFENAC SODIUM 2 G: 10 GEL TOPICAL at 17:59

## 2025-05-31 RX ADMIN — FERROUS SULFATE TAB 325 MG (65 MG ELEMENTAL FE) 325 MG: 325 (65 FE) TAB at 08:52

## 2025-05-31 RX ADMIN — CHLORHEXIDINE GLUCONATE 15 ML: 1.2 SOLUTION ORAL at 22:54

## 2025-05-31 RX ADMIN — INSULIN LISPRO 2 UNITS: 100 INJECTION, SOLUTION INTRAVENOUS; SUBCUTANEOUS at 17:31

## 2025-05-31 RX ADMIN — POLYETHYLENE GLYCOL 3350 17 G: 17 POWDER, FOR SOLUTION ORAL at 08:52

## 2025-05-31 RX ADMIN — SENNOSIDES AND DOCUSATE SODIUM 1 TABLET: 50; 8.6 TABLET ORAL at 22:54

## 2025-05-31 RX ADMIN — ALLOPURINOL 300 MG: 100 TABLET ORAL at 08:52

## 2025-05-31 RX ADMIN — CHLORHEXIDINE GLUCONATE 15 ML: 1.2 SOLUTION ORAL at 08:52

## 2025-05-31 NOTE — TREATMENT PLAN
Assessment:  S/p bilateral lake holes for SDH evac 5/25  SDH  Thrombocytopenia   Acute right cerebellar infarct    Imaging:  CT head wo contrast 5/31/2025:  Stable small recent right cerebellar infarct.  No significant change in mixed density bilateral cerebral convexity subdural hematomas admixed with foci of air.    Plan:  Imaging reviewed, stable findings in regard to acute cerebellar infarct and new left sided SDH collection  Per trauma team patient has been neuro stable  No plans for intervention at this time, continue to monitor closely for now  Repeat CT head STAT if GCS declines more than 2 points in 1 hour  SBP < 160  INR < 1.4, currently 1.2  Plt > 100, currently > 200  Continue keppra for seizure ppx  Hold all therapeutic doses of AC / AP therapy, he is not cleared to start asa per neurology recommendations for stroke management  Consider dvt ppx tomorrow   Neurology following for stroke work up, will need new MRI brain   Rest of care per primary team  Neurosurgery to follow peripherally, reach out with questions or concerns

## 2025-05-31 NOTE — SPEECH THERAPY NOTE
Order received and chart reviewed. Pt is currently being transported to MRI.  to follow up for assessment as able.

## 2025-05-31 NOTE — PROGRESS NOTES
Progress Note - Trauma   Name: Deonte Greenfield 74 y.o. male I MRN: [id number]  Unit/Bed#: [facility] 826-01 I Date of Admission: 5/24/2025   Date of Service: 5/31/2025 I Hospital Day: 7    Assessment & Plan  Fall  - Status post fall with the below noted injuries.  - Fall precautions.  - Geriatric Medicine consultation for evaluation, medication review and recommendations.  - PT and OT evaluation and treatment as indicated.  - Case Management consultation for disposition planning.  Subdural hematoma (HCC)  - Initially noted to have large acute on chronic bilateral subdural hematomas   - Appreciate Neurosurgery evaluation -signed off at this time   - s/p bilateral [place] 5/25 with 4 subdural drains    - 2 drains removed 5/27   - 2 drains removed 5/28  - MMA embolization unable to be completed secondary to anatomy   - Neuro exam: GCS 15, non-focal  - Continue neurologic checks: Every 4 hours  - Complete 7 day course of Keppra for seizure prophylaxis  - Chemical DVT prophylaxis: Lovenox  - Hold all anticoagulants and anti platelet medications for 2 weeks and/or until cleared by Neurosurgery to resume.  - PT and OT (including cognitive evaluation) evaluation and treatment as indicated.  Diabetes mellitus (HCC)  - Hold home metformin   - Continue SSI for coverage  - Adjust and start basal bolus coverage as needed  Thrombocytopenia (HCC)  - Chronic thrombocytopenia   - Outpatient workup   Acute CVA (cerebrovascular accident) (HCC)      Bowel Regimen: Miralax  VTE Prophylaxis:VTE covered by:    None         Disposition: M/S,  Please contact the SecureChat role for the Trauma service with any questions/concerns.    24 Hour Events : No acute overnight events, MRI pending   Subjective : Sitting on chair, resting comfortably. In no acute distress. No new complaints.     Objective :  Temp:  [97.6 °F (36.4 °C)-99.1 °F (37.3 °C)] 98.4 °F (36.9 °C)  HR:  [58-92] 60  BP: (105-164)/(34-72) 129/65  Resp:  [16-18] 16  SpO2:  [94 %-96 %]  95 %    I/O         05/29 0701  05/30 0700 05/30 0701  05/31 0700    P.O. 600 720    Total Intake(mL/kg) 600 (5.8) 720 (7)    Urine (mL/kg/hr) 650 (0.3) 500 (0.2)    Stool  0    Total Output 650 500    Net -50 +220          Unmeasured Urine Occurrence 1 x     Unmeasured Stool Occurrence  0 x            Physical Exam  Vitals reviewed.   Constitutional:       General: He is not in acute distress.     Appearance: Normal appearance.   HENT:      Head: Normocephalic.      Eyes:      Extraocular Movements: Extraocular movements intact.      Pupils: Pupils are equal, round, and reactive to light.         Cardiovascular:      Rate and Rhythm: Normal rate and regular rhythm.      Pulses: Normal pulses.   Pulmonary:      Effort: Pulmonary effort is normal. No respiratory distress.      Breath sounds: Normal breath sounds.   Abdominal:      General: There is no distension.      Palpations: Abdomen is soft.      Tenderness: There is no abdominal tenderness.      Neurological:      General: No focal deficit present.      Mental Status: He is alert and oriented to person, place, and time.       Lab Results: I have reviewed the following results:  Recent Labs     05/29/25  0612   WBC 8.94   HGB 11.3*   HCT 35.3*      SODIUM 134*   K 4.1      CO2 27   BUN 25   CREATININE 0.74   GLUC 136

## 2025-05-31 NOTE — PLAN OF CARE
Problem: PAIN - ADULT  Goal: Verbalizes/displays adequate comfort level or baseline comfort level  Description: Interventions:  - Encourage patient to monitor pain and request assistance  - Assess pain using appropriate pain scale  - Administer analgesics as ordered based on type and severity of pain and evaluate response  - Implement non-pharmacological measures as appropriate and evaluate response  - Consider cultural and social influences on pain and pain management  - Notify physician/advanced practitioner if interventions unsuccessful or patient reports new pain  - Educate patient/family on pain management process including their role and importance of  reporting pain   - Provide non-pharmacologic/complimentary pain relief interventions  Outcome: Progressing     Problem: INFECTION - ADULT  Goal: Absence or prevention of progression during hospitalization  Description: INTERVENTIONS:  - Assess and monitor for signs and symptoms of infection  - Monitor lab/diagnostic results  - Monitor all insertion sites, i.e. indwelling lines, tubes, and drains  - Monitor endotracheal if appropriate and nasal secretions for changes in amount and color  - [place] appropriate cooling/warming therapies per order  - Administer medications as ordered  - Instruct and encourage patient and family to use good hand hygiene technique  - Identify and instruct in appropriate isolation precautions for identified infection/condition  Outcome: Progressing     Problem: SAFETY ADULT  Goal: Patient will remain free of falls  Description: INTERVENTIONS:  - Educate patient/family on patient safety including physical limitations  - Instruct patient to call for assistance with activity   - Consider consulting OT/PT to assist with strengthening/mobility based on AM PAC & JH-HLM score  - Consult OT/PT to assist with strengthening/mobility   - Keep Call bell within reach  - Keep bed low and locked with side rails adjusted as appropriate  - Keep  care items and personal belongings within reach  - Initiate and maintain comfort rounds  - Make Fall Risk Sign visible to staff  - Offer Toileting every 2 Hours, in advance of need  - Initiate/Maintain bed/chair alarm  - Obtain necessary fall risk management equipment:   - Apply yellow socks and bracelet for high fall risk patients  - Consider moving patient to room near nurses station  Outcome: Progressing

## 2025-05-31 NOTE — PLAN OF CARE
Problem: PAIN - ADULT  Goal: Verbalizes/displays adequate comfort level or baseline comfort level  Description: Interventions:  - Encourage patient to monitor pain and request assistance  - Assess pain using appropriate pain scale  - Administer analgesics as ordered based on type and severity of pain and evaluate response  - Implement non-pharmacological measures as appropriate and evaluate response  - Consider cultural and social influences on pain and pain management  - Notify physician/advanced practitioner if interventions unsuccessful or patient reports new pain  - Educate patient/family on pain management process including their role and importance of  reporting pain   - Provide non-pharmacologic/complimentary pain relief interventions  Outcome: Progressing     Problem: INFECTION - ADULT  Goal: Absence or prevention of progression during hospitalization  Description: INTERVENTIONS:  - Assess and monitor for signs and symptoms of infection  - Monitor lab/diagnostic results  - Monitor all insertion sites, i.e. indwelling lines, tubes, and drains  - Monitor endotracheal if appropriate and nasal secretions for changes in amount and color  - [place] appropriate cooling/warming therapies per order  - Administer medications as ordered  - Instruct and encourage patient and family to use good hand hygiene technique  - Identify and instruct in appropriate isolation precautions for identified infection/condition  Outcome: Progressing  Goal: Absence of fever/infection during neutropenic period  Description: INTERVENTIONS:  - Monitor WBC  - Perform strict hand hygiene  - Limit to healthy visitors only  - No plants, dried, fresh or silk flowers with zelaya in patient room  Outcome: Progressing     Problem: SAFETY ADULT  Goal: Patient will remain free of falls  Description: INTERVENTIONS:  - Educate patient/family on patient safety including physical limitations  - Instruct patient to call for assistance with activity   -  Consider consulting OT/PT to assist with strengthening/mobility based on AM PAC & JH-HLM score  - Consult OT/PT to assist with strengthening/mobility   - Keep Call bell within reach  - Keep bed low and locked with side rails adjusted as appropriate  - Keep care items and personal belongings within reach  - Initiate and maintain comfort rounds  - Make Fall Risk Sign visible to staff  - Offer Toileting every 2 Hours, in advance of need  - Initiate/Maintain bed/chair alarm  - Obtain necessary fall risk management equipment:   - Apply yellow socks and bracelet for high fall risk patients  - Consider moving patient to room near nurses station  Outcome: Progressing  Goal: Maintain or return to baseline ADL function  Description: INTERVENTIONS:  -  Assess patient's ability to carry out ADLs; assess patient's baseline for ADL function and identify physical deficits which impact ability to perform ADLs (bathing, care of mouth/teeth, toileting, grooming, dressing, etc.)  - Assess/evaluate cause of self-care deficits   - Assess range of motion  - Assess patient's mobility; develop plan if impaired  - Assess patient's need for assistive devices and provide as appropriate  - Encourage maximum independence but intervene and supervise when necessary  - Involve family in performance of ADLs  - Assess for home care needs following discharge   - Consider OT consult to assist with ADL evaluation and planning for discharge  - Provide patient education as appropriate  - Monitor functional capacity and physical performance, use of AM PAC & JH-HLM   - Monitor gait, balance and fatigue with ambulation    Outcome: Progressing  Goal: Maintains/Returns to pre admission functional level  Description: INTERVENTIONS:  - Perform AM-PAC 6 Click Basic Mobility/ Daily Activity assessment daily.  - Set and communicate daily mobility goal to care team and patient/family/caregiver.   - Collaborate with rehabilitation services on mobility goals if  consulted  - Perform Range of Motion 3 times a day.  - Reposition patient every 2 hours.  - Dangle patient 3 times a day  - Stand patient 3 times a day  - Ambulate patient 3 times a day  - Out of bed to chair 3 times a day   - Out of bed for meals 3 times a day  - Out of bed for toileting  - Record patient progress and toleration of activity level   Outcome: Progressing     Problem: DISCHARGE PLANNING  Goal: Discharge to home or other facility with appropriate resources  Description: INTERVENTIONS:  - Identify barriers to discharge w/patient and caregiver  - Arrange for needed discharge resources and transportation as appropriate  - Identify discharge learning needs (meds, wound care, etc.)  - Arrange for interpretive services to assist at discharge as needed  - Refer to Case Management Department for coordinating discharge planning if the patient needs post-hospital services based on physician/advanced practitioner order or complex needs related to functional status, cognitive ability, or social support system  Outcome: Progressing     Problem: Knowledge Deficit  Goal: Patient/family/caregiver demonstrates understanding of disease process, treatment plan, medications, and discharge instructions  Description: Complete learning assessment and assess knowledge base.  Interventions:  - Provide teaching at level of understanding  - Provide teaching via preferred learning methods  Outcome: Progressing     Problem: Prexisting or High Potential for Compromised Skin Integrity  Goal: Skin integrity is maintained or improved  Description: INTERVENTIONS:  - Identify patients at risk for skin breakdown  - Assess and monitor skin integrity including under and around medical devices   - Assess and monitor nutrition and hydration status  - Monitor labs  - Assess for incontinence   - Turn and reposition patient  - Assist with mobility/ambulation  - Relieve pressure over verónica prominences   - Avoid friction and shearing  - Provide  appropriate hygiene as needed including keeping skin clean and dry  - Evaluate need for skin moisturizer/barrier cream  - Collaborate with interdisciplinary team  - Patient/family teaching  - Consider wound care consult    Assess:  - Review Zaid scale daily  - Clean and moisturize skin every shift  - Inspect skin when repositioning, toileting, and assisting with ADLS  - Assess under medical devices such as venodynes every 2 hours  - Assess extremities for adequate circulation and sensation     Bed Management:  - Have minimal linens on bed & keep smooth, unwrinkled  - Change linens as needed when moist or perspiring  - Avoid sitting or lying in one position for more than 2 hours while in bed Keep HOB at 20degrees   - Toileting:  - Offer bedside commode  - Assess for incontinence every 2 hours  - Use incontinent care products after each incontinent episode such as skin protectant    Activity:  - Mobilize patient 3 times a day  - Encourage activity and walks on unit  - Encourage or provide ROM exercises   - Turn and reposition patient every 2 Hours  - Use appropriate equipment to lift or move patient in bed  - Instruct/ Assist with weight shifting every 30 minutes when out of bed in chair  - Consider limitation of chair time 2 hour intervals    Skin Care:  - Avoid use of baby powder, tape, friction and shearing, hot water or constrictive clothing  - Relieve pressure over bony prominences using pillows/wedges  - Do not massage red bony areas    Next Steps:  - Teach patient strategies to minimize risks such as weight shift/turn and reposition  - Consider consults to  interdisciplinary teams such as PT/OT  Outcome: Progressing     Problem: Nutrition/Hydration-ADULT  Goal: Nutrient/Hydration intake appropriate for improving, restoring or maintaining nutritional needs  Description: Monitor and assess patient's nutrition/hydration status for malnutrition. Collaborate with interdisciplinary team and initiate plan and  interventions as ordered.  Monitor patient's weight and dietary intake as ordered or per policy. Utilize nutrition screening tool and intervene as necessary. Determine patient's food preferences and provide high-protein, high-caloric foods as appropriate.     INTERVENTIONS:  - Monitor oral intake, urinary output, labs, and treatment plans  - Assess nutrition and hydration status and recommend course of action  - Evaluate amount of meals eaten  - Assist patient with eating if necessary   - Allow adequate time for meals  - Recommend/ encourage appropriate diets, oral nutritional supplements, and vitamin/mineral supplements  - Order, calculate, and assess calorie counts as needed  - Recommend, monitor, and adjust tube feedings and TPN/PPN based on assessed needs  - Assess need for intravenous fluids  - Provide specific nutrition/hydration education as appropriate  - Include patient/family/caregiver in decisions related to nutrition  Outcome: Progressing     Problem: NEUROSENSORY - ADULT  Goal: Achieves stable or improved neurological status  Description: INTERVENTIONS  - Monitor and report changes in neurological status  - Monitor vital signs such as temperature, blood pressure, glucose, and any other labs ordered   - Initiate measures to prevent increased intracranial pressure  - Monitor for seizure activity and implement precautions if appropriate      Outcome: Progressing  Goal: Achieves maximal functionality and self care  Description: INTERVENTIONS  - Monitor swallowing and airway patency with patient fatigue and changes in neurological status  - Encourage and assist patient to increase activity and self care.   - Encourage visually impaired, hearing impaired and aphasic patients to use assistive/communication devices  Outcome: Progressing     Problem: CARDIOVASCULAR - ADULT  Goal: Maintains optimal cardiac output and hemodynamic stability  Description: INTERVENTIONS:  - Monitor I/O, vital signs and rhythm  -  Monitor for S/S and trends of decreased cardiac output  - Administer and titrate ordered vasoactive medications to optimize hemodynamic stability  - Assess quality of pulses, skin color and temperature  - Assess for signs of decreased coronary artery perfusion  - Instruct patient to report change in severity of symptoms  Outcome: Progressing  Goal: Absence of cardiac dysrhythmias or at baseline rhythm  Description: INTERVENTIONS:  - Continuous cardiac monitoring, vital signs, obtain 12 lead EKG if ordered  - Administer antiarrhythmic and heart rate control medications as ordered  - Monitor electrolytes and administer replacement therapy as ordered  Outcome: Progressing     Problem: RESPIRATORY - ADULT  Goal: Achieves optimal ventilation and oxygenation  Description: INTERVENTIONS:  - Assess for changes in respiratory status  - Assess for changes in mentation and behavior  - Position to facilitate oxygenation and minimize respiratory effort  - Oxygen administered by appropriate delivery if ordered  - Initiate smoking cessation education as indicated  - Encourage broncho-pulmonary hygiene including cough, deep breathe, Incentive Spirometry  - Assess the need for suctioning and aspirate as needed  - Assess and instruct to report SOB or any respiratory difficulty  - Respiratory Therapy support as indicated  Outcome: Progressing     Problem: GASTROINTESTINAL - ADULT  Goal: Minimal or absence of nausea and/or vomiting  Description: INTERVENTIONS:  - Administer IV fluids if ordered to ensure adequate hydration  - Maintain NPO status until nausea and vomiting are resolved  - Nasogastric tube if ordered  - Administer ordered antiemetic medications as needed  - Provide nonpharmacologic comfort measures as appropriate  - Advance diet as tolerated, if ordered  - Consider nutrition services referral to assist patient with adequate nutrition and appropriate food choices  Outcome: Progressing  Goal: Maintains or returns to  baseline bowel function  Description: INTERVENTIONS:  - Assess bowel function  - Encourage oral fluids to ensure adequate hydration  - Administer IV fluids if ordered to ensure adequate hydration  - Administer ordered medications as needed  - Encourage mobilization and activity  - Consider nutritional services referral to assist patient with adequate nutrition and appropriate food choices  Outcome: Progressing  Goal: Maintains adequate nutritional intake  Description: INTERVENTIONS:  - Monitor percentage of each meal consumed  - Identify factors contributing to decreased intake, treat as appropriate  - Assist with meals as needed  - Monitor I&O, weight, and lab values if indicated  - Obtain nutrition services referral as needed  Outcome: Progressing     Problem: GENITOURINARY - ADULT  Goal: Maintains or returns to baseline urinary function  Description: INTERVENTIONS:  - Assess urinary function  - Encourage oral fluids to ensure adequate hydration if ordered  - Administer IV fluids as ordered to ensure adequate hydration  - Administer ordered medications as needed  - Offer frequent toileting  - Follow urinary retention protocol if ordered  Outcome: Progressing  Goal: Absence of urinary retention  Description: INTERVENTIONS:  - Assess patient’s ability to void and empty bladder  - Monitor I/O  - Bladder scan as needed  - Discuss with physician/AP medications to alleviate retention as needed  - Discuss catheterization for long term situations as appropriate  Outcome: Progressing     Problem: METABOLIC, FLUID AND ELECTROLYTES - ADULT  Goal: Electrolytes maintained within normal limits  Description: INTERVENTIONS:  - Monitor labs and assess patient for signs and symptoms of electrolyte imbalances  - Administer electrolyte replacement as ordered  - Monitor response to electrolyte replacements, including repeat lab results as appropriate  - Instruct patient on fluid and nutrition as appropriate  Outcome:  Progressing  Goal: Fluid balance maintained  Description: INTERVENTIONS:  - Monitor labs   - Monitor I/O and WT  - Instruct patient on fluid and nutrition as appropriate  - Assess for signs & symptoms of volume excess or deficit  Outcome: Progressing  Goal: Glucose maintained within target range  Description: INTERVENTIONS:  - Monitor Blood Glucose as ordered  - Assess for signs and symptoms of hyperglycemia and hypoglycemia  - Administer ordered medications to maintain glucose within target range  - Assess nutritional intake and initiate nutrition service referral as needed  Outcome: Progressing     Problem: SKIN/TISSUE INTEGRITY - ADULT  Goal: Skin Integrity remains intact(Skin Breakdown Prevention)  Description: Assess:  -Perform Zaid assessment every shift  -Clean and moisturize skin every shift  -Inspect skin when repositioning, toileting, and assisting with ADLS  -Assess under medical devices such as venodynes every 2 hours  -Assess extremities for adequate circulation and sensation     Bed Management:  -Have minimal linens on bed & keep smooth, unwrinkled  -Change linens as needed when moist or perspiring  -Avoid sitting or lying in one position for more than 2 hours while in bed  -Keep HOB at 20degrees     Toileting:  -Offer bedside commode  -Assess for incontinence every 2 hours  -Use incontinent care products after each incontinent episode such as skin protectant    Activity:  -Mobilize patient 3 times a day  -Encourage activity and walks on unit  -Encourage or provide ROM exercises   -Turn and reposition patient every 2 Hours  -Use appropriate equipment to lift or move patient in bed  -Instruct/ Assist with weight shifting every 30 minutes when out of bed in chair  -Consider limitation of chair time 2 hour intervals    Skin Care:  -Avoid use of baby powder, tape, friction and shearing, hot water or constrictive clothing  -Relieve pressure over bony prominences using pillows/wedges  -Do not massage red  bony areas    Next Steps:  -Teach patient strategies to minimize risks such as weight shift/turn and reposition   -Consider consults to  interdisciplinary teams such as PT/OT  Outcome: Progressing  Goal: Incision(s), wounds(s) or drain site(s) healing without S/S of infection  Description: INTERVENTIONS  - Assess and document dressing, incision, wound bed, drain sites and surrounding tissue  - Provide patient and family education  - Perform skin care/dressing changes as ordered  Outcome: Progressing     Problem: HEMATOLOGIC - ADULT  Goal: Maintains hematologic stability  Description: INTERVENTIONS  - Assess for signs and symptoms of bleeding or hemorrhage  - Monitor labs  - Administer supportive blood products/factors as ordered and appropriate  Outcome: Progressing     Problem: MUSCULOSKELETAL - ADULT  Goal: Maintain or return mobility to safest level of function  Description: INTERVENTIONS:  - Assess patient's ability to carry out ADLs; assess patient's baseline for ADL function and identify physical deficits which impact ability to perform ADLs (bathing, care of mouth/teeth, toileting, grooming, dressing, etc.)  - Assess/evaluate cause of self-care deficits   - Assess range of motion  - Assess patient's mobility  - Assess patient's need for assistive devices and provide as appropriate  - Encourage maximum independence but intervene and supervise when necessary  - Involve family in performance of ADLs  - Assess for home care needs following discharge   - Consider OT consult to assist with ADL evaluation and planning for discharge  - Provide patient education as appropriate  Outcome: Progressing     Problem: Potential for Falls  Goal: Patient will remain free of falls  Description: INTERVENTIONS:  - Educate patient/family on patient safety including physical limitations  - Instruct patient to call for assistance with activity   - Consider consulting OT/PT to assist with strengthening/mobility based on AM PAC &  JH-HLM score  - Consult OT/PT to assist with strengthening/mobility   - Keep Call bell within reach  - Keep bed low and locked with side rails adjusted as appropriate  - Keep care items and personal belongings within reach  - Initiate and maintain comfort rounds  - Make Fall Risk Sign visible to staff  - Offer Toileting every 2 Hours, in advance of need  - Initiate/Maintain bed/chair alarm  - Obtain necessary fall risk management equipment:   - Apply yellow socks and bracelet for high fall risk patients  - Consider moving patient to room near nurses station  Outcome: Progressing     Problem: COPING  Goal: Pt/Family able to verbalize concerns and demonstrate effective coping strategies  Description: INTERVENTIONS:  - Assist patient/family to identify coping skills, available support systems and cultural and spiritual values  - Provide emotional support, including active listening and acknowledgement of concerns of patient and caregivers  - Reduce environmental stimuli, as able  - Provide patient education  - Assess for spiritual pain/suffering and initiate spiritual care, including notification of Pastoral Care or pawan based community as needed  - Assess effectiveness of coping strategies  Outcome: Progressing  Goal: Will report anxiety at manageable levels  Description: INTERVENTIONS:  - Administer medication as ordered  - Teach and encourage coping skills  - Provide emotional support  - Assess patient/family for anxiety and ability to cope  Outcome: Progressing     Problem: Neurological Deficit  Goal: Neurological status is stable or improving  Description: Interventions:  - Monitor and assess patient's level of consciousness, motor function, sensory function, and level of assistance needed for ADLs.   - Monitor and report changes from baseline. Collaborate with interdisciplinary team to initiate plan and implement interventions as ordered.   - Provide and maintain a safe environment.  - Consider seizure  precautions.  - Consider fall precautions.  - Consider aspiration precautions.  - Consider bleeding precautions.  Outcome: Progressing     Problem: Activity Intolerance/Impaired Mobility  Goal: Mobility/activity is maintained at optimum level for patient  Description: Interventions:  - Assess and monitor patient  barriers to mobility and need for assistive/adaptive devices.  - Assess patient's emotional response to limitations.  - Collaborate with interdisciplinary team and initiate plans and interventions as ordered.  - Encourage independent activity per ability.  - Maintain proper body alignment.  - Perform active/passive rom as tolerated/ordered.  - Plan activities to conserve energy.  - Turn patient as appropriate  Outcome: Progressing     Problem: Communication Impairment  Goal: Ability to express needs and understand communication  Description: Assess patient's communication skills and ability to understand information.  Patient will demonstrate use of effective communication techniques, alternative methods of communication and understanding even if not able to speak.     - Encourage communication and provide alternate methods of communication as needed.  - Collaborate with case management/ for discharge needs.  - Include patient/family/caregiver in decisions related to communication.  Outcome: Progressing     Problem: Potential for Aspiration  Goal: Non-ventilated patient's risk of aspiration is minimized  Description: Assess and monitor vital signs, respiratory status, and labs (WBC).  Monitor for signs of aspiration (tachypnea, cough, rales, wheezing, cyanosis, fever).    - Assess and monitor patient's ability to swallow.  - Place patient up in chair to eat if possible.  - HOB up at 90 degrees to eat if unable to get patient up into chair.  - Supervise patient during oral intake.   - Instruct patient/ family to take small bites.  - Instruct patient/ family to take small single sips when  taking liquids.  - Follow patient-specific strategies generated by speech pathologist.  Outcome: Progressing  Goal: Ventilated patient's risk of aspiration is minimized  Description: Assess and monitor vital signs, respiratory status, airway cuff pressure, and labs (WBC).  Monitor for signs of aspiration (tachypnea, cough, rales, wheezing, cyanosis, fever).    - Elevate head of bed 30 degrees if patient has tube feeding.  - Monitor tube feeding.  Outcome: Progressing     Problem: Nutrition  Goal: Nutrition/Hydration status is improving  Description: Monitor and assess patient's nutrition/hydration status for malnutrition (ex- brittle hair, bruises, dry skin, pale skin and conjunctiva, muscle wasting, smooth red tongue, and disorientation). Collaborate with interdisciplinary team and initiate plan and interventions as ordered.  Monitor patient's weight and dietary intake as ordered or per policy. Utilize nutrition screening tool and intervene per policy. Determine patient's food preferences and provide high-protein, high-caloric foods as appropriate.     - Assist patient with eating.  - Allow adequate time for meals.  - Encourage patient to take dietary supplement as ordered.  - Collaborate with clinical nutritionist.  - Include patient/family/caregiver in decisions related to nutrition.  Outcome: Progressing

## 2025-06-01 ENCOUNTER — APPOINTMENT (INPATIENT)
Dept: NON INVASIVE DIAGNOSTICS | Facility: HOSPITAL | Age: 75
DRG: 025 | End: 2025-06-01
Payer: COMMERCIAL

## 2025-06-01 ENCOUNTER — TELEPHONE (OUTPATIENT)
Age: 75
End: 2025-06-01

## 2025-06-01 LAB
AORTIC ROOT: 3.4 CM
ASCENDING AORTA: 3.6 CM
BSA FOR ECHO PROCEDURE: 2.04 M2
E WAVE DECELERATION TIME: 241 MS
E/A RATIO: 0.86
FRACTIONAL SHORTENING: 38 (ref 28–44)
GLUCOSE SERPL-MCNC: 111 MG/DL (ref 65–140)
GLUCOSE SERPL-MCNC: 113 MG/DL (ref 65–140)
GLUCOSE SERPL-MCNC: 123 MG/DL (ref 65–140)
GLUCOSE SERPL-MCNC: 160 MG/DL (ref 65–140)
INTERVENTRICULAR SEPTUM IN DIASTOLE (PARASTERNAL SHORT AXIS VIEW): 0.9 CM
INTERVENTRICULAR SEPTUM: 0.9 CM (ref 0.6–1.1)
LAAS-AP2: 25.8 CM2
LAAS-AP4: 25.7 CM2
LEFT ATRIUM SIZE: 4 CM
LEFT ATRIUM VOLUME (MOD BIPLANE): 78 ML
LEFT ATRIUM VOLUME INDEX (MOD BIPLANE): 38 ML/M2
LEFT INTERNAL DIMENSION IN SYSTOLE: 3.2 CM (ref 2.1–4)
LEFT VENTRICULAR INTERNAL DIMENSION IN DIASTOLE: 5.2 CM (ref 3.5–6)
LEFT VENTRICULAR POSTERIOR WALL IN END DIASTOLE: 1.1 CM
LEFT VENTRICULAR STROKE VOLUME: 90 ML
LV EF US.2D.A4C+ESTIMATED: 64 %
LVSV (TEICH): 90 ML
MV E'TISSUE VEL-LAT: 9 CM/S
MV E'TISSUE VEL-SEP: 7 CM/S
MV PEAK A VEL: 0.99 M/S
MV PEAK E VEL: 85 CM/S
MV STENOSIS PRESSURE HALF TIME: 70 MS
MV VALVE AREA P 1/2 METHOD: 3.14
RA PRESSURE ESTIMATED: 5 MMHG
RIGHT ATRIAL 2D VOLUME: 68 ML
RIGHT ATRIUM AREA SYSTOLE A4C: 23.2 CM2
RIGHT VENTRICLE ID DIMENSION: 4.1 CM
SL CV LEFT ATRIUM LENGTH A2C: 7.4 CM
SL CV LV EF: 65
SL CV PED ECHO LEFT VENTRICLE DIASTOLIC VOLUME (MOD BIPLANE) 2D: 130 ML
SL CV PED ECHO LEFT VENTRICLE SYSTOLIC VOLUME (MOD BIPLANE) 2D: 40 ML
TRICUSPID ANNULAR PLANE SYSTOLIC EXCURSION: 2.8 CM

## 2025-06-01 PROCEDURE — 99232 SBSQ HOSP IP/OBS MODERATE 35: CPT | Performed by: STUDENT IN AN ORGANIZED HEALTH CARE EDUCATION/TRAINING PROGRAM

## 2025-06-01 PROCEDURE — 93308 TTE F-UP OR LMTD: CPT

## 2025-06-01 PROCEDURE — 92610 EVALUATE SWALLOWING FUNCTION: CPT

## 2025-06-01 PROCEDURE — 97530 THERAPEUTIC ACTIVITIES: CPT

## 2025-06-01 PROCEDURE — 97116 GAIT TRAINING THERAPY: CPT

## 2025-06-01 PROCEDURE — 99232 SBSQ HOSP IP/OBS MODERATE 35: CPT | Performed by: SURGERY

## 2025-06-01 PROCEDURE — 93321 DOPPLER ECHO F-UP/LMTD STD: CPT

## 2025-06-01 PROCEDURE — 93325 DOPPLER ECHO COLOR FLOW MAPG: CPT | Performed by: INTERNAL MEDICINE

## 2025-06-01 PROCEDURE — 82948 REAGENT STRIP/BLOOD GLUCOSE: CPT

## 2025-06-01 PROCEDURE — 93321 DOPPLER ECHO F-UP/LMTD STD: CPT | Performed by: INTERNAL MEDICINE

## 2025-06-01 PROCEDURE — 93308 TTE F-UP OR LMTD: CPT | Performed by: INTERNAL MEDICINE

## 2025-06-01 PROCEDURE — 93325 DOPPLER ECHO COLOR FLOW MAPG: CPT

## 2025-06-01 PROCEDURE — 97535 SELF CARE MNGMENT TRAINING: CPT

## 2025-06-01 RX ORDER — ENOXAPARIN SODIUM 100 MG/ML
30 INJECTION SUBCUTANEOUS EVERY 12 HOURS SCHEDULED
Status: DISCONTINUED | OUTPATIENT
Start: 2025-06-01 | End: 2025-06-03 | Stop reason: HOSPADM

## 2025-06-01 RX ADMIN — ATORVASTATIN CALCIUM 10 MG: 10 TABLET, FILM COATED ORAL at 22:24

## 2025-06-01 RX ADMIN — ENOXAPARIN SODIUM 30 MG: 30 INJECTION SUBCUTANEOUS at 09:56

## 2025-06-01 RX ADMIN — ENOXAPARIN SODIUM 30 MG: 30 INJECTION SUBCUTANEOUS at 22:24

## 2025-06-01 RX ADMIN — ALLOPURINOL 300 MG: 100 TABLET ORAL at 09:57

## 2025-06-01 RX ADMIN — ATENOLOL 50 MG: 50 TABLET ORAL at 09:56

## 2025-06-01 RX ADMIN — INSULIN LISPRO 2 UNITS: 100 INJECTION, SOLUTION INTRAVENOUS; SUBCUTANEOUS at 16:15

## 2025-06-01 RX ADMIN — POLYETHYLENE GLYCOL 3350 17 G: 17 POWDER, FOR SOLUTION ORAL at 09:56

## 2025-06-01 RX ADMIN — CHLORHEXIDINE GLUCONATE 15 ML: 1.2 SOLUTION ORAL at 09:56

## 2025-06-01 NOTE — ASSESSMENT & PLAN NOTE
Patient sustained an unwitnessed fall in April in which he had a head strike with resolution previously now with admission for bilateral subdural hematomas s/p drainage by Neurosurgery  DVT ppx held. Patient was not on AP/AC previously. History of thrombocytopenia  Continue management per primary team and neurosurgery. Slight interval increase in volume of hyperdense blood products 5/30

## 2025-06-01 NOTE — PROGRESS NOTES
Progress Note - Trauma   Name: Deonte Greenfield 74 y.o. male I MRN: [id number]  Unit/Bed#: [facility] 826-01 I Date of Admission: 5/24/2025   Date of Service: 6/1/2025 I Hospital Day: 8    Assessment & Plan  Fall  - Status post fall with the below noted injuries.  - Fall precautions.  - Geriatric Medicine consultation for evaluation, medication review and recommendations.  - PT and OT evaluation and treatment as indicated.  - Case Management consultation for disposition planning.  Subdural hematoma (HCC)  - Initially noted to have large acute on chronic bilateral subdural hematomas   - Appreciate Neurosurgery evaluation -signed off at this time   - s/p bilateral lake holes 5/25 with 4 subdural drains    - 2 drains removed 5/27   - 2 drains removed 5/28  - MMA embolization unable to be completed secondary to anatomy   - Neuro exam: GCS 15, non-focal  - Continue neurologic checks: Every 4 hours  - Complete 7 day course of Keppra for seizure prophylaxis  - Chemical DVT prophylaxis: Lovenox  - Hold all anticoagulants and anti platelet medications for 2 weeks and/or until cleared by Neurosurgery to resume.  - PT and OT (including cognitive evaluation) evaluation and treatment as indicated.  Diabetes mellitus (HCC)  - Hold home metformin   - Continue SSI for coverage  - Adjust and start basal bolus coverage as needed    - working with speech  - regular diet / thin liquids, CCD modifier  - aspiration precautions  Thrombocytopenia (HCC)  - Chronic thrombocytopenia   - Outpatient workup     Bowel Regimen: Miralax  VTE Prophylaxis:VTE covered by:  enoxaparin, Subcutaneous, 30 mg at 06/01/25 0956         Disposition: M/S,  Please contact the SecureChat role for the Trauma service with any questions/concerns.    24 Hour Events : No acute overnight events   Subjective :     Patient seen and examined bedside.  A+O to self and place, not year.  Per nursing patient scratching left scalp, staples present, will place meplex dressing  overlying area, no drainage or sx infection at present  No new sx  Tolerating diet  +BM this am      Objective :  Temp:  [97.4 °F (36.3 °C)-98.5 °F (36.9 °C)] 97.5 °F (36.4 °C)  HR:  [57-64] 59  BP: ()/(46-90) 169/90  Resp:  [16-20] 16  SpO2:  [93 %-97 %] 97 %    I/O         05/29 0701  05/30 0700 05/30 0701  05/31 0700    P.O. 600 720    Total Intake(mL/kg) 600 (5.8) 720 (7)    Urine (mL/kg/hr) 650 (0.3) 500 (0.2)    Stool  0    Total Output 650 500    Net -50 +220          Unmeasured Urine Occurrence 1 x     Unmeasured Stool Occurrence  0 x            Physical Exam  Vitals reviewed.   Constitutional:       General: He is not in acute distress.     Appearance: Normal appearance.   HENT:      Head: Normocephalic.      Eyes:      Extraocular Movements: Extraocular movements intact.      Pupils: Pupils are equal, round, and reactive to light.         Cardiovascular:      Rate and Rhythm: Normal rate and regular rhythm.      Pulses: Normal pulses.   Pulmonary:      Effort: Pulmonary effort is normal. No respiratory distress.      Breath sounds: Normal breath sounds.   Abdominal:      General: There is no distension.      Palpations: Abdomen is soft.      Tenderness: There is no abdominal tenderness.      Neurological:      General: No focal deficit present. A+O (self, place, -year)     Mental Status: He is alert and oriented to person, place, and time.       Lab Results: I have reviewed the following results:  Recent Labs     05/31/25  1007   WBC 7.10   HGB 10.9*   HCT 33.9*      SODIUM 137   K 4.2      CO2 28   BUN 29*   CREATININE 0.78   GLUC 150*   MG 1.8*   PHOS 3.4   AST 18   ALT 17   ALB 3.6   TBILI 1.19*   ALKPHOS 102   PTT 32   INR 1.20*

## 2025-06-01 NOTE — PHYSICAL THERAPY NOTE
PHYSICAL THERAPY NOTE          Patient Name: Deonte Greenfield  Today's Date: 6/1/2025 06/01/25 0900   PT Last Visit   PT Visit Date 06/01/25   Note Type   Note Type Treatment for insurance authorization   Pain Assessment   Pain Assessment Tool 0-10   Pain Score No Pain   Restrictions/Precautions   Weight Bearing Precautions Per Order No   Other Precautions Cognitive;Chair Alarm;Bed Alarm;Telemetry;Multiple lines;Fall Risk  (RUE Weakness/impaired coordination (improving))   General   Chart Reviewed Yes   Cognition   Overall Cognitive Status Impaired   Arousal/Participation Cooperative   Attention Attends with cues to redirect   Orientation Level Oriented to person;Disoriented to place;Disoriented to situation;Disoriented to time  (month only)   Memory Decreased recall of recent events   Following Commands Follows one step commands with increased time or repetition   Comments overall pleasant, requires redirection at time   Subjective   Subjective agreeable   Bed Mobility   Supine to Sit Unable to assess   Sit to Supine Unable to assess   Additional Comments presented standing in bathroom with aide upon entry   Transfers   Sit to Stand 4  Minimal assistance   Additional items Assist x 1;Increased time required;Verbal cues   Stand to Sit 4  Minimal assistance   Additional items Assist x 1;Increased time required;Verbal cues   Additional Comments c RW   Ambulation/Elevation   Gait pattern Improper Weight shift;Decreased foot clearance;Short stride;Excessively slow   Gait Assistance 4  Minimal assist   Additional items Assist x 1;Verbal cues   Assistive Device Rolling walker   Distance 5'+10'+40'x2   Balance   Static Sitting Fair   Dynamic Sitting Fair -   Static Standing Poor +   Dynamic Standing Poor +   Ambulatory Poor +   Endurance Deficit   Endurance Deficit Yes   Activity Tolerance   Activity Tolerance Patient limited by fatigue    Medical Staff Made Aware co-tx with OT BERNARD and OTS due to medical complexity and increased A/poor activity tolerance last session   Nurse Made Aware yes   Exercises   Neuro re-ed stepping up onto scale to be weighed minAx1   Balance training  static standing at sink with 1 vs no UE support and X x10 min   Assessment   Prognosis Good   Problem List Decreased strength;Decreased endurance;Impaired balance;Decreased mobility;Decreased coordination;Decreased cognition;Impaired judgement;Decreased safety awareness;Obesity   Assessment Pt agreeable to participate in PT session. Pt performed functional mobility as outlined above. +confusion noted during session and requires cues to redirect. Functional status is improving and Ax1 for mobility. Remains high fall risk. Pt left seated in chair with chair alarm, call bell, phone, and all personal needs within reach. Pt will continue to benefit from skilled acute care PT to further address their functional mobility limitations.   Barriers to Discharge Decreased caregiver support;Inaccessible home environment   Goals   Patient Goals none stated   STG Expiration Date 06/10/25   PT Treatment Day 2   Plan   Treatment/Interventions Functional transfer training;LE strengthening/ROM;Therapeutic exercise;Endurance training;Cognitive reorientation;Patient/family training;Equipment eval/education;Bed mobility;Gait training;Spoke to nursing;Spoke to case management;OT   Progress Progressing toward goals   PT Frequency 3-5x/wk   Discharge Recommendation   Rehab Resource Intensity Level, PT I (Maximum Resource Intensity)   Equipment Recommended Walker   Walker Package Recommended Wheeled walker   AM-PAC Basic Mobility Inpatient   Turning in Flat Bed Without Bedrails 2   Lying on Back to Sitting on Edge of Flat Bed Without Bedrails 2   Moving Bed to Chair 3   Standing Up From Chair Using Arms 3   Walk in Room 3   Climb 3-5 Stairs With Railing 2   Basic Mobility Inpatient Raw Score 15    Basic Mobility Standardized Score 36.97   [facility] Highest Level Of Mobility   -[facility] Goal 4: Move to chair/commode   -HLM Achieved 7: Walk 25 feet or more   Rajeev Mandujano, PT, DPT, NCS

## 2025-06-01 NOTE — SPEECH THERAPY NOTE
Speech Language/Pathology  Speech-Language Pathology Bedside Swallow Evaluation        Patient Name: Deonte Greenfield    Today's Date: 6/1/2025     Problem List  Principal Problem:    Subdural hematoma (HCC)  Active Problems:    Fall    PVD (peripheral vascular disease) (HCC)    Diabetes mellitus (HCC)    Thrombocytopenia (HCC)    Neuropathy    Weight loss    Frailty    Acute pain    Insomnia    At risk for delirium    Acute CVA (cerebrovascular accident) (HCC)         Past Medical History  Past Medical History[1]    Past Surgical History  Past Surgical History[2]    Summary    Pt presents with mild oral and suspected mild pharyngeal dysphagia. This is characterized by prolonged mastication, with food tending to stick to pt's teeth (pt able to clear independently with extra swallows or sips of liquid), and ?reduced airway protection with liquids. There was inconsistent slight throat clearing/slight coughing immediately after sips of thin liquid. Overall this was minimal, and respiratory status and SpO2 appeared stable. Intermittent slightly wet vocal quality also observed during the assessment. Pt denies dysphagia, and states he has phlegm, with frequent throat clearing throughout the day. There is no current chest imaging in the chart. At this time pt appears appropriate to continue current diet, with ST to follow up for dysphagia tx, to determine need for further instrumental testing.      Recommendations:   Diet: regular diet and thin liquids   Meds: whole with liquid and whole with puree   Frequent Oral care  Aspiration precautions and compensatory swallowing strategies: upright posture, slow rate of feeding, small bites/sips, and alternating bites and sips  Other Recommendations/ considerations: ST to see for dysphagia tx, determine need for MBS to further assess.        Current Medical Status  Copied from admission/physician notes:  Deonte Greenfield is a 74 y.o. with hx of DM, CKD2, HTN, HLD, thrombocytopenia,  gout, b/l total hip arthroplasty, who presents as transfer from [facility] due to SDH. Had a fall on 4/4 resulting in small R posterior falx SDH. Follow-up exams on 4/5 and 4/6 revealed resolution of SDH. A workup was initiated for frequent falls by neurology including an MRI which was performed 5/24 revealing large b/l high convexity SDHs with mass effect. He was referred to the hospital where CTH confirmed these findings.  Patient arrives to the ICU with no complaints. Patient does note progressive weakness, requiring a walker to ambulate in last couple weeks. No headache, n/v, or recent new falls. GCS is 15.     Past medical history:   Please see H&P for details    Special Studies:  MRI brain-  Within the confines of an extensively motion-degraded examination, which significantly reduces diagnostic sensitivity and results in a limited examination:     1.  Evolving recent right cerebellar infarct with mild local mass effect.  2.  No significant change in mixed density bilateral cerebral convexity subdural hematomas.      Social/Education/Vocational Hx:  Pt lives with family    Swallow Information   Current Risks for Dysphagia & Aspiration: CVA and recent surgery  Current Symptoms/Concerns: throat clearing, coughing with PO  Current Diet: regular diet and thin liquids   Baseline Diet: regular diet and thin liquids    Baseline Assessment   Behavior/Cognition: alert  Speech/Language Status: able to participate in conversation and able to follow commands  Patient Positioning: upright in bed     Swallow Mechanism Exam   Facial: symmetrical  Labial: WFL  Lingual: WFL  Velum: symmetrical  Mandible: adequate ROM  Dentition: adequate  Vocal quality:intermittently slightly wet   Volitional Cough: strong/productive   Respiratory: RA    Consistencies Assessed and Performance   Consistencies Administered: thin liquids and regular solids  -pt ate a portion of breakfast, including an omelette with vegetables, and thin liquids via  straw    Oral Stage: Adequate bolus retrieval and draw from straw. Mastication was prolonged, but functional. Noted food sticking to pt's teeth, but he cleared independently with extra swallows and/or liquid wash. No pocketing observed. Adequate lip seal.     Pharyngeal Stage: Hyolaryngeal elevation observed and palpated. Occasional slight throat clear/slight cough observed immediately after sips of thin liquid, both single and consecutive sips.       Esophageal Concerns: none reported      Results Reviewed with: patient and RN   Dysphagia Goals: 1. Pt will tolerate regular diet and thin liquids with prompt oropharyngeal swallows and no overt s/s of aspiration. 2. Pt will tolerate LRD without s/s of aspiration across all meals and snacks.   Discharge recommendation: likely no follow up needed for swallowing    Speech Therapy Prognosis   Prognosis: good    Prognosis Considerations: medical status, cognitive status, and therapeutic potential             [1]   Past Medical History:  Diagnosis Date    Diabetes mellitus (HCC)     History of kidney stones 04/05/2025    Hypertension    [2]   Past Surgical History:  Procedure Laterality Date    KYLIE HOLE FOR SUBDURAL HEMATOMA Bilateral 5/25/2025    Procedure: KYLIE HOLES;  Surgeon: Issac Grady MD;  Location: [facility];  Service: Neurosurgery    TOTAL HIP ARTHROPLASTY Bilateral

## 2025-06-01 NOTE — ASSESSMENT & PLAN NOTE
- Hold home metformin   - Continue SSI for coverage  - Adjust and start basal bolus coverage as needed    - working with speech  - regular diet / thin liquids, CCD modifier  - aspiration precautions

## 2025-06-01 NOTE — PROGRESS NOTES
Progress Note - Neurology   Name: Deonte Attdallas 74 y.o. male I MRN: [id number]  Unit/Bed#: [facility] 826-01 I Date of Admission: 5/24/2025   Date of Service: 6/1/2025 I Hospital Day: 8    Assessment & Plan  Acute CVA (cerebrovascular accident) (HCC)  Patient initially presented in April due to unwitnessed fall and found to have small subdural hemorrhage which subsequently appeared to resolve upon repeat CT head imaging. Patient opted to have MRI performed in outpatient setting and MRI team sent him to ER for concerning findings. MRI brain earlier this admission showing new large bilateral high convexity subdural hematomas R > L subacute in timing. No acute infarct at the time. S/p subdural drains and removal  Neurology consulted 5/30 due to findings of acute appearing infarct in right superior cerebellum on CT imaging   Now with right sided UE incoordination and clumsiness per patient which began 2-3 days ago (around 5/27-5/28 he reports). Wife now helping to feed him as he is unable to do so with dexterity  Cardiac monitor outpatient (due to unwitnessed fall in April) showing overall normal heart rhythm in sinus rhythm with few APCs/PVCs noted. No other abnormal arrythmias.   Other risk factors for stroke: HTN history, T2DM, family history of CVA in mother, prior tobacco user, age. Patient not previously on AP/AC prior to admission. Patient does have pancreatic cysts requiring follow-up imaging with consideration for EUS with regards to malignancy concerns      --MRI brain 5/24:  New, large bilateral high convexity subdural hematomas, right greater than left, which are predominantly subacute in timing, with mass effect on the bilateral cerebral hemispheres, and effacement of the sulci. No significant midline shift noted. FLAIR signal hyperintensity along the sulci of the right frontal and parietal convexities, which may represent trace associated subarachnoid hemorrhage. No acute infarct. Mild chronic white matter  microangiopathic changes involving both cerebral hemispheres.  --CT head 5/30: Acute appearing infarct in right superior cerebellum. Increase in volume of hyperdense blood products bilaterally  --Echo 4/2025: EF 65%. Normal systolic function. Grade 1 diastolic relaxation. 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   LDL 62  A1c 6.2  TTE: 65%    Given new stroke on MRI with concurrent SDH, patient will need ASA when cleared by NSGY after repeat CTH. Given recent negative ziopatch in April 2025, will need loop recorder outpatient.     Plan:  Discussed case with Neurology attending Dr. Steve Pizanoitor 10 mg qHS  Normotension  Monitor on telemetry   Recommend further workup when appropriate for pancreatic cystic lesions to rule out malignancy   Start ASA 81 mg when cleared by NSGY for AP/AC, potentially after CTH completed 6/11 if stable  Outpatient loop recorder  PT/OT/ST    Subdural hematoma (HCC)  Patient sustained an unwitnessed fall in April in which he had a head strike with resolution previously now with admission for bilateral subdural hematomas s/p drainage by Neurosurgery  DVT ppx held. Patient was not on AP/AC previously. History of thrombocytopenia  Continue management per primary team and neurosurgery. Slight interval increase in volume of hyperdense blood products 5/30  Thrombocytopenia (HCC)  Follows with Hem Onc in outpatient setting   Recommend continuing to monitor with daily labs      Deonte Attarian will need follow up in in 6 weeks with neurovascular attending. He will not require outpatient neurological testing. Staff message sent  I have discussed the above management plan in detail with the primary service.   Neurology service signing off.    Subjective   Patient feels well today. He feels he is able to feed himself easier with his right hand. MRI results discussed with family at bedside.     Review of  Systems   Neurological:  Negative for dizziness, tremors, seizures, syncope, facial asymmetry, speech difficulty, weakness, light-headedness, numbness and headaches.         Objective :  Temp:  [97.4 °F (36.3 °C)-98.5 °F (36.9 °C)] 97.5 °F (36.4 °C)  HR:  [57-64] 59  BP: ()/(46-90) 169/90  Resp:  [16-20] 16  SpO2:  [93 %-97 %] 97 %    Physical Exam  Constitutional:       General: He is awake.     Eyes:      Extraocular Movements: Extraocular movements intact.      Pupils: Pupils are equal, round, and reactive to light.       Neurological:      Mental Status: He is alert.      Motor: Motor strength is normal.    Psychiatric:         Speech: Speech normal.     Neurological Exam  Mental Status  Awake and alert. Oriented to person, place and time. Speech is normal. Language is fluent with no aphasia.    Cranial Nerves  CN III, IV, VI: Extraocular movements intact bilaterally. Pupils equal round and reactive to light bilaterally.  CN V: Facial sensation is normal.  CN VII: Full and symmetric facial movement.  CN XI: Shoulder shrug strength is normal.  CN XII: Tongue midline without atrophy or fasciculations.    Motor   Strength is 5/5 throughout all four extremities.    Sensory  Light touch is normal in upper and lower extremities.     Coordination  Right: Finger-to-nose abnormality: Heel-to-shin normal.Left: Finger-to-nose normal. Heel-to-shin normal.  Mild dysmetria, able to feed himself, which he was unable to do 2 days ago.        Lab Results: I have reviewed the following results:  Imaging Results Review: I reviewed radiology reports from this admission including: CT head and MRI brain.      VTE Pharmacologic Prophylaxis: VTE covered by:  enoxaparin, Subcutaneous, 30 mg at 06/01/25 0956    and Sequential compression device (Venodyne)

## 2025-06-01 NOTE — PLAN OF CARE
Problem: PHYSICAL THERAPY ADULT  Goal: Performs mobility at highest level of function for planned discharge setting.  See evaluation for individualized goals.  Description: Treatment/Interventions: OT, Spoke to case management, Spoke to nursing, Gait training, Bed mobility, Patient/family training, Endurance training, LE strengthening/ROM, Functional transfer training          See flowsheet documentation for full assessment, interventions and recommendations.  Outcome: Progressing  Note: Prognosis: Good  Problem List: Decreased strength, Decreased endurance, Impaired balance, Decreased mobility, Decreased coordination, Decreased cognition, Impaired judgement, Decreased safety awareness, Obesity  Assessment: Pt agreeable to participate in PT session. Pt performed functional mobility as outlined above. +confusion noted during session and requires cues to redirect. Functional status is improving and Ax1 for mobility. Remains high fall risk. Pt left seated in chair with chair alarm, call bell, phone, and all personal needs within reach. Pt will continue to benefit from skilled acute care PT to further address their functional mobility limitations.  Barriers to Discharge: Decreased caregiver support, Inaccessible home environment     Rehab Resource Intensity Level, PT: I (Maximum Resource Intensity)    See flowsheet documentation for full assessment.

## 2025-06-01 NOTE — TELEPHONE ENCOUNTER
STILL ADMITTED:5/24/2025 - present (8 days)  [facility]    1ST ATTEMPT,     VIA [employer]     Thank you,     Jenifer MCCOLLUM/ VERONICA [facility]/ ACUTE CVA    ----- Message from Letty Boyle MD sent at 6/1/2025  5:16 PM EDT -----  Regarding: HFU  Deonte Attarian will need follow-up in in 6 weeks with neurovascular team for Other = ATTENDING in 60 minute appointment. They will not require outpatient neurological testing

## 2025-06-01 NOTE — PLAN OF CARE
Problem: OCCUPATIONAL THERAPY ADULT  Goal: Performs self-care activities at highest level of function for planned discharge setting.  See evaluation for individualized goals.  Description: Treatment Interventions: ADL retraining, Functional transfer training, UE strengthening/ROM, Endurance training, Patient/family training, Equipment evaluation/education, Compensatory technique education, Continued evaluation, Energy conservation, Activityengagement          See flowsheet documentation for full assessment, interventions and recommendations.   Outcome: Progressing  Note: Limitation: Decreased ADL status, Decreased UE ROM, Decreased UE strength, Decreased Safe judgement during ADL, Decreased endurance, Decreased self-care trans, Decreased high-level ADLs  Prognosis: Fair  Assessment: Pt seen this am for OT treatment session w/ focus on UB dressing, grooming, toileting, toilet transfers, functional mobility, standing tolerance, and attention to task. Upon OT entrance, pt found on the commode post continent bm. Pt modA for perineal hygiene and Froy x 1 w/ RW for STS transfer off of the commode. Pt setup A for grooming activities while standing at the sink w/ S and use of the RW ~10 minutes. Pt demonstrates improved standing tolerance w/ F balance. Pt Froy x 1 w/ RW to ambulate to bedside chair. Pt Froy to complete UB dressing while seated in recliner. Pt requires min VC's throughout all functional tasks for pacing and maintaining attention to task. Pt continues to demonstrate decreased safety awareness and insight into deficits. Pt left OOB in recliner w/ chair alarm activated and all needs within reach at EOS. OT to continue to recommend maximum resource intensity upon d/c. OT continue POC.  Recommendation: Physiatry Consult  Rehab Resource Intensity Level, OT: I (Maximum Resource Intensity)     JUNIOR Barros        24-Mar-2020 14:52 24-Mar-2020 14:53

## 2025-06-01 NOTE — OCCUPATIONAL THERAPY NOTE
Occupational Therapy Treatment Note      Deonte Attarian    6/1/2025    Principal Problem:    Subdural hematoma (HCC)  Active Problems:    Fall    PVD (peripheral vascular disease) (HCC)    Diabetes mellitus (HCC)    Thrombocytopenia (HCC)    Neuropathy    Weight loss    Frailty    Acute pain    Insomnia    At risk for delirium    Acute CVA (cerebrovascular accident) (HCC)      Past Medical History[1]    Past Surgical History[2]        06/01/25 0901   OT Last Visit   OT Visit Date 06/01/25   Note Type   Note Type Treatment for insurance authorization   Pain Assessment   Pain Assessment Tool 0-10   Pain Score No Pain   Restrictions/Precautions   Weight Bearing Precautions Per Order No   Other Precautions Cognitive;Chair Alarm;Bed Alarm;Multiple lines;Telemetry;Fall Risk;Pain   Lifestyle   Autonomy I w/ ADLs, I w/ IADLs, I w/ functional mobility, receives assistance w/ transfers   Reciprocal Relationships Spouse, son, daughter, grandchildren   Service to Others Retired   Intrinsic Gratification Watching TV   ADL   Grooming Assistance 5  Supervision/Setup   Grooming Deficit Teeth care;Wash/dry face;Wash/dry hands;Standing with assistive device   Grooming Comments Pt setup A for oral hygiene, washing hands, and washing face while standing at sink w/ RW for ~10 minutes; pt able to stand at sink w/ S; pt requires min VC's for pacing and maintaing attention to task   UB Dressing Assistance 4  Minimal Assistance   UB Dressing Deficit Verbal cueing;Increased time to complete   UB Dressing Comments Pt Froy for UB dressing while seated in recliner; pt requires VC's for pacing and maintaining attention to task   Toileting Assistance  3  Moderate Assistance   Toileting Deficit Perineal hygiene   Toileting Comments Pt modA for toileting post continent BM   Functional Standing Tolerance   Time ~10 minutes   Activity Grooming tasks while standing at the sink   Comments Pt setup A for grooming tasks while standing at the sink w/  RW for ~10 minutes; pt able to stand at sink w/ S   Transfers   Sit to Stand 4  Minimal assistance   Additional items Assist x 1;Increased time required;Verbal cues   Stand to Sit 4  Minimal assistance   Additional items Assist x 1;Increased time required;Verbal cues   Toilet transfer 4  Minimal assistance   Additional items Assist x 1;Increased time required;Verbal cues;Standard toilet   Additional Comments Pt Froy x 1 w/ RW for STS transfers; pt requires min VC's to maintain attention to task as well as increased time to complete transfers   Functional Mobility   Functional Mobility 4  Minimal assistance   Additional Comments Assist x 1; pt Froy x 1 w/ RW to ambulate household distance   Additional items Rolling walker   Toilet Transfers   Toilet Transfer From Bed   Toilet Transfer Type To   Toilet Transfer to Standard toilet   Toilet Transfer Technique Ambulating   Toilet Transfers Minimal assistance   Toilet Transfers Comments Pt Froy x 1 w/ RW to ambulate to standard toilet   Cognition   Overall Cognitive Status Impaired   Arousal/Participation Alert;Cooperative   Attention Attends with cues to redirect   Orientation Level Oriented to person;Disoriented to place;Disoriented to time;Disoriented to situation  (Pt oriented to month only; states year is 1983)   Memory Decreased recall of precautions;Decreased recall of recent events;Decreased short term memory   Following Commands Follows one step commands with increased time or repetition   Comments Pt is pleasant and cooperative; pt requires VC's for pacing and maintaing attention to tasks; pt demonstrates decreased safety awareness and insight into deficits   Activity Tolerance   Activity Tolerance Patient tolerated treatment well   Medical Staff Made Aware RN; PT, Rajeev   Assessment   Assessment Pt seen this am for OT treatment session w/ focus on UB dressing, grooming, toileting, toilet transfers, functional mobility, standing tolerance, and attention to  task. Upon OT entrance, pt found on the commode post continent bm. Pt modA for perineal hygiene and Froy x 1 w/ RW for STS transfer off of the commode. Pt setup A for grooming activities while standing at the sink w/ S and use of the RW ~10 minutes. Pt demonstrates improved standing tolerance w/ F balance. Pt Froy x 1 w/ RW to ambulate to bedside chair. Pt Froy to complete UB dressing while seated in recliner. Pt requires min VC's throughout all functional tasks for pacing and maintaining attention to task. Pt continues to demonstrate decreased safety awareness and insight into deficits. Pt left OOB in recliner w/ chair alarm activated and all needs within reach at EOS. OT to continue to recommend maximum resource intensity upon d/c. OT continue POC.   Plan   Treatment Interventions ADL retraining;Functional transfer training;UE strengthening/ROM;Endurance training;Cognitive reorientation;Patient/family training;Equipment evaluation/education;Fine motor coordination activities;Compensatory technique education;Continued evaluation;Energy conservation;Activityengagement   Goal Expiration Date 06/10/25   OT Treatment Day 2   OT Frequency 3-5x/wk   Discharge Recommendation   Rehab Resource Intensity Level, OT I (Maximum Resource Intensity)   Additional Comments  Pt seen as a co-session due to the patient's co-morbidities, clinically unstable presentation, and present impairments which are a regression from the patient's baseline.   Additional Comments 2 The patient's raw score on the -PAC Daily Activity Inpatient Short Form is 16. A raw score of less than 19 suggests the patient may benefit from discharge to post-acute rehabilitation services. Please refer to the recommendation of the Occupational Therapist for safe discharge planning.   AM-PAC Daily Activity Inpatient   Lower Body Dressing 2   Bathing 2   Toileting 2   Upper Body Dressing 3   Grooming 3   Eating 4   Daily Activity Raw Score 16   Daily Activity  Standardized Score (Calc for Raw Score >=11) 35.96   AM-PAC Applied Cognition Inpatient   Following a Speech/Presentation 3   Understanding Ordinary Conversation 4   Taking Medications 3   Remembering Where Things Are Placed or Put Away 3   Remembering List of 4-5 Errands 2   Taking Care of Complicated Tasks 2   Applied Cognition Raw Score 17   Applied Cognition Standardized Score 36.52   End of Consult   Education Provided Yes   Patient Position at End of Consult Bedside chair;Bed/Chair alarm activated;All needs within reach   Nurse Communication Nurse aware of consult     JUNIOR Barros         [1]   Past Medical History:  Diagnosis Date    Diabetes mellitus (HCC)     History of kidney stones 04/05/2025    Hypertension    [2]   Past Surgical History:  Procedure Laterality Date    KYLIE HOLE FOR SUBDURAL HEMATOMA Bilateral 5/25/2025    Procedure: KYLIE HOLES;  Surgeon: Issac Grady MD;  Location: [facility];  Service: Neurosurgery    TOTAL HIP ARTHROPLASTY Bilateral

## 2025-06-01 NOTE — ASSESSMENT & PLAN NOTE
Patient initially presented in April due to unwitnessed fall and found to have small subdural hemorrhage which subsequently appeared to resolve upon repeat CT head imaging. Patient opted to have MRI performed in outpatient setting and MRI team sent him to ER for concerning findings. MRI brain earlier this admission showing new large bilateral high convexity subdural hematomas R > L subacute in timing. No acute infarct at the time. S/p subdural drains and removal  Neurology consulted 5/30 due to findings of acute appearing infarct in right superior cerebellum on CT imaging   Now with right sided UE incoordination and clumsiness per patient which began 2-3 days ago (around 5/27-5/28 he reports). Wife now helping to feed him as he is unable to do so with dexterity  Cardiac monitor outpatient (due to unwitnessed fall in April) showing overall normal heart rhythm in sinus rhythm with few APCs/PVCs noted. No other abnormal arrythmias.   Other risk factors for stroke: HTN history, T2DM, family history of CVA in mother, prior tobacco user, age. Patient not previously on AP/AC prior to admission. Patient does have pancreatic cysts requiring follow-up imaging with consideration for EUS with regards to malignancy concerns      --MRI brain 5/24:  New, large bilateral high convexity subdural hematomas, right greater than left, which are predominantly subacute in timing, with mass effect on the bilateral cerebral hemispheres, and effacement of the sulci. No significant midline shift noted. FLAIR signal hyperintensity along the sulci of the right frontal and parietal convexities, which may represent trace associated subarachnoid hemorrhage. No acute infarct. Mild chronic white matter microangiopathic changes involving both cerebral hemispheres.  --CT head 5/30: Acute appearing infarct in right superior cerebellum. Increase in volume of hyperdense blood products bilaterally  --Echo 4/2025: EF 65%. Normal systolic function. Grade 1  diastolic relaxation. 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   LDL 62  A1c 6.2  TTE: 65%    Given new stroke on MRI with concurrent SDH, patient will need ASA when cleared by NSGY after repeat CTH. Given recent negative ziopatch in April 2025, will need loop recorder outpatient.     Plan:  Discussed case with Neurology attending Dr. Steve Pizanoitor 10 mg qHS  Normotension  Monitor on telemetry   Recommend further workup when appropriate for pancreatic cystic lesions to rule out malignancy   Start ASA 81 mg when cleared by NSGY for AP/AC, potentially after CTH completed 6/11 if stable  Outpatient loop recorder  PT/OT/ST

## 2025-06-01 NOTE — PLAN OF CARE
Problem: PAIN - ADULT  Goal: Verbalizes/displays adequate comfort level or baseline comfort level  Description: Interventions:  - Encourage patient to monitor pain and request assistance  - Assess pain using appropriate pain scale  - Administer analgesics as ordered based on type and severity of pain and evaluate response  - Implement non-pharmacological measures as appropriate and evaluate response  - Consider cultural and social influences on pain and pain management  - Notify physician/advanced practitioner if interventions unsuccessful or patient reports new pain  - Educate patient/family on pain management process including their role and importance of  reporting pain   - Provide non-pharmacologic/complimentary pain relief interventions  Outcome: Progressing     Problem: INFECTION - ADULT  Goal: Absence or prevention of progression during hospitalization  Description: INTERVENTIONS:  - Assess and monitor for signs and symptoms of infection  - Monitor lab/diagnostic results  - Monitor all insertion sites, i.e. indwelling lines, tubes, and drains  - Monitor endotracheal if appropriate and nasal secretions for changes in amount and color  - [place] appropriate cooling/warming therapies per order  - Administer medications as ordered  - Instruct and encourage patient and family to use good hand hygiene technique  - Identify and instruct in appropriate isolation precautions for identified infection/condition  Outcome: Progressing     Problem: INFECTION - ADULT  Goal: Absence of fever/infection during neutropenic period  Description: INTERVENTIONS:  - Monitor WBC  - Perform strict hand hygiene  - Limit to healthy visitors only  - No plants, dried, fresh or silk flowers with zelaya in patient room  Outcome: Progressing     Problem: SAFETY ADULT  Goal: Patient will remain free of falls  Description: INTERVENTIONS:  - Educate patient/family on patient safety including physical limitations  - Instruct patient to call  for assistance with activity   - Consider consulting OT/PT to assist with strengthening/mobility based on AM PAC & JH-HLM score  - Consult OT/PT to assist with strengthening/mobility   - Keep Call bell within reach  - Keep bed low and locked with side rails adjusted as appropriate  - Keep care items and personal belongings within reach  - Initiate and maintain comfort rounds  - Make Fall Risk Sign visible to staff  - Offer Toileting every 2 Hours, in advance of need  - Initiate/Maintain bed/chair alarm  - Obtain necessary fall risk management equipment:   - Apply yellow socks and bracelet for high fall risk patients  - Consider moving patient to room near nurses station  Outcome: Progressing     Problem: SAFETY ADULT  Goal: Maintain or return to baseline ADL function  Description: INTERVENTIONS:  -  Assess patient's ability to carry out ADLs; assess patient's baseline for ADL function and identify physical deficits which impact ability to perform ADLs (bathing, care of mouth/teeth, toileting, grooming, dressing, etc.)  - Assess/evaluate cause of self-care deficits   - Assess range of motion  - Assess patient's mobility; develop plan if impaired  - Assess patient's need for assistive devices and provide as appropriate  - Encourage maximum independence but intervene and supervise when necessary  - Involve family in performance of ADLs  - Assess for home care needs following discharge   - Consider OT consult to assist with ADL evaluation and planning for discharge  - Provide patient education as appropriate  - Monitor functional capacity and physical performance, use of AM PAC & JH-HLM   - Monitor gait, balance and fatigue with ambulation    Outcome: Progressing

## 2025-06-02 DIAGNOSIS — K86.2 PANCREATIC CYST: Primary | ICD-10-CM

## 2025-06-02 LAB
ANION GAP SERPL CALCULATED.3IONS-SCNC: 8 MMOL/L (ref 4–13)
BUN SERPL-MCNC: 26 MG/DL (ref 5–25)
CALCIUM SERPL-MCNC: 9.3 MG/DL (ref 8.4–10.2)
CHLORIDE SERPL-SCNC: 103 MMOL/L (ref 96–108)
CO2 SERPL-SCNC: 25 MMOL/L (ref 21–32)
CREAT SERPL-MCNC: 0.76 MG/DL (ref 0.6–1.3)
ERYTHROCYTE [DISTWIDTH] IN BLOOD BY AUTOMATED COUNT: 14.6 % (ref 11.6–15.1)
GFR SERPL CREATININE-BSD FRML MDRD: 89 ML/MIN/1.73SQ M
GLUCOSE SERPL-MCNC: 111 MG/DL (ref 65–140)
GLUCOSE SERPL-MCNC: 117 MG/DL (ref 65–140)
GLUCOSE SERPL-MCNC: 119 MG/DL (ref 65–140)
GLUCOSE SERPL-MCNC: 130 MG/DL (ref 65–140)
GLUCOSE SERPL-MCNC: 134 MG/DL (ref 65–140)
GLUCOSE SERPL-MCNC: 98 MG/DL (ref 65–140)
HCT VFR BLD AUTO: 33.7 % (ref 36.5–49.3)
HGB BLD-MCNC: 10.6 G/DL (ref 12–17)
MCH RBC QN AUTO: 29.9 PG (ref 26.8–34.3)
MCHC RBC AUTO-ENTMCNC: 31.5 G/DL (ref 31.4–37.4)
MCV RBC AUTO: 95 FL (ref 82–98)
PLATELET # BLD AUTO: 215 THOUSANDS/UL (ref 149–390)
PMV BLD AUTO: 12.3 FL (ref 8.9–12.7)
POTASSIUM SERPL-SCNC: 4.8 MMOL/L (ref 3.5–5.3)
RBC # BLD AUTO: 3.54 MILLION/UL (ref 3.88–5.62)
SODIUM SERPL-SCNC: 136 MMOL/L (ref 135–147)
WBC # BLD AUTO: 7.98 THOUSAND/UL (ref 4.31–10.16)

## 2025-06-02 PROCEDURE — 82948 REAGENT STRIP/BLOOD GLUCOSE: CPT

## 2025-06-02 PROCEDURE — 99232 SBSQ HOSP IP/OBS MODERATE 35: CPT | Performed by: STUDENT IN AN ORGANIZED HEALTH CARE EDUCATION/TRAINING PROGRAM

## 2025-06-02 PROCEDURE — 85027 COMPLETE CBC AUTOMATED: CPT | Performed by: PHYSICIAN ASSISTANT

## 2025-06-02 PROCEDURE — 80048 BASIC METABOLIC PNL TOTAL CA: CPT | Performed by: PHYSICIAN ASSISTANT

## 2025-06-02 RX ADMIN — ENOXAPARIN SODIUM 30 MG: 30 INJECTION SUBCUTANEOUS at 08:49

## 2025-06-02 RX ADMIN — ATORVASTATIN CALCIUM 10 MG: 10 TABLET, FILM COATED ORAL at 20:59

## 2025-06-02 RX ADMIN — ALLOPURINOL 300 MG: 100 TABLET ORAL at 08:48

## 2025-06-02 RX ADMIN — ENOXAPARIN SODIUM 30 MG: 30 INJECTION SUBCUTANEOUS at 20:59

## 2025-06-02 RX ADMIN — CHLORHEXIDINE GLUCONATE 15 ML: 1.2 SOLUTION ORAL at 20:59

## 2025-06-02 RX ADMIN — CHLORHEXIDINE GLUCONATE 15 ML: 1.2 SOLUTION ORAL at 08:49

## 2025-06-02 RX ADMIN — FERROUS SULFATE TAB 325 MG (65 MG ELEMENTAL FE) 325 MG: 325 (65 FE) TAB at 08:48

## 2025-06-02 NOTE — CASE MANAGEMENT
Case Management Discharge Planning Note    Patient name Deonte Attarian  Location [facility]-01 MRN [id number]  : 1950 Date 2025       Current Admission Date: 2025  Current Admission Diagnosis:Subdural hematoma (HCC)   Patient Active Problem List    Diagnosis Date Noted    Acute CVA (cerebrovascular accident) (HCC) 2025    Weight loss 2025    Frailty 2025    Acute pain 2025    Insomnia 2025    At risk for delirium 2025    Subdural hematoma (HCC) 2025    Leg swelling 2025    Severe obesity (BMI 35.0-39.9) with comorbidity ([facility]) 2025    Bradycardia, sinus 2025    Atrial septal aneurysm 2025    Normocytic anemia 2025    Syncope 2025    Thrombocytopenia ([facility]) 2025    Abnormal echocardiogram 2025    Groin pain, right 2025    Fall 2025    Hypocitraturia 2024    Neuropathy 2019    Hyperuricemia 2016    Mixed hyperlipidemia 2016    Onychomycosis 2015    PVD (peripheral vascular disease) ([facility]) 10/07/2015    POPPY (obstructive sleep apnea) 10/07/2015    Diabetes mellitus ([facility]) 2015    S/P hip replacement 2014    Osteoarthritis 2008      LOS (days): 9  Geometric Mean LOS (GMLOS) (days): 6.5  Days to GMLOS:-2.3     OBJECTIVE:  Risk of Unplanned Readmission Score: 21.84         Current admission status: Inpatient   Preferred Pharmacy:   [facility] - [place] - [facility]  [address]  [place] [address]  Phone: [phone] Fax: [phone]    [facility]/pharmacy #[id number] - [place] - [address]  [facility] [facility] [place] [address]  Phone: [phone] Fax: [phone]    Primary Care Provider: Garcia Cartwright MD    Primary Insurance: ANTWON  REP  Secondary Insurance:     DISCHARGE DETAILS:    Auth obtained for [facility]  Awaiting if there is a bed available today

## 2025-06-02 NOTE — PLAN OF CARE
Problem: PAIN - ADULT  Goal: Verbalizes/displays adequate comfort level or baseline comfort level  Description: Interventions:  - Encourage patient to monitor pain and request assistance  - Assess pain using appropriate pain scale  - Administer analgesics as ordered based on type and severity of pain and evaluate response  - Implement non-pharmacological measures as appropriate and evaluate response  - Consider cultural and social influences on pain and pain management  - Notify physician/advanced practitioner if interventions unsuccessful or patient reports new pain  - Educate patient/family on pain management process including their role and importance of  reporting pain   - Provide non-pharmacologic/complimentary pain relief interventions  Outcome: Progressing     Problem: SAFETY ADULT  Goal: Patient will remain free of falls  Description: INTERVENTIONS:  - Educate patient/family on patient safety including physical limitations  - Instruct patient to call for assistance with activity   - Consider consulting OT/PT to assist with strengthening/mobility based on AM PAC & JH-HLM score  - Consult OT/PT to assist with strengthening/mobility   - Keep Call bell within reach  - Keep bed low and locked with side rails adjusted as appropriate  - Keep care items and personal belongings within reach  - Initiate and maintain comfort rounds  - Make Fall Risk Sign visible to staff  - Offer Toileting every 2 Hours, in advance of need  - Initiate/Maintain bed/chair alarm  - Obtain necessary fall risk management equipment:   - Apply yellow socks and bracelet for high fall risk patients  - Consider moving patient to room near nurses station  Outcome: Progressing     Problem: INFECTION - ADULT  Goal: Absence or prevention of progression during hospitalization  Description: INTERVENTIONS:  - Assess and monitor for signs and symptoms of infection  - Monitor lab/diagnostic results  - Monitor all insertion sites, i.e. indwelling lines,  tubes, and drains  - Monitor endotracheal if appropriate and nasal secretions for changes in amount and color  - [place] appropriate cooling/warming therapies per order  - Administer medications as ordered  - Instruct and encourage patient and family to use good hand hygiene technique  - Identify and instruct in appropriate isolation precautions for identified infection/condition  Outcome: Progressing     Problem: Prexisting or High Potential for Compromised Skin Integrity  Goal: Skin integrity is maintained or improved  Description: INTERVENTIONS:  - Identify patients at risk for skin breakdown  - Assess and monitor skin integrity including under and around medical devices   - Assess and monitor nutrition and hydration status  - Monitor labs  - Assess for incontinence   - Turn and reposition patient  - Assist with mobility/ambulation  - Relieve pressure over verónica prominences   - Avoid friction and shearing  - Provide appropriate hygiene as needed including keeping skin clean and dry  - Evaluate need for skin moisturizer/barrier cream  - Collaborate with interdisciplinary team  - Patient/family teaching  - Consider wound care consult    Assess:  - Review Zaid scale daily  - Clean and moisturize skin every shift  - Inspect skin when repositioning, toileting, and assisting with ADLS  - Assess under medical devices such as venodynes every 2 hours  - Assess extremities for adequate circulation and sensation     Bed Management:  - Have minimal linens on bed & keep smooth, unwrinkled  - Change linens as needed when moist or perspiring  - Avoid sitting or lying in one position for more than 2 hours while in bed Keep HOB at 20degrees   - Toileting:  - Offer bedside commode  - Assess for incontinence every 2 hours  - Use incontinent care products after each incontinent episode such as skin protectant    Activity:  - Mobilize patient 3 times a day  - Encourage activity and walks on unit  - Encourage or provide ROM  exercises   - Turn and reposition patient every 2 Hours  - Use appropriate equipment to lift or move patient in bed  - Instruct/ Assist with weight shifting every 30 minutes when out of bed in chair  - Consider limitation of chair time 2 hour intervals    Skin Care:  - Avoid use of baby powder, tape, friction and shearing, hot water or constrictive clothing  - Relieve pressure over bony prominences using pillows/wedges  - Do not massage red bony areas    Next Steps:  - Teach patient strategies to minimize risks such as weight shift/turn and reposition  - Consider consults to  interdisciplinary teams such as PT/OT  Outcome: Progressing     Problem: NEUROSENSORY - ADULT  Goal: Achieves stable or improved neurological status  Description: INTERVENTIONS  - Monitor and report changes in neurological status  - Monitor vital signs such as temperature, blood pressure, glucose, and any other labs ordered   - Initiate measures to prevent increased intracranial pressure  - Monitor for seizure activity and implement precautions if appropriate      Outcome: Progressing     Problem: NEUROSENSORY - ADULT  Goal: Achieves maximal functionality and self care  Description: INTERVENTIONS  - Monitor swallowing and airway patency with patient fatigue and changes in neurological status  - Encourage and assist patient to increase activity and self care.   - Encourage visually impaired, hearing impaired and aphasic patients to use assistive/communication devices  Outcome: Progressing     Problem: RESPIRATORY - ADULT  Goal: Achieves optimal ventilation and oxygenation  Description: INTERVENTIONS:  - Assess for changes in respiratory status  - Assess for changes in mentation and behavior  - Position to facilitate oxygenation and minimize respiratory effort  - Oxygen administered by appropriate delivery if ordered  - Initiate smoking cessation education as indicated  - Encourage broncho-pulmonary hygiene including cough, deep breathe,  Incentive Spirometry  - Assess the need for suctioning and aspirate as needed  - Assess and instruct to report SOB or any respiratory difficulty  - Respiratory Therapy support as indicated  Outcome: Progressing     Problem: HEMATOLOGIC - ADULT  Goal: Maintains hematologic stability  Description: INTERVENTIONS  - Assess for signs and symptoms of bleeding or hemorrhage  - Monitor labs  - Administer supportive blood products/factors as ordered and appropriate  Outcome: Progressing     Problem: NEUROSENSORY - ADULT  Goal: Achieves stable or improved neurological status  Description: INTERVENTIONS  - Monitor and report changes in neurological status  - Monitor vital signs such as temperature, blood pressure, glucose, and any other labs ordered   - Initiate measures to prevent increased intracranial pressure  - Monitor for seizure activity and implement precautions if appropriate      Outcome: Progressing     Problem: NEUROSENSORY - ADULT  Goal: Achieves maximal functionality and self care  Description: INTERVENTIONS  - Monitor swallowing and airway patency with patient fatigue and changes in neurological status  - Encourage and assist patient to increase activity and self care.   - Encourage visually impaired, hearing impaired and aphasic patients to use assistive/communication devices  Outcome: Progressing     Problem: Prexisting or High Potential for Compromised Skin Integrity  Goal: Skin integrity is maintained or improved  Description: INTERVENTIONS:  - Identify patients at risk for skin breakdown  - Assess and monitor skin integrity including under and around medical devices   - Assess and monitor nutrition and hydration status  - Monitor labs  - Assess for incontinence   - Turn and reposition patient  - Assist with mobility/ambulation  - Relieve pressure over verónica prominences   - Avoid friction and shearing  - Provide appropriate hygiene as needed including keeping skin clean and dry  - Evaluate need for skin  moisturizer/barrier cream  - Collaborate with interdisciplinary team  - Patient/family teaching  - Consider wound care consult    Assess:  - Review Zaid scale daily  - Clean and moisturize skin every shift  - Inspect skin when repositioning, toileting, and assisting with ADLS  - Assess under medical devices such as venodynes every 2 hours  - Assess extremities for adequate circulation and sensation     Bed Management:  - Have minimal linens on bed & keep smooth, unwrinkled  - Change linens as needed when moist or perspiring  - Avoid sitting or lying in one position for more than 2 hours while in bed Keep HOB at 20degrees   - Toileting:  - Offer bedside commode  - Assess for incontinence every 2 hours  - Use incontinent care products after each incontinent episode such as skin protectant    Activity:  - Mobilize patient 3 times a day  - Encourage activity and walks on unit  - Encourage or provide ROM exercises   - Turn and reposition patient every 2 Hours  - Use appropriate equipment to lift or move patient in bed  - Instruct/ Assist with weight shifting every 30 minutes when out of bed in chair  - Consider limitation of chair time 2 hour intervals    Skin Care:  - Avoid use of baby powder, tape, friction and shearing, hot water or constrictive clothing  - Relieve pressure over bony prominences using pillows/wedges  - Do not massage red bony areas    Next Steps:  - Teach patient strategies to minimize risks such as weight shift/turn and reposition  - Consider consults to  interdisciplinary teams such as PT/OT  Outcome: Progressing     Problem: Neurological Deficit  Goal: Neurological status is stable or improving  Description: Interventions:  - Monitor and assess patient's level of consciousness, motor function, sensory function, and level of assistance needed for ADLs.   - Monitor and report changes from baseline. Collaborate with interdisciplinary team to initiate plan and implement interventions as ordered.   -  Provide and maintain a safe environment.  - Consider seizure precautions.  - Consider fall precautions.  - Consider aspiration precautions.  - Consider bleeding precautions.  Outcome: Progressing     Problem: Activity Intolerance/Impaired Mobility  Goal: Mobility/activity is maintained at optimum level for patient  Description: Interventions:  - Assess and monitor patient  barriers to mobility and need for assistive/adaptive devices.  - Assess patient's emotional response to limitations.  - Collaborate with interdisciplinary team and initiate plans and interventions as ordered.  - Encourage independent activity per ability.  - Maintain proper body alignment.  - Perform active/passive rom as tolerated/ordered.  - Plan activities to conserve energy.  - Turn patient as appropriate  Outcome: Progressing     Problem: Nutrition  Goal: Nutrition/Hydration status is improving  Description: Monitor and assess patient's nutrition/hydration status for malnutrition (ex- brittle hair, bruises, dry skin, pale skin and conjunctiva, muscle wasting, smooth red tongue, and disorientation). Collaborate with interdisciplinary team and initiate plan and interventions as ordered.  Monitor patient's weight and dietary intake as ordered or per policy. Utilize nutrition screening tool and intervene per policy. Determine patient's food preferences and provide high-protein, high-caloric foods as appropriate.     - Assist patient with eating.  - Allow adequate time for meals.  - Encourage patient to take dietary supplement as ordered.  - Collaborate with clinical nutritionist.  - Include patient/family/caregiver in decisions related to nutrition.  Outcome: Progressing     Problem: Knowledge Deficit  Goal: Patient/family/caregiver demonstrates understanding of disease process, treatment plan, medications, and discharge instructions  Description: Complete learning assessment and assess knowledge base.  Interventions:  - Provide teaching at level  of understanding  - Provide teaching via preferred learning methods  Outcome: Progressing     Problem: DISCHARGE PLANNING  Goal: Discharge to home or other facility with appropriate resources  Description: INTERVENTIONS:  - Identify barriers to discharge w/patient and caregiver  - Arrange for needed discharge resources and transportation as appropriate  - Identify discharge learning needs (meds, wound care, etc.)  - Arrange for interpretive services to assist at discharge as needed  - Refer to Case Management Department for coordinating discharge planning if the patient needs post-hospital services based on physician/advanced practitioner order or complex needs related to functional status, cognitive ability, or social support system  Outcome: Progressing

## 2025-06-02 NOTE — RESTORATIVE TECHNICIAN NOTE
Restorative Technician Note      Patient Name: Deonte Greenfield     Restorative Tech Visit Date: 06/02/25  Note Type: Mobility  Patient Position Upon Consult: Bedside chair  Activity Performed: Ambulated (To/from Bathroom)  Assistive Device: Roller walker; Other (Comment) (HHA)  Patient Position at End of Consult: Bedside chair; All needs within reach; Bed/Chair alarm activated  Nurse Communication: -- (Nurse aware of consult)      Nanda Cho Restorative Tech

## 2025-06-02 NOTE — DISCHARGE SUPPORT
Case Management Assessment & Discharge Planning Note    Patient name Deonte Attarian  Location [facility] 826/[facility] 826-01 MRN [id number]  : 1950 Date 2025       Current Admission Date: 2025  Current Admission Diagnosis:Subdural hematoma (HCC)   Patient Active Problem List    Diagnosis Date Noted    Acute CVA (cerebrovascular accident) (HCC) 2025    Weight loss 2025    Frailty 2025    Acute pain 2025    Insomnia 2025    At risk for delirium 2025    Subdural hematoma (HCC) 2025    Leg swelling 2025    Severe obesity (BMI 35.0-39.9) with comorbidity ([facility]) 2025    Bradycardia, sinus 2025    Atrial septal aneurysm 2025    Normocytic anemia 2025    Syncope 2025    Thrombocytopenia ([facility]) 2025    Abnormal echocardiogram 2025    Groin pain, right 2025    Fall 2025    Hypocitraturia 2024    Neuropathy 2019    Hyperuricemia 2016    Mixed hyperlipidemia 2016    Onychomycosis 2015    PVD (peripheral vascular disease) ([facility]) 10/07/2015    POPPY (obstructive sleep apnea) 10/07/2015    Diabetes mellitus ([facility]) 2015    S/P hip replacement 2014    Osteoarthritis 2008      LOS (days): 9  Geometric Mean LOS (GMLOS) (days): 6.5  Days to GMLOS:-2.3   Facility Authorization Approved  [facility] received approved auth for: Acute Rehab  Insurance: [employer]  Authorization Obtained Via: Phone  Name/Phone # of Rep who called in determination: Consuelo  Facility Name: [facility]  Approved Authorization Number: [id number]  Start of Care Date: 25  Next Review Date: 25  Continued Stay Care Coordinator Name: Consuelo  Continued Stay Care Coordinator Phone #: [phone]  Submit Next Review to: F: [phone]   notified: Will A     Updates to authorization status will be noted in chart.    Please reach out to CM for updates on any clinical information.

## 2025-06-02 NOTE — PROGRESS NOTES
Progress Note - Trauma   Name: Deonte Greenfield 74 y.o. male I MRN: [id number]  Unit/Bed#: [facility] 826-01 I Date of Admission: 5/24/2025   Date of Service: 6/2/2025 I Hospital Day: 9    Assessment & Plan  Fall  - Status post fall with the below noted injuries.  - Fall precautions.  - Geriatric Medicine consultation for evaluation, medication review and recommendations.  - PT and OT evaluation and treatment as indicated.  - Case Management consultation for disposition planning.  Subdural hematoma (HCC)  - Initially noted to have large acute on chronic bilateral subdural hematomas   - Appreciate Neurosurgery evaluation -signed off at this time   - s/p bilateral [place] 5/25 with 4 subdural drains    - 2 drains removed 5/27   - 2 drains removed 5/28  - MMA embolization unable to be completed secondary to anatomy   - Neuro exam: GCS 15, non-focal  - Continue neurologic checks: Every 4 hours  - Complete 7 day course of Keppra for seizure prophylaxis  - Chemical DVT prophylaxis: Lovenox  - Hold all anticoagulants and anti platelet medications for 2 weeks and/or until cleared by Neurosurgery to resume.  - PT and OT (including cognitive evaluation) evaluation and treatment as indicated.  Diabetes mellitus (HCC)  - Hold home metformin   - Continue SSI for coverage  - Adjust and start basal bolus coverage as needed    - working with speech  - regular diet / thin liquids, CCD modifier  - aspiration precautions  Thrombocytopenia (HCC)  - Chronic thrombocytopenia   - Outpatient workup     Bowel Regimen: sennakot  VTE Prophylaxis:VTE covered by:  enoxaparin, Subcutaneous, 30 mg at 06/02/25 0849         Disposition: medsurg Medications reviewed. Continue current medications at prescribed doses.    24 Hour Events : No acute overnight events.   Subjective : No acute complaints.     Objective :  Temp:  [97.5 °F (36.4 °C)-98.3 °F (36.8 °C)] 97.5 °F (36.4 °C)  HR:  [58-73] 65  BP: (107-152)/(53-86) 142/69  Resp:  [16-18] 17  SpO2:  [95  %-97 %] 96 %  O2 Device: None (Room air)    I/O         05/31 0701 06/01 0700 06/01 0701 06/02 0700 06/02 0701  06/03 0700    P.O.  222 236    Total Intake(mL/kg)  222 (2.3) 236 (2.4)    Urine (mL/kg/hr) 200 (0.1)      Stool       Total Output 200      Net -200 +222 +236           Unmeasured Urine Occurrence 1 x 2 x 2 x    Unmeasured Stool Occurrence  1 x 1 x            Physical Exam  Vitals and nursing note reviewed.   Constitutional:       General: He is not in acute distress.     Appearance: He is well-developed. He is not ill-appearing, toxic-appearing or diaphoretic.   HENT:      Head: Normocephalic and atraumatic.      Right Ear: There is no impacted cerumen.      Nose: No congestion.      Mouth/Throat:      Pharynx: No oropharyngeal exudate.     Eyes:      Extraocular Movements: Extraocular movements intact.      Conjunctiva/sclera: Conjunctivae normal.      Pupils: Pupils are equal, round, and reactive to light.       Cardiovascular:      Rate and Rhythm: Normal rate and regular rhythm.      Heart sounds: No murmur heard.  Pulmonary:      Effort: Pulmonary effort is normal. No respiratory distress.      Breath sounds: Normal breath sounds.   Abdominal:      Palpations: Abdomen is soft.      Tenderness: There is no abdominal tenderness.     Musculoskeletal:         General: Swelling present.      Cervical back: Neck supple.      Right lower leg: Edema present.      Left lower leg: Edema present.     Skin:     General: Skin is warm and dry.      Capillary Refill: Capillary refill takes less than 2 seconds.     Neurological:      Mental Status: He is alert and oriented to person, place, and time. Mental status is at baseline.      Cranial Nerves: No cranial nerve deficit.      Sensory: No sensory deficit.     Psychiatric:         Mood and Affect: Mood normal.               Lab Results: I have reviewed the following results:  Recent Labs     05/31/25  1007 06/02/25  0559   WBC 7.10 7.98   HGB 10.9* 10.6*   HCT  33.9* 33.7*    215   SODIUM 137 136   K 4.2 4.8    103   CO2 28 25   BUN 29* 26*   CREATININE 0.78 0.76   GLUC 150* 119   MG 1.8*  --    PHOS 3.4  --    AST 18  --    ALT 17  --    ALB 3.6  --    TBILI 1.19*  --    ALKPHOS 102  --    PTT 32  --    INR 1.20*  --

## 2025-06-02 NOTE — CASE MANAGEMENT
Case Management Discharge Planning Note    Patient name Deonte Attarian  Location [facility] 826/[facility] 826-01 MRN [id number]  : 1950 Date 2025       Current Admission Date: 2025  Current Admission Diagnosis:Subdural hematoma (HCC)   Patient Active Problem List    Diagnosis Date Noted    Acute CVA (cerebrovascular accident) (HCC) 2025    Weight loss 2025    Frailty 2025    Acute pain 2025    Insomnia 2025    At risk for delirium 2025    Subdural hematoma (HCC) 2025    Leg swelling 2025    Severe obesity (BMI 35.0-39.9) with comorbidity ([facility]) 2025    Bradycardia, sinus 2025    Atrial septal aneurysm 2025    Normocytic anemia 2025    Syncope 2025    Thrombocytopenia ([facility]) 2025    Abnormal echocardiogram 2025    Groin pain, right 2025    Fall 2025    Hypocitraturia 2024    Neuropathy 2019    Hyperuricemia 2016    Mixed hyperlipidemia 2016    Onychomycosis 2015    PVD (peripheral vascular disease) ([facility]) 10/07/2015    POPPY (obstructive sleep apnea) 10/07/2015    Diabetes mellitus ([facility]) 2015    S/P hip replacement 2014    Osteoarthritis 2008      LOS (days): 9  Geometric Mean LOS (GMLOS) (days): 6.5  Days to GMLOS:-2.4     OBJECTIVE:  Risk of Unplanned Readmission Score: 21.84         Current admission status: Inpatient   Preferred Pharmacy:   [facility] - [place] - [address] [facility]  [address]  [place] [address]  Phone: [phone] Fax: [phone]    [facility]/pharmacy #[id number] - [place] ALMAGUER - [address]  [facility] [place] [address]  Phone: [phone] Fax: [phone]    Primary Care Provider: Garcia Cartwright MD    Primary Insurance: ANTWON  REP  Secondary Insurance:     DISCHARGE DETAILS:    CM informed there is a bed tomorrow for the pt at [facility]

## 2025-06-02 NOTE — DISCHARGE SUPPORT
Case Management Assessment & Discharge Planning Note    Patient name Deonte Attarian  Location [facility] 826/[facility] 826-01 MRN [id number]  : 1950 Date 2025       Current Admission Date: 2025  Current Admission Diagnosis:Subdural hematoma (HCC)   Patient Active Problem List    Diagnosis Date Noted    Acute CVA (cerebrovascular accident) (HCC) 2025    Weight loss 2025    Frailty 2025    Acute pain 2025    Insomnia 2025    At risk for delirium 2025    Subdural hematoma (HCC) 2025    Leg swelling 2025    Severe obesity (BMI 35.0-39.9) with comorbidity ([facility]) 2025    Bradycardia, sinus 2025    Atrial septal aneurysm 2025    Normocytic anemia 2025    Syncope 2025    Thrombocytopenia ([facility]) 2025    Abnormal echocardiogram 2025    Groin pain, right 2025    Fall 2025    Hypocitraturia 2024    Neuropathy 2019    Hyperuricemia 2016    Mixed hyperlipidemia 2016    Onychomycosis 2015    PVD (peripheral vascular disease) ([facility]) 10/07/2015    POPPY (obstructive sleep apnea) 10/07/2015    Diabetes mellitus ([facility]) 2015    S/P hip replacement 2014    Osteoarthritis 2008      LOS (days): 9  Geometric Mean LOS (GMLOS) (days): 6.5  Days to GMLOS:-2.2   Facility Authorization Initiated  [facility] received request for auth from : Will A  Authorization Request Submitted for: Acute Rehab  Requested Start of Care Date: 25  Facility Name: [facility]  Facility NPI: [id number]  Facility MD: Ashley DePadua  Facility MD NPI: [id number]  Authorization initiated by contacting insurance: Tushar  Via: [employer]  Clinicals submitted via: Portal Attachment  Pending reference #: [id number]   notified: Will A     Updates to authorization status will be noted in chart.    Please reach out to CM for updates on any clinical information.

## 2025-06-02 NOTE — PROGRESS NOTES
MRI abdomen shows numerous cystic lesions scattered throughout the pancreas with a dominant lesion measuring 18 mm in the body. Worrisome features include ductal dilation up to 6 mm in the tail the pancreas and abrupt change in caliber of the pancreatic duct.   Patient is currently admitted to [facility]. Reached out to primary team and surgery oncall as imaging findings are suggestive of malignancy. According to surgery they recommend out patient follow up with surg onc for possible biopsy and out patient GI follow up. Surgery called the surg onc office to schedule the appointment for patient. I did a STAT referral for GI outpatient. Primary team will inform the patient regarding imaging findings and the referrals.

## 2025-06-02 NOTE — RESTORATIVE TECHNICIAN NOTE
Restorative Technician Note      Patient Name: Deonte Greenfield     Restorative Tech Visit Date: 06/02/25  Note Type: Mobility  Patient Position Upon Consult: Bedside chair  Activity Performed: Ambulated (Ambulated to/from bathroom with Nurse)  Assistive Device: Roller walker; Other (Comment) (HHA)  Patient Position at End of Consult: Bedside chair; All needs within reach; Bed/Chair alarm activated  Nurse Communication: -- (Nurse aware of consult)    Nanda Cho Restorative Tech

## 2025-06-02 NOTE — PLAN OF CARE
Problem: PAIN - ADULT  Goal: Verbalizes/displays adequate comfort level or baseline comfort level  Description: Interventions:  - Encourage patient to monitor pain and request assistance  - Assess pain using appropriate pain scale  - Administer analgesics as ordered based on type and severity of pain and evaluate response  - Implement non-pharmacological measures as appropriate and evaluate response  - Consider cultural and social influences on pain and pain management  - Notify physician/advanced practitioner if interventions unsuccessful or patient reports new pain  - Educate patient/family on pain management process including their role and importance of  reporting pain   - Provide non-pharmacologic/complimentary pain relief interventions  Outcome: Progressing     Problem: INFECTION - ADULT  Goal: Absence or prevention of progression during hospitalization  Description: INTERVENTIONS:  - Assess and monitor for signs and symptoms of infection  - Monitor lab/diagnostic results  - Monitor all insertion sites, i.e. indwelling lines, tubes, and drains  - Monitor endotracheal if appropriate and nasal secretions for changes in amount and color  - Sims appropriate cooling/warming therapies per order  - Administer medications as ordered  - Instruct and encourage patient and family to use good hand hygiene technique  - Identify and instruct in appropriate isolation precautions for identified infection/condition  Outcome: Progressing     Problem: INFECTION - ADULT  Goal: Absence of fever/infection during neutropenic period  Description: INTERVENTIONS:  - Monitor WBC  - Perform strict hand hygiene  - Limit to healthy visitors only  - No plants, dried, fresh or silk flowers with zelaya in patient room  Outcome: Progressing     Problem: SAFETY ADULT  Goal: Patient will remain free of falls  Description: INTERVENTIONS:  - Educate patient/family on patient safety including physical limitations  - Instruct patient to call  for assistance with activity   - Consider consulting OT/PT to assist with strengthening/mobility based on AM PAC & JH-HLM score  - Consult OT/PT to assist with strengthening/mobility   - Keep Call bell within reach  - Keep bed low and locked with side rails adjusted as appropriate  - Keep care items and personal belongings within reach  - Initiate and maintain comfort rounds  - Make Fall Risk Sign visible to staff  - Offer Toileting every 2 Hours, in advance of need  - Initiate/Maintain bed/chair alarm  - Obtain necessary fall risk management equipment:   - Apply yellow socks and bracelet for high fall risk patients  - Consider moving patient to room near nurses station  Outcome: Progressing     Problem: SAFETY ADULT  Goal: Maintain or return to baseline ADL function  Description: INTERVENTIONS:  -  Assess patient's ability to carry out ADLs; assess patient's baseline for ADL function and identify physical deficits which impact ability to perform ADLs (bathing, care of mouth/teeth, toileting, grooming, dressing, etc.)  - Assess/evaluate cause of self-care deficits   - Assess range of motion  - Assess patient's mobility; develop plan if impaired  - Assess patient's need for assistive devices and provide as appropriate  - Encourage maximum independence but intervene and supervise when necessary  - Involve family in performance of ADLs  - Assess for home care needs following discharge   - Consider OT consult to assist with ADL evaluation and planning for discharge  - Provide patient education as appropriate  - Monitor functional capacity and physical performance, use of AM PAC & JH-HLM   - Monitor gait, balance and fatigue with ambulation    Outcome: Progressing     Problem: SAFETY ADULT  Goal: Maintains/Returns to pre admission functional level  Description: INTERVENTIONS:  - Perform AM-PAC 6 Click Basic Mobility/ Daily Activity assessment daily.  - Set and communicate daily mobility goal to care team and  patient/family/caregiver.   - Collaborate with rehabilitation services on mobility goals if consulted  - Perform Range of Motion 3 times a day.  - Reposition patient every 2 hours.  - Dangle patient 3 times a day  - Stand patient 3 times a day  - Ambulate patient 3 times a day  - Out of bed to chair 3 times a day   - Out of bed for meals 3 times a day  - Out of bed for toileting  - Record patient progress and toleration of activity level   Outcome: Progressing

## 2025-06-02 NOTE — CASE MANAGEMENT
Case Management Discharge Planning Note    Patient name Deonte Attarian  Location [facility] 826/[facility] 826-01 MRN [id number]  : 1950 Date 2025       Current Admission Date: 2025  Current Admission Diagnosis:Subdural hematoma (HCC)   Patient Active Problem List    Diagnosis Date Noted    Acute CVA (cerebrovascular accident) (HCC) 2025    Weight loss 2025    Frailty 2025    Acute pain 2025    Insomnia 2025    At risk for delirium 2025    Subdural hematoma (HCC) 2025    Leg swelling 2025    Severe obesity (BMI 35.0-39.9) with comorbidity ([facility]) 2025    Bradycardia, sinus 2025    Atrial septal aneurysm 2025    Normocytic anemia 2025    Syncope 2025    Thrombocytopenia ([facility]) 2025    Abnormal echocardiogram 2025    Groin pain, right 2025    Fall 2025    Hypocitraturia 2024    Neuropathy 2019    Hyperuricemia 2016    Mixed hyperlipidemia 2016    Onychomycosis 2015    PVD (peripheral vascular disease) ([facility]) 10/07/2015    POPPY (obstructive sleep apnea) 10/07/2015    Diabetes mellitus ([facility]) 2015    S/P hip replacement 2014    Osteoarthritis 2008      LOS (days): 9  Geometric Mean LOS (GMLOS) (days): 6.5  Days to GMLOS:-2.2     OBJECTIVE:  Risk of Unplanned Readmission Score: 21.79         Current admission status: Inpatient   Preferred Pharmacy:   [facility] - [place] - [address]  [address]  [place] [address]  Phone: [phone] Fax: [phone]    [facility] #[id number] - [place] ALMAGUER - [address]  [facility] [facility] [place] [address]  Phone: [phone] Fax: [phone]    Primary Care Provider: Garcia Cartwright MD    Primary Insurance: ANTWON CHOUDHARY  Secondary Insurance:     DISCHARGE DETAILS:    Plan to resubmit for insurance approval today, as the pt's medically appropriate for d/c

## 2025-06-03 ENCOUNTER — TELEPHONE (OUTPATIENT)
Dept: INTERNAL MEDICINE CLINIC | Facility: CLINIC | Age: 75
End: 2025-06-03

## 2025-06-03 ENCOUNTER — HOSPITAL ENCOUNTER (INPATIENT)
Facility: HOSPITAL | Age: 75
LOS: 13 days | Discharge: HOME | DRG: 949 | End: 2025-06-16
Payer: COMMERCIAL

## 2025-06-03 VITALS
DIASTOLIC BLOOD PRESSURE: 66 MMHG | RESPIRATION RATE: 16 BRPM | TEMPERATURE: 98.3 F | OXYGEN SATURATION: 97 % | WEIGHT: 215 LBS | BODY MASS INDEX: 35.82 KG/M2 | HEART RATE: 79 BPM | SYSTOLIC BLOOD PRESSURE: 131 MMHG | HEIGHT: 65 IN

## 2025-06-03 DIAGNOSIS — I63.9 ACUTE CVA (CEREBROVASCULAR ACCIDENT) (HCC): Primary | ICD-10-CM

## 2025-06-03 DIAGNOSIS — S06.5XAA SUBDURAL HEMATOMA (HCC): ICD-10-CM

## 2025-06-03 DIAGNOSIS — D50.9 IRON DEFICIENCY ANEMIA, UNSPECIFIED IRON DEFICIENCY ANEMIA TYPE: ICD-10-CM

## 2025-06-03 DIAGNOSIS — N18.9 CHRONIC KIDNEY DISEASE, UNSPECIFIED CKD STAGE: ICD-10-CM

## 2025-06-03 DIAGNOSIS — I62.01 ACUTE ON CHRONIC INTRACRANIAL SUBDURAL HEMATOMA (HCC): ICD-10-CM

## 2025-06-03 DIAGNOSIS — I62.03 ACUTE ON CHRONIC INTRACRANIAL SUBDURAL HEMATOMA (HCC): ICD-10-CM

## 2025-06-03 DIAGNOSIS — I10 PRIMARY HYPERTENSION: ICD-10-CM

## 2025-06-03 DIAGNOSIS — R82.991 HYPOCITRATURIA: ICD-10-CM

## 2025-06-03 PROBLEM — M25.512 LEFT SHOULDER PAIN: Status: ACTIVE | Noted: 2025-06-03

## 2025-06-03 PROBLEM — R13.12 OROPHARYNGEAL DYSPHAGIA: Status: ACTIVE | Noted: 2025-06-03

## 2025-06-03 PROBLEM — S80.852A: Status: ACTIVE | Noted: 2025-06-03

## 2025-06-03 PROBLEM — R13.13 PHARYNGEAL DYSPHAGIA: Status: RESOLVED | Noted: 2025-06-03 | Resolved: 2025-06-03

## 2025-06-03 PROBLEM — R13.13 PHARYNGEAL DYSPHAGIA: Status: ACTIVE | Noted: 2025-06-03

## 2025-06-03 PROBLEM — M10.9 GOUT: Status: ACTIVE | Noted: 2025-06-03

## 2025-06-03 PROBLEM — K86.2 PANCREATIC CYST: Status: ACTIVE | Noted: 2025-06-03

## 2025-06-03 LAB
ALBUMIN SERPL BCG-MCNC: 3.2 G/DL (ref 3.5–5)
ALP SERPL-CCNC: 101 U/L (ref 34–104)
ALT SERPL W P-5'-P-CCNC: 23 U/L (ref 7–52)
ANION GAP SERPL CALCULATED.3IONS-SCNC: 6 MMOL/L (ref 4–13)
AST SERPL W P-5'-P-CCNC: 21 U/L (ref 13–39)
BASOPHILS # BLD AUTO: 0.08 THOUSANDS/ÂΜL (ref 0–0.1)
BASOPHILS NFR BLD AUTO: 1 % (ref 0–1)
BILIRUB SERPL-MCNC: 0.67 MG/DL (ref 0.2–1)
BUN SERPL-MCNC: 21 MG/DL (ref 5–25)
CALCIUM ALBUM COR SERPL-MCNC: 9.7 MG/DL (ref 8.3–10.1)
CALCIUM SERPL-MCNC: 9.1 MG/DL (ref 8.4–10.2)
CHLORIDE SERPL-SCNC: 101 MMOL/L (ref 96–108)
CO2 SERPL-SCNC: 28 MMOL/L (ref 21–32)
CREAT SERPL-MCNC: 0.68 MG/DL (ref 0.6–1.3)
EOSINOPHIL # BLD AUTO: 0.24 THOUSAND/ÂΜL (ref 0–0.61)
EOSINOPHIL NFR BLD AUTO: 3 % (ref 0–6)
ERYTHROCYTE [DISTWIDTH] IN BLOOD BY AUTOMATED COUNT: 14.9 % (ref 11.6–15.1)
GFR SERPL CREATININE-BSD FRML MDRD: 94 ML/MIN/1.73SQ M
GLUCOSE SERPL-MCNC: 101 MG/DL (ref 65–140)
GLUCOSE SERPL-MCNC: 112 MG/DL (ref 65–140)
GLUCOSE SERPL-MCNC: 116 MG/DL (ref 65–140)
GLUCOSE SERPL-MCNC: 137 MG/DL (ref 65–140)
GLUCOSE SERPL-MCNC: 146 MG/DL (ref 65–140)
HCT VFR BLD AUTO: 33.1 % (ref 36.5–49.3)
HGB BLD-MCNC: 10.6 G/DL (ref 12–17)
IMM GRANULOCYTES # BLD AUTO: 0.07 THOUSAND/UL (ref 0–0.2)
IMM GRANULOCYTES NFR BLD AUTO: 1 % (ref 0–2)
LYMPHOCYTES # BLD AUTO: 1.15 THOUSANDS/ÂΜL (ref 0.6–4.47)
LYMPHOCYTES NFR BLD AUTO: 15 % (ref 14–44)
MCH RBC QN AUTO: 30.2 PG (ref 26.8–34.3)
MCHC RBC AUTO-ENTMCNC: 32 G/DL (ref 31.4–37.4)
MCV RBC AUTO: 94 FL (ref 82–98)
MONOCYTES # BLD AUTO: 0.73 THOUSAND/ÂΜL (ref 0.17–1.22)
MONOCYTES NFR BLD AUTO: 10 % (ref 4–12)
NEUTROPHILS # BLD AUTO: 5.18 THOUSANDS/ÂΜL (ref 1.85–7.62)
NEUTS SEG NFR BLD AUTO: 70 % (ref 43–75)
NRBC BLD AUTO-RTO: 0 /100 WBCS
PLATELET # BLD AUTO: 225 THOUSANDS/UL (ref 149–390)
PMV BLD AUTO: 11.8 FL (ref 8.9–12.7)
POTASSIUM SERPL-SCNC: 4.1 MMOL/L (ref 3.5–5.3)
PROT SERPL-MCNC: 7.2 G/DL (ref 6.4–8.4)
RBC # BLD AUTO: 3.51 MILLION/UL (ref 3.88–5.62)
SODIUM SERPL-SCNC: 135 MMOL/L (ref 135–147)
WBC # BLD AUTO: 7.45 THOUSAND/UL (ref 4.31–10.16)

## 2025-06-03 PROCEDURE — 82948 REAGENT STRIP/BLOOD GLUCOSE: CPT

## 2025-06-03 PROCEDURE — NC001 PR NO CHARGE: Performed by: STUDENT IN AN ORGANIZED HEALTH CARE EDUCATION/TRAINING PROGRAM

## 2025-06-03 PROCEDURE — 85025 COMPLETE CBC W/AUTO DIFF WBC: CPT

## 2025-06-03 PROCEDURE — 99222 1ST HOSP IP/OBS MODERATE 55: CPT | Performed by: NURSE PRACTITIONER

## 2025-06-03 PROCEDURE — 99255 IP/OBS CONSLTJ NEW/EST HI 80: CPT | Performed by: STUDENT IN AN ORGANIZED HEALTH CARE EDUCATION/TRAINING PROGRAM

## 2025-06-03 PROCEDURE — 80053 COMPREHEN METABOLIC PANEL: CPT

## 2025-06-03 RX ORDER — HYDRALAZINE HYDROCHLORIDE 10 MG/1
10 TABLET, FILM COATED ORAL EVERY 8 HOURS PRN
Status: DISCONTINUED | OUTPATIENT
Start: 2025-06-03 | End: 2025-06-16 | Stop reason: HOSPADM

## 2025-06-03 RX ORDER — AMOXICILLIN 250 MG
1 CAPSULE ORAL
Qty: 7 TABLET | Refills: 0 | Status: ON HOLD | OUTPATIENT
Start: 2025-06-03 | End: 2025-06-10

## 2025-06-03 RX ORDER — ATENOLOL 50 MG/1
50 TABLET ORAL DAILY
Status: DISCONTINUED | OUTPATIENT
Start: 2025-06-04 | End: 2025-06-16 | Stop reason: HOSPADM

## 2025-06-03 RX ORDER — AMOXICILLIN 250 MG
1 CAPSULE ORAL
Status: DISCONTINUED | OUTPATIENT
Start: 2025-06-03 | End: 2025-06-16 | Stop reason: HOSPADM

## 2025-06-03 RX ORDER — ENOXAPARIN SODIUM 100 MG/ML
30 INJECTION SUBCUTANEOUS EVERY 12 HOURS SCHEDULED
Status: DISCONTINUED | OUTPATIENT
Start: 2025-06-03 | End: 2025-06-16 | Stop reason: HOSPADM

## 2025-06-03 RX ORDER — INSULIN LISPRO 100 [IU]/ML
1-5 INJECTION, SOLUTION INTRAVENOUS; SUBCUTANEOUS
Status: DISCONTINUED | OUTPATIENT
Start: 2025-06-03 | End: 2025-06-16 | Stop reason: HOSPADM

## 2025-06-03 RX ORDER — LOSARTAN POTASSIUM 50 MG/1
100 TABLET ORAL DAILY
Status: DISCONTINUED | OUTPATIENT
Start: 2025-06-04 | End: 2025-06-04

## 2025-06-03 RX ORDER — ALLOPURINOL 300 MG/1
300 TABLET ORAL DAILY
Status: DISCONTINUED | OUTPATIENT
Start: 2025-06-04 | End: 2025-06-16 | Stop reason: HOSPADM

## 2025-06-03 RX ORDER — POLYETHYLENE GLYCOL 3350 17 G/17G
17 POWDER, FOR SOLUTION ORAL DAILY
Qty: 238 G | Refills: 0 | Status: ON HOLD | OUTPATIENT
Start: 2025-06-04 | End: 2025-06-18

## 2025-06-03 RX ORDER — FERROUS SULFATE 325(65) MG
325 TABLET ORAL EVERY OTHER DAY
Status: DISCONTINUED | OUTPATIENT
Start: 2025-06-04 | End: 2025-06-16 | Stop reason: HOSPADM

## 2025-06-03 RX ORDER — CHLORTHALIDONE 25 MG/1
25 TABLET ORAL DAILY
Status: DISCONTINUED | OUTPATIENT
Start: 2025-06-04 | End: 2025-06-16 | Stop reason: HOSPADM

## 2025-06-03 RX ORDER — ACETAMINOPHEN 325 MG/1
650 TABLET ORAL EVERY 6 HOURS PRN
Qty: 28 TABLET | Refills: 0 | Status: ON HOLD | OUTPATIENT
Start: 2025-06-03 | End: 2025-06-17

## 2025-06-03 RX ORDER — CHLORHEXIDINE GLUCONATE ORAL RINSE 1.2 MG/ML
15 SOLUTION DENTAL EVERY 12 HOURS SCHEDULED
Status: DISCONTINUED | OUTPATIENT
Start: 2025-06-03 | End: 2025-06-12

## 2025-06-03 RX ORDER — ATORVASTATIN CALCIUM 10 MG/1
10 TABLET, FILM COATED ORAL
Status: DISCONTINUED | OUTPATIENT
Start: 2025-06-03 | End: 2025-06-16 | Stop reason: HOSPADM

## 2025-06-03 RX ORDER — ACETAMINOPHEN 325 MG/1
650 TABLET ORAL EVERY 6 HOURS PRN
Status: DISCONTINUED | OUTPATIENT
Start: 2025-06-03 | End: 2025-06-16 | Stop reason: HOSPADM

## 2025-06-03 RX ORDER — POLYETHYLENE GLYCOL 3350 17 G/17G
17 POWDER, FOR SOLUTION ORAL DAILY
Status: DISCONTINUED | OUTPATIENT
Start: 2025-06-04 | End: 2025-06-16 | Stop reason: HOSPADM

## 2025-06-03 RX ADMIN — ATORVASTATIN CALCIUM 10 MG: 10 TABLET, FILM COATED ORAL at 21:09

## 2025-06-03 RX ADMIN — POLYETHYLENE GLYCOL 3350 17 G: 17 POWDER, FOR SOLUTION ORAL at 09:06

## 2025-06-03 RX ADMIN — ENOXAPARIN SODIUM 30 MG: 30 INJECTION SUBCUTANEOUS at 21:09

## 2025-06-03 RX ADMIN — CHLORHEXIDINE GLUCONATE 15 ML: 1.2 SOLUTION ORAL at 21:09

## 2025-06-03 RX ADMIN — ALLOPURINOL 300 MG: 100 TABLET ORAL at 09:07

## 2025-06-03 RX ADMIN — SENNOSIDES AND DOCUSATE SODIUM 1 TABLET: 50; 8.6 TABLET ORAL at 21:09

## 2025-06-03 RX ADMIN — CHLORHEXIDINE GLUCONATE 15 ML: 1.2 SOLUTION ORAL at 09:06

## 2025-06-03 RX ADMIN — ENOXAPARIN SODIUM 30 MG: 30 INJECTION SUBCUTANEOUS at 09:06

## 2025-06-03 RX ADMIN — ATENOLOL 50 MG: 50 TABLET ORAL at 09:07

## 2025-06-03 NOTE — ASSESSMENT & PLAN NOTE
> Patient had MRI abdomen completed outpatient (5/22, d/t jaundice & thrombocytopenia), with numerous pancreatic cystic lesions concerning for malignancy. This showed numerous cystic lesions scattered throughout the pancreas that were concerning for malignancy at this time.   >Patient's family was informed of these and are aware that following discharge from acute rehab they will need to follow-up with the surgical oncology team as well as GI for further evaluation and treatment of these.  Patient and spouse both aware.    - Surg Onc & GI referrals placed by trauma surgery during acute hospitalization discharge

## 2025-06-03 NOTE — ASSESSMENT & PLAN NOTE
Home:  Atenolol/chlorthalidone 50-25 mg qd/Diovan 320 mg qd  Here:  Atenolol 50 mg qd  Likely will need to add Losartan back tomorrow 6/4/25  Will order Hydralazine 10 mg q8hrs prn SBP >165

## 2025-06-03 NOTE — ASSESSMENT & PLAN NOTE
- new pain for patient  - pain at AC joint, but could not provoke pain other than on palpation  - Differential includes AC arthritis, glenohumeral arthritis, MSK strain, rotator cuff tendinopahty  - Doesn't seem like needs K-tape as no sulcus sign  - PT/OT  - XR if hindering therapies

## 2025-06-03 NOTE — H&P
"H&P - Hospitalist   Name: Deonte Attdallas 74 y.o. male I MRN: [id number]  Unit/Bed#: -01 I Date of Admission: 6/3/2025   Date of Service: 6/3/2025 I Hospital Day: 0     Assessment & Plan  Acute on chronic intracranial subdural hematoma (HCC)  MRI of the brain that showed large bilateral SDH  S/p bilateral Yael holes for evacuation of SDH 5/25/25  MMA was not able to be done due to anatomical considerations  For no full AC/AP for 2 weeks = 6/9/25 then clear with NS  Acute CVA (cerebrovascular accident) (HCC)  Acute CVA right superior cerebellum  Was found on CTH 5/30, confirmed by MRI  CTA of the head/neck showed \"Moderate (approximately 50%) stenosis in the 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 No significant stenosis of the cervical left internal carotid artery. Severe stenosis origin of the dominant left vertebral artery. Hypoplastic right vertebral artery. No high-grade intracranial stenosis, large vessel occlusion or aneurysm\"  Neurology saw in consult for the CVA.  They recommended that the patient start ASA 81mg daily when cleared by Neurosurgery.    For LOOP recorder implanted as an outpatient.  He did have a previous Ziopatch done that was negative for atrial fibrillation.    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  Continue statin  Pancreatic cyst  Had an OP ultrasound done for elevated total bilirubin that showed cystic lesions in the pancreas for which further imaging was recommended  MRI done thereafter as an OP showed \"Numerous cystic lesions scattered throughout the pancreas with a dominant lesion measuring 18 mm in the body. Worrisome features include ductal dilation up to 6 mm in the tail the pancreas and abrupt change in caliber of the pancreatic duct. Management recommendation: Because of presence of multiple worrisome features, surgical consultation/management with consideration for EUS " "recommended.  Management and follow up recommendations for cystic pancreatic lesions are based on Institutional consensus and international evidence-based Kyoto guidelines for the management of intraductal papillary mucinous neoplasm of the pancreas. Pancreatology 24 2024) 255-270\".  Per IMs note in the hospital:  \"Reached out to primary team and surgery oncall as imaging findings are suggestive of malignancy. According to surgery they recommend out patient follow up with surg onc for possible biopsy and out patient GI follow up. Surgery called the surg onc office to schedule the appointment for patient. I did a STAT referral for GI outpatient. Primary team will inform the patient regarding imaging findings and the referrals\".  Diabetes mellitus (HCC)  HbA1C 6.2 on 5/24/25  Home:  Metformin 1000 mg BID  Here:  QID Accuchecks/SSI  Will restart Metformin when needed/able  HTN (hypertension)  Home:  Atenolol/chlorthalidone 50-25 mg qd/Diovan 320 mg qd  Here:  Atenolol 50 mg qd  Likely will need to add Losartan back tomorrow 6/4/25  Will order Hydralazine 10 mg q8hrs prn SBP >165  Thrombocytopenia (HCC)  Chronic  Follows with H-O as OP  Currently platelet count is 225  Mixed hyperlipidemia  Takes Lipitor 10 mg qd at home  Normocytic anemia  Continue Iron 325 mg QOD  Gout  Continue Allopurinol 300mg qd as at home  CKD (chronic kidney disease)  Baseline 0.7-0.9  Stable      Subjective/HPI:  Deonte Greenfield is a 74 year old patient with a history of chronic thrombocytopenia, anemia, pancreatic cysts, HTN, DM2, fatty liver, CKD 0.7-0.9, gout, bilateral KIANNA and HLD who fell in early April with head strike.  CT showed thin right sided SDH and the next day was resolved.  Since that time he has needed a walker for ambulation 2/2 poor balance, difficult ADLs and speech difficulty.  He had an outpatient MRI of the brain that showed large bilateral SDH with mass effect and was directed to the ED at [facility].  He was " "transferred to [facility] and underwent bilateral [place] holes for evacuation of SDH.  MMA was not able to be done due to anatomical considerations.  CTA of the head/neck showed \"Moderate (approximately 50%) stenosis in the 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 No significant stenosis of the cervical left internal carotid artery. Severe stenosis origin of the dominant left vertebral artery. Hypoplastic right vertebral artery. No high-grade intracranial stenosis, large vessel occlusion or aneurysm\".  He then had a followup CT head on 5/30/25 that showed an acute infarct in right superior cerebellum.  MRI of the brain done confirmed the infarct.  Neurology saw in consult for the CVA.  They recommended that the patient start ASA 81mg daily when cleared by Neurosurgery.  They recommended that he have a LOOP recorder implanted as an outpatient.  He did have a previous Ziopatch done that was negative for atrial fibrillation.  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    The patient has now been referred to [facility] for ongoing therapy.     Currently, the patient does not have any complaints of CP, dizziness, N/V/D. Had BM today.  Appetite is good.       Review of Systems: A 10 point ROS was performed; negative except as noted above.       Social History:    Substance Use History:   Social History     Substance and Sexual Activity   Alcohol Use Not Currently     Tobacco Use History[1]  Social History     Substance and Sexual Activity   Drug Use Never       Past Medical History:    Past Medical History[2]      Review of Scheduled Meds:  Current Facility-Administered Medications   Medication Dose Route Frequency Provider Last Rate    acetaminophen  650 mg Oral Q6H PRN JOSE ALEJANDRO Bradley      [START ON 6/4/2025] allopurinol  300 mg Oral Daily JOSE ALEJANDRO Bradley      [START ON 6/4/2025] atenolol  50 mg " Oral Daily JOSE ALEJANDRO Bradley      And    [Held by provider] chlorthalidone  25 mg Oral Daily JOSE ALEJANDRO Bradley      atorvastatin  10 mg Oral HS JOSE ALEJANDRO Bradley      chlorhexidine  15 mL Mouth/Throat Q12H [facility] JOSE ALEJANDRO Bradley      Diclofenac Sodium  2 g Topical 4x Daily PRN JOSE ALEJANDRO Bradley      enoxaparin  30 mg Subcutaneous Q12H [facility] JOSE ALEJANDRO Bradley      [START ON 6/4/2025] ferrous sulfate  325 mg Oral Every Other Day JOSE ALEJANDRO Bradley      insulin lispro  1-5 Units Subcutaneous TID AC JOSE ALEJANDRO Bradley      insulin lispro  1-5 Units Subcutaneous  JOSE ALEJANDRO Bradley      [Held by provider] losartan  100 mg Oral Daily JOSE ALEJANDRO Bradley      [START ON 6/4/2025] polyethylene glycol  17 g Oral Daily JOSE ALEJANDRO Bradley      senna-docusate sodium  1 tablet Oral HS JOSE ALEJANDRO Bradley         Physical Exam:  Temp:  [98.1 °F (36.7 °C)-98.3 °F (36.8 °C)] 98.1 °F (36.7 °C)  HR:  [63-79] 63  Resp:  [16-22] 16  BP: (131-172)/(66-75) 172/75  SpO2:  [94 %-98 %] 98 %    General Appearance: no distress, non toxic appearing  HEENT: PERRLA, conjuctiva normal; oropharynx clear; mucous membranes moist   Neck:  Supple, normal ROM  Lungs: CTA, normal respiratory effort, no retractions, expiratory effort normal  CV: regular rate and rhythm; no rubs/murmurs/gallops, PMI normal   ABD: soft; ND/NT; +BS  EXT: no edema  Skin: normal turgor, normal texture  Psych: affect normal, mood normal  Neuro: AA        Laboratory:    Results from last 7 days   Lab Units 06/03/25  0601 06/02/25  0559 05/31/25  1007   HEMOGLOBIN g/dL 10.6* 10.6* 10.9*   HEMATOCRIT % 33.1* 33.7* 33.9*   WBC Thousand/uL 7.45 7.98 7.10     Results from last 7 days   Lab Units 06/03/25  0601 06/02/25  0559 05/31/25  1007   BUN mg/dL 21 26* 29*   SODIUM mmol/L 135 136 137   POTASSIUM mmol/L 4.1 4.8 4.2   CHLORIDE mmol/L 101 103 102   CREATININE mg/dL 0.68  "0.76 0.78   AST U/L 21  --  18   ALT U/L 23  --  17     Results from last 7 days   Lab Units 05/31/25  1007   PROTIME seconds 15.5*   INR  1.20*        Wt Readings from Last 1 Encounters:   06/03/25 97.5 kg (215 lb)     Estimated body mass index is 35.78 kg/m² as calculated from the following:    Height as of this encounter: 5' 5\" (1.651 m).    Weight as of an earlier encounter on 6/3/25: 97.5 kg (215 lb).    Imaging:  No orders to display       Level 1 - Full Code    Counseling / Coordination of Care:   Total floor / unit time spent today 60 minutes. and Greater than 50% of total time was spent with the patient and / or family counseling and / or coordination of care.  A description of the counseling / coordination of care: review of records, examination of the patient and explanation of the plan of care to the patient.      ** Please Note: Fluency Direct voice to text software may have been used in the creation of this document. **             [1]   Social History  Tobacco Use   Smoking Status Former    Types: Cigarettes   Smokeless Tobacco Never   [2]   Past Medical History:  Diagnosis Date    Diabetes mellitus (HCC)     History of kidney stones 04/05/2025    Hypertension      "

## 2025-06-03 NOTE — INCIDENTAL FINDINGS
The following findings require follow up:  Radiographic finding   Finding: Numerous cystic lesions scattered throughout the pancreas with a dominant lesion measuring 18 mm in the body. Worrisome features include ductal dilation up to 6 mm in the tail the pancreas and abrupt change in caliber of the pancreatic duct.     -Management recommendation: Because of presence of multiple worrisome features, surgical consultation/management with consideration for EUS recommended.   Follow up required: yes   Follow up should be done within 2 week(s)    Please notify the following clinician to assist with the follow up:   Dr. Garcia Cartwright MD       Incidental finding results were discussed with the Patient and Patient's POA by Jer Johnson MD on 06/03/25.   They expressed understanding and all questions answered.    Surg Onc and GI consults have been placed.

## 2025-06-03 NOTE — ASSESSMENT & PLAN NOTE
Lab Results   Component Value Date    HGBA1C 6.2 (H) 05/24/2025       Recent Labs     06/03/25  1551 06/03/25  2043 06/04/25  0617 06/04/25  1044   POCGLU 146* 137 133 128       Blood Sugar Average: Last 72 hrs:  (P) 136  - plan to restart Metformin during [facility] stay  - continue SSI and accuchecks per IM

## 2025-06-03 NOTE — ASSESSMENT & PLAN NOTE
Last seen by SLP on 6/1/25    - continue recommended diet of regular texture and thin liquids  - Aspiration precautions and compensatory swallowing strategies: upright posture, slow rate of feeding, small bites/sips, and alternating bites and sips   - SLP consulted

## 2025-06-03 NOTE — ASSESSMENT & PLAN NOTE
MRI of the brain that showed large bilateral SDH  S/p bilateral Yael holes for evacuation of SDH 5/25/25  MMA was not able to be done due to anatomical considerations  For no full AC/AP for 2 weeks = 6/9/25 then clear with NS

## 2025-06-03 NOTE — TREATMENT PLAN
Individualized Plan of Care - [facility]  Deonte Greenfield 74 y.o. male MRN: [id number]  Unit/Bed#: -01 Encounter: [id number]     PATIENT INFORMATION  ADMISSION DATE: 6/3/2025  3:27 PM ROSY CATEGORY:Brain Dysfunction:  02.22  Traumatic, Closed Injury   ADMISSION DIAGNOSIS: Impairment of mobility, safety and Activities of Daily Living (ADLs) due to Brain Dysfunction: 02.22 Traumatic, Closed Injury. Acute on Chronic bilateral SDH  EXPECTED LOS: 10-14 days     MEDICAL/FUNCTIONAL PROGNOSIS  Pre-admit screens and post-admit evaluations reviewed and are consistent.     Based on my assessment of the patient's medical conditions and current functional status, the prognosis for attaining medical and functional goals or the IRF stay is:  Good.    Patient is appropriate for acute rehabilitation.    Medical Goals:   Patient will be medically stable for discharge back to community setting upon completion or acute rehab program and patient/family will be able to manage medical conditions and comorbid conditions with medications and appropriate follow up upon completion of rehab program.  Cardiopulmonary function/Anemia: Ensure cardiopulmonary stability and optimize cardiopulmonary function not only at rest but with activity as patient's activity level significantly increases in acute rehab compared with prior to transfer in preparation for safe discharge from [facility].  Must closely and frequently monitor blood pressure, HR, anemia to ensure adequate cardiac output during ADLs and ambulation as patient is at increased risk for orthostatic hypotension/syncope and potential injury if not monitored for and managed adequately.    Blood pressure management:    Frequent monitoring of blood pressure with appropriate adjustments in blood pressure medication management to optimize blood pressure control and prevent/limit renal complications.   Monitoring impact of blood pressure and side-effects of blood pressure  medications at rest and with activity.  Hypoxia prevention: Ensure appropriate level of oxygenation at rest and with activity to avoid symptomatic hypoxia, maximize functional performance, and decrease risk of atelectasis/pneumonia through close and frequent monitoring, providing appropriate respiratory treatments (such as incentive spirometry), and when necessary provide/adjust respiratory medications.    DM: Patient will improve/maintain blood sugar control to ensure optimal healing and decrease risks of complications associated with DM through medication monitoring, adjustments as indicated, and optimal dietary intake and education.  Brain injury:  Patient's cognitive status is significantly impaired and neurologic status can fluctuate particularly early in rehabilitation process.  Patient requires frequent rehab physician and rehab nursing neurologic monitoring for subtle and more significant changes in mentation and neurologic function.  Labs must be monitored closely along with hydration and nutritional status.  Low threshold to obtain brain imaging.  Medications must be carefully monitored and adjusted to optimize functional recovery while limit cognitive impairments.  Goal to improve cognitive and neurologic function, provide appropriate patient (and/or family education), and to ensure appropriate optimal discharge plan.    Cognitive deficits following cerebrovascular disease: intensive skilled therapies including speech language pathology to evaluate and treat deficits in cognition.  Close oversight and management by rehab specialized physician of cognitive deficits and potential confounding factors (prevention of, monitoring for, and if found treatment of possible complications such as infections, as well as optimally managing sleep, mood, and meications which can negatively impact cognition and functional recovery).    Dysphagia: improve swallowing through SLP evaluation with intensive treatments, optimal  nutrition, and fluid intake.  Adjust diet and swallowing precautions as indicated to decrease risk of aspiration pneumonia and respiratory distress.   Close oversight and management by rehab specialized physician.    Inpatient rehabilitation education/teaching:  To be provided to patient and typically family/caregiver (if able to be identified) by all skilled therapists, rehab nursing, case management, and rehab specialized physician to ensure optimal recovery and decrease risks of complications in both acute rehabilitation setting as well as after discharge.   Cognitive and Neurobehavioral Dysfunction:  Patient's mood, cognition, and behavior and it's impact on therapy participation and functional recovery will improve during course with monitoring neuro exam, labs, vitals, sleep, and mood.  Adjustments in non-pharmacologic/environmental management including supportive counseling and when necessary pharmacologic management.  Requires frequent re-assessment and close management to ensure optimal mood, cognitive, and behavior management during acute rehab course as well as patient (family/caregiver when available) education and training to ensure appropriate management during acute rehab course with planning for outpatient management to ensure optimal mental health, cognitive, and functional recovery.     Chronic kidney failure management and blood pressure management: Frequent measurements and evaluation of urinary intake, urinary output, and labs which include BUN/Cr and electrolytes with appropriate adjustments in medication and when necessary order additional testing.  Frequent monitoring of blood pressure with appropriate adjustments in blood pressure medication management to optimize blood pressure control and prevent/limit renal complications.    Skin wounds: Appropriate skin checks for wound/skin evaluation including evaluation of healing, worsening of wounds, or signs of infection.   Wound care management  from rehab nursing, wound care nursing, physicians.  Ensure frequent appropriate turning, positioning in bed, in chair, when mobilizing, and when appropriate with use of appropriate devices to optimize healing and decrease risk of worsening or new skin breakdown.    Skin management: Increased risk of skin wounds/breakdown/rashes due to recent immobility and co-morbidities.   Appropriate skin checks from nursing and as needed physician.  Frequent turning, application of appropriate barrier creams/dressings, cushions.  Patient/caregiver education.  Optimal bowel/bladder hygiene and mobilization.         ANTICIPATED DISCHARGE DISPOSITION AND SERVICES  COMMUNITY SETTING:    Previous community setting with wife and kids in 2 story house. Modified independent goals    ANTICIPATED FOLLOW-UP SERVICE:   Outpatient Therapy PT, OT    DISCIPLINE SPECIFIC PLANS:  Required Disciplines & Services: PT, OT, Rehabilitation Physician, Rehabillitation Nursing, Case Management, Dietary, Speech Language Pathology    REQUIRED THERAPY (expected):  Therapy Hours per Day Days per Week Tx Days (Days in ARF)   Physical Therapy 1-1.5 5 10-14   Occupational Therapy 1-1.5 5 10-14   Speech/Language Therapy 0.5-1 3-5 10-14   NOTE: Additional therapy time(s) may be added as appropriate to meet patient needs and to achieve functional goals.      ANTICIPATED FUNCTIONAL OUTCOMES:  ADL: supervision to Mod-I   Bladder/Bowel: supervision to mod-I   Transfers: Patient will be modified independent with transfers upon completion of rehab program   Locomotion: Patient will be modified independent with locomotion upon completion of rehab program   Cognitive: Patient will be independent for basic tasks near prior level of cognitive function upon completion of rehab program, but likely to remain needing help with iADL's      DISCHARGE PLANNING NEEDS  Equipment needs: To be determined at team conference.      REHAB ANTICIPATED PARTICIPATION RESTRICTIONS:  Assist  with Mobility, Decreased Insight to Deficits, Decreaed Safety Awareness, Inability to Drive, and Requires Assist with Heavy Homemaking

## 2025-06-03 NOTE — WOUND OSTOMY CARE
Progress Note - Wound   Deonte Attarian 74 y.o. male MRN: [id number]  Unit/Bed#: [facility] 826-01 Encounter: [id number]        Assessment:   Patient is seen for wound care follow-up. Patient seen sitting OOB in recliner. Min assist for turning and repositioning. Continent of bowel and bladder. Agreeable for assessment.     Findings:  B/L heels and sacro-buttocks are dry intact and latisha with no skin loss or wounds present. Recommend preventative foam dressings and proper offloading/ repositioning.      Left Pretibial Leg Wound: measures smaller in size on exam. Small circular in shape area of partial thickness skin loss. Wound bed is pink in color. Antonia-wound is fragile. Scant serosanguineous drainage noted.       No induration, fluctuance, odor, warmth/temperature differences, redness, or purulence noted to the above noted wounds and skin areas assessed. New dressings applied per orders listed below. Patient tolerated well- no s/s of non-verbal pain or discomfort observed during the encounter. Bedside nurse aware of plan of care. See flow sheets for more detailed assessment findings.      Orders listed below and wound care will continue to follow, call or Secure Chat Message with questions.       Skin care plans:  1-Cleanse B/L Heels and Sacro-Buttocks with soap and water. Pat dry. Apply Silicone Border Foam (Mepilex) to areas. Gaston with P for Prevention and change every 3 days or PRN soilage/displacement. Peel back and inspect Q-shift.  2-Elevate heels to offload pressure.  3-Ehob cushion in chair when out of bed.  4-Moisturize skin daily with skin nourishing cream.  5-Turn/reposition q2h for pressure re-distribution on skin.  6-Left Pretibial leg wound: cleanse left lower leg wound with soap and water, pat dry, apply xeroform to wound bed and cover with foam dressing. Change every other day.       WOUNDS:  Wound 05/24/25 Traumatic Skin Tear Pretibial Left;Lateral (Active)   Wound Image   06/03/25 0859   Wound  Description Pink 06/03/25 1500   Non-staged Wound Description Partial thickness 06/03/25 1500   Wound Length (cm) 0.5 cm 06/03/25 0859   Wound Width (cm) 0.5 cm 06/03/25 0859   Wound Depth (cm) 0.1 cm 06/03/25 0859   Wound Surface Area (cm^2) 0.2 cm^2 06/03/25 0859   Wound Volume (cm^3) 0.013 cm^3 06/03/25 0859   Calculated Wound Volume (cm^3) 0.03 cm^3 06/03/25 0859   Change in Wound Size % 86.96 06/03/25 0859   Drainage Amount Scant 06/03/25 1500   Drainage Description Serosanguineous 06/03/25 1500   Antonia-wound Assessment Fragile 06/03/25 1500   Treatments Cleansed;Irrigation with NSS;Site care 06/03/25 1500   Wound Site Closure None 06/03/25 1500   Dressing Xeroform;Foam, Silicon (eg. Allevyn, etc) 06/03/25 1500   Wound packed? No 06/03/25 1500   Dressing Changed Changed 06/03/25 1500   Patient Tolerance Tolerated well 06/03/25 1500   Dressing Status Clean;Dry;Intact 06/03/25 1500                Sherry Bryant RN, BSN, CWOCN

## 2025-06-03 NOTE — DISCHARGE SUMMARY
Discharge Summary - Trauma   Name: Deonte Greenfield 74 y.o. male I MRN: [id number]  Unit/Bed#: [facility] 826-01 I Date of Admission: 5/24/2025   Date of Service: 6/3/2025 I Hospital Day: 10    Admission Date: 5/24/2025 1617  Discharge Date: 06/03/25  Admitting Diagnosis: Subdural hematoma (HCC) [S06.5XAA]  Discharge Diagnosis:   Medical Problems       Resolved Problems  Date Reviewed: 5/25/2025   None         HPI: 74 y.o. with hx of DM, CKD2, HTN, HLD, thrombocytopenia, gout, b/l total hip arthroplasty, who presents as transfer from [facility] due to SDH. Had a fall on 4/4 resulting in small R posterior falx SDH. Follow-up exams on 4/5 and 4/6 revealed resolution of SDH. A workup was initiated for frequent falls by neurology including an MRI which was performed 5/24 revealing large b/l high convexity SDHs with mass effect. He was referred to the hospital where CTH confirmed these findings.. Patient noted progressive weakness, requiring a walker to ambulate in last couple weeks. No headache, n/v, or recent new falls. GCS is 15.     Procedures Performed: No orders of the defined types were placed in this encounter.      Summary of Hospital Course: 74-year-old male with numerous comorbidities comes in after a fall with subsequent finding of small right posterior subdural hematoma on April 4.  The patient follow-up exam on the 5th and 6th revealed resolution of the subdural.  Patient continues to have frequent falls and MRI on 5/24 revealed bilateral subdural hematomas with mass effect.  During the patient's stay neurosurgery input was placed with status post bilateral bur holes started on May 25 with 4 subdural drains placed.  Drains were removed on May 27 and 28.  The patient was unable to get MMA embolization due to anatomy.  On May 31 showed right cerebellar ischemic infarct with mild global mass effect and no change in the bilateral cerebral convexity subdural hematomas.  Patient continued to improve with a GCS of 15 and a  nonfocal neurological examination.  Patient was transferred to the trauma service on May 28 where he continued to improve.    Significant Findings, Care, Treatment and Services Provided: Significant findings include bilateral subdural hematomas present on arrival.  Ischemic cerebellar infarct.     Concerning malignancy in pancreas after getting MRI of abdomen.  This showed numerous cystic lesions scattered throughout the pancreas that were concerning for malignancy at this time.  Patient's family was informed of these and are aware that following discharge from acute rehab they will need to follow-up with the surgical oncology team as well as GI for further evaluation and treatment of these.  Patient and spouse both aware.       Condition at Discharge: stable       Discharge instructions/Information to patient and family:   See After Visit Summary (AVS) for information provided to patient and family.      Provisions for Follow-Up Care:  See after visit summary for information related to follow-up care and any pertinent home health orders.      PCP: Garcia Cartwright MD    Disposition: See After Visit Summary for discharge disposition information.    Planned Readmission: No     Discharge Medications:  See after visit summary for reconciled discharge medications provided to patient and family.      Discharge Statement:  I have spent a total time of 30 minutes in caring for this patient on the day of the visit/encounter. .

## 2025-06-03 NOTE — ARC ADMISSION
Reviewed updates with [facility] physician - patient remains acute rehab appropriate, and medically stable for ARC admission.    He will admit to [facility] room 962 with pickup at 2pm. Report can be called to (P): [phone]. CM has been updated.

## 2025-06-03 NOTE — ASSESSMENT & PLAN NOTE
> Originally presented in early April due to unwitnessed fall and found to have small subdural hemorrhage which subsequently appeared to resolve upon repeat CT head imaging. Patient opted to have MRI performed in outpatient setting and MRI team sent him to ER for large bilateral high convexity SDHs with mass effect. Presented to ED on 5/24 where he received bilateral Yael holes with 4 subdural drains placed  > Drains removed on 5/27 and 5/28  > completed 7 days of Keppra for seizure prophylaxis    Repeat CT head STAT if GCS declines more than 2 points in 1 hour  SBP < 160  INR < 1.4  Plt > 100  Hold all therapeutic doses of AC / AP therapy, he is not cleared to start asa per neurology recommendations for stroke management  Ok for dvt prophylaxis, continue lovenox  PT/OT   18

## 2025-06-03 NOTE — ASSESSMENT & PLAN NOTE
- baseline 12ish, now ranging at 10-11 likely in the setting of blood loss  - Continue iron supplementation

## 2025-06-03 NOTE — ASSESSMENT & PLAN NOTE
"Had an OP ultrasound done for elevated total bilirubin that showed cystic lesions in the pancreas for which further imaging was recommended  MRI done thereafter as an OP showed \"Numerous cystic lesions scattered throughout the pancreas with a dominant lesion measuring 18 mm in the body. Worrisome features include ductal dilation up to 6 mm in the tail the pancreas and abrupt change in caliber of the pancreatic duct. Management recommendation: Because of presence of multiple worrisome features, surgical consultation/management with consideration for EUS recommended.  Management and follow up recommendations for cystic pancreatic lesions are based on Institutional consensus and international evidence-based Kyoto guidelines for the management of intraductal papillary mucinous neoplasm of the pancreas. Pancreatology 24 (2024) 255-270\".  Per IMs note in the hospital:  \"Reached out to primary team and surgery oncall as imaging findings are suggestive of malignancy. According to surgery they recommend out patient follow up with surg onc for possible biopsy and out patient GI follow up. Surgery called the surg onc office to schedule the appointment for patient. I did a STAT referral for GI outpatient. Primary team will inform the patient regarding imaging findings and the referrals\".  "

## 2025-06-03 NOTE — ASSESSMENT & PLAN NOTE
Lab Results   Component Value Date    EGFR 94 06/03/2025    EGFR 89 06/02/2025    EGFR 88 05/31/2025    CREATININE 0.68 06/03/2025    CREATININE 0.76 06/02/2025    CREATININE 0.78 05/31/2025     - Adequate GFR while here, previously diagnosed with CKD 2  - has appointment to establish with nephrology on 7/21/25

## 2025-06-03 NOTE — ASSESSMENT & PLAN NOTE
HbA1C 6.2 on 5/24/25  Home:  Metformin 1000 mg BID  Here:  QID Accuchecks/SSI  Will restart Metformin when needed/able

## 2025-06-03 NOTE — ASSESSMENT & PLAN NOTE
"Acute CVA right superior cerebellum  Was found on CTH 5/30, confirmed by MRI  CTA of the head/neck showed \"Moderate (approximately 50%) stenosis in the 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 No significant stenosis of the cervical left internal carotid artery. Severe stenosis origin of the dominant left vertebral artery. Hypoplastic right vertebral artery. No high-grade intracranial stenosis, large vessel occlusion or aneurysm\"  Neurology saw in consult for the CVA.  They recommended that the patient start ASA 81mg daily when cleared by Neurosurgery.    For LOOP recorder implanted as an outpatient.  He did have a previous Ziopatch done that was negative for atrial fibrillation.    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  Continue statin  "

## 2025-06-03 NOTE — ASSESSMENT & PLAN NOTE
--MRI brain 5/24:  New, large bilateral high convexity subdural hematomas, right greater than left, which are predominantly subacute in timing, with mass effect on the bilateral cerebral hemispheres, and effacement of the sulci. No significant midline shift noted. FLAIR signal hyperintensity along the sulci of the right frontal and parietal convexities, which may represent trace associated subarachnoid hemorrhage. No acute infarct. Mild chronic white matter microangiopathic changes involving both cerebral hemispheres.  --CT head 5/30: Acute appearing infarct in right superior cerebellum. Increase in volume of hyperdense blood products bilaterally  --Echo 4/2025: EF 65%. Normal systolic function. Grade 1 diastolic relaxation. 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   LDL 62  A1c 6.2  TTE: 65%     Given new stroke on MRI with concurrent SDH, patient will need ASA when cleared by NSGY after repeat CTH. Given recent negative ziopatch in April 2025, will need loop recorder outpatient.      Lipitor 10 mg qHS  Normotension  Start ASA 81 mg when cleared by NSGY for AP/AC, potentially after CTH completed 6/11 if stable  Outpatient loop recorder  PT/OT  SLP to evaluate language and cognition s/p cerebral infarction

## 2025-06-03 NOTE — ASSESSMENT & PLAN NOTE
-wound care consulted while in acute care  - Left Pretibial leg wound: cleanse left lower leg wound with soap and water, pat dry, apply xeroform to wound bed and cover with foam dressing. Change every other day.

## 2025-06-03 NOTE — CASE MANAGEMENT
Case Management Discharge Planning Note    Patient name Deonte Attarian  Location [facility] 826/[facility] 826-01 MRN [id number]  : 1950 Date 6/3/2025       Current Admission Date: 2025  Current Admission Diagnosis:Subdural hematoma (HCC)   Patient Active Problem List    Diagnosis Date Noted    Acute CVA (cerebrovascular accident) (HCC) 2025    Weight loss 2025    Frailty 2025    Acute pain 2025    Insomnia 2025    At risk for delirium 2025    Subdural hematoma (HCC) 2025    Leg swelling 2025    Severe obesity (BMI 35.0-39.9) with comorbidity ([facility]) 2025    Bradycardia, sinus 2025    Atrial septal aneurysm 2025    Normocytic anemia 2025    Syncope 2025    Thrombocytopenia ([facility]) 2025    Abnormal echocardiogram 2025    Groin pain, right 2025    Fall 2025    Hypocitraturia 2024    Neuropathy 2019    Hyperuricemia 2016    Mixed hyperlipidemia 2016    Onychomycosis 2015    PVD (peripheral vascular disease) ([facility]) 10/07/2015    POPPY (obstructive sleep apnea) 10/07/2015    Diabetes mellitus ([facility]) 2015    S/P hip replacement 2014    Osteoarthritis 2008      LOS (days): 10  Geometric Mean LOS (GMLOS) (days): 6.5  Days to GMLOS:-3.3     OBJECTIVE:  Risk of Unplanned Readmission Score: 19.73         Current admission status: Inpatient   Preferred Pharmacy:   [facility] - [place] - [address]  [address]  [place] [address]  Phone: [phone] Fax: [phone]    [facility]pharmacy #[id number] - [place] ALMAGUER - [address]  [facility] [facility] [place] [address]  Phone: [phone] Fax: [phone]    Primary Care Provider: Garcia Cartwright MD    Primary Insurance: ANTWON CHOUDHARY  Secondary Insurance:     DISCHARGE DETAILS:    Shared room available at [facility] today  Pt and spouse are in agreement and will accept the bed  Plan to d/c  there today once bed assignment is given

## 2025-06-03 NOTE — ASSESSMENT & PLAN NOTE
Home meds: atenolol-chlorthalidone 50-25, valsartan 320  Here: atenolol 50 mg  - continue titration for normotension

## 2025-06-03 NOTE — PLAN OF CARE
Problem: PAIN - ADULT  Goal: Verbalizes/displays adequate comfort level or baseline comfort level  Description: Interventions:  - Encourage patient to monitor pain and request assistance  - Assess pain using appropriate pain scale  - Administer analgesics as ordered based on type and severity of pain and evaluate response  - Implement non-pharmacological measures as appropriate and evaluate response  - Consider cultural and social influences on pain and pain management  - Notify physician/advanced practitioner if interventions unsuccessful or patient reports new pain  - Educate patient/family on pain management process including their role and importance of  reporting pain   - Provide non-pharmacologic/complimentary pain relief interventions  Outcome: Progressing     Problem: INFECTION - ADULT  Goal: Absence or prevention of progression during hospitalization  Description: INTERVENTIONS:  - Assess and monitor for signs and symptoms of infection  - Monitor lab/diagnostic results  - Monitor all insertion sites, i.e. indwelling lines, tubes, and drains  - Monitor endotracheal if appropriate and nasal secretions for changes in amount and color  - [place] appropriate cooling/warming therapies per order  - Administer medications as ordered  - Instruct and encourage patient and family to use good hand hygiene technique  - Identify and instruct in appropriate isolation precautions for identified infection/condition  Outcome: Progressing  Goal: Absence of fever/infection during neutropenic period  Description: INTERVENTIONS:  - Monitor WBC  - Perform strict hand hygiene  - Limit to healthy visitors only  - No plants, dried, fresh or silk flowers with zelaya in patient room  Outcome: Progressing     Problem: SAFETY ADULT  Goal: Patient will remain free of falls  Description: INTERVENTIONS:  - Educate patient/family on patient safety including physical limitations  - Instruct patient to call for assistance with activity   -  Consider consulting OT/PT to assist with strengthening/mobility based on AM PAC & JH-HLM score  - Consult OT/PT to assist with strengthening/mobility   - Keep Call bell within reach  - Keep bed low and locked with side rails adjusted as appropriate  - Keep care items and personal belongings within reach  - Initiate and maintain comfort rounds  - Make Fall Risk Sign visible to staff  - Offer Toileting every 2 Hours, in advance of need  - Initiate/Maintain bed/chair alarm  - Obtain necessary fall risk management equipment:   - Apply yellow socks and bracelet for high fall risk patients  - Consider moving patient to room near nurses station  Outcome: Progressing  Goal: Maintain or return to baseline ADL function  Description: INTERVENTIONS:  -  Assess patient's ability to carry out ADLs; assess patient's baseline for ADL function and identify physical deficits which impact ability to perform ADLs (bathing, care of mouth/teeth, toileting, grooming, dressing, etc.)  - Assess/evaluate cause of self-care deficits   - Assess range of motion  - Assess patient's mobility; develop plan if impaired  - Assess patient's need for assistive devices and provide as appropriate  - Encourage maximum independence but intervene and supervise when necessary  - Involve family in performance of ADLs  - Assess for home care needs following discharge   - Consider OT consult to assist with ADL evaluation and planning for discharge  - Provide patient education as appropriate  - Monitor functional capacity and physical performance, use of AM PAC & JH-HLM   - Monitor gait, balance and fatigue with ambulation    Outcome: Progressing  Goal: Maintains/Returns to pre admission functional level  Description: INTERVENTIONS:  - Perform AM-PAC 6 Click Basic Mobility/ Daily Activity assessment daily.  - Set and communicate daily mobility goal to care team and patient/family/caregiver.   - Collaborate with rehabilitation services on mobility goals if  consulted  - Perform Range of Motion 3 times a day.  - Reposition patient every 2 hours.  - Dangle patient 3 times a day  - Stand patient 3 times a day  - Ambulate patient 3 times a day  - Out of bed to chair 3 times a day   - Out of bed for meals 3 times a day  - Out of bed for toileting  - Record patient progress and toleration of activity level   Outcome: Progressing     Problem: DISCHARGE PLANNING  Goal: Discharge to home or other facility with appropriate resources  Description: INTERVENTIONS:  - Identify barriers to discharge w/patient and caregiver  - Arrange for needed discharge resources and transportation as appropriate  - Identify discharge learning needs (meds, wound care, etc.)  - Arrange for interpretive services to assist at discharge as needed  - Refer to Case Management Department for coordinating discharge planning if the patient needs post-hospital services based on physician/advanced practitioner order or complex needs related to functional status, cognitive ability, or social support system  Outcome: Progressing     Problem: Knowledge Deficit  Goal: Patient/family/caregiver demonstrates understanding of disease process, treatment plan, medications, and discharge instructions  Description: Complete learning assessment and assess knowledge base.  Interventions:  - Provide teaching at level of understanding  - Provide teaching via preferred learning methods  Outcome: Progressing     Problem: Prexisting or High Potential for Compromised Skin Integrity  Goal: Skin integrity is maintained or improved  Description: INTERVENTIONS:  - Identify patients at risk for skin breakdown  - Assess and monitor skin integrity including under and around medical devices   - Assess and monitor nutrition and hydration status  - Monitor labs  - Assess for incontinence   - Turn and reposition patient  - Assist with mobility/ambulation  - Relieve pressure over verónica prominences   - Avoid friction and shearing  - Provide  appropriate hygiene as needed including keeping skin clean and dry  - Evaluate need for skin moisturizer/barrier cream  - Collaborate with interdisciplinary team  - Patient/family teaching  - Consider wound care consult    Assess:  - Review Zaid scale daily  - Clean and moisturize skin every shift  - Inspect skin when repositioning, toileting, and assisting with ADLS  - Assess under medical devices such as venodynes every 2 hours  - Assess extremities for adequate circulation and sensation     Bed Management:  - Have minimal linens on bed & keep smooth, unwrinkled  - Change linens as needed when moist or perspiring  - Avoid sitting or lying in one position for more than 2 hours while in bed Keep HOB at 20degrees   - Toileting:  - Offer bedside commode  - Assess for incontinence every 2 hours  - Use incontinent care products after each incontinent episode such as skin protectant    Activity:  - Mobilize patient 3 times a day  - Encourage activity and walks on unit  - Encourage or provide ROM exercises   - Turn and reposition patient every 2 Hours  - Use appropriate equipment to lift or move patient in bed  - Instruct/ Assist with weight shifting every 30 minutes when out of bed in chair  - Consider limitation of chair time 2 hour intervals    Skin Care:  - Avoid use of baby powder, tape, friction and shearing, hot water or constrictive clothing  - Relieve pressure over bony prominences using pillows/wedges  - Do not massage red bony areas    Next Steps:  - Teach patient strategies to minimize risks such as weight shift/turn and reposition  - Consider consults to  interdisciplinary teams such as PT/OT  Outcome: Progressing     Problem: Nutrition/Hydration-ADULT  Goal: Nutrient/Hydration intake appropriate for improving, restoring or maintaining nutritional needs  Description: Monitor and assess patient's nutrition/hydration status for malnutrition. Collaborate with interdisciplinary team and initiate plan and  interventions as ordered.  Monitor patient's weight and dietary intake as ordered or per policy. Utilize nutrition screening tool and intervene as necessary. Determine patient's food preferences and provide high-protein, high-caloric foods as appropriate.     INTERVENTIONS:  - Monitor oral intake, urinary output, labs, and treatment plans  - Assess nutrition and hydration status and recommend course of action  - Evaluate amount of meals eaten  - Assist patient with eating if necessary   - Allow adequate time for meals  - Recommend/ encourage appropriate diets, oral nutritional supplements, and vitamin/mineral supplements  - Order, calculate, and assess calorie counts as needed  - Recommend, monitor, and adjust tube feedings and TPN/PPN based on assessed needs  - Assess need for intravenous fluids  - Provide specific nutrition/hydration education as appropriate  - Include patient/family/caregiver in decisions related to nutrition  Outcome: Progressing     Problem: NEUROSENSORY - ADULT  Goal: Achieves stable or improved neurological status  Description: INTERVENTIONS  - Monitor and report changes in neurological status  - Monitor vital signs such as temperature, blood pressure, glucose, and any other labs ordered   - Initiate measures to prevent increased intracranial pressure  - Monitor for seizure activity and implement precautions if appropriate      Outcome: Progressing  Goal: Achieves maximal functionality and self care  Description: INTERVENTIONS  - Monitor swallowing and airway patency with patient fatigue and changes in neurological status  - Encourage and assist patient to increase activity and self care.   - Encourage visually impaired, hearing impaired and aphasic patients to use assistive/communication devices  Outcome: Progressing     Problem: CARDIOVASCULAR - ADULT  Goal: Maintains optimal cardiac output and hemodynamic stability  Description: INTERVENTIONS:  - Monitor I/O, vital signs and rhythm  -  Monitor for S/S and trends of decreased cardiac output  - Administer and titrate ordered vasoactive medications to optimize hemodynamic stability  - Assess quality of pulses, skin color and temperature  - Assess for signs of decreased coronary artery perfusion  - Instruct patient to report change in severity of symptoms  Outcome: Progressing  Goal: Absence of cardiac dysrhythmias or at baseline rhythm  Description: INTERVENTIONS:  - Continuous cardiac monitoring, vital signs, obtain 12 lead EKG if ordered  - Administer antiarrhythmic and heart rate control medications as ordered  - Monitor electrolytes and administer replacement therapy as ordered  Outcome: Progressing     Problem: RESPIRATORY - ADULT  Goal: Achieves optimal ventilation and oxygenation  Description: INTERVENTIONS:  - Assess for changes in respiratory status  - Assess for changes in mentation and behavior  - Position to facilitate oxygenation and minimize respiratory effort  - Oxygen administered by appropriate delivery if ordered  - Initiate smoking cessation education as indicated  - Encourage broncho-pulmonary hygiene including cough, deep breathe, Incentive Spirometry  - Assess the need for suctioning and aspirate as needed  - Assess and instruct to report SOB or any respiratory difficulty  - Respiratory Therapy support as indicated  Outcome: Progressing     Problem: GASTROINTESTINAL - ADULT  Goal: Minimal or absence of nausea and/or vomiting  Description: INTERVENTIONS:  - Administer IV fluids if ordered to ensure adequate hydration  - Maintain NPO status until nausea and vomiting are resolved  - Nasogastric tube if ordered  - Administer ordered antiemetic medications as needed  - Provide nonpharmacologic comfort measures as appropriate  - Advance diet as tolerated, if ordered  - Consider nutrition services referral to assist patient with adequate nutrition and appropriate food choices  Outcome: Progressing  Goal: Maintains or returns to  baseline bowel function  Description: INTERVENTIONS:  - Assess bowel function  - Encourage oral fluids to ensure adequate hydration  - Administer IV fluids if ordered to ensure adequate hydration  - Administer ordered medications as needed  - Encourage mobilization and activity  - Consider nutritional services referral to assist patient with adequate nutrition and appropriate food choices  Outcome: Progressing  Goal: Maintains adequate nutritional intake  Description: INTERVENTIONS:  - Monitor percentage of each meal consumed  - Identify factors contributing to decreased intake, treat as appropriate  - Assist with meals as needed  - Monitor I&O, weight, and lab values if indicated  - Obtain nutrition services referral as needed  Outcome: Progressing     Problem: GENITOURINARY - ADULT  Goal: Maintains or returns to baseline urinary function  Description: INTERVENTIONS:  - Assess urinary function  - Encourage oral fluids to ensure adequate hydration if ordered  - Administer IV fluids as ordered to ensure adequate hydration  - Administer ordered medications as needed  - Offer frequent toileting  - Follow urinary retention protocol if ordered  Outcome: Progressing  Goal: Absence of urinary retention  Description: INTERVENTIONS:  - Assess patient’s ability to void and empty bladder  - Monitor I/O  - Bladder scan as needed  - Discuss with physician/AP medications to alleviate retention as needed  - Discuss catheterization for long term situations as appropriate  Outcome: Progressing     Problem: METABOLIC, FLUID AND ELECTROLYTES - ADULT  Goal: Electrolytes maintained within normal limits  Description: INTERVENTIONS:  - Monitor labs and assess patient for signs and symptoms of electrolyte imbalances  - Administer electrolyte replacement as ordered  - Monitor response to electrolyte replacements, including repeat lab results as appropriate  - Instruct patient on fluid and nutrition as appropriate  Outcome:  Progressing  Goal: Fluid balance maintained  Description: INTERVENTIONS:  - Monitor labs   - Monitor I/O and WT  - Instruct patient on fluid and nutrition as appropriate  - Assess for signs & symptoms of volume excess or deficit  Outcome: Progressing  Goal: Glucose maintained within target range  Description: INTERVENTIONS:  - Monitor Blood Glucose as ordered  - Assess for signs and symptoms of hyperglycemia and hypoglycemia  - Administer ordered medications to maintain glucose within target range  - Assess nutritional intake and initiate nutrition service referral as needed  Outcome: Progressing     Problem: SKIN/TISSUE INTEGRITY - ADULT  Goal: Skin Integrity remains intact(Skin Breakdown Prevention)  Description: Assess:  -Perform Zaid assessment every shift  -Clean and moisturize skin every shift  -Inspect skin when repositioning, toileting, and assisting with ADLS  -Assess under medical devices such as venodynes every 2 hours  -Assess extremities for adequate circulation and sensation     Bed Management:  -Have minimal linens on bed & keep smooth, unwrinkled  -Change linens as needed when moist or perspiring  -Avoid sitting or lying in one position for more than 2 hours while in bed  -Keep HOB at 20degrees     Toileting:  -Offer bedside commode  -Assess for incontinence every 2 hours  -Use incontinent care products after each incontinent episode such as skin protectant    Activity:  -Mobilize patient 3 times a day  -Encourage activity and walks on unit  -Encourage or provide ROM exercises   -Turn and reposition patient every 2 Hours  -Use appropriate equipment to lift or move patient in bed  -Instruct/ Assist with weight shifting every 30 minutes when out of bed in chair  -Consider limitation of chair time 2 hour intervals    Skin Care:  -Avoid use of baby powder, tape, friction and shearing, hot water or constrictive clothing  -Relieve pressure over bony prominences using pillows/wedges  -Do not massage red  bony areas    Next Steps:  -Teach patient strategies to minimize risks such as weight shift/turn and reposition   -Consider consults to  interdisciplinary teams such as PT/OT  Outcome: Progressing  Goal: Incision(s), wounds(s) or drain site(s) healing without S/S of infection  Description: INTERVENTIONS  - Assess and document dressing, incision, wound bed, drain sites and surrounding tissue  - Provide patient and family education  - Perform skin care/dressing changes as ordered  Outcome: Progressing     Problem: HEMATOLOGIC - ADULT  Goal: Maintains hematologic stability  Description: INTERVENTIONS  - Assess for signs and symptoms of bleeding or hemorrhage  - Monitor labs  - Administer supportive blood products/factors as ordered and appropriate  Outcome: Progressing     Problem: MUSCULOSKELETAL - ADULT  Goal: Maintain or return mobility to safest level of function  Description: INTERVENTIONS:  - Assess patient's ability to carry out ADLs; assess patient's baseline for ADL function and identify physical deficits which impact ability to perform ADLs (bathing, care of mouth/teeth, toileting, grooming, dressing, etc.)  - Assess/evaluate cause of self-care deficits   - Assess range of motion  - Assess patient's mobility  - Assess patient's need for assistive devices and provide as appropriate  - Encourage maximum independence but intervene and supervise when necessary  - Involve family in performance of ADLs  - Assess for home care needs following discharge   - Consider OT consult to assist with ADL evaluation and planning for discharge  - Provide patient education as appropriate  Outcome: Progressing     Problem: Potential for Falls  Goal: Patient will remain free of falls  Description: INTERVENTIONS:  - Educate patient/family on patient safety including physical limitations  - Instruct patient to call for assistance with activity   - Consider consulting OT/PT to assist with strengthening/mobility based on AM PAC &  JH-HLM score  - Consult OT/PT to assist with strengthening/mobility   - Keep Call bell within reach  - Keep bed low and locked with side rails adjusted as appropriate  - Keep care items and personal belongings within reach  - Initiate and maintain comfort rounds  - Make Fall Risk Sign visible to staff  - Offer Toileting every 2 Hours, in advance of need  - Initiate/Maintain bed/chair alarm  - Obtain necessary fall risk management equipment:   - Apply yellow socks and bracelet for high fall risk patients  - Consider moving patient to room near nurses station  Outcome: Progressing     Problem: COPING  Goal: Pt/Family able to verbalize concerns and demonstrate effective coping strategies  Description: INTERVENTIONS:  - Assist patient/family to identify coping skills, available support systems and cultural and spiritual values  - Provide emotional support, including active listening and acknowledgement of concerns of patient and caregivers  - Reduce environmental stimuli, as able  - Provide patient education  - Assess for spiritual pain/suffering and initiate spiritual care, including notification of Pastoral Care or pawan based community as needed  - Assess effectiveness of coping strategies  Outcome: Progressing  Goal: Will report anxiety at manageable levels  Description: INTERVENTIONS:  - Administer medication as ordered  - Teach and encourage coping skills  - Provide emotional support  - Assess patient/family for anxiety and ability to cope  Outcome: Progressing     Problem: Neurological Deficit  Goal: Neurological status is stable or improving  Description: Interventions:  - Monitor and assess patient's level of consciousness, motor function, sensory function, and level of assistance needed for ADLs.   - Monitor and report changes from baseline. Collaborate with interdisciplinary team to initiate plan and implement interventions as ordered.   - Provide and maintain a safe environment.  - Consider seizure  precautions.  - Consider fall precautions.  - Consider aspiration precautions.  - Consider bleeding precautions.  Outcome: Progressing     Problem: Activity Intolerance/Impaired Mobility  Goal: Mobility/activity is maintained at optimum level for patient  Description: Interventions:  - Assess and monitor patient  barriers to mobility and need for assistive/adaptive devices.  - Assess patient's emotional response to limitations.  - Collaborate with interdisciplinary team and initiate plans and interventions as ordered.  - Encourage independent activity per ability.  - Maintain proper body alignment.  - Perform active/passive rom as tolerated/ordered.  - Plan activities to conserve energy.  - Turn patient as appropriate  Outcome: Progressing     Problem: Communication Impairment  Goal: Ability to express needs and understand communication  Description: Assess patient's communication skills and ability to understand information.  Patient will demonstrate use of effective communication techniques, alternative methods of communication and understanding even if not able to speak.     - Encourage communication and provide alternate methods of communication as needed.  - Collaborate with case management/ for discharge needs.  - Include patient/family/caregiver in decisions related to communication.  Outcome: Progressing     Problem: Potential for Aspiration  Goal: Non-ventilated patient's risk of aspiration is minimized  Description: Assess and monitor vital signs, respiratory status, and labs (WBC).  Monitor for signs of aspiration (tachypnea, cough, rales, wheezing, cyanosis, fever).    - Assess and monitor patient's ability to swallow.  - Place patient up in chair to eat if possible.  - HOB up at 90 degrees to eat if unable to get patient up into chair.  - Supervise patient during oral intake.   - Instruct patient/ family to take small bites.  - Instruct patient/ family to take small single sips when  taking liquids.  - Follow patient-specific strategies generated by speech pathologist.  Outcome: Progressing  Goal: Ventilated patient's risk of aspiration is minimized  Description: Assess and monitor vital signs, respiratory status, airway cuff pressure, and labs (WBC).  Monitor for signs of aspiration (tachypnea, cough, rales, wheezing, cyanosis, fever).    - Elevate head of bed 30 degrees if patient has tube feeding.  - Monitor tube feeding.  Outcome: Progressing     Problem: Nutrition  Goal: Nutrition/Hydration status is improving  Description: Monitor and assess patient's nutrition/hydration status for malnutrition (ex- brittle hair, bruises, dry skin, pale skin and conjunctiva, muscle wasting, smooth red tongue, and disorientation). Collaborate with interdisciplinary team and initiate plan and interventions as ordered.  Monitor patient's weight and dietary intake as ordered or per policy. Utilize nutrition screening tool and intervene per policy. Determine patient's food preferences and provide high-protein, high-caloric foods as appropriate.     - Assist patient with eating.  - Allow adequate time for meals.  - Encourage patient to take dietary supplement as ordered.  - Collaborate with clinical nutritionist.  - Include patient/family/caregiver in decisions related to nutrition.  Outcome: Progressing

## 2025-06-03 NOTE — RESTORATIVE TECHNICIAN NOTE
Restorative Technician Note      Patient Name: Deonte Greenfield     Restorative Tech Visit Date: 06/03/25  Note Type: Mobility  Patient Position Upon Consult: Bedside chair  Activity Performed: Ambulated (To/from bathroom and in hallways)  Assistive Device: Roller walker  Patient Position at End of Consult: Bedside chair; All needs within reach; Bed/Chair alarm activated  Nurse Communication: -- (Nurse aware of consult)    Nanda Cho Restorative Tech

## 2025-06-04 ENCOUNTER — TELEPHONE (OUTPATIENT)
Dept: GASTROENTEROLOGY | Facility: CLINIC | Age: 75
End: 2025-06-04

## 2025-06-04 LAB
GLUCOSE SERPL-MCNC: 103 MG/DL (ref 65–140)
GLUCOSE SERPL-MCNC: 111 MG/DL (ref 65–140)
GLUCOSE SERPL-MCNC: 128 MG/DL (ref 65–140)
GLUCOSE SERPL-MCNC: 133 MG/DL (ref 65–140)

## 2025-06-04 PROCEDURE — 97112 NEUROMUSCULAR REEDUCATION: CPT

## 2025-06-04 PROCEDURE — 99232 SBSQ HOSP IP/OBS MODERATE 35: CPT | Performed by: STUDENT IN AN ORGANIZED HEALTH CARE EDUCATION/TRAINING PROGRAM

## 2025-06-04 PROCEDURE — 97166 OT EVAL MOD COMPLEX 45 MIN: CPT

## 2025-06-04 PROCEDURE — 82948 REAGENT STRIP/BLOOD GLUCOSE: CPT

## 2025-06-04 PROCEDURE — 97130 THER IVNTJ EA ADDL 15 MIN: CPT

## 2025-06-04 PROCEDURE — 97129 THER IVNTJ 1ST 15 MIN: CPT

## 2025-06-04 PROCEDURE — 97530 THERAPEUTIC ACTIVITIES: CPT

## 2025-06-04 PROCEDURE — 99232 SBSQ HOSP IP/OBS MODERATE 35: CPT | Performed by: NURSE PRACTITIONER

## 2025-06-04 PROCEDURE — 92610 EVALUATE SWALLOWING FUNCTION: CPT

## 2025-06-04 PROCEDURE — 97535 SELF CARE MNGMENT TRAINING: CPT

## 2025-06-04 PROCEDURE — 97161 PT EVAL LOW COMPLEX 20 MIN: CPT

## 2025-06-04 PROCEDURE — 92523 SPEECH SOUND LANG COMPREHEN: CPT

## 2025-06-04 RX ORDER — LOSARTAN POTASSIUM 25 MG/1
25 TABLET ORAL DAILY
Status: DISCONTINUED | OUTPATIENT
Start: 2025-06-04 | End: 2025-06-11

## 2025-06-04 RX ADMIN — ATENOLOL 50 MG: 50 TABLET ORAL at 09:28

## 2025-06-04 RX ADMIN — ATORVASTATIN CALCIUM 10 MG: 10 TABLET, FILM COATED ORAL at 21:17

## 2025-06-04 RX ADMIN — ENOXAPARIN SODIUM 30 MG: 30 INJECTION SUBCUTANEOUS at 09:28

## 2025-06-04 RX ADMIN — ALLOPURINOL 300 MG: 300 TABLET ORAL at 09:28

## 2025-06-04 RX ADMIN — SENNOSIDES AND DOCUSATE SODIUM 1 TABLET: 50; 8.6 TABLET ORAL at 21:17

## 2025-06-04 RX ADMIN — FERROUS SULFATE TAB 325 MG (65 MG ELEMENTAL FE) 325 MG: 325 (65 FE) TAB at 09:28

## 2025-06-04 RX ADMIN — ENOXAPARIN SODIUM 30 MG: 30 INJECTION SUBCUTANEOUS at 21:17

## 2025-06-04 RX ADMIN — POLYETHYLENE GLYCOL 3350 17 G: 17 POWDER, FOR SOLUTION ORAL at 09:28

## 2025-06-04 RX ADMIN — LOSARTAN POTASSIUM 25 MG: 25 TABLET, FILM COATED ORAL at 11:39

## 2025-06-04 RX ADMIN — CHLORHEXIDINE GLUCONATE 15 ML: 1.2 SOLUTION ORAL at 21:17

## 2025-06-04 RX ADMIN — CHLORHEXIDINE GLUCONATE 15 ML: 1.2 SOLUTION ORAL at 09:28

## 2025-06-04 NOTE — PCC PHYSICAL THERAPY
Patient making progress with skilled PT intervention. His discharge is scheduled for Monday with OPPT services. Family will be invited to come in for brief observation Friday as he states they will be unable to come in tomorrow. PT initiated HEP for continued strength and balance at home. These remain his biggest deficits impacting his safety with functional mobility. He requires DS with use of RW and CS with use of SPC. Patient states he feels more comfortable with use of RW and will likely chose to utilize this AD upon discharge. Please continue to work with LRAD to dec risk for falls.

## 2025-06-04 NOTE — PROGRESS NOTES
SLP ARC TAA     06/04/25 0800   Patient Data   Rehab Impairment Impairment of mobility, safety, Activities of Daily Living (ADLs), and cognitive/communication skills due to Brain Dysfunction:  02.22  Traumatic, Closed Injury   Etiologic Diagnosis Acute on Chronic B/L SDH s/p Fall   Date of Onset 05/24/25  (5/25/2025 Bilateral lake holes for evacuation of bilateral subdural hematomas)   Support System   Name lives with wife and family   Able to provide 24 hour supervision Yes   Able to provide physical help? Yes   Baseline Information   Transportation Family/friends drive   Prior IADL Participation   Money Management   (wife completes)   Meal Preparation   (wife completes)   Laundry   (wife completes)   Home Cleaning   (wife completes)   Patient Preference   Nickname (Patient Preference) Saw   Psychosocial   Psychosocial (WDL) WDL   Patient Behaviors/Mood Calm;Cooperative   Restrictions/Precautions   Precautions Aspiration;Bed/chair alarms;Cognitive;Fall Risk;Supervision on toilet/commode   Pain Assessment   Pain Assessment Tool 0-10   Pain Score No Pain   Eating Assessment   Swallow Precautions Yes   Bedside Swallow Results Yes   VBS Study Results No   Food To Mouth Yes   Able To Cut Yes   Positioning Upright;Out of Bed   Safety Needs Increase Time   Meal Assessed Breakfast   QI: Swallowing/Nutritional Status None of the above   Current Diet Regular;Thin   Intake Mode PO   Finishes Timely Yes   Opens Packages Yes   Type of Assistance Needed Set-up / clean-up   Physical Assistance Level No physical assistance   Eating CARE Score 5   Comprehension   Auditory Basic;Complex   Visual Basic;Complex   Findings Pt completed the CLQT assessment tool- see SLP rehab note for details.   QI: Comprehension 3. Usually Understands: Understands most conversations, but misses some part/intent of message. Requires cues at times to understand.   Comprehension (FIM) 5 - Needs help/cues, repetition only RARELY ( < 10% of the time)  "  Expression   Verbal Basic;Complex   Non-Verbal Basic;Complex   Intelligibility Sentence   Findings Pt completed the CLQT assessment tool- see SLP rehab note for details.   QI: Expression 3. Exhibits some difficulty with expressing needs and ideas (e.g., some words or finishing thoughts) or speech is not clear   Expression (FIM) 5 - Needs help/cues only RARELY (< 10% of the time)   Social Interaction   Cooperation with staff   Participation Individual   Behaviors observed Appropriate   Findings Pt completed the CLQT assessment tool- see SLP rehab note for details.   Social Interaction (FIM) 6 - Interacts appropriately with others BUT requires extra  time   Problem Solving   Routine Manages call bell   Findings Pt completed the CLQT assessment tool- see SLP rehab note for details.   Problem solving (FIM) 4 - Solves basic problems 75-89% of time   Memory   Recognize People Yes   Remember Routine Yes   Initiates Tasks Yes   Short-Term Impaired   Long Term Intact   Recalls Precaution Yes   Findings Pt completed the CLQT assessment tool- see SLP rehab note for details.   Memory (FIM) 4 - Recognizes/recalls/performs 75-89%   Discharge Information   Vocational Plan Retired/not working   Patient's Discharge Plan home with family support   Patient's Rehab Expectations \"to get back to 85-95% better\"   Barriers to Discharge Home Decreased Cognitive Function;Decreased Endurance;Decreased Strength;Safety Considerations   Impressions   Pt is a 75 yo male with PMHx diabetes, stage II CKD, hypertension, hyperlipidemia, history of multiple joint osteoarthritis status post bilateral KIANNA who presents to [facility] in April with an unwitnessed fall and had a subdural hemorrhage which seems to have resolved on repeat imaging. Eventually due to decline in overall function MRI was completed and he was found to have large bilateral high convexity subdural hematomas and was recommended to go to the emergency room. He underwent bur hole " evacuation and subdural drain placement bilaterally with improvements on the follow-up imaging. An MMA intervention was not amenable. He completed Keppra for seizure prophylaxis. 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      Pt was approved for acute rehab and transferred on 6/3/2025. Orders received for skilled bedside swallow and cognitive assessment which was initiated using the CLQT assessment tool. Initial interview with review of history, orientation and events completed as well.  At this time, pt is a good rehab candidate to achieve supervision for cognitive linguistic skills while in the acute rehab center w/ anticipated discharge home w/ family support/supervision. Current barriers which present at this time include: decreased ST/working memory, decreased executive function skills (problem solving, reasoning, organization, judgement) which currently impact safety and functional mobility. ELOS ~ 2 weeks. Pt will benefit from skilled SLP services at this time to maximize overall functional cognitive linguistic skills to decrease burden of care for family at time of discharge.   SLP Therapy Minutes   SLP Time In 0800   SLP Time Out 0930   SLP Total Time (minutes) 90   SLP Mode of treatment - Individual (minutes) 90   SLP Mode of treatment - Concurrent (minutes) 0   SLP Mode of treatment - Group (minutes) 0   SLP Mode of treatment - Co-treat (minutes) 0   SLP Mode of Treatment - Total time(minutes) 90 minutes   SLP Cumulative Minutes 90   Cumulative Minutes   Cumulative therapy minutes 90

## 2025-06-04 NOTE — UTILIZATION REVIEW
NOTIFICATION OF ADMISSION DISCHARGE   This is a Notification of Discharge from [facility]. Please be advised that this patient has been discharge from our facility. Below you will find the admission and discharge date and time including the patient’s disposition.   UTILIZATION REVIEW CONTACT:  Utilization Review Assistants  Network Utilization Review Department  Phone: [phone] x carefully listen to the prompts. All voicemails are confidential.  Email: [email or web address]     ADMISSION INFORMATION  PRESENTATION DATE: 5/24/2025  4:17 PM  OBERVATION ADMISSION DATE: N/A  INPATIENT ADMISSION DATE: 5/24/25  4:17 PM   DISCHARGE DATE: 6/3/2025  3:24 PM   DISPOSITION:[facility]    Network Utilization Review Department  ATTENTION: Please call with any questions or concerns to [phone] and carefully listen to the prompts so that you are directed to the right person. All voicemails are confidential.   For Discharge needs, contact Care Management DC Support Team at [phone] opt. 2  Send all requests for admission clinical reviews, approved or denied determinations and any other requests to dedicated fax number below belonging to the campus where the patient is receiving treatment. List of dedicated fax numbers for the Facilities:  FACILITY NAME UR FAX NUMBER   ADMISSION DENIALS (Administrative/Medical Necessity) [phone]   DISCHARGE SUPPORT TEAM ([facility]) [phone]   PARENT CHILD HEALTH (Maternity/NICU/Pediatrics) [phone]   [facility] [phone]   [facility] [phone]   [facility] [phone]   [facility] [phone]   [facility] [phone]   [facility] [phone]   [facility] [phone]   [facility] [phone]   [facility]  [facility] [phone]   [facility] [phone]   [facility] [phone]   [facility] [phone]

## 2025-06-04 NOTE — ASSESSMENT & PLAN NOTE
Home:  Atenolol/chlorthalidone 50-25 mg qd/Diovan 320 mg qd  Here:  Atenolol 50 mg qd  Will add Losartan back today 6/4/25 at 25mg qd  Has Hydralazine 10 mg q8hrs prn SBP >165

## 2025-06-04 NOTE — CASE MANAGEMENT
Met w/pts wife while pt was in therapy. They reside in a multi level home w/first flr set up.  Their two sons, and their daughter and her teenage children also reside with them.  Pt is very sedentary at baseline and prior to knowledge of the bleed pts wife was assisting with toileting and showering.  Pts children drive. Wife is a retired nurse with experience working nights in a rehab facility on [address]. Pt has experience with outpt therapy and rehab but not locally. Cm reviewed insurance update due next week, team mtg process and potential los. Wife inquired about home therapy and cm reviewed criteria and explained potential options of outpt therapy. Cm to follow up post team.

## 2025-06-04 NOTE — ASSESSMENT & PLAN NOTE
MRI of the brain that showed large bilateral SDH  S/p bilateral Yael holes for evacuation of SDH 5/25/25  MMA was not able to be done due to anatomical considerations  For no full AC/AP for 2 weeks = 6/11/25 then need to clear with NS

## 2025-06-04 NOTE — PROGRESS NOTES
OT Initial Evaluation     06/04/25 0185   Patient Data   Rehab Impairment Impairment of mobility, safety, Activities of Daily Living (ADLs), and cognitive/communication skills due to Brain Dysfunction:  02.22  Traumatic, Closed Injury   Etiologic Diagnosis Acute on Chronic B/L SDH s/p Fall   Date of Onset 05/24/25   Support System   Name Jing   Relationship Spouse   Able to provide 24 hour supervision Yes   Able to provide physical help? Yes   Multiple Support Systems   (Pt lives with wife, three children, and two grandchildren.)   Home Setup   Type of Home Multi Level  (Pt primarily lives on the first floor with his wife. Children and grandchildren live on second level.)   Method of Entry Stairs   Number of Stairs 1  (Pt expressed that he uses the garage to enter the home, with one step to enter. Front door also has one step to enter.)   Number of Stairs in Home 13   In Home Hand Rail Left   First Floor Bathroom Half;Full;Shower;Grab Bars  (One half bathroom and one full bathroom on first floor. Full bathroom connected to pt's bedroom, and has a walk-in shower.)   First Floor Bathroom Accessibility Grab bars in tub/shower;Shower chair;Raised toilet seat   Second Floor Bathroom   (Pt does not use.)   First Floor Setup Available Yes   Home Modifications Necessary? No  (Pt lives on first floor of home.)   Baseline Information   Home Care Services prior to Admission   (N/A)   Outpatient Services Received Prior to Admission   (N/A)   Community Resources Utilized Prior to Admission   (N/A)   Vocation   (Retired [employer] employee.)   Transportation ;Family/friends drive  (Pt drove independently before fall in April. Following fall, pt's family provides transportation.)   Prior Device(s) Used Roller Walker;Single Point Cane;Shower Chair  (Pt used SPC prior to fall in April, and used RW following fall.)   Prior IADL Participation   Money Management Identify Money;Estimate Costs;Estimate  Change  (Spouse completes)   Meal Preparation Partial Participation;Other  (Prior to fall, pt participated in meal prep. Following fall, completed by family.)   Laundry Partial Participation  (Prior to fall in April, pt completed. Following fall, pt's wife completes.)   Home Cleaning Partial Participation  (Prior to fall in April, pt participated. Following fall, pt's wife completes.)   Prior Level of Function   Self-Care 3. Independent - Patient completed the activities by him/herself, with or without an assistive device, with no assistance from a helper.  (Pt reports changing clothes ~ every 3 days following fall in April.)   Indoor-Mobility (Ambulation) 3. Independent - Patient completed the activities by him/herself, with or without an assistive device, with no assistance from a helper.   Stairs 3. Independent - Patient completed the activities by him/herself, with or without an assistive device, with no assistance from a helper.  (Mod I with SPC before fall in April, with RW following fall.)   Functional Cognition 3. Independent - Patient completed the activities by him/herself, with or without an assistive device, with no assistance from a helper.   Prior Assistance Needed for Driving;Household Chores/Cleaning;Meal Preparation;Medication Management;Money Management;Shopping  (Prior to fall in April, pt able to drive and participate in household chores/cleaning, meal prep, medication management, money management, and shopping. Following fall, pt required assistance with all of the above.)   Falls in the Last Year   Number of falls in the past 12 months 1  (Pt report)   Type of Injury Associated with Fall Major injury  (Reason for admission.)   Patient Preference   Nickname (Patient Preference) Deonte   Psychosocial   Psychosocial (WDL) WDL   Patient Behaviors/Mood Calm;Cooperative   Restrictions/Precautions   Precautions Aspiration;Bed/chair alarms;Cognitive;Fall Risk;Supervision on toilet/commode   Weight  Bearing Restrictions No   ROM Restrictions No   Pain Assessment   Pain Assessment Tool 0-10   Pain Score No Pain   Eating Assessment   Type of Assistance Needed Set-up / clean-up   Physical Assistance Level No physical assistance   Comment Not observed today. Per ST report, pt able to eat with set-up.   Eating CARE Score 5   Oral Hygiene   Type of Assistance Needed Physical assistance   Physical Assistance Level 25% or less   Comment Pt would require min A to stabilize self in stance at sink for oral hygiene. Observed today seated in recliner with supervision.   Oral Hygiene CARE Score 3   Grooming   Able To Wash/Dry Face;Brush/Clean Teeth   Tub/Shower Transfer   Reason Not Assessed Sponge Bath   Shower/Bathe Self   Type of Assistance Needed Physical assistance   Physical Assistance Level 51%-75%   Comment Pt would require mod A to maintain balance in shower in stance without RW and for rear care. Observed pt complete SB seated in recliner with min A. Pt able to wash UB, vernon area, and legs in sitting. Pt required min A with RW to stand and assistance for rear hygiene, and min VC for thoroughness.   Shower/Bathe Self CARE Score 2   Bathing   Assessed Bath Style Sponge Bath   Anticipated D/C Bath Style Shower   Dressing/Undressing Clothing   Type of Assistance Needed Supervision   Physical Assistance Level No physical assistance   Comment Pt able to don shirt over head and bilateral UE with supervision.   Upper Body Dressing CARE Score 4   Type of Assistance Needed Physical assistance   Physical Assistance Level 51%-75%   Comment Pt would require mod A to stand without RW to don underwear and pants over hips, and to don underwear over RLE. Pt observed today seated in recliner, requiring min A to underwear over RLE. Pt able to don underwear over LLE, pants over BLE, and able to stand with RW and CGA to don underwear and pants over bilateral hips. Pt presented with need for increased time to don LB.   Lower Body  Dressing CARE Score 2   Putting On/Taking Off Footwear   Type of Assistance Needed Physical assistance   Physical Assistance Level Total assistance   Comment Pt required TA to doff and don socks and sneakers.   Putting On/Taking Off Footwear CARE Score 1   Toileting Hygiene   Type of Assistance Needed Physical assistance   Physical Assistance Level 51%-75%   Comment Not formally assessed today, but based on similar tasks, pt would require min/mod A to stand for CM and rear hygiene without RW.   Toileting Hygiene CARE Score 2   Toilet Transfer   Surface Assessed Platform Commode   Transfer Technique Standard  (RW)   Type of Assistance Needed Physical assistance   Physical Assistance Level 26%-50%   Comment Pt would require min A to sit and stand without RW. Pt observed today with CGA.   Toilet Transfer CARE Score 3   Transfer Bed/Chair/Wheelchair   Type of Assistance Needed Physical assistance   Physical Assistance Level 51%-75%   Comment Pt would require mod A to maintain balance during transfers without RW. Observed pt complete chair to commode transfer with RW with CGA.   Chair/Bed-to-Chair Transfer CARE Score 2   Roll Left and Right   Reason if not Attempted   (Pt seated in recliner upon entering.)   Sit to Lying   Reason if not Attempted   (Pt seated in recliner throughout session.)   Lying to Sitting on Side of Bed   Reason if not Attempted   (Pt seated in recliner upon entering.)   Sit to Stand   Type of Assistance Needed Physical assistance   Physical Assistance Level 26%-50%   Comment Pt would require min A to stand without RW. Pt observed today with CGA/min A to stand with RW. Pt required mod VCs for hand placement.   Sit to Stand CARE Score 3   Comprehension   QI: Comprehension 4. Undestands: Clear comprehension without cues or repetitions   Expression   QI: Expression 4. Express complex messages without difficulty and with speech that is clear and easy to understan   RUE Assessment   RUE Assessment   (Not  "assessed this session. Intend to assess in upcoming sessions.)   LUE Assessment   LUE Assessment   (Not assessed this session. Intend to assess in upcoming sessions.)   Cognition   Overall Cognitive Status Impaired   Arousal/Participation Alert;Cooperative   Attention Attends with cues to redirect   Orientation Level Oriented X4   Memory Decreased short term memory;Decreased recall of recent events;Decreased recall of precautions   Following Commands Follows one step commands without difficulty   Comments Pt demonstrates decreased safety awareness and judgment during ADL tasks. Pt would benefit from formal cognitive assessment in future sessions.   Vision   Vision Comments Pt reported that he does not currently have prescription lenses, but is in need of them.   Discharge Information   Vocational Plan Retired/not working   Barriers to Return to Vocation   (N/A)   Patient's Discharge Plan \"To go home. If I'm 85-90%, I'm good. I'm happy.\"   Barriers to Discharge Home Decreased Cognitive Function;Decreased Strength;Decreased Endurance;Safety Considerations   Impressions Pt is a 74 y.o. male admitted to [facility] on 5/24/2025 due to Acute on Chronic B/L SDH s/p Fall. Pt was [facility] to [facility] on 6/3/[facility]25. Pt has PMH of Diabetes mellitus (HCC), History of kidney stones, hypertension, CKD stage 2, HLD, thrombocytopenia, gout, and bilateral KIANNA. See flowsheet for home set-up. Pt barriers include decreased balance, decreased endurance, decreased activity tolerance, decreased strength, decreased range of motion, decreased judgment, decreased safety awareness, and decreased executive functioning. Decreased balance, endurance, activity tolerance, strength, and range of motion limit pt's participation in ADL tasks. Decreased executive functioning and judgment decrease pt's safety and participation during daily ADLs and IADLs. Pt has supportive family. Pt would benefit from skilled OT " services for 2 weeks to focus on ADL retraining, functional transfer training, endurance training, balance training, strength training, increasing range of motion, and cognitive reorientation.   OT Therapy Minutes   OT Time In 0930   OT Time Out 1100   OT Total Time (minutes) 90   OT Mode of treatment - Individual (minutes) 90   OT Mode of treatment - Concurrent (minutes) 0   OT Mode of treatment - Group (minutes) 0   OT Mode of treatment - Co-treat (minutes) 0   OT Mode of Treatment - Total time(minutes) 90 minutes   OT Cumulative Minutes 90   Cumulative Minutes   Cumulative therapy minutes 90

## 2025-06-04 NOTE — PROGRESS NOTES
ARC PT EVAL-GOALS    06/04/25 1400   Rehab Team Goals   ADL Team Goal Patient will be independent with ADLs with least restrictive device upon completion of rehab program   Bowel/Bladder Team Goal Patient will be independent with bladder/bowel management with least restrictive device upon completion of rehab program   Rehab Team Interventions   PT Interventions Gait Training;Therapeutic Exercise;Neuromuscualr Reeducation;Transfer Training;Community Reintegration;Bed Mobility;Modalities;Patient/Family Education   Toileting Transfer Goal   Toilet transfer Goal 06. Independent - Patient completes the activity by him/herself with no assistance from a helper.   Assistive Device   (LRAD)   Status Ongoing;Target goal - two weeks   Intervention Assistive Device;Balance Work   PT Transfer Goal   Roll left and right Goal 06. Independent - Patient completes the activity by him/herself with no assistance from a helper.   Sit to lying Goal 06. Independent - Patient completes the activity by him/herself with no assistance from a helper.   Lying to sitting on side of bed Goal 06. Independent - Patient completes the activity by him/herself with no assistance from a helper.   Sit to stand Goal 06. Independent - Patient completes the activity by him/herself with no assistance from a helper.   Chair/bed-to-chair transfer Goal 06. Independent - Patient completes the activity by him/herself with no assistance from a helper.   Car Transfer Goal 05. Setup or clean-up assistance - Mobile SETS UP or CLEANS UP, patient completes activity. Mobile assists only prior to or following the activity.   Assistive Device   (LRAD)   Environment Level Surface;Well Lit;Tile Floor   Status Ongoing;Target goal - two weeks   Locomotion Goal   Primary discharge mode of locomotion Walking   Target Walk Distance 150 ft  (NOT LIMITED)   Assist Device   (LRAD)   Environment Level Surface;Well Lit;Tile Floor   Walk 10 feet Goal 06. Independent - Patient  completes the activity by him/herself with no assistance from a helper.   Walk 50 feet with 2 turns Goal 06. Independent - Patient completes the activity by him/herself with no assistance from a helper.   Walk 150 feet Goal 06. Independent - Patient completes the activity by him/herself with no assistance from a helper.   Walking 10 feet on uneven surface 04. Supervision or touching assistance- [place] provides VERBAL CUES or supervision throughout activity.   Walking Goal Status Ongoing;Target goal - two weeks   Wheel 50 feet with 2 turns Goal 09. Not applicable   Wheel 150 feet Goal 09. Not applicable   Stairs Goal   1 step or curb goal 04. Supervision or touching assistance- [place] provides VERBAL CUES or supervision throughout activity.   4 steps Goal 04. Supervision or touching assistance- [place] provides VERBAL CUES or supervision throughout activity.   12 steps Goal 04. Supervision or touching assistance- [place] provides VERBAL CUES or supervision throughout activity.   Status Ongoing;Target goal - two weeks   Object Retrieval Goal   Picking up object Goal 06. Independent - Patient completes the activity by him/herself with no assistance from a helper.   Assistive Device    (LRAD)   Small Object Picked Up MARKER   Education Goal   Goal pt family will understand team recommendations for contd care and safety   Status Ongoing;Target goal - one week;Discharge Home   Interventions Printed Material;Demostration;Family Education   Disability Adjustment   Goal pt family will understand limitations and adjust as needed on dc for safe task completion   Status Ongoing;Target - one week;Discharge Home   Interventions 1:1 Counseling;Group Therapy;Peer Support;Resourse education   Discharge Concerns   Goal pt family will understand need for contd services on dc   Status Ongoing;target goal - one week;Discharge Home   Interventions Discharge Planning;Equipment Assessment;Patient Counseling;Family Counseling

## 2025-06-04 NOTE — PLAN OF CARE
Problem: SAFETY ADULT  Goal: Patient will remain free of falls  Description: INTERVENTIONS:  - Educate patient/family on patient safety including physical limitations  - Instruct patient to call for assistance with activity   - Consider consulting OT/PT to assist with strengthening/mobility based on AM PAC & JH-HLM score  - Consult OT/PT to assist with strengthening/mobility   - Keep Call bell within reach  - Keep bed low and locked with side rails adjusted as appropriate  - Keep care items and personal belongings within reach  - Initiate and maintain comfort rounds  - Make Fall Risk Sign visible to staff  - Offer Toileting every 2 Hours, in advance of need  - Initiate/Maintain bed/chair alarm  - Obtain necessary fall risk management equipment: non skid footwear  - Apply yellow socks and bracelet for high fall risk patients  - Consider moving patient to room near nurses station  Outcome: Progressing  Goal: Maintain or return to baseline ADL function  Description: INTERVENTIONS:  -  Assess patient's ability to carry out ADLs; assess patient's baseline for ADL function and identify physical deficits which impact ability to perform ADLs (bathing, care of mouth/teeth, toileting, grooming, dressing, etc.)  - Assess/evaluate cause of self-care deficits   - Assess range of motion  - Assess patient's mobility; develop plan if impaired  - Assess patient's need for assistive devices and provide as appropriate  - Encourage maximum independence but intervene and supervise when necessary  - Involve family in performance of ADLs  - Assess for home care needs following discharge   - Consider OT consult to assist with ADL evaluation and planning for discharge  - Provide patient education as appropriate  - Monitor functional capacity and physical performance, use of AM PAC & JH-HLM   - Monitor gait, balance and fatigue with ambulation    Outcome: Progressing  Goal: Maintains/Returns to pre admission functional level  Description:  INTERVENTIONS:  - Perform AM-PAC 6 Click Basic Mobility/ Daily Activity assessment daily.  - Set and communicate daily mobility goal to care team and patient/family/caregiver.   - Collaborate with rehabilitation services on mobility goals if consulted  - Perform Range of Motion 3 times a day.  - Reposition patient every 2 hours.  - Dangle patient 3 times a day  - Stand patient 3 times a day  - Ambulate patient 3 times a day  - Out of bed to chair 3 times a day   - Out of bed for meals 3 times a day  - Out of bed for toileting  - Record patient progress and toleration of activity level   Outcome: Progressing
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
## 2025-06-04 NOTE — PLAN OF CARE
Problem: PAIN - ADULT  Goal: Verbalizes/displays adequate comfort level or baseline comfort level  Description: Interventions:  - Encourage patient to monitor pain and request assistance  - Assess pain using appropriate pain scale  - Administer analgesics as ordered based on type and severity of pain and evaluate response  - Implement non-pharmacological measures as appropriate and evaluate response  - Consider cultural and social influences on pain and pain management  - Notify physician/advanced practitioner if interventions unsuccessful or patient reports new pain  - Educate patient/family on pain management process including their role and importance of  reporting pain   - Provide non-pharmacologic/complimentary pain relief interventions  Outcome: Progressing     Problem: INFECTION - ADULT  Goal: Absence or prevention of progression during hospitalization  Description: INTERVENTIONS:  - Assess and monitor for signs and symptoms of infection  - Monitor lab/diagnostic results  - Monitor all insertion sites, i.e. indwelling lines, tubes, and drains  - Monitor endotracheal if appropriate and nasal secretions for changes in amount and color  - [place] appropriate cooling/warming therapies per order  - Administer medications as ordered  - Instruct and encourage patient and family to use good hand hygiene technique  - Identify and instruct in appropriate isolation precautions for identified infection/condition  Outcome: Progressing     Problem: SAFETY ADULT  Goal: Patient will remain free of falls  Description: INTERVENTIONS:  - Educate patient/family on patient safety including physical limitations  - Instruct patient to call for assistance with activity   - Consider consulting OT/PT to assist with strengthening/mobility based on AM PAC & JH-HLM score  - Consult OT/PT to assist with strengthening/mobility   - Keep Call bell within reach  - Keep bed low and locked with side rails adjusted as appropriate  - Keep  care items and personal belongings within reach  - Initiate and maintain comfort rounds  - Make Fall Risk Sign visible to staff  - Offer Toileting every  Hours, in advance of need  - Initiate/Maintain alarm  - Obtain necessary fall risk management equipment:   - Apply yellow socks and bracelet for high fall risk patients  - Consider moving patient to room near nurses station  Outcome: Progressing  Goal: Maintain or return to baseline ADL function  Description: INTERVENTIONS:  -  Assess patient's ability to carry out ADLs; assess patient's baseline for ADL function and identify physical deficits which impact ability to perform ADLs (bathing, care of mouth/teeth, toileting, grooming, dressing, etc.)  - Assess/evaluate cause of self-care deficits   - Assess range of motion  - Assess patient's mobility; develop plan if impaired  - Assess patient's need for assistive devices and provide as appropriate  - Encourage maximum independence but intervene and supervise when necessary  - Involve family in performance of ADLs  - Assess for home care needs following discharge   - Consider OT consult to assist with ADL evaluation and planning for discharge  - Provide patient education as appropriate  - Monitor functional capacity and physical performance, use of AM PAC & JH-HLM   - Monitor gait, balance and fatigue with ambulation    Outcome: Progressing  Goal: Maintains/Returns to pre admission functional level  Description: INTERVENTIONS:  - Perform AM-PAC 6 Click Basic Mobility/ Daily Activity assessment daily.  - Set and communicate daily mobility goal to care team and patient/family/caregiver.   - Collaborate with rehabilitation services on mobility goals if consulted  - Perform Range of Motion  times a day.  - Reposition patient every  hours.  - Dangle patient  times a day  - Stand patient  times a day  - Ambulate patient  times a day  - Out of bed to chair  times a day   - Out of bed for meals  times a day  - Out of bed for  toileting  - Record patient progress and toleration of activity level   Outcome: Progressing     Problem: DISCHARGE PLANNING  Goal: Discharge to home or other facility with appropriate resources  Description: INTERVENTIONS:  - Identify barriers to discharge w/patient and caregiver  - Arrange for needed discharge resources and transportation as appropriate  - Identify discharge learning needs (meds, wound care, etc.)  - Arrange for interpretive services to assist at discharge as needed  - Refer to Case Management Department for coordinating discharge planning if the patient needs post-hospital services based on physician/advanced practitioner order or complex needs related to functional status, cognitive ability, or social support system  Outcome: Progressing     Problem: Prexisting or High Potential for Compromised Skin Integrity  Goal: Skin integrity is maintained or improved  Description: INTERVENTIONS:  - Identify patients at risk for skin breakdown  - Assess and monitor skin integrity including under and around medical devices   - Assess and monitor nutrition and hydration status  - Monitor labs  - Assess for incontinence   - Turn and reposition patient  - Assist with mobility/ambulation  - Relieve pressure over verónica prominences   - Avoid friction and shearing  - Provide appropriate hygiene as needed including keeping skin clean and dry  - Evaluate need for skin moisturizer/barrier cream  - Collaborate with interdisciplinary team  - Patient/family teaching  - Consider wound care consult    Assess:  - Review Zaid scale daily  - Clean and moisturize skin every   - Inspect skin when repositioning, toileting, and assisting with ADLS  - Assess under medical devices such as  every   - Assess extremities for adequate circulation and sensation     Bed Management:  - Have minimal linens on bed & keep smooth, unwrinkled  - Change linens as needed when moist or perspiring  - Avoid sitting or lying in one position for  more than  hours while in bed?Keep HOB at degrees   - Toileting:  - Offer bedside commode  - Assess for incontinence every   - Use incontinent care products after each incontinent episode such as     Activity:  - Mobilize patient  times a day  - Encourage activity and walks on unit  - Encourage or provide ROM exercises   - Turn and reposition patient every  Hours  - Use appropriate equipment to lift or move patient in bed  - Instruct/ Assist with weight shifting every  when out of bed in chair  - Consider limitation of chair time  hour intervals    Skin Care:  - Avoid use of baby powder, tape, friction and shearing, hot water or constrictive clothing  - Relieve pressure over bony prominences using   - Do not massage red bony areas    Next Steps:  - Teach patient strategies to minimize risks such as   - Consider consults to  interdisciplinary teams such as   Outcome: Progressing

## 2025-06-04 NOTE — PROGRESS NOTES
Progress Note - PMR   Name: Deonte Greenfield 74 y.o. male I MRN: [id number]  Unit/Bed#: -01 I Date of Admission: 6/3/2025   Date of Service: 6/4/2025 I Hospital Day: 1     Assessment & Plan  Acute on chronic intracranial subdural hematoma (HCC)  > Originally presented in early April due to unwitnessed fall and found to have small subdural hemorrhage which subsequently appeared to resolve upon repeat CT head imaging. Patient opted to have MRI performed in outpatient setting and MRI team sent him to ER for large bilateral high convexity SDHs with mass effect. Presented to ED on 5/24 where he received bilateral Yael holes with 4 subdural drains placed  > Drains removed on 5/27 and 5/28  > completed 7 days of Keppra for seizure prophylaxis    Repeat CT head STAT if GCS declines more than 2 points in 1 hour  SBP < 160  INR < 1.4  Plt > 100  Hold all therapeutic doses of AC / AP therapy, he is not cleared to start asa per neurology recommendations for stroke management  Ok for dvt prophylaxis, continue lovenox  PT/OT  Diabetes mellitus (HCC)  Lab Results   Component Value Date    HGBA1C 6.2 (H) 05/24/2025       Recent Labs     06/03/25  1551 06/03/25  2043 06/04/25  0617 06/04/25  1044   POCGLU 146* 137 133 128       Blood Sugar Average: Last 72 hrs:  (P) 136  - plan to restart Metformin during [facility] stay  - continue SSI and accuchecks per IM  Thrombocytopenia (HCC)  Baseline 100-250  Trend biweekly  Goal > 100  OP follow up with heme onc  Mixed hyperlipidemia  - Continue statin  - Last LDL: 62 on 5/31/2025  Normocytic anemia  - baseline 12ish, now ranging at 10-11 likely in the setting of blood loss  - Continue iron supplementation  Acute CVA (cerebrovascular accident) (HCC)  --MRI brain 5/24:  New, large bilateral high convexity subdural hematomas, right greater than left, which are predominantly subacute in timing, with mass effect on the bilateral cerebral hemispheres, and effacement of the sulci. No significant  midline shift noted. FLAIR signal hyperintensity along the sulci of the right frontal and parietal convexities, which may represent trace associated subarachnoid hemorrhage. No acute infarct. Mild chronic white matter microangiopathic changes involving both cerebral hemispheres.  --CT head 5/30: Acute appearing infarct in right superior cerebellum. Increase in volume of hyperdense blood products bilaterally  --Echo 4/2025: EF 65%. Normal systolic function. Grade 1 diastolic relaxation. 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   LDL 62  A1c 6.2  TTE: 65%     Given new stroke on MRI with concurrent SDH, patient will need ASA when cleared by NSGY after repeat CTH. Given recent negative ziopatch in April 2025, will need loop recorder outpatient.      Lipitor 10 mg qHS  Normotension  Start ASA 81 mg when cleared by NSGY for AP/AC, potentially after CTH completed 6/11 if stable  Outpatient loop recorder  PT/OT  SLP to evaluate language and cognition s/p cerebral infarction  Gout  - continue Allopurinol  Pancreatic cyst  > Patient had MRI abdomen completed outpatient (5/22, d/t jaundice & thrombocytopenia), with numerous pancreatic cystic lesions concerning for malignancy. This showed numerous cystic lesions scattered throughout the pancreas that were concerning for malignancy at this time.   >Patient's family was informed of these and are aware that following discharge from acute rehab they will need to follow-up with the surgical oncology team as well as GI for further evaluation and treatment of these.  Patient and spouse both aware.    - Surg Onc & GI referrals placed by trauma surgery during acute hospitalization discharge  HTN (hypertension)  Home meds: atenolol-chlorthalidone 50-25, valsartan 320  Here: atenolol 50 mg  - continue titration for normotension  CKD (chronic kidney disease)  Lab Results   Component Value Date     EGFR 94 06/03/2025    EGFR 89 06/02/2025    EGFR 88 05/31/2025    CREATININE 0.68 06/03/2025    CREATININE 0.76 06/02/2025    CREATININE 0.78 05/31/2025     - Adequate GFR while here, previously diagnosed with CKD 2  - has appointment to establish with nephrology on 7/21/25  Superficial foreign body of left leg without major open wound and without infection  -wound care consulted while in acute care  - Left Pretibial leg wound: cleanse left lower leg wound with soap and water, pat dry, apply xeroform to wound bed and cover with foam dressing. Change every other day.   Oropharyngeal dysphagia  Last seen by SLP on 6/1/25    - continue recommended diet of regular texture and thin liquids  - Aspiration precautions and compensatory swallowing strategies: upright posture, slow rate of feeding, small bites/sips, and alternating bites and sips   - SLP consulted  Left shoulder pain  - new pain for patient  - pain at AC joint, but could not provoke pain other than on palpation  - Differential includes AC arthritis, glenohumeral arthritis, MSK strain, rotator cuff tendinopahty  - Doesn't seem like needs K-tape as no sulcus sign  - PT/OT  - XR if hindering therapies    Subjective   74-year-old male with history of diabetes, stage II CKD, hypertension, hyperlipidemia, history of multiple joint osteoarthritis status post bilateral KIANNA who presents to [facility] in April with an unwitnessed fall and had a subdural hemorrhage which seems to have resolved on repeat imaging.  Eventually due to decline in overall function MRI was completed and he was found to have large bilateral high convexity subdural hematomas and was recommended to go to the emergency room.  He underwent bur hole evacuation and subdural drain placement bilaterally with improvements on the follow-up imaging.  An MMA intervention was not amenable.  He completed Keppra for seizure prophylaxis.  He had a change in mental status his postoperative course and was  found to have an acute infarct in the right superior cerebellum for which neurosurgery was consulted and recommending a loop recorder as well as aspirin once cleared by neurosurgery.The patient was evaluated by the Rehabilitation team and deemed an appropriate candidate for comprehensive inpatient rehabilitation and admitted to the [facility] on 6/3/2025  3:27 PM     Chief Complaint: f/u TBI and f/u ambulatory dysfunction    Interval: Patient seen and evaluated in room today without acute issues overnight although not fond of having a roommate but we did discuss that he was in agreement of having 1.  He will be transitioning from 962 to 961 later today. Otherwise for the first night sleep was fine and patient denies fever, chills, nausea, emesis, cough, shortness of breath, diarrhea, or constipation. Sleep was fine, mood stable. Pain is controlled. No new labs to review today.      Objective :  Temp:  [98 °F (36.7 °C)-99.1 °F (37.3 °C)] 98 °F (36.7 °C)  HR:  [58-64] 64  BP: (132-172)/(62-75) 132/62  Resp:  [16-19] 19  SpO2:  [97 %-98 %] 97 %  O2 Device: None (Room air)    Functional Update:  Mobility: evals  Transfers: evals  ADLs: evals    Physical Exam  Vitals reviewed.   Constitutional:       General: He is not in acute distress.     Appearance: He is obese.   HENT:      Head: Normocephalic.      Comments: Bilateral drain and bur hole sites well-healing     Right Ear: External ear normal.      Left Ear: External ear normal.      Nose: Nose normal. No rhinorrhea.      Mouth/Throat:      Mouth: Mucous membranes are moist.      Pharynx: Oropharynx is clear.     Eyes:      General: No scleral icterus.      Cardiovascular:      Rate and Rhythm: Normal rate.      Pulses: Normal pulses.   Pulmonary:      Effort: Pulmonary effort is normal. No respiratory distress.   Abdominal:      General: There is no distension.      Palpations: Abdomen is soft.     Musculoskeletal:      Right lower leg: No edema.      Left lower leg: No  edema.     Skin:     General: Skin is warm and dry.     Neurological:      Mental Status: He is alert and oriented to person, place, and time.     Psychiatric:         Mood and Affect: Mood normal.         Behavior: Behavior normal.           Scheduled Meds:  Current Facility-Administered Medications   Medication Dose Route Frequency Provider Last Rate    acetaminophen  650 mg Oral Q6H PRN JOSE ALEJANDRO Bradley      allopurinol  300 mg Oral Daily JOSE ALEJANDRO Bradley      atenolol  50 mg Oral Daily JOSE ALEJANDRO Bradley      And    [Held by provider] chlorthalidone  25 mg Oral Daily Evon Price, JOSE ALEJANDRO      atorvastatin  10 mg Oral HS JOSE ALEJANDRO Bradley      chlorhexidine  15 mL Mouth/Throat Q12H [facility] JOSE ALEJANDRO Bradley      Diclofenac Sodium  2 g Topical 4x Daily PRN JOSE ALEJANDRO Bradley      enoxaparin  30 mg Subcutaneous Q12H [facility] JOSE ALEJANDRO Bradley      ferrous sulfate  325 mg Oral Every Other Day Evon Price, JOSE ALEJANDRO      hydrALAZINE  10 mg Oral Q8H PRN Evon Price, JOSE ALEJANDRO      insulin lispro  1-5 Units Subcutaneous TID AC Evon Price, JOSE ALEJANDRO      insulin lispro  1-5 Units Subcutaneous HS JOSE ALEJANDRO Bradley      losartan  25 mg Oral Daily JOSE ALEJANDRO Bradley      polyethylene glycol  17 g Oral Daily Evon Price, JOSE ALEJANDRO      senna-docusate sodium  1 tablet Oral HS JOSE ALEJANDRO Bradley           Lab Results: I have reviewed the following results:  Results from last 7 days   Lab Units 06/03/25  0601 06/02/25  0559 05/31/25  1007   HEMOGLOBIN g/dL 10.6* 10.6* 10.9*   HEMATOCRIT % 33.1* 33.7* 33.9*   WBC Thousand/uL 7.45 7.98 7.10   PLATELETS Thousands/uL 225 215 226     Results from last 7 days   Lab Units 06/03/25  0601 06/02/25  0559 05/31/25  1007   BUN mg/dL 21 26* 29*   SODIUM mmol/L 135 136 137   POTASSIUM mmol/L 4.1 4.8 4.2   CHLORIDE mmol/L 101 103 102   CREATININE mg/dL 0.68 0.76 0.78   AST U/L  21  --  18   ALT U/L 23  --  17       Results from last 7 days   Lab Units 05/31/25  1007   PROTIME seconds 15.5*   INR  1.20*          Tre Flores, DO  Physical Medicine and Rehabilitation  [facility]    I have spent a total time of 35 minutes in caring for this patient on the day of the visit/encounter including Counseling / Coordination of care, Documenting in the medical record, and Communicating with other healthcare professionals .

## 2025-06-04 NOTE — PROGRESS NOTES
"Progress Note - Hospitalist   Name: Deonte Greenfield 74 y.o. male I MRN: [id number]  Unit/Bed#: -01 I Date of Admission: 6/3/2025   Date of Service: 6/4/2025 I Hospital Day: 1    Assessment & Plan  Acute on chronic intracranial subdural hematoma (HCC)  MRI of the brain that showed large bilateral SDH  S/p bilateral Yael holes for evacuation of SDH 5/25/25  MMA was not able to be done due to anatomical considerations  For no full AC/AP for 2 weeks = 6/11/25 then need to clear with NS  Acute CVA (cerebrovascular accident) (HCC)  Acute CVA right superior cerebellum  Was found on CTH 5/30, confirmed by MRI  CTA of the head/neck showed \"Moderate (approximately 50%) stenosis in the 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 No significant stenosis of the cervical left internal carotid artery. Severe stenosis origin of the dominant left vertebral artery. Hypoplastic right vertebral artery. No high-grade intracranial stenosis, large vessel occlusion or aneurysm\"  Neurology saw in consult for the CVA.  They recommended that the patient start ASA 81mg daily when cleared by Neurosurgery as above.    For LOOP recorder implanted as an outpatient.  He did have a previous Ziopatch done that was negative for atrial fibrillation.    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  Continue statin  Pancreatic cyst  Had an OP ultrasound done for elevated total bilirubin that showed cystic lesions in the pancreas for which further imaging was recommended  MRI done thereafter as an OP showed \"Numerous cystic lesions scattered throughout the pancreas with a dominant lesion measuring 18 mm in the body. Worrisome features include ductal dilation up to 6 mm in the tail the pancreas and abrupt change in caliber of the pancreatic duct. Management recommendation: Because of presence of multiple worrisome features, surgical consultation/management " "with consideration for EUS recommended.  Management and follow up recommendations for cystic pancreatic lesions are based on Institutional consensus and international evidence-based Kyoto guidelines for the management of intraductal papillary mucinous neoplasm of the pancreas. Pancreatology 24 (2024) 255-270\".  Per IMs note in the hospital:  \"Reached out to primary team and surgery oncall as imaging findings are suggestive of malignancy. According to surgery they recommend out patient follow up with surg onc for possible biopsy and out patient GI follow up. Surgery called the surg onc office to schedule the appointment for patient. I did a STAT referral for GI outpatient. Primary team will inform the patient regarding imaging findings and the referrals\".  Diabetes mellitus (HCC)  HbA1C 6.2 on 5/24/25  Home:  Metformin 1000 mg BID  Here:  QID Accuchecks/SSI  Will restart Metformin when needed/able  HTN (hypertension)  Home:  Atenolol/chlorthalidone 50-25 mg qd/Diovan 320 mg qd  Here:  Atenolol 50 mg qd  Will add Losartan back today 6/4/25 at 25mg qd  Has Hydralazine 10 mg q8hrs prn SBP >165  Thrombocytopenia (HCC)  Chronic  Follows with H-O as OP  Currently platelet count is 225  Mixed hyperlipidemia  Takes Lipitor 10 mg qd at home = continue here  Normocytic anemia  Continue Iron 325 mg QOD  Gout  Continue Allopurinol 300mg qd as at home  No recent flares  CKD (chronic kidney disease)  Baseline 0.7-0.9  Stable  Superficial foreign body of left leg without major open wound and without infection  Per PMR  Left shoulder pain  Per PMR    The above assessment and plan was reviewed and updated as determined by my evaluation of the patient on 6/4/2025.    History of Present Illness   Patient seen and examined. Patients overnight issues or events were reviewed with nursing staff. New or overnight issues include the following:   No new or overnight issues.  Offers no complaints    Review of Systems   All other systems " reviewed and are negative.      Objective :  Temp:  [98 °F (36.7 °C)-99.1 °F (37.3 °C)] 98 °F (36.7 °C)  HR:  [58-64] 64  BP: (132-172)/(62-75) 132/62  Resp:  [16-19] 19  SpO2:  [97 %-98 %] 97 %  O2 Device: None (Room air)    Invasive Devices       Peripheral Intravenous Line  Duration             Peripheral IV 06/02/25 Right;Ventral (anterior) Forearm 2 days                    Physical Exam  General Appearance: no distress, nontoxic appearing  HEENT:  External ear normal.  Nose normal w/o drainage. Mucous membranes are moist. Oropharynx is clear. Conjunctiva clear w/o icterus or redness.  Scalp incisions and drain site is w/o erythema/drainage  Neck:  Supple, normal ROM  Lungs: BBS without crackles/wheeze/rhonchi; respirations unlabored with normal inspiratory/expiratory effort.  No retractions noted.  On RA  CV: regular rate and rhythm; no rubs/murmurs/gallops, PMI normal   ABD: Abdomen is soft.  Bowel sounds all quadrants.  Nontender with no distention.    EXT: no edema  Skin: normal turgor, normal texture  Psych: affect normal, mood normal  Neuro: AAO       The above physical exam was reviewed and updated as determined by my evaluation of the patient on 6/4/2025.      Lab Results: I have reviewed the following results:  Results from last 7 days   Lab Units 06/03/25  0601 06/02/25  0559   WBC Thousand/uL 7.45 7.98   HEMOGLOBIN g/dL 10.6* 10.6*   HEMATOCRIT % 33.1* 33.7*   PLATELETS Thousands/uL 225 215     Results from last 7 days   Lab Units 06/03/25  0601 06/02/25  0559   SODIUM mmol/L 135 136   POTASSIUM mmol/L 4.1 4.8   CHLORIDE mmol/L 101 103   CO2 mmol/L 28 25   BUN mg/dL 21 26*   CREATININE mg/dL 0.68 0.76   CALCIUM mg/dL 9.1 9.3         Results from last 7 days   Lab Units 05/31/25  1007   INR  1.20*     Results from last 7 days   Lab Units 06/04/25  0617 06/03/25  2043 06/03/25  1551   POC GLUCOSE mg/dl 133 137 146*       Imaging Results Review: No pertinent imaging studies reviewed.  Other Study Results  Review: No additional pertinent studies reviewed.    Review of Scheduled Meds: Medications  reviewed and reconciled as needed  Current Facility-Administered Medications   Medication Dose Route Frequency Provider Last Rate    acetaminophen  650 mg Oral Q6H PRN JOSE ALEJANDRO Bradley      allopurinol  300 mg Oral Daily JOSE ALEJANDRO Bradley      atenolol  50 mg Oral Daily JOSE ALEJANDRO Bradley      And    [Held by provider] chlorthalidone  25 mg Oral Daily JOSE ALEJANDRO Bradley      atorvastatin  10 mg Oral HS JOSE ALEJANDRO Bradley      chlorhexidine  15 mL Mouth/Throat Q12H [facility] JOSE ALEJANDRO Bradley      Diclofenac Sodium  2 g Topical 4x Daily PRN JOSE ALEJANDRO Bradley      enoxaparin  30 mg Subcutaneous Q12H [facility] JOSE ALEJANDRO Bradley      ferrous sulfate  325 mg Oral Every Other Day JOSE ALEJANDRO Bradley      hydrALAZINE  10 mg Oral Q8H PRN JOSE ALEJANDRO Bradley      insulin lispro  1-5 Units Subcutaneous TID AC JOSE ALEJANDRO Bradley      insulin lispro  1-5 Units Subcutaneous HS JOSE ALEJANDRO Bradley      [Held by provider] losartan  100 mg Oral Daily JOSE ALEJANDRO Bradley      polyethylene glycol  17 g Oral Daily JOSE ALEJANDRO Bradley      senna-docusate sodium  1 tablet Oral HS JOSE ALEJANDRO Bradley         VTE Pharmacologic Prophylaxis: Lovenox  Code Status: Level 1 - Full Code  Current Length of Stay: 1 day(s)    Administrative Statements     ** Please Note:  voice to text software may have been used in the creation of this document. Although proof errors in transcription or interpretation are a potential of such software**

## 2025-06-04 NOTE — PCC OCCUPATIONAL THERAPY
06/04/2025     Pt is a 74 y.o. male admitted to [facility] on 5/24/2025 due to Acute on Chronic B/L SDH s/p Fall. Pt was transferred to [facility] on 6/3/2025. Pt has PMH of Diabetes mellitus (HCC), History of kidney stones, hypertension, CKD stage 2, HLD, thrombocytopenia, gout, and bilateral KIANNA. See flowsheet for home set-up. Pt barriers include decreased balance, decreased endurance, decreased activity tolerance, decreased strength, decreased range of motion, decreased judgment, decreased safety awareness, and decreased executive functioning. Decreased balance, endurance, activity tolerance, strength, and range of motion limit pt's participation in ADL tasks. Decreased executive functioning and judgment decrease pt's safety and participation during daily ADLs and IADLs. Pt has supportive family. Pt would benefit from skilled OT services for 2 weeks to focus on ADL retraining, functional transfer training, endurance training, balance training, strength training, increasing range of motion, and cognitive reorientation.     06/11/25: Pt is demonstrating good progress with occupational therapy and is progressing toward long term goals for ADL, IADL, and functional transfers/mobility. Pts long term goals for ADLs are Independent with LRAD. Pt is currently SUP for ADLs (requiring min A for LB dressing only). Pt continues to present with impairments in activity tolerance, endurance, standing balance/tolerance, UE strength, memory, safety , judgement , and attention . Occupational performance remains limited by risk for falls and home environment. Family training/education will be required prior to D/C. Pt will continue to benefit from skilled acute rehab OT services to address above mentioned barriers and maximize functional independence in baseline areas of occupation to meet established treatment goals with overall decreased burden of care. Plan of care to continue to focus on ADL  Retraining , LB Dressing, UB dressing,  LHAE education/training, IADL training , Kitchen Mobilty, Meal preparation, Medication management , Functional Transfers, Functional Cognition, Functional Attention, Standing tolerance, Standing balance , DME training/education, Family training/education, Energy conservation training/education, healthy coping education, Leisure and social pursuits, and community re-integration. Goals for the upcoming week are: primarily focus on IADL training and setup FT if necessary.    Anticipate DC date for 6/16 w/ OP PT only

## 2025-06-04 NOTE — ASSESSMENT & PLAN NOTE
"Acute CVA right superior cerebellum  Was found on CTH 5/30, confirmed by MRI  CTA of the head/neck showed \"Moderate (approximately 50%) stenosis in the 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 No significant stenosis of the cervical left internal carotid artery. Severe stenosis origin of the dominant left vertebral artery. Hypoplastic right vertebral artery. No high-grade intracranial stenosis, large vessel occlusion or aneurysm\"  Neurology saw in consult for the CVA.  They recommended that the patient start ASA 81mg daily when cleared by Neurosurgery as above.    For LOOP recorder implanted as an outpatient.  He did have a previous Ziopatch done that was negative for atrial fibrillation.    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  Continue statin  "

## 2025-06-04 NOTE — PCC CARE MANAGEMENT
Pt is participating well and making good progress. Dc is scheduled for next week for pt to return home w/outpt physical therapy. Cm following to arrange dc recommended services. Clinical update to be done 6/16 if pt is to not dc.

## 2025-06-05 LAB
GLUCOSE SERPL-MCNC: 110 MG/DL (ref 65–140)
GLUCOSE SERPL-MCNC: 112 MG/DL (ref 65–140)
GLUCOSE SERPL-MCNC: 118 MG/DL (ref 65–140)
GLUCOSE SERPL-MCNC: 119 MG/DL (ref 65–140)

## 2025-06-05 PROCEDURE — 99233 SBSQ HOSP IP/OBS HIGH 50: CPT | Performed by: STUDENT IN AN ORGANIZED HEALTH CARE EDUCATION/TRAINING PROGRAM

## 2025-06-05 PROCEDURE — 97530 THERAPEUTIC ACTIVITIES: CPT

## 2025-06-05 PROCEDURE — 97112 NEUROMUSCULAR REEDUCATION: CPT

## 2025-06-05 PROCEDURE — 97129 THER IVNTJ 1ST 15 MIN: CPT

## 2025-06-05 PROCEDURE — 97130 THER IVNTJ EA ADDL 15 MIN: CPT

## 2025-06-05 PROCEDURE — 82948 REAGENT STRIP/BLOOD GLUCOSE: CPT

## 2025-06-05 PROCEDURE — 97116 GAIT TRAINING THERAPY: CPT

## 2025-06-05 PROCEDURE — 99232 SBSQ HOSP IP/OBS MODERATE 35: CPT | Performed by: NURSE PRACTITIONER

## 2025-06-05 RX ORDER — LIDOCAINE 40 MG/G
CREAM TOPICAL 4 TIMES DAILY PRN
Status: DISCONTINUED | OUTPATIENT
Start: 2025-06-05 | End: 2025-06-16 | Stop reason: HOSPADM

## 2025-06-05 RX ADMIN — CHLORHEXIDINE GLUCONATE 15 ML: 1.2 SOLUTION ORAL at 08:27

## 2025-06-05 RX ADMIN — ALLOPURINOL 300 MG: 300 TABLET ORAL at 08:47

## 2025-06-05 RX ADMIN — ATORVASTATIN CALCIUM 10 MG: 10 TABLET, FILM COATED ORAL at 21:30

## 2025-06-05 RX ADMIN — SENNOSIDES AND DOCUSATE SODIUM 1 TABLET: 50; 8.6 TABLET ORAL at 21:30

## 2025-06-05 RX ADMIN — ENOXAPARIN SODIUM 30 MG: 30 INJECTION SUBCUTANEOUS at 21:30

## 2025-06-05 RX ADMIN — POLYETHYLENE GLYCOL 3350 17 G: 17 POWDER, FOR SOLUTION ORAL at 08:54

## 2025-06-05 RX ADMIN — ENOXAPARIN SODIUM 30 MG: 30 INJECTION SUBCUTANEOUS at 08:27

## 2025-06-05 NOTE — ASSESSMENT & PLAN NOTE
Lab Results   Component Value Date    HGBA1C 6.2 (H) 05/24/2025       Recent Labs     06/04/25  1044 06/04/25  1534 06/04/25 2052 06/05/25  0546   POCGLU 128 103 111 110       Blood Sugar Average: Last 72 hrs:  (P) 124  - plan to restart Metformin during [facility] stay  - continue SSI and accuchecks per IM

## 2025-06-05 NOTE — PROGRESS NOTES
"OT daily treatment note       06/05/25 1230   Pain Assessment   Pain Assessment Tool 0-10   Pain Score No Pain   Restrictions/Precautions   Precautions Aspiration;Bed/chair alarms;Cognitive;Fall Risk;Supervision on toilet/commode   Lifestyle   Autonomy \"You guys are trying to kill me!\"   Sit to Stand   Type of Assistance Needed Physical assistance   Physical Assistance Level 26%-50%   Comment Min A/CGA pending surface height with and without RW. can trial with SPC vs no AD w/ GB   Sit to Stand CARE Score 3   Bed-Chair Transfer   Type of Assistance Needed Physical assistance   Physical Assistance Level 25% or less   Comment Min A/CG with RW; can trial with SPC vs no AD w/ GB   Chair/Bed-to-Chair Transfer CARE Score 3   Toileting Hygiene   Type of Assistance Needed Physical assistance   Physical Assistance Level 51%-75%   Comment assist for rear hygiene only   Toileting Hygiene CARE Score 2   Toilet Transfer   Type of Assistance Needed Physical assistance   Physical Assistance Level 26%-50%   Comment would require assistance w/o grab bars; CGA w/ RW. can trial with SPC vs no AD w/ GB   Toilet Transfer CARE Score 3   Commmunity Re-entry   Community Re-entry Level Walker   Community Re-entry Level of Assistance Minimum assistance;Contact guard   Community Re-entry Pt participated in functional mobility task with use of RW to simulate home and community distances required for ADL/IADL completion. Task focused on increasing functional activity tolerance, endurance, strength and dynamic standing balance to improve independence with ADL completion.   Functional Standing Tolerance   Time 15 mins   Activity memory game, bean bag toss   Comments Pt engaged in memory game activity followed by bean bag toss in stance focusing on increasing dynamic standing balance with unilateral and bilateral release to increase IND with ADL tasks. Activity also focused on functional standing tolerance, endurance, activity tolerance, visual " scanning and working memory. Pt able to stand for 15 minutes in total requiring intermittent rest breaks PRN. Pt completed 4 trials of bean bag toss w/ one trial w/ use of blue foam balance board requiring CGA to steady in stance. Education provided on energy conservation techniques including pursed lip and diaphragmatic breathing. Pt would benefit from cont activities focusing on improving the above mentioned deficits.   Cognition   Overall Cognitive Status Impaired   Arousal/Participation Alert;Cooperative   Attention Attends with cues to redirect   Orientation Level Oriented X4   Memory Decreased short term memory;Decreased recall of recent events;Decreased recall of precautions   Following Commands Follows one step commands without difficulty   Comments pt engaged in word finding activity in stance focusing on working memory, problem solving, attention and abstraction to increase ind w/ ADL/IADLs. pt provided w/ 8 letters and was able to create 3 words w/o cues and 5 words with cues. pt would benefit from further functional cog throughout stay. pt remains tangential and chatty at times requiring cues to redirect.   Activity Tolerance   Activity Tolerance Patient tolerated treatment well   Assessment   Treatment Assessment Pt participated in skilled OT session with focus on ADL retraining, functional transfer training, endurance training, cognitive reorientation, neuro re-ed, compensatory technique education, continued education, energy conservation, and activity engagement . See flowsheet for details of session and current functional status. Pt is limited by impaired balance, decreased endurance, increased fall risk, decreased ADLS, decreased IADLS, decreased activity tolerance, decreased safety awareness, impaired judgement, decreased cognition, and decreased strength and requires skilled OT services to increase independence and safety with ADL completion in prep for DC home. Plan to continue ADL retraining,  functional transfer training, UE strengthening/ROM, endurance training, cognitive reorientation, Pt/caregiver education, equipment evaluation/education, neuro re-ed, compensatory technique education, continued education, energy conservation, and activity engagement  to address barriers mentioned above.   Prognosis Good   Problem List Decreased strength;Decreased endurance;Impaired balance;Decreased mobility;Decreased coordination;Decreased cognition;Impaired judgement;Decreased safety awareness;Obesity   Barriers to Discharge Decreased caregiver support;Inaccessible home environment   Plan   Treatment/Interventions ADL retraining;Functional transfer training;Therapeutic exercise;Endurance training;Patient/family training;Equipment eval/education;Compensatory technique education   Progress Progressing toward goals   OT Therapy Minutes   OT Time In 1230   OT Time Out 1400   OT Total Time (minutes) 90   OT Mode of treatment - Individual (minutes) 90   OT Mode of treatment - Concurrent (minutes) 0   OT Mode of treatment - Group (minutes) 0   OT Mode of treatment - Co-treat (minutes) 0   OT Mode of Treatment - Total time(minutes) 90 minutes   OT Cumulative Minutes 180   Therapy Time missed   Time missed? No

## 2025-06-05 NOTE — TELEPHONE ENCOUNTER
6/6/25 - PT STILL IN HOSPITAL. MESSAGED AP'S TO KEEP OR CX APT. PER [facility]IL[facility], CX APT. PT TO BE SEEN IN [facility] ON MONDAY 6/9/25.    6/5/25 - PT STILL IN HOSPITAL    06/03/2025- PT STILL IN HOSPITAL

## 2025-06-05 NOTE — TEAM CONFERENCE
Acute RehabilitationTeam Conference Note  Date: 6/5/2025   Time: 10:49 AM       Patient Name:  Deonte Greenfield       Medical Record Number: [id number]   YOB: 1950  Sex: Male          Room/Bed:  [facility]-01  Payor Info:  Payor: ANTWON  REP / Plan: [employer] MEDICARE PPO  REP / Product Type: Medicare PPO /      Admitting Diagnosis: Acute on chronic intracranial subdural hematoma (HCC) [I62.01, I62.03]   Admit Date/Time:  6/3/2025  3:27 PM  Admission Comments: No comment available     Primary Diagnosis:  Acute on chronic intracranial subdural hematoma (HCC)  Principal Problem: Acute on chronic intracranial subdural hematoma (HCC)    Patient Active Problem List    Diagnosis Date Noted    Gout 06/03/2025    Pancreatic cyst 06/03/2025    HTN (hypertension) 06/03/2025    CKD (chronic kidney disease) 06/03/2025    Superficial foreign body of left leg without major open wound and without infection 06/03/2025    Oropharyngeal dysphagia 06/03/2025    Left shoulder pain 06/03/2025    Acute CVA (cerebrovascular accident) ([facility]) 05/30/2025    Weight loss 05/28/2025    Frailty 05/28/2025    Acute pain 05/28/2025    Insomnia 05/28/2025    At risk for delirium 05/28/2025    Acute on chronic intracranial subdural hematoma (HCC) 05/24/2025    Leg swelling 05/24/2025    Severe obesity (BMI 35.0-39.9) with comorbidity ([facility]) 05/16/2025    Bradycardia, sinus 04/21/2025    Atrial septal aneurysm 04/09/2025    Normocytic anemia 04/09/2025    Syncope 04/07/2025    Thrombocytopenia ([facility]) 04/07/2025    Abnormal echocardiogram 04/07/2025    Groin pain, right 04/06/2025    Fall 04/05/2025    Hypocitraturia 01/18/2024    Neuropathy 09/12/2019    Hyperuricemia 06/03/2016    Mixed hyperlipidemia 06/03/2016    Onychomycosis 12/16/2015    PVD (peripheral vascular disease) ([facility]) 10/07/2015    POPPY (obstructive sleep apnea) 10/07/2015    Diabetes mellitus ([facility]) 08/24/2015    S/P hip replacement 11/05/2014    Osteoarthritis  04/14/2008       Physical Therapy:    Weight Bearing Status: Full Weight Bearing  Transfers: Moderate Assistance  Bed Mobility: Moderate Assistance  Amulation Distance (ft): 50 feet  Ambulation: Moderate Assistance  Assistive Device for Ambulation: Hand Hold Assistance  Number of Stairs: 12  Assistive Device for Stairs: Lehft Hand Rail  Stair Assistance: Minimal Assistance  Discharge Recommendations: Home with:  DC Home with:: Family Support, Outpatient Physical Therapy    Pt is 74 y.o. male seen for PT evaluation s/p admit to [facility] on 6/3/2025 w/ Acute on chronic intracranial subdural hematoma (HCC) s/p fall. He underwent bur hole evacuation and subdural drain placement bilaterally with improvements on the follow-up imaging. 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 He presents now medically appropriate for rehab. Comorbidities affecting pt's physical performance at time of assessment include: diabetes, stage II CKD, hypertension, hyperlipidemia, history of multiple joint osteoarthritis status post bilateral KIANNA . PTA, pt was independent w/ all functional mobility w/ SPC (RW after his fall), ambulates household distances, lives w/ family in 1 level home with 1 HERMILO, and retired. Personal factors affecting pt at time of IE include: inaccessible home environment, inability to ambulate household distances, inability to navigate level surfaces w/o external assistance, positive fall history, impulsivity, inability to perform IADLs, and inability to perform ADLs. Patient presents with impairments and limitations including impaired balance, decreased endurance, impaired coordination, gait deviations, decreased functional mobility tolerance, decreased safety awareness, and fall risk. The following objective measures performed on IE also reveal limitations: Timed Up and Go: 32.48 seconds (positive risk for falls) and  Gait Speed: 0.5 m/s (indicative of falls and limited community ambulator). He present as a good rehab candidate and is expected to need appx 2 weeks to achieve mod(I) goals.    Occupational Therapy:  Eating:  (Set-up)  Grooming: Minimal Assistance  Bathing: Moderate Assistance  Bathing: Moderate Assistance  Upper Body Dressing: Supervision  Lower Body Dressing: Moderate Assistance  Toileting: Moderate Assistance  Tub/Shower Transfer: Moderate Assistance  Toilet Transfer: Minimal Assistance  Cognition: Exceptions to WNL  Cognition: Decreased Memory, Decreased Executive Functions, Decreased Attention, Decreased Safety  Orientation: Person, Place, Time, Situation  Discharge Recommendations: Home with:  DC Home with:: Family Support (TBD continued therapy recommendations)       06/04/2025     Pt is a 74 y.o. male admitted to [facility] on 5/24/2025 due to Acute on Chronic B/L SDH s/p Fall. Pt was transferred to [facility] on 6/3/2025. Pt has PMH of Diabetes mellitus (HCC), History of kidney stones, hypertension, CKD stage 2, HLD, thrombocytopenia, gout, and bilateral KIANNA. See flowsheet for home set-up. Pt barriers include decreased balance, decreased endurance, decreased activity tolerance, decreased strength, decreased range of motion, decreased judgment, decreased safety awareness, and decreased executive functioning. Decreased balance, endurance, activity tolerance, strength, and range of motion limit pt's participation in ADL tasks. Decreased executive functioning and judgment decrease pt's safety and participation during daily ADLs and IADLs. Pt has supportive family. Pt would benefit from skilled OT services for 2 weeks to focus on ADL retraining, functional transfer training, endurance training, balance training, strength training, increasing range of motion, and cognitive reorientation.     Speech Therapy:  Mode of Communication: Verbal  Cognition: Exceptions to WNL  Cognition:  Decreased Memory, Decreased Executive Functions  Orientation: Person, Place, Time, Situation  Swallowing: Within Defined Limits  Diet Recommendations: Regular Diet, Thin  Discharge Recommendations: Home with:  DC Home with:: Family Support  Pt completed skilled cognitive and beside swallow assessments.    In regards to swallow: Pt presenting with WFL oral and WFL pharyngeal swallow function. Recommend regular diet and thin liquids, set up as needed. No further follow up warranted.     In regards to cognition: Pt completed the CLQT+ on initial evaluation with a Composite Severity Rating score of 3.8 out of 4.0, correlating to overall WNL cognitive linguistic impairments at time of evaluation and in comparison to age matched peers ranging from 70-90 y/o. Pt scored at or above criterion cut score for 8 out of 10 tasks completed. Pt overall scoring WNL as compared to similar aged peers. However, pt demonstrating MILD cognitive deficits in executive functions and clock drawing. Discussed goals to maximize his skills- pt reported agreement to engaged in follow up cognitive therapy while on rehab unit. Pt also completed informal interview- pt orientated to place, situation and date. Lives at home with supportive family who can provided supervision and assistance. Pt was independent with meds but wife completed bills and household chores. Family can provide transportation. Reviewed rehab program and ELOS. Pt will cont to benefit from skilled SLP services targeting cognitive linguistic communication skills for increased independence and decreased burden of care.     Family training to be initiated in upcoming sessions. Pt suspected to meet goals of supervision to mod I for overall cognitive linguistic communication skills.     This week, focus for continued services to target IADL tasks, functional cognitive tasks for sequencing, STM and working memory and executive functioning.    At this time, pt will continue to benefit  from skilled cognitive linguistic tx session to maximize overall functional independence given overall cognitive linguistic skills in attempts to decreased caregiver burden over time.     Nursing Notes:  Appetite: Good  Diet Type: Diabetic                      Diet Patient/Family Education Complete: No                         Type of Wound Patient/Family Education: No  Bladder: Continent     Bladder Patient/Family Education: Yes  Bowel: Continent     Bowel Patient/Family Education: Yes     Pain Score: 0                          Pain Patient/Family Education: Yes  Medication Management/Safety  Safe Administration: No  Reason for non-safe administration: will need eval  Medication Patient/Family Education Complete: No (ongoing)    Pt admitted s/p fall.  MRI of the brain that showed large bilateral SDH S/p bilateral [place] holes for evacuation of SDH 5/25/25 MMA was not able to be done due to anatomical considerations For no full AC/AP for 2 weeks = 6/11/25 then need to clear with NS Acute CVA-right superior cerebellum Was found on CTH 5/30, confirmed by MRI Neurology saw in consult for the CVA.  They recommended that the patient start ASA 81mg daily when cleared by Neurosurgery as above.   For LOOP recorder implanted as an outpatient.  He did have a previous Ziopatch done that was negative for atrial fibrillation.    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  Will need op f/u.   Diabetes mellitus- HbA1C 6.2 on 5/24/25 Home:  Metformin 1000 mg BID Here:  QID Accuchecks/SSI Will restart Metformin when needed/able HTN-Home:  Atenolol/chlorthalidone 50-25 mg qd/Diovan 320 mg qd Here:  Atenolol 50 mg qd/Losartan 25mg qd. Has Hydralazine 10 mg q8hrs prn SBP >165. Thrombocytopenia-Chronic Follows with H-O as OP Mixed hyperlipidemia- on  Lipitor 10 mg qd as at home.   Gout-Allopurinol 300mg qd as at home CKD- Baseline 0.7-0.9.   Chronic L leg wound with local wound care every other day.  New L shoulder pain-PMR monitoring.  Wound care consulted for stage 2 sacral slit area.  Mepilex applied.  Pt is continent of bowel and bladder.  Pt requires alarms for safety.      This week we will encourage independence with ADLs.  We will monitor labs and vital signs.  We will educate pt/family about repositioning to prevent skin breakdown.  We will assist w repositioning and perform routine skin checks.  We will monitor for adequate pain control.  We will monitor for constipation and medicate pt as ordered. We will monitor incision/wound for healing and s/s of infection. We will provide pt/family with DM education as needed.We will increase safety awareness and keep pt free from falls.                  Case Management:     Discharge Planning  Living Arrangements: Lives w/ Children, Lives w/ Spouse/significant other, Lives w/ Family members, Lives w/ Daughter, Lives w/ Son, Other (Comment) (grandchildren)  Support Systems: Family members, Spouse/significant other  Assistance Needed: TBD  Type of Current Residence: Other (Comment) (2 story home)  Current Home Care Services: No  Pt admitted s/p fall resulting in SDH. Pt resides with wife and children and teenage grandchildren. Pt and wife are aware of the potential los and contd therapy services potentially needed on dc. Following for contd stay reivew and dc planning needs.     Is the patient actively participating in therapies? yes  List any modifications to the treatment plan:     Barriers Interventions   Sacrum wound, left anterior leg wound Wound care consult placed   Endurance, activity tolerance Assisted device training, enery conservation endurance training   Functional reach for adls Ae training, family education   Mild cog, insight judgement Continued testing, moca pending   Balance righting reactions Device training, nuero re ed, nu step, endurance training     Is the patient making expected  progress toward goals? yes  List any update or changes to goals:     Medical Goals: Patient will be medically stable for discharge to [facility] restrictive envrionment upon completion of rehab program and Patient will be able to manage medical conditions and comorbid conditions with medications and follow up upon completion of rehab program    Weekly Team Goals:   Rehab Team Goals  ADL Team Goal: Patient will be independent with ADLs with least restrictive device upon completion of rehab program  Bowel/Bladder Team Goal: Patient will be independent with bladder/bowel management with least restrictive device upon completion of rehab program  Cognitive Team Goal: Patient will return to premorbid level of cognitive activity upon completion of rehab program    Discussion: pt presents with the above barriers and is benefiting from stated interventions from the team. Pt is using devices to aid in adl completion primarily due to body habitus. Wife has been assisting at baseline with adls. Pt is using a spc at baseline and currently balance and strength are effecting that stability. Pt does have steps to enter and stair training will occur. Pt is currently mod a for overall function and mobility as well as adls. Two week los expected with independent goals. Family aware of potential outpt therapy recommendations.     Anticipated Discharge Date:  6/13/25  [facility] Team Members Present:The following team members are supervising care for this patient and were present during this Weekly Team Conference.    Physician: Dr. Sandra DO  : Shelia Thakkar MSW  Registered Nurse: Jodi Garcia, LEANDRO  Physical Therapist: Tootie Dennison DPT  Occupational Therapist: Eli Cash MS, OTR/L and Tavo Conner MS, OTR/L  Speech Therapist: Cory Simpson MS, CCC-SLP

## 2025-06-05 NOTE — PLAN OF CARE
Problem: PAIN - ADULT  Goal: Verbalizes/displays adequate comfort level or baseline comfort level  Description: Interventions:  - Encourage patient to monitor pain and request assistance  - Assess pain using appropriate pain scale  - Administer analgesics as ordered based on type and severity of pain and evaluate response  - Implement non-pharmacological measures as appropriate and evaluate response  - Consider cultural and social influences on pain and pain management  - Notify physician/advanced practitioner if interventions unsuccessful or patient reports new pain  - Educate patient/family on pain management process including their role and importance of  reporting pain   - Provide non-pharmacologic/complimentary pain relief interventions  Outcome: Progressing     Problem: INFECTION - ADULT  Goal: Absence or prevention of progression during hospitalization  Description: INTERVENTIONS:  - Assess and monitor for signs and symptoms of infection  - Monitor lab/diagnostic results  - Monitor all insertion sites, i.e. indwelling lines, tubes, and drains  - Monitor endotracheal if appropriate and nasal secretions for changes in amount and color  - Willard appropriate cooling/warming therapies per order  - Administer medications as ordered  - Instruct and encourage patient and family to use good hand hygiene technique  - Identify and instruct in appropriate isolation precautions for identified infection/condition  Outcome: Progressing     Problem: SAFETY ADULT  Goal: Patient will remain free of falls  Description: INTERVENTIONS:  - Educate patient/family on patient safety including physical limitations  - Instruct patient to call for assistance with activity   - Consider consulting OT/PT to assist with strengthening/mobility based on AM PAC & JH-HLM score  - Consult OT/PT to assist with strengthening/mobility   - Keep Call bell within reach  - Keep bed low and locked with side rails adjusted as appropriate  - Keep  care items and personal belongings within reach  - Initiate and maintain comfort rounds  - Make Fall Risk Sign visible to staff  - Offer Toileting every  Hours, in advance of need  - Initiate/Maintain alarm  - Obtain necessary fall risk management equipment:   - Apply yellow socks and bracelet for high fall risk patients  - Consider moving patient to room near nurses station  Outcome: Progressing  Goal: Maintain or return to baseline ADL function  Description: INTERVENTIONS:  -  Assess patient's ability to carry out ADLs; assess patient's baseline for ADL function and identify physical deficits which impact ability to perform ADLs (bathing, care of mouth/teeth, toileting, grooming, dressing, etc.)  - Assess/evaluate cause of self-care deficits   - Assess range of motion  - Assess patient's mobility; develop plan if impaired  - Assess patient's need for assistive devices and provide as appropriate  - Encourage maximum independence but intervene and supervise when necessary  - Involve family in performance of ADLs  - Assess for home care needs following discharge   - Consider OT consult to assist with ADL evaluation and planning for discharge  - Provide patient education as appropriate  - Monitor functional capacity and physical performance, use of AM PAC & JH-HLM   - Monitor gait, balance and fatigue with ambulation    Outcome: Progressing  Goal: Maintains/Returns to pre admission functional level  Description: INTERVENTIONS:  - Perform AM-PAC 6 Click Basic Mobility/ Daily Activity assessment daily.  - Set and communicate daily mobility goal to care team and patient/family/caregiver.   - Collaborate with rehabilitation services on mobility goals if consulted  - Perform Range of Motion  times a day.  - Reposition patient every  hours.  - Dangle patient  times a day  - Stand patient  times a day  - Ambulate patient  times a day  - Out of bed to chair  times a day   - Out of bed for meals  times a day  - Out of bed for  toileting  - Record patient progress and toleration of activity level   Outcome: Progressing     Problem: DISCHARGE PLANNING  Goal: Discharge to home or other facility with appropriate resources  Description: INTERVENTIONS:  - Identify barriers to discharge w/patient and caregiver  - Arrange for needed discharge resources and transportation as appropriate  - Identify discharge learning needs (meds, wound care, etc.)  - Arrange for interpretive services to assist at discharge as needed  - Refer to Case Management Department for coordinating discharge planning if the patient needs post-hospital services based on physician/advanced practitioner order or complex needs related to functional status, cognitive ability, or social support system  Outcome: Progressing     Problem: Prexisting or High Potential for Compromised Skin Integrity  Goal: Skin integrity is maintained or improved  Description: INTERVENTIONS:  - Identify patients at risk for skin breakdown  - Assess and monitor skin integrity including under and around medical devices   - Assess and monitor nutrition and hydration status  - Monitor labs  - Assess for incontinence   - Turn and reposition patient  - Assist with mobility/ambulation  - Relieve pressure over verónica prominences   - Avoid friction and shearing  - Provide appropriate hygiene as needed including keeping skin clean and dry  - Evaluate need for skin moisturizer/barrier cream  - Collaborate with interdisciplinary team  - Patient/family teaching  - Consider wound care consult    Assess:  - Review Zaid scale daily  - Clean and moisturize skin every   - Inspect skin when repositioning, toileting, and assisting with ADLS  - Assess under medical devices such as  every   - Assess extremities for adequate circulation and sensation     Bed Management:  - Have minimal linens on bed & keep smooth, unwrinkled  - Change linens as needed when moist or perspiring  - Avoid sitting or lying in one position for  more than  hours while in bed?Keep HOB at degrees   - Toileting:  - Offer bedside commode  - Assess for incontinence every   - Use incontinent care products after each incontinent episode such as     Activity:  - Mobilize patient  times a day  - Encourage activity and walks on unit  - Encourage or provide ROM exercises   - Turn and reposition patient every  Hours  - Use appropriate equipment to lift or move patient in bed  - Instruct/ Assist with weight shifting every  when out of bed in chair  - Consider limitation of chair time  hour intervals    Skin Care:  - Avoid use of baby powder, tape, friction and shearing, hot water or constrictive clothing  - Relieve pressure over bony prominences using   - Do not massage red bony areas    Next Steps:  - Teach patient strategies to minimize risks such as   - Consider consults to  interdisciplinary teams such as   Outcome: Progressing

## 2025-06-05 NOTE — PROGRESS NOTES
Progress Note - PMR   Name: Deonte Greenfield 74 y.o. male I MRN: [id number]  Unit/Bed#: -01 I Date of Admission: 6/3/2025   Date of Service: 6/5/2025 I Hospital Day: 2     Assessment & Plan  Acute on chronic intracranial subdural hematoma (HCC)  > Originally presented in early April due to unwitnessed fall and found to have small subdural hemorrhage which subsequently appeared to resolve upon repeat CT head imaging. Patient opted to have MRI performed in outpatient setting and MRI team sent him to ER for large bilateral high convexity SDHs with mass effect. Presented to ED on 5/24 where he received bilateral Yael holes with 4 subdural drains placed  > Drains removed on 5/27 and 5/28  > completed 7 days of Keppra for seizure prophylaxis    Repeat CT head STAT if GCS declines more than 2 points in 1 hour  SBP < 160  INR < 1.4  Plt > 100  Hold all therapeutic doses of AC / AP therapy, he is not cleared to start asa per neurology recommendations for stroke management  Ok for dvt prophylaxis, continue lovenox  PT/OT  Diabetes mellitus (HCC)  Lab Results   Component Value Date    HGBA1C 6.2 (H) 05/24/2025       Recent Labs     06/04/25  1044 06/04/25  1534 06/04/25  2052 06/05/25  0546   POCGLU 128 103 111 110       Blood Sugar Average: Last 72 hrs:  (P) 124  - plan to restart Metformin during [facility] stay  - continue SSI and accuchecks per IM  Thrombocytopenia (HCC)  Baseline 100-250  Trend biweekly  Goal > 100  OP follow up with heme onc  Mixed hyperlipidemia  - Continue statin  - Last LDL: 62 on 5/31/2025  Normocytic anemia  - baseline 12ish, now ranging at 10-11 likely in the setting of blood loss  - Continue iron supplementation  Acute CVA (cerebrovascular accident) (HCC)  --MRI brain 5/24:  New, large bilateral high convexity subdural hematomas, right greater than left, which are predominantly subacute in timing, with mass effect on the bilateral cerebral hemispheres, and effacement of the sulci. No significant  midline shift noted. FLAIR signal hyperintensity along the sulci of the right frontal and parietal convexities, which may represent trace associated subarachnoid hemorrhage. No acute infarct. Mild chronic white matter microangiopathic changes involving both cerebral hemispheres.  --CT head 5/30: Acute appearing infarct in right superior cerebellum. Increase in volume of hyperdense blood products bilaterally  --Echo 4/2025: EF 65%. Normal systolic function. Grade 1 diastolic relaxation. 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   LDL 62  A1c 6.2  TTE: 65%     Given new stroke on MRI with concurrent SDH, patient will need ASA when cleared by NSGY after repeat CTH. Given recent negative ziopatch in April 2025, will need loop recorder outpatient.      Lipitor 10 mg qHS  Normotension  Start ASA 81 mg when cleared by NSGY for AP/AC, potentially after CTH completed 6/11 if stable  Outpatient loop recorder  PT/OT  SLP to evaluate language and cognition s/p cerebral infarction  Gout  - continue Allopurinol  Pancreatic cyst  > Patient had MRI abdomen completed outpatient (5/22, d/t jaundice & thrombocytopenia), with numerous pancreatic cystic lesions concerning for malignancy. This showed numerous cystic lesions scattered throughout the pancreas that were concerning for malignancy at this time.   >Patient's family was informed of these and are aware that following discharge from acute rehab they will need to follow-up with the surgical oncology team as well as GI for further evaluation and treatment of these.  Patient and spouse both aware.    - Surg Onc & GI referrals placed by trauma surgery during acute hospitalization discharge  HTN (hypertension)  Home meds: atenolol-chlorthalidone 50-25, valsartan 320  Here: atenolol 50 mg, losartan 25 mg  - continue titration for normotension  CKD (chronic kidney disease)  Lab Results    Component Value Date    EGFR 94 06/03/2025    EGFR 89 06/02/2025    EGFR 88 05/31/2025    CREATININE 0.68 06/03/2025    CREATININE 0.76 06/02/2025    CREATININE 0.78 05/31/2025     - Adequate GFR while here, previously diagnosed with CKD 2  - has appointment to establish with nephrology on 7/21/25  Superficial foreign body of left leg without major open wound and without infection  -wound care consulted while in acute care  - Left Pretibial leg wound: cleanse left lower leg wound with soap and water, pat dry, apply xeroform to wound bed and cover with foam dressing. Change every other day.   Oropharyngeal dysphagia  Last seen by SLP on 6/1/25    - continue recommended diet of regular texture and thin liquids  - Aspiration precautions and compensatory swallowing strategies: upright posture, slow rate of feeding, small bites/sips, and alternating bites and sips   - SLP consulted  Left shoulder pain  - new pain for patient  - pain at AC joint, but could not provoke pain other than on palpation  - Differential includes AC arthritis, glenohumeral arthritis, MSK strain, rotator cuff tendinopahty  - Doesn't seem like needs K-tape as no sulcus sign  - PT/OT  - XR if hindering therapies    Subjective   74-year-old male with history of diabetes, stage II CKD, hypertension, hyperlipidemia, history of multiple joint osteoarthritis status post bilateral KIANNA who presents to [facility] in April with an unwitnessed fall and had a subdural hemorrhage which seems to have resolved on repeat imaging.  Eventually due to decline in overall function MRI was completed and he was found to have large bilateral high convexity subdural hematomas and was recommended to go to the emergency room.  He underwent bur hole evacuation and subdural drain placement bilaterally with improvements on the follow-up imaging.  An MMA intervention was not amenable.  He completed Keppra for seizure prophylaxis.  He had a change in mental status his  postoperative course and was found to have an acute infarct in the right superior cerebellum for which neurosurgery was consulted and recommending a loop recorder as well as aspirin once cleared by neurosurgery.The patient was evaluated by the Rehabilitation team and deemed an appropriate candidate for comprehensive inpatient rehabilitation and admitted to the [facility] on 6/3/2025  3:27 PM     Chief Complaint: f/u TBI and f/u ambulatory dysfunction    Interval: No acute events overnight. Seen this morning at bedside recliner. Slept well, feeling well this AM. Looking forward to therapy. Last BM 6/4    He notes right knee pain that radiates to his right hip that is a shooting pain and chronic for him, but worsened since therapy started. He endorses remote history of bilateral hip replacements, with the left one occurring 25 years after his right one. The pain is intermittent and is all around his knee.    Discussed in interdisciplinary teams today, and determined that patient will have an anticipated discharge date of 6/16/2025 home with family.    Denies fever, chills, headaches, changes in vision, chest pain, shortness of breath, presyncope, abdominal pain, nausea, diarrhea, constipation, and insomnia.       Objective :  Temp:  [98 °F (36.7 °C)-98.4 °F (36.9 °C)] 98 °F (36.7 °C)  HR:  [60-88] 75  BP: (106-131)/(56-68) 106/58  Resp:  [18] 18  SpO2:  [96 %-98 %] 98 %  O2 Device: None (Room air)    Functional Update:  Mobility: mod-maxA walking 10' with SPC  Transfers: mod-maxA transfers  ADLs: supervision UB dressing/bathing, max assist LB bathing/dressing, max assist toileting    Physical Exam  Vitals reviewed.   Constitutional:       General: He is not in acute distress.     Appearance: He is obese.   HENT:      Head: Normocephalic.      Comments: Bilateral incisions C/D/I     Right Ear: External ear normal.      Left Ear: External ear normal.      Nose: No rhinorrhea.      Mouth/Throat:      Mouth: Mucous membranes are  moist.      Pharynx: Oropharynx is clear.     Eyes:      General: No scleral icterus.     Conjunctiva/sclera: Conjunctivae normal.       Cardiovascular:      Rate and Rhythm: Normal rate.      Pulses: Normal pulses.   Pulmonary:      Effort: Pulmonary effort is normal. No respiratory distress.   Abdominal:      General: There is no distension.      Palpations: Abdomen is soft.     Musculoskeletal:      Right hip: No tenderness.      Right knee: Crepitus present. No bony tenderness. Normal range of motion. No tenderness. No LCL laxity, MCL laxity, ACL laxity or PCL laxity. Normal alignment, normal meniscus and normal patellar mobility.      Right lower leg: No edema.      Left lower leg: No edema.      Comments: FADER negative on right, painful in right hip with internal rotation     Skin:     General: Skin is warm and dry.     Neurological:      Mental Status: He is alert and oriented to person, place, and time.     Psychiatric:         Mood and Affect: Mood normal.         Behavior: Behavior normal.           Scheduled Meds:  Current Facility-Administered Medications   Medication Dose Route Frequency Provider Last Rate    acetaminophen  650 mg Oral Q6H PRN JOSE ALEJANDRO Bradley      allopurinol  300 mg Oral Daily JOSE ALEJANDRO Bradley      atenolol  50 mg Oral Daily JOSE ALEJANDRO Bradley      And    [Held by provider] chlorthalidone  25 mg Oral Daily JOSE ALEJANDRO Bradley      atorvastatin  10 mg Oral HS JOSE ALEJANDRO Bradley      chlorhexidine  15 mL Mouth/Throat Q12H [facility] JOSE ALEJANDRO Bradley      Diclofenac Sodium  2 g Topical 4x Daily PRN JOSE ALEJANDRO Bradley      enoxaparin  30 mg Subcutaneous Q12H [facility] JOSE ALEJANDRO Bradley      ferrous sulfate  325 mg Oral Every Other Day JOSE ALEJANDRO Bradley      hydrALAZINE  10 mg Oral Q8H PRN JOSE ALEJANDRO Bradley      insulin lispro  1-5 Units Subcutaneous TID  JOSE ALEJANDRO Bradley      insulin lispro   1-5 Units Subcutaneous HS JOSE ALEJANDRO Bradley      losartan  25 mg Oral Daily JOSE ALEJANDRO Bradley      polyethylene glycol  17 g Oral Daily JOSE ALEJANDRO Bradley      senna-docusate sodium  1 tablet Oral HS JOSE ALEJANDRO Bradley           Lab Results: I have reviewed the following results:  Results from last 7 days   Lab Units 06/03/25  0601 06/02/25  0559 05/31/25  1007   HEMOGLOBIN g/dL 10.6* 10.6* 10.9*   HEMATOCRIT % 33.1* 33.7* 33.9*   WBC Thousand/uL 7.45 7.98 7.10   PLATELETS Thousands/uL 225 215 226     Results from last 7 days   Lab Units 06/03/25  0601 06/02/25  0559 05/31/25  1007   BUN mg/dL 21 26* 29*   SODIUM mmol/L 135 136 137   POTASSIUM mmol/L 4.1 4.8 4.2   CHLORIDE mmol/L 101 103 102   CREATININE mg/dL 0.68 0.76 0.78   AST U/L 21  --  18   ALT U/L 23  --  17       Results from last 7 days   Lab Units 05/31/25  1007   PROTIME seconds 15.5*   INR  1.20*          Martinez Ramirez MD (PGY-2)

## 2025-06-05 NOTE — PCC SPEECH THERAPY
Pt completed skilled cognitive and beside swallow assessments.    In regards to swallow: Pt presenting with WFL oral and WFL pharyngeal swallow function. Recommend regular diet and thin liquids, set up as needed. No further follow up warranted.     In regards to cognition: Pt completed the CLQT+ on initial evaluation with a Composite Severity Rating score of 3.8 out of 4.0, correlating to overall WNL cognitive linguistic impairments at time of evaluation and in comparison to age matched peers ranging from 70-90 y/o. Pt scored at or above criterion cut score for 8 out of 10 tasks completed. Pt overall scoring WNL as compared to similar aged peers. However, pt demonstrating MILD cognitive deficits in executive functions and clock drawing. Discussed goals to maximize his skills- pt reported agreement to engaged in follow up cognitive therapy while on rehab unit. Pt also completed informal interview- pt orientated to place, situation and date. Lives at home with supportive family who can provided supervision and assistance. Pt was independent with meds but wife completed bills and household chores. Family can provide transportation. Reviewed rehab program and [facility]. Pt will cont to benefit from skilled SLP services targeting cognitive linguistic communication skills for increased independence and decreased burden of care.     Family training to be initiated in upcoming sessions. Pt suspected to meet goals of supervision to mod I for overall cognitive linguistic communication skills.     This week, focus for continued services to target IADL tasks, functional cognitive tasks for sequencing, STM and working memory and executive functioning.    At this time, pt will continue to benefit from skilled cognitive linguistic tx session to maximize overall functional independence given overall cognitive linguistic skills in attempts to decreased caregiver burden over time.     Update from week: 6/11/2025 . Pt is being followed for  cognitive linguistic tx sessions to where pt is making steady progress this week.     Continued cognitive barriers which present include: decreased attention, visual attention, ST memory recall , problem solving, reasoning, sequencing, organization of thoughts, and as well as executive functions, which still impacts pt's overall safety, functional cognitive communication skills as well as functional mobility. The following interventions are used to target these barriers, including verbal problem solving task, verbal working memory tasks, visual memory recall tasks, drawing conclusions activities, written sequencing tasks, verbal sequencing tasks, verbal reasoning tasks, higher level deductive reasoning activities, verbal review of current medications, written review of medications,  written health management tasks, verbal health management tasks, visual attention tasks, and family education/training.     Current level of functioning:   Comprehension: supervision  Expression: supervision  Social Interaction: mod I  Executive functions: min A  Memory: min A    Family training/education to be initiated in upcoming sessions as able. Pt is suspected to meet goals for supervision to mod I for overall cognitive linguistic communication skills. This week, focus for continued services to target IADL tasks, STM and working memory and executive functioning. At this time, pt will continue to benefit from skilled cognitive linguistic tx session to maximize overall functional independence given overall cognitive linguistic skills in attempts to decreased caregiver burden over time.

## 2025-06-05 NOTE — PROGRESS NOTES
06/05/25 0901   Pain Assessment   Pain Assessment Tool 0-10   Pain Score No Pain   Restrictions/Precautions   Precautions Aspiration;Bed/chair alarms;Cognitive;Fall Risk;Supervision on toilet/commode   Weight Bearing Restrictions No   ROM Restrictions No   Comprehension   Comprehension (FIM) 5 - Needs help/cues, repetition only RARELY ( < 10% of the time)   Expression   Expression (FIM) 5 - Needs help/cues only RARELY (< 10% of the time)   Social Interaction   Social Interaction (FIM) 6 - Interacts appropriately with others BUT requires extra  time   Problem Solving   Problem solving (FIM) 4 - Solves basic problems 75-89% of time   Memory   Memory (FIM) 4 - Recognizes/recalls/performs 75-89%   Speech/Language/Cognition Assessmetn   Treatment Assessment Pt seen for skilled speech therapy session targeting cognitive linguistic communication skills. Pt alert and interactive- engaged in some rapport conversation regarding rehab process, therapies completed, discussed schedule and routine as well as plan for team meeting today and that therapy will provide update on his progress, goals, and discharge plan. Pt receptive, asking appropriate follow up questions- good insight.     Written sequencing 4 step tasks, pt able to complete with 32/32 acc increasing with extended time and noted pt was able to self correct errors x2. Completed 5 step task with 16/20acc increasing with verbal cues to recognize errors. Pt with improved reasoning and able to explain his reasoning for the order her selected based on how he would complete task and how he interpretted question.     Completed functional memory/recall task with product associations- provided name bands, pt was able to name the products associated with 38/40acc increasing with verbal cues.      Pt overall is improving with skilled SLP services and will cont to benefit to maximize overall cognitive linguistic communication abilities at this time.    SLP Therapy Minutes    SLP Time In 0900   SLP Time Out 1000   SLP Total Time (minutes) 60   SLP Mode of treatment - Individual (minutes) 60   SLP Mode of treatment - Concurrent (minutes) 0   SLP Mode of treatment - Group (minutes) 0   SLP Mode of treatment - Co-treat (minutes) 0   SLP Mode of Treatment - Total time(minutes) 60 minutes   SLP Cumulative Minutes 150   Therapy Time missed   Time missed? No

## 2025-06-05 NOTE — PHYSICAL THERAPY NOTE
"Wife notifying therapy of the following:    Steps from garage:  1st step 7\"  Next step 7.5\" (into the house)    Steps at front of house:  Pathway to step 8\"  Landing into home 6\"     Bed is 28\" high   "

## 2025-06-05 NOTE — OCCUPATIONAL THERAPY NOTE
Occupational Therapy POC:     Barriers: cognition, endurance, activity tolerance, endurance, higher level balance, dec functional reach due to body habitus    Plan: LHAE education for LB dressing, higher level balance retraining (PT has been trialing with no AD, keeping RW when in room w/ staff), endurance training, UB strengthening, functional cog and IADL mgmt including med mgmt, finance mgmt and meal prep.     DC date: Monday 6/13 w/ OPPT      *please include this POC to your daily treatment note to allow for continuity of care. Adjust POC as necessary.*    Eli Cash OTR/L

## 2025-06-05 NOTE — ASSESSMENT & PLAN NOTE
Home:  Atenolol/chlorthalidone 50-25 mg qd/Diovan 320 mg qd  Here:  Atenolol 50 mg qd/Losartan 25mg qd  On 6/4/25, added Losartan back at 25mg qd  Has Hydralazine 10 mg q8hrs prn SBP >165  No changes today 6/5/25

## 2025-06-05 NOTE — WOUND OSTOMY CARE
Progress Note - Wound   Deonte Attarian 74 y.o. male MRN: [id number]  Unit/Bed#: -01 Encounter: [id number]        Assessment:   Wound Care was reconsulted for a sacral wound.     Patient is received OOB in bathroom during daily care. He is alert and oriented, pleasant and agreeable to today's assessment. He uses his walker to stand for assessment and requires an assist x1.     Findings:  1-Sacrum MASD: Three small partial thickness wounds measured together that are pink in color and fragile. No drainage noted.Periwound is fragile and hyperpigmented.     No induration, fluctuance, odor, warmth/temperature differences, redness, or purulence noted to the above noted wounds and skin areas assessed. New dressings applied per orders listed below. Patient tolerated well- no s/s of non-verbal pain or discomfort observed during the encounter. Bedside nurse aware of plan of care. See flow sheets for more detailed assessment findings.      Orders listed below and wound care will continue to follow, call or Secure Chat Message with questions.       Skin care plans:  1-Cleanse B/L Heels and Sacro-Buttocks with soap and water. Pat dry. Apply Silicone Border Foam (Mepilex) to areas. Gaston with P for Prevention and change every 3 days or PRN soilage/displacement. Peel back and inspect Q-shift.  2-Elevate heels to offload pressure.  3-Ehob cushion in chair when out of bed.  4-Moisturize skin daily with skin nourishing cream.  5-Turn/reposition q2h for pressure re-distribution on skin.  6-Left Pretibial leg wound: cleanse left lower leg wound with soap and water, pat dry, apply xeroform to wound bed and cover with foam dressing. Change every other day  7-Sacrum: Cleanse with mild soap and water and pat dry. Apply thin layer of Calazime paste to sacrum TID and prn.    WOUNDS:      Wound 06/04/25 Pressure Injury Sacrum Mid;Left (Active)   Wound Image   06/05/25 0835   Wound Description Pink;Fragile 06/05/25 0835   Pressure  Injury Stage N/a 06/04/25 1000   Non-staged Wound Description Partial thickness 06/05/25 0835   Wound Length (cm) 1.5 cm 06/05/25 0835   Wound Width (cm) 1.5 cm 06/05/25 0835   Wound Depth (cm) 0.1 cm 06/05/25 0835   Wound Surface Area (cm^2) 1.77 cm^2 06/05/25 0835   Wound Volume (cm^3) 0.118 cm^3 06/05/25 0835   Calculated Wound Volume (cm^3) 0.23 cm^3 06/05/25 0835   Antonia-wound Assessment Fragile;Hyperpigmented 06/05/25 0835   Treatments Cleansed;Site care 06/05/25 0835   Wound Site Closure None 06/05/25 0835   Dressing Moisture barrier 06/05/25 0835   Wound packed? No 06/05/25 0835       Daly Pal BSN, RN, CWCN

## 2025-06-05 NOTE — PROGRESS NOTES
"Progress Note - Hospitalist   Name: Deonte Greenfield 74 y.o. male I MRN: [id number]  Unit/Bed#: -01 I Date of Admission: 6/3/2025   Date of Service: 6/5/2025 I Hospital Day: 2    Assessment & Plan  Acute on chronic intracranial subdural hematoma (HCC)  MRI of the brain that showed large bilateral SDH  S/p bilateral Yael holes for evacuation of SDH 5/25/25  MMA was not able to be done due to anatomical considerations  For no full AC/AP for 2 weeks = 6/11/25 then need to clear with NS  Acute CVA (cerebrovascular accident) (HCC)  Acute CVA right superior cerebellum  Was found on CTH 5/30, confirmed by MRI  CTA of the head/neck showed \"Moderate (approximately 50%) stenosis in the 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 No significant stenosis of the cervical left internal carotid artery. Severe stenosis origin of the dominant left vertebral artery. Hypoplastic right vertebral artery. No high-grade intracranial stenosis, large vessel occlusion or aneurysm\"  Neurology saw in consult for the CVA.  They recommended that the patient start ASA 81mg daily when cleared by Neurosurgery as above.    For LOOP recorder implanted as an outpatient.  He did have a previous Ziopatch done that was negative for atrial fibrillation.    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  Continue statin  Pancreatic cyst  Had an OP ultrasound done for elevated total bilirubin that showed cystic lesions in the pancreas for which further imaging was recommended  MRI done thereafter as an OP showed \"Numerous cystic lesions scattered throughout the pancreas with a dominant lesion measuring 18 mm in the body. Worrisome features include ductal dilation up to 6 mm in the tail the pancreas and abrupt change in caliber of the pancreatic duct. Management recommendation: Because of presence of multiple worrisome features, surgical consultation/management " "with consideration for EUS recommended.  Management and follow up recommendations for cystic pancreatic lesions are based on Institutional consensus and international evidence-based [place] guidelines for the management of intraductal papillary mucinous neoplasm of the pancreas. Pancreatology 24 (2024) 255-270\".  Per IMs note in the hospital:  \"Reached out to primary team and surgery oncall as imaging findings are suggestive of malignancy. According to surgery they recommend out patient follow up with surg onc for possible biopsy and out patient GI follow up. Surgery called the surg onc office to schedule the appointment for patient. I did a STAT referral for GI outpatient. Primary team will inform the patient regarding imaging findings and the referrals\".  Most recent CMP on 6/3/25 with normal total bilirubin  Diabetes mellitus (HCC)  HbA1C 6.2 on 5/24/25  Home:  Metformin 1000 mg BID  Here:  QID Accuchecks/SSI  Will restart Metformin when needed/able  HTN (hypertension)  Home:  Atenolol/chlorthalidone 50-25 mg qd/Diovan 320 mg qd  Here:  Atenolol 50 mg qd/Losartan 25mg qd  On 6/4/25, added Losartan back at 25mg qd  Has Hydralazine 10 mg q8hrs prn SBP >165  No changes today 6/5/25  Thrombocytopenia (HCC)  Chronic  Follows with H-O as OP  Currently platelet count is 225  CBC 6/6/25  Mixed hyperlipidemia  Takes Lipitor 10 mg qd at home = continue here  Normocytic anemia  Continue Iron 325 mg QOD  Gout  Continue Allopurinol 300mg qd as at home  No recent flares  CKD (chronic kidney disease)  Baseline 0.7-0.9  Stable  Superficial foreign body of left leg without major open wound and without infection  Per PMR  Left shoulder pain  Per PMR    The above assessment and plan was reviewed and updated as determined by my evaluation of the patient on 6/5/2025.    History of Present Illness   Patient seen and examined. Patients overnight issues or events were reviewed with nursing staff. New or overnight issues include the " following:   No new or overnight issues.  Offers no complaints    Review of Systems    Objective :  Temp:  [98 °F (36.7 °C)-98.4 °F (36.9 °C)] 98 °F (36.7 °C)  HR:  [60-88] 75  BP: (106-131)/(56-68) 106/58  Resp:  [18] 18  SpO2:  [96 %-98 %] 98 %  O2 Device: None (Room air)    Invasive Devices       Peripheral Intravenous Line  Duration             Peripheral IV 06/02/25 Right;Ventral (anterior) Forearm 3 days                    Physical Exam  General Appearance: no distress, non toxic appearing  HEENT: PERRLA, conjuctiva normal; oropharynx clear; mucous membranes moist   Neck:  Supple, normal ROM  Lungs: CTA, normal respiratory effort, no retractions, expiratory effort normal  CV: regular rate and rhythm; no rubs/murmurs/gallops, PMI normal   ABD: soft; ND/NT; +BS  EXT: no edema  Skin: normal turgor, normal texture  Psych: affect normal, mood normal  Neuro: AAO      The above physical exam was reviewed and updated as determined by my evaluation of the patient on 6/5/2025.      Lab Results: I have reviewed the following results:  Results from last 7 days   Lab Units 06/03/25  0601 06/02/25  0559   WBC Thousand/uL 7.45 7.98   HEMOGLOBIN g/dL 10.6* 10.6*   HEMATOCRIT % 33.1* 33.7*   PLATELETS Thousands/uL 225 215     Results from last 7 days   Lab Units 06/03/25  0601 06/02/25  0559   SODIUM mmol/L 135 136   POTASSIUM mmol/L 4.1 4.8   CHLORIDE mmol/L 101 103   CO2 mmol/L 28 25   BUN mg/dL 21 26*   CREATININE mg/dL 0.68 0.76   CALCIUM mg/dL 9.1 9.3         Results from last 7 days   Lab Units 05/31/25  1007   INR  1.20*     Results from last 7 days   Lab Units 06/05/25  0546 06/04/25 2052 06/04/25  1534   POC GLUCOSE mg/dl 110 111 103       Imaging Results Review: No pertinent imaging studies reviewed.  Other Study Results Review: No additional pertinent studies reviewed.    Review of Scheduled Meds: Medications  reviewed and reconciled as needed  Current Facility-Administered Medications   Medication Dose Route  Frequency Provider Last Rate    acetaminophen  650 mg Oral Q6H PRN JOSE ALEJANDRO Bradley      allopurinol  300 mg Oral Daily JOSE ALEJANDRO Bradley      atenolol  50 mg Oral Daily JOSE ALEJANDRO Bradley      And    [Held by provider] chlorthalidone  25 mg Oral Daily JOSE ALEJANDRO Bradley      atorvastatin  10 mg Oral HS JOSE ALEJANDRO Bradley      chlorhexidine  15 mL Mouth/Throat Q12H [facility]GET JOSE ALEJANDRO Bradley      Diclofenac Sodium  2 g Topical 4x Daily PRN JOSE ALEJANDRO Bradley      enoxaparin  30 mg Subcutaneous Q12H [facility] JOSE ALEJANDRO Bradley      ferrous sulfate  325 mg Oral Every Other Day JOSE ALEJANDRO Bradley      hydrALAZINE  10 mg Oral Q8H PRN JOSE ALEJANDRO Bradley      insulin lispro  1-5 Units Subcutaneous TID AC JOSE ALEJANDRO Bradley      insulin lispro  1-5 Units Subcutaneous HS JOSE ALEJANDRO Bradley      losartan  25 mg Oral Daily JOSE ALEJANDRO Bradley      polyethylene glycol  17 g Oral Daily JOSE ALEJANDRO Bradley      senna-docusate sodium  1 tablet Oral HS JOSE ALEJANDRO Bradley         VTE Pharmacologic Prophylaxis: Lovenox  Code Status: Level 1 - Full Code  Current Length of Stay: 2 day(s)    Administrative Statements     ** Please Note:  voice to text software may have been used in the creation of this document. Although proof errors in transcription or interpretation are a potential of such software**

## 2025-06-05 NOTE — CASE MANAGEMENT
Met w/pt, spouse and two sons and reviewed team update with potential for dc 6/16.  They are in agreement and aware of dc time of noon. Cm reviewed recommendation for contd outpt therapy and cm will follow up next week with modalities and appropriate location.

## 2025-06-05 NOTE — PROGRESS NOTES
06/05/25 1000   Pain Assessment   Pain Assessment Tool 0-10   Pain Score No Pain   Restrictions/Precautions   Precautions Aspiration;Bed/chair alarms;Cognitive;Fall Risk;Supervision on toilet/commode   Weight Bearing Restrictions No   ROM Restrictions No   Cognition   Overall Cognitive Status Impaired   Arousal/Participation Alert;Cooperative   Attention Attends with cues to redirect   Orientation Level Oriented X4   Memory Decreased short term memory;Decreased recall of recent events;Decreased recall of precautions   Following Commands Follows one step commands without difficulty   Sit to Stand   Type of Assistance Needed Physical assistance;Incidental touching;Adaptive equipment   Physical Assistance Level 26%-50%   Comment varied CGA to MIN/MODA pending height of chair. use of RW, SPC and no AD.   Sit to Stand CARE Score 3   Bed-Chair Transfer   Type of Assistance Needed Physical assistance;Verbal cues;Adaptive equipment   Physical Assistance Level 25% or less   Comment JAQUELINE with SPC and HHA, CGA with RW.   Chair/Bed-to-Chair Transfer CARE Score 3   Transfer Bed/Chair/Wheelchair   Adaptive Equipment Roller Walker;Cane;Hand Hold;None   Walk 10 Feet   Type of Assistance Needed Physical assistance;Verbal cues;Adaptive equipment   Physical Assistance Level 51%-75%   Comment CGA with RW, MIN/MODA with SPC and no AD   Walk 10 Feet CARE Score 2   Walk 50 Feet with Two Turns   Type of Assistance Needed Physical assistance;Verbal cues;Adaptive equipment   Physical Assistance Level 51%-75%   Comment CGA with RW, MIN/MODA with SPC and no AD   Walk 50 Feet with Two Turns CARE Score 2   Walk 150 Feet   Type of Assistance Needed Physical assistance;Verbal cues;Adaptive equipment   Physical Assistance Level 51%-75%   Comment CGA with RW, MIN/MODA with SPC and no AD   Walk 150 Feet CARE Score 2   Ambulation   Primary Mode of Locomotion Prior to Admission Walk   Distance Walked (feet) 150 ft  (x4, 100' x2)   Assist Device Hand  Hold;Cane;Roller Walker   Gait Pattern Inconsistant Rose;Slow Rose;Decreased foot clearance;Forward Flexion;Shuffle;Improper weight shift;Lateral deviation   Limitations Noted In Balance;Endurance;Heel Strike;Posture;Safety;Speed;Swing;Strength   Provided Assistance with: Direction;Balance   Does the patient walk? 2. Yes   Curb or Single Stair   Style negotiated Curb   Type of Assistance Needed Physical assistance;Verbal cues;Adaptive equipment   Physical Assistance Level 51%-75%   Comment with SPC, JAQUELINE with RW   1 Step (Curb) CARE Score 2   Toilet Transfer   Type of Assistance Needed Physical assistance;Verbal cues;Adaptive equipment   Physical Assistance Level 25% or less   Comment with RW   Toilet Transfer CARE Score 3   Toilet Transfer   Surface Assessed Standard Toilet   Therapeutic Interventions   Neuromuscular Re-Education AMB ball toss with MODA fwd/bwds, alt to taps to cone with HHA/MODA x10 reps.   Equipment Use   NuStep L2 x14 min BLE/UE   Assessment   Treatment Assessment Pt participated in skilled PT session with increased focus on gait with LRAD, increased balance, coordination, righting reactions and insight into current deficits. Pt cont to be limited by balance, coordination and lateral deviations with gait. Pt noted poor weight shifting with no AD with decreased foot clearance. Pt Pt states he is most comfortable with the RW but at baseline he was amb with no AD or a SPC. Pt will cont POC as tolerated with cont focus on balance, coordination, increased safety awareness, increased righting reactions and increased stair management.   Problem List Decreased strength;Decreased endurance;Impaired balance;Decreased mobility;Decreased coordination;Decreased cognition;Impaired judgement;Decreased safety awareness;Obesity   Barriers to Discharge Decreased caregiver support;Inaccessible home environment   PT Barriers   Functional Limitation Ramp negotiation;Stair negotiation;Standing;Transfers;Walking    Plan   Treatment/Interventions Functional transfer training;LE strengthening/ROM;Therapeutic exercise;Endurance training;Cognitive reorientation;Patient/family training;Bed mobility;Gait training   Progress Progressing toward goals   Discharge Recommendation   Equipment Recommended Walker   PT Therapy Minutes   PT Time In 1000   PT Time Out 1130   PT Total Time (minutes) 90   PT Mode of treatment - Individual (minutes) 90   PT Mode of treatment - Concurrent (minutes) 0   PT Mode of treatment - Group (minutes) 0   PT Mode of treatment - Co-treat (minutes) 0   PT Mode of Treatment - Total time(minutes) 90 minutes   PT Cumulative Minutes 180   Therapy Time missed   Time missed? No

## 2025-06-05 NOTE — ASSESSMENT & PLAN NOTE
"Had an OP ultrasound done for elevated total bilirubin that showed cystic lesions in the pancreas for which further imaging was recommended  MRI done thereafter as an OP showed \"Numerous cystic lesions scattered throughout the pancreas with a dominant lesion measuring 18 mm in the body. Worrisome features include ductal dilation up to 6 mm in the tail the pancreas and abrupt change in caliber of the pancreatic duct. Management recommendation: Because of presence of multiple worrisome features, surgical consultation/management with consideration for EUS recommended.  Management and follow up recommendations for cystic pancreatic lesions are based on Institutional consensus and international evidence-based [place] guidelines for the management of intraductal papillary mucinous neoplasm of the pancreas. Pancreatology 24 (2024) 255-270\".  Per IMs note in the hospital:  \"Reached out to primary team and surgery oncall as imaging findings are suggestive of malignancy. According to surgery they recommend out patient follow up with surg onc for possible biopsy and out patient GI follow up. Surgery called the surg onc office to schedule the appointment for patient. I did a STAT referral for GI outpatient. Primary team will inform the patient regarding imaging findings and the referrals\".  Most recent CMP on 6/3/25 with normal total bilirubin  "

## 2025-06-05 NOTE — ASSESSMENT & PLAN NOTE
> Originally presented in early April due to unwitnessed fall and found to have small subdural hemorrhage which subsequently appeared to resolve upon repeat CT head imaging. Patient opted to have MRI performed in outpatient setting and MRI team sent him to ER for large bilateral high convexity SDHs with mass effect. Presented to ED on 5/24 where he received bilateral Yael holes with 4 subdural drains placed  > Drains removed on 5/27 and 5/28  > completed 7 days of Keppra for seizure prophylaxis    Repeat CT head STAT if GCS declines more than 2 points in 1 hour  SBP < 160  INR < 1.4  Plt > 100  Hold all therapeutic doses of AC / AP therapy, he is not cleared to start asa per neurology recommendations for stroke management  Ok for dvt prophylaxis, continue lovenox  PT/OT

## 2025-06-05 NOTE — PCC NURSING
Pt admitted s/p fall.  MRI of the brain that showed large bilateral SDH S/p bilateral Baird holes for evacuation of SDH 5/25/25 MMA was not able to be done due to anatomical considerations For no full AC/AP for 2 weeks = 6/11/25 then need to clear with NS Acute CVA-right superior cerebellum Was found on CTH 5/30, confirmed by MRI Neurology saw in consult for the CVA.  They recommended that the patient start ASA 81mg daily when cleared by Neurosurgery as above.   For LOOP recorder implanted as an outpatient.  He did have a previous Ziopatch done that was negative for atrial fibrillation.    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  Will need op f/u.   Diabetes mellitus- HbA1C 6.2 on 5/24/25 Home:  Metformin 1000 mg BID Here:  QID Accuchecks/SSI Will restart Metformin when needed/able HTN-Home:  Atenolol/chlorthalidone 50-25 mg qd/Diovan 320 mg qd Here:  Atenolol 50 mg qd/Losartan 25mg qd. Has Hydralazine 10 mg q8hrs prn SBP >165. Thrombocytopenia-Chronic Follows with H-O as OP Mixed hyperlipidemia- on  Lipitor 10 mg qd as at home.   Gout-Allopurinol 300mg qd as at home CKD- Baseline 0.7-0.9.  Chronic L leg wound with local wound care every other day.  New L shoulder pain-PMR monitoring.  Wound care consulted for stage 2 sacral slit area.  Mepilex applied.  Pt is continent of bowel and bladder.  Pt requires alarms for safety.      This week we will encourage independence with ADLs.  We will monitor labs and vital signs.  We will educate pt/family about repositioning to prevent skin breakdown.  We will assist w repositioning and perform routine skin checks.  We will monitor for adequate pain control.  We will monitor for constipation and medicate pt as ordered. We will monitor incision/wound for healing and s/s of infection. We will provide pt/family with DM education as needed.We will increase safety awareness and  keep pt free from falls.

## 2025-06-05 NOTE — DISCHARGE INSTR - OTHER ORDERS
Skin care plans:  1-Cleanse B/L Heels with soap and water. Pat dry. Apply Silicone Border Foam (Mepilex) to areas. Gaston with P for Prevention and change every 3 days or PRN soilage/displacement. Peel back and inspect Q-shift.  2-Elevate heels to offload pressure.  3-Ehob cushion in chair when out of bed.  4-Moisturize skin daily with skin nourishing cream.  5-Turn/reposition q2h for pressure re-distribution on skin.  6-Sacrum: Cleanse with mild soap and water and pat dry. Apply thin layer of Calazime paste to sacrum three times a day and prn.

## 2025-06-06 ENCOUNTER — TELEPHONE (OUTPATIENT)
Age: 75
End: 2025-06-06

## 2025-06-06 PROBLEM — R60.0 BILATERAL LEG EDEMA: Status: ACTIVE | Noted: 2025-06-06

## 2025-06-06 PROBLEM — S81.802A LEG WOUND, LEFT: Status: ACTIVE | Noted: 2025-06-06

## 2025-06-06 LAB
ANION GAP SERPL CALCULATED.3IONS-SCNC: 8 MMOL/L (ref 4–13)
BASOPHILS # BLD AUTO: 0.07 THOUSANDS/ÂΜL (ref 0–0.1)
BASOPHILS NFR BLD AUTO: 1 % (ref 0–1)
BUN SERPL-MCNC: 24 MG/DL (ref 5–25)
CALCIUM SERPL-MCNC: 9.1 MG/DL (ref 8.4–10.2)
CHLORIDE SERPL-SCNC: 104 MMOL/L (ref 96–108)
CO2 SERPL-SCNC: 26 MMOL/L (ref 21–32)
CREAT SERPL-MCNC: 0.74 MG/DL (ref 0.6–1.3)
EOSINOPHIL # BLD AUTO: 0.15 THOUSAND/ÂΜL (ref 0–0.61)
EOSINOPHIL NFR BLD AUTO: 2 % (ref 0–6)
ERYTHROCYTE [DISTWIDTH] IN BLOOD BY AUTOMATED COUNT: 15.2 % (ref 11.6–15.1)
GFR SERPL CREATININE-BSD FRML MDRD: 90 ML/MIN/1.73SQ M
GLUCOSE SERPL-MCNC: 101 MG/DL (ref 65–140)
GLUCOSE SERPL-MCNC: 106 MG/DL (ref 65–140)
GLUCOSE SERPL-MCNC: 129 MG/DL (ref 65–140)
GLUCOSE SERPL-MCNC: 131 MG/DL (ref 65–140)
GLUCOSE SERPL-MCNC: 134 MG/DL (ref 65–140)
HCT VFR BLD AUTO: 35.1 % (ref 36.5–49.3)
HGB BLD-MCNC: 11 G/DL (ref 12–17)
IMM GRANULOCYTES # BLD AUTO: 0.08 THOUSAND/UL (ref 0–0.2)
IMM GRANULOCYTES NFR BLD AUTO: 1 % (ref 0–2)
LYMPHOCYTES # BLD AUTO: 1.15 THOUSANDS/ÂΜL (ref 0.6–4.47)
LYMPHOCYTES NFR BLD AUTO: 16 % (ref 14–44)
MCH RBC QN AUTO: 29.7 PG (ref 26.8–34.3)
MCHC RBC AUTO-ENTMCNC: 31.3 G/DL (ref 31.4–37.4)
MCV RBC AUTO: 95 FL (ref 82–98)
MONOCYTES # BLD AUTO: 0.63 THOUSAND/ÂΜL (ref 0.17–1.22)
MONOCYTES NFR BLD AUTO: 9 % (ref 4–12)
NEUTROPHILS # BLD AUTO: 5.16 THOUSANDS/ÂΜL (ref 1.85–7.62)
NEUTS SEG NFR BLD AUTO: 71 % (ref 43–75)
NRBC BLD AUTO-RTO: 0 /100 WBCS
PLATELET # BLD AUTO: 223 THOUSANDS/UL (ref 149–390)
PMV BLD AUTO: 12 FL (ref 8.9–12.7)
POTASSIUM SERPL-SCNC: 4.2 MMOL/L (ref 3.5–5.3)
RBC # BLD AUTO: 3.7 MILLION/UL (ref 3.88–5.62)
SODIUM SERPL-SCNC: 138 MMOL/L (ref 135–147)
WBC # BLD AUTO: 7.24 THOUSAND/UL (ref 4.31–10.16)

## 2025-06-06 PROCEDURE — 97129 THER IVNTJ 1ST 15 MIN: CPT

## 2025-06-06 PROCEDURE — 82948 REAGENT STRIP/BLOOD GLUCOSE: CPT

## 2025-06-06 PROCEDURE — 97130 THER IVNTJ EA ADDL 15 MIN: CPT

## 2025-06-06 PROCEDURE — 97535 SELF CARE MNGMENT TRAINING: CPT

## 2025-06-06 PROCEDURE — 80048 BASIC METABOLIC PNL TOTAL CA: CPT | Performed by: NURSE PRACTITIONER

## 2025-06-06 PROCEDURE — 97112 NEUROMUSCULAR REEDUCATION: CPT

## 2025-06-06 PROCEDURE — 99232 SBSQ HOSP IP/OBS MODERATE 35: CPT | Performed by: NURSE PRACTITIONER

## 2025-06-06 PROCEDURE — 97530 THERAPEUTIC ACTIVITIES: CPT

## 2025-06-06 PROCEDURE — 85025 COMPLETE CBC W/AUTO DIFF WBC: CPT | Performed by: NURSE PRACTITIONER

## 2025-06-06 PROCEDURE — 99232 SBSQ HOSP IP/OBS MODERATE 35: CPT | Performed by: STUDENT IN AN ORGANIZED HEALTH CARE EDUCATION/TRAINING PROGRAM

## 2025-06-06 RX ADMIN — ENOXAPARIN SODIUM 30 MG: 30 INJECTION SUBCUTANEOUS at 11:12

## 2025-06-06 RX ADMIN — FERROUS SULFATE TAB 325 MG (65 MG ELEMENTAL FE) 325 MG: 325 (65 FE) TAB at 11:07

## 2025-06-06 RX ADMIN — ATORVASTATIN CALCIUM 10 MG: 10 TABLET, FILM COATED ORAL at 21:55

## 2025-06-06 RX ADMIN — ENOXAPARIN SODIUM 30 MG: 30 INJECTION SUBCUTANEOUS at 20:29

## 2025-06-06 RX ADMIN — LIDOCAINE 4%: 4 CREAM TOPICAL at 11:44

## 2025-06-06 RX ADMIN — ATENOLOL 50 MG: 50 TABLET ORAL at 11:08

## 2025-06-06 RX ADMIN — CHLORHEXIDINE GLUCONATE 15 ML: 1.2 SOLUTION ORAL at 20:28

## 2025-06-06 RX ADMIN — SENNOSIDES AND DOCUSATE SODIUM 1 TABLET: 50; 8.6 TABLET ORAL at 21:55

## 2025-06-06 RX ADMIN — CHLORHEXIDINE GLUCONATE 15 ML: 1.2 SOLUTION ORAL at 11:12

## 2025-06-06 RX ADMIN — ALLOPURINOL 300 MG: 300 TABLET ORAL at 11:07

## 2025-06-06 RX ADMIN — LOSARTAN POTASSIUM 25 MG: 25 TABLET, FILM COATED ORAL at 11:08

## 2025-06-06 RX ADMIN — POLYETHYLENE GLYCOL 3350 17 G: 17 POWDER, FOR SOLUTION ORAL at 11:12

## 2025-06-06 NOTE — PROGRESS NOTES
"Progress Note - Hospitalist   Name: Deonte Greenfield 74 y.o. male I MRN: [id number]  Unit/Bed#: -01 I Date of Admission: 6/3/2025   Date of Service: 6/6/2025 I Hospital Day: 3    Assessment & Plan  Acute on chronic intracranial subdural hematoma (HCC)  MRI of the brain that showed large bilateral SDH  S/p bilateral [place] holes for evacuation of SDH 5/25/25  MMA was not able to be done due to anatomical considerations  For no full AC/AP for 2 weeks = 6/11/25 then need to clear with NS  D/W NS today = they will order followup CTH for 6/9/25 and will see him after done  Acute CVA (cerebrovascular accident) (HCC)  Acute CVA right superior cerebellum  Was found on CTH 5/30, confirmed by MRI  CTA of the head/neck showed \"Moderate (approximately 50%) stenosis in the 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 No significant stenosis of the cervical left internal carotid artery. Severe stenosis origin of the dominant left vertebral artery. Hypoplastic right vertebral artery. No high-grade intracranial stenosis, large vessel occlusion or aneurysm\"  Neurology saw in consult for the CVA.  They recommended that the patient start ASA 81mg daily when cleared by Neurosurgery as above.    For LOOP recorder implanted as an outpatient.  He did have a previous Ziopatch done that was negative for atrial fibrillation.    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  Continue statin  Pancreatic cyst  Had an OP ultrasound done for elevated total bilirubin that showed cystic lesions in the pancreas for which further imaging was recommended  MRI done thereafter as an OP showed \"Numerous cystic lesions scattered throughout the pancreas with a dominant lesion measuring 18 mm in the body. Worrisome features include ductal dilation up to 6 mm in the tail the pancreas and abrupt change in caliber of the pancreatic duct. Management recommendation: " "Because of presence of multiple worrisome features, surgical consultation/management with consideration for EUS recommended.  Management and follow up recommendations for cystic pancreatic lesions are based on Institutional consensus and international evidence-based [place] guidelines for the management of intraductal papillary mucinous neoplasm of the pancreas. Pancreatology 24 (2024) 255-270\".  Per IMs note in the hospital:  \"Reached out to primary team and surgery oncall as imaging findings are suggestive of malignancy. According to surgery they recommend out patient follow up with surg onc for possible biopsy and out patient GI follow up. Surgery called the surg onc office to schedule the appointment for patient. I did a STAT referral for GI outpatient. Primary team will inform the patient regarding imaging findings and the referrals\".  Most recent CMP on 6/3/25 with normal total bilirubin/LFT  Diabetes mellitus (HCC)  HbA1C 6.2 on 5/24/25  Home:  Metformin 1000 mg BID  Here:  QID Accuchecks/SSI  Will restart Metformin when needed/able  HTN (hypertension)  Home:  Atenolol-chlorthalidone 50-25 mg qd/Diovan 320 mg qd  Here:  Atenolol 50 mg qd/Losartan 25mg qd  On 6/4/25, added Losartan at 25mg qd  Has Hydralazine 10 mg q8hrs prn SBP >165  No changes today 6/6/25.  Will continue to hold Chlorthalidone  Thrombocytopenia (HCC)  Chronic  Follows with H-O as OP  Currently platelet count is 223 on 6/6/25  Mixed hyperlipidemia  Takes Lipitor 10 mg qd at home = continue here  Normocytic anemia  Continue Iron 325 mg QOD  Gout  Continue Allopurinol 300mg qd as at home  No recent flares  CKD (chronic kidney disease)  Baseline 0.7-0.9  Stable  Left shoulder pain  Per PMR  Bilateral leg edema  Will ACE wrap  Leg wound, left  WC saw in acute hospital stay  Continue local WC  Tx per PMR    The above assessment and plan was reviewed and updated as determined by my evaluation of the patient on 6/6/2025.    History of Present Illness "   Patient seen and examined. Patients overnight issues or events were reviewed with nursing staff. New or overnight issues include the following:   No new or overnight issues.  Offers no complaints    Review of Systems   All other systems reviewed and are negative.      Objective :  Temp:  [97.4 °F (36.3 °C)-98.7 °F (37.1 °C)] 97.8 °F (36.6 °C)  HR:  [68-76] 68  BP: (108-160)/(58-71) 160/71  Resp:  [18] 18  SpO2:  [96 %-99 %] 99 %  O2 Device: None (Room air)    Invasive Devices       Peripheral Intravenous Line  Duration             Peripheral IV 06/02/25 Right;Ventral (anterior) Forearm 4 days                    Physical Exam  General Appearance: no distress, nontoxic appearing  HEENT:  External ear normal.  Nose normal w/o drainage. Mucous membranes are moist. Oropharynx is clear. Conjunctiva clear w/o icterus or redness.  Neck:  Supple, normal ROM  Lungs: BBS without crackles/wheeze/rhonchi; respirations unlabored with normal inspiratory/expiratory effort.  No retractions noted.  On RA  CV: regular rate and rhythm; no rubs/murmurs/gallops, PMI normal   ABD: Abdomen is soft.  Bowel sounds all quadrants.  Nontender with no distention.    EXT: + mild LE edema  Skin: normal turgor, normal texture  Psych: affect normal, mood normal  Neuro: AAO       The above physical exam was reviewed and updated as determined by my evaluation of the patient on 6/6/2025.      Lab Results: I have reviewed the following results:  Results from last 7 days   Lab Units 06/03/25  0601 06/02/25  0559   WBC Thousand/uL 7.45 7.98   HEMOGLOBIN g/dL 10.6* 10.6*   HEMATOCRIT % 33.1* 33.7*   PLATELETS Thousands/uL 225 215     Results from last 7 days   Lab Units 06/03/25  0601 06/02/25  0559   SODIUM mmol/L 135 136   POTASSIUM mmol/L 4.1 4.8   CHLORIDE mmol/L 101 103   CO2 mmol/L 28 25   BUN mg/dL 21 26*   CREATININE mg/dL 0.68 0.76   CALCIUM mg/dL 9.1 9.3         Results from last 7 days   Lab Units 05/31/25  1007   INR  1.20*     Results from  last 7 days   Lab Units 06/06/25  0603 06/05/25 2047 06/05/25  1543   POC GLUCOSE mg/dl 101 119 118       Imaging Results Review: No pertinent imaging studies reviewed.  Other Study Results Review: No additional pertinent studies reviewed.    Review of Scheduled Meds: Medications  reviewed and reconciled as needed  Current Facility-Administered Medications   Medication Dose Route Frequency Provider Last Rate    acetaminophen  650 mg Oral Q6H PRN Evon Price, JOSE ALEJANDRO      allopurinol  300 mg Oral Daily Evon Price, CRNP      atenolol  50 mg Oral Daily Evon Price, CRNP      And    [Held by provider] chlorthalidone  25 mg Oral Daily Evon Price, CRNP      atorvastatin  10 mg Oral HS Evon Price, JOSE ALEJANDRO      chlorhexidine  15 mL Mouth/Throat Q12H [facility] Evon Price, CRNP      Diclofenac Sodium  2 g Topical 4x Daily PRN Evon Price, CRNP      enoxaparin  30 mg Subcutaneous Q12H [facility] Evon Price, CRNP      ferrous sulfate  325 mg Oral Every Other Day Evon Price, CRNP      hydrALAZINE  10 mg Oral Q8H PRN Evon Price, CRNP      insulin lispro  1-5 Units Subcutaneous TID AC Evon Price, CRNP      insulin lispro  1-5 Units Subcutaneous HS Evon Price, CRNP      lidocaine   Topical 4x Daily PRN Evon Price, CRNP      losartan  25 mg Oral Daily Evon Price, CRNP      polyethylene glycol  17 g Oral Daily Evon Price, CRNP      senna-docusate sodium  1 tablet Oral HS Evon Price, CRNP         VTE Pharmacologic Prophylaxis: Lovenox  Code Status: Level 1 - Full Code  Current Length of Stay: 3 day(s)    Administrative Statements     ** Please Note:  voice to text software may have been used in the creation of this document. Although proof errors in transcription or interpretation are a potential of such software**

## 2025-06-06 NOTE — PLAN OF CARE
Problem: PAIN - ADULT  Goal: Verbalizes/displays adequate comfort level or baseline comfort level  Description: Interventions:  - Encourage patient to monitor pain and request assistance  - Assess pain using appropriate pain scale  - Administer analgesics as ordered based on type and severity of pain and evaluate response  - Implement non-pharmacological measures as appropriate and evaluate response  - Consider cultural and social influences on pain and pain management  - Notify physician/advanced practitioner if interventions unsuccessful or patient reports new pain  - Educate patient/family on pain management process including their role and importance of  reporting pain   - Provide non-pharmacologic/complimentary pain relief interventions  Outcome: Progressing     Problem: INFECTION - ADULT  Goal: Absence or prevention of progression during hospitalization  Description: INTERVENTIONS:  - Assess and monitor for signs and symptoms of infection  - Monitor lab/diagnostic results  - Monitor all insertion sites, i.e. indwelling lines, tubes, and drains  - Monitor endotracheal if appropriate and nasal secretions for changes in amount and color  - [place] appropriate cooling/warming therapies per order  - Administer medications as ordered  - Instruct and encourage patient and family to use good hand hygiene technique  - Identify and instruct in appropriate isolation precautions for identified infection/condition  Outcome: Progressing     Problem: SAFETY ADULT  Goal: Patient will remain free of falls  Description: INTERVENTIONS:  - Educate patient/family on patient safety including physical limitations  - Instruct patient to call for assistance with activity   - Consider consulting OT/PT to assist with strengthening/mobility based on AM PAC & JH-HLM score  - Consult OT/PT to assist with strengthening/mobility   - Keep Call bell within reach  - Keep bed low and locked with side rails adjusted as appropriate  - Keep  care items and personal belongings within reach  - Initiate and maintain comfort rounds  - Make Fall Risk Sign visible to staff  - Offer Toileting every 2 Hours, in advance of need  - Initiate/Maintain bed/chair alarm  - Obtain necessary fall risk management equipment:    - Apply yellow socks and bracelet for high fall risk patients  - Consider moving patient to room near nurses station  Outcome: Progressing  Goal: Maintain or return to baseline ADL function  Description: INTERVENTIONS:  -  Assess patient's ability to carry out ADLs; assess patient's baseline for ADL function and identify physical deficits which impact ability to perform ADLs (bathing, care of mouth/teeth, toileting, grooming, dressing, etc.)  - Assess/evaluate cause of self-care deficits   - Assess range of motion  - Assess patient's mobility; develop plan if impaired  - Assess patient's need for assistive devices and provide as appropriate  - Encourage maximum independence but intervene and supervise when necessary  - Involve family in performance of ADLs  - Assess for home care needs following discharge   - Consider OT consult to assist with ADL evaluation and planning for discharge  - Provide patient education as appropriate  - Monitor functional capacity and physical performance, use of AM PAC & -HLM   - Monitor gait, balance and fatigue with ambulation    Outcome: Progressing  Goal: Maintains/Returns to pre admission functional level  Description: INTERVENTIONS:  - Perform AM-PAC 6 Click Basic Mobility/ Daily Activity assessment daily.  - Set and communicate daily mobility goal to care team and patient/family/caregiver.   - Collaborate with rehabilitation services on mobility goals if consulted  - Perform Range of Motion 3 times a day.  - Reposition patient every 2 hours.  - Dangle patient 3 times a day  - Stand patient 3 times a day  - Ambulate patient 3 times a day  - Out of bed to chair for meals  - Out of bed for toileting  - Record  patient progress and toleration of activity level   Outcome: Progressing     Problem: DISCHARGE PLANNING  Goal: Discharge to home or other facility with appropriate resources  Description: INTERVENTIONS:  - Identify barriers to discharge w/patient and caregiver  - Arrange for needed discharge resources and transportation as appropriate  - Identify discharge learning needs (meds, wound care, etc.)  - Arrange for interpretive services to assist at discharge as needed  - Refer to Case Management Department for coordinating discharge planning if the patient needs post-hospital services based on physician/advanced practitioner order or complex needs related to functional status, cognitive ability, or social support system  Outcome: Progressing

## 2025-06-06 NOTE — ASSESSMENT & PLAN NOTE
Home:  Atenolol-chlorthalidone 50-25 mg qd/Diovan 320 mg qd  Here:  Atenolol 50 mg qd/Losartan 25mg qd  On 6/4/25, added Losartan at 25mg qd  Has Hydralazine 10 mg q8hrs prn SBP >165  No changes today 6/6/25.  Will continue to hold Chlorthalidone

## 2025-06-06 NOTE — PLAN OF CARE
Problem: PAIN - ADULT  Goal: Verbalizes/displays adequate comfort level or baseline comfort level  Description: Interventions:  - Encourage patient to monitor pain and request assistance  - Assess pain using appropriate pain scale  - Administer analgesics as ordered based on type and severity of pain and evaluate response  - Implement non-pharmacological measures as appropriate and evaluate response  - Consider cultural and social influences on pain and pain management  - Notify physician/advanced practitioner if interventions unsuccessful or patient reports new pain  - Educate patient/family on pain management process including their role and importance of  reporting pain   - Provide non-pharmacologic/complimentary pain relief interventions  Outcome: Progressing     Problem: INFECTION - ADULT  Goal: Absence or prevention of progression during hospitalization  Description: INTERVENTIONS:  - Assess and monitor for signs and symptoms of infection  - Monitor lab/diagnostic results  - Monitor all insertion sites, i.e. indwelling lines, tubes, and drains  - Monitor endotracheal if appropriate and nasal secretions for changes in amount and color  - [place] appropriate cooling/warming therapies per order  - Administer medications as ordered  - Instruct and encourage patient and family to use good hand hygiene technique  - Identify and instruct in appropriate isolation precautions for identified infection/condition  Outcome: Progressing     Problem: SAFETY ADULT  Goal: Patient will remain free of falls  Description: INTERVENTIONS:  - Educate patient/family on patient safety including physical limitations  - Instruct patient to call for assistance with activity   - Consider consulting OT/PT to assist with strengthening/mobility based on AM PAC & JH-HLM score  - Consult OT/PT to assist with strengthening/mobility   - Keep Call bell within reach  - Keep bed low and locked with side rails adjusted as appropriate  - Keep  care items and personal belongings within reach  - Initiate and maintain comfort rounds  - Make Fall Risk Sign visible to staff  - Offer Toileting every 2 Hours, in advance of need  - Initiate/Maintain bed/chair alarm  - Obtain necessary fall risk management equipment:    - Apply yellow socks and bracelet for high fall risk patients  - Consider moving patient to room near nurses station  Outcome: Progressing  Goal: Maintain or return to baseline ADL function  Description: INTERVENTIONS:  -  Assess patient's ability to carry out ADLs; assess patient's baseline for ADL function and identify physical deficits which impact ability to perform ADLs (bathing, care of mouth/teeth, toileting, grooming, dressing, etc.)  - Assess/evaluate cause of self-care deficits   - Assess range of motion  - Assess patient's mobility; develop plan if impaired  - Assess patient's need for assistive devices and provide as appropriate  - Encourage maximum independence but intervene and supervise when necessary  - Involve family in performance of ADLs  - Assess for home care needs following discharge   - Consider OT consult to assist with ADL evaluation and planning for discharge  - Provide patient education as appropriate  - Monitor functional capacity and physical performance, use of AM PAC & -HLM   - Monitor gait, balance and fatigue with ambulation    Outcome: Progressing  Goal: Maintains/Returns to pre admission functional level  Description: INTERVENTIONS:  - Perform AM-PAC 6 Click Basic Mobility/ Daily Activity assessment daily.  - Set and communicate daily mobility goal to care team and patient/family/caregiver.   - Collaborate with rehabilitation services on mobility goals if consulted  - Perform Range of Motion 3 times a day.  - Reposition patient every 2 hours.  - Dangle patient 3 times a day  - Stand patient 3 times a day  - Ambulate patient 3 times a day  - Out of bed to chair for meals  - Out of bed for toileting  - Record  patient progress and toleration of activity level   Outcome: Progressing     Problem: DISCHARGE PLANNING  Goal: Discharge to home or other facility with appropriate resources  Description: INTERVENTIONS:  - Identify barriers to discharge w/patient and caregiver  - Arrange for needed discharge resources and transportation as appropriate  - Identify discharge learning needs (meds, wound care, etc.)  - Arrange for interpretive services to assist at discharge as needed  - Refer to Case Management Department for coordinating discharge planning if the patient needs post-hospital services based on physician/advanced practitioner order or complex needs related to functional status, cognitive ability, or social support system  Outcome: Progressing

## 2025-06-06 NOTE — PROGRESS NOTES
"OT daily treatment note       06/06/25 0863   Pain Assessment   Pain Assessment Tool 0-10   Pain Score No Pain   Restrictions/Precautions    Precautions Aspiration;Bed/chair alarms;Cognitive;Fall Risk;Pressure Ulcer;Supervision on toilet/commode  (BLE ACE for edema)   Lifestyle   Autonomy \"Me? Trouble? Never!\"   Oral Hygiene   Type of Assistance Needed Incidental touching   Physical Assistance Level No physical assistance   Comment in stance at sink   Oral Hygiene CARE Score 4   Shower/Bathe Self   Type of Assistance Needed Physical assistance   Physical Assistance Level 25% or less   Comment SB seated EOB; pt able to bathe 10/10 body parts with Min A/CG in unsupported stance during vernon bathing   Shower/Bathe Self CARE Score 3   Upper Body Dressing   Type of Assistance Needed Supervision   Physical Assistance Level No physical assistance   Upper Body Dressing CARE Score 4   Lower Body Dressing   Type of Assistance Needed Physical assistance   Physical Assistance Level 26%-50%   Comment seated in WC, pt observed having increased difficulty w/ threading RLE when donning underwear requiring assistance from OT. for pants, OT recommended donning RLE first w/ use of LHR for increased assistance which he was able to complete w/o physical assist. min A/CG in stance when donning over hips   Lower Body Dressing CARE Score 3   Putting On/Taking Off Footwear   Type of Assistance Needed Physical assistance   Physical Assistance Level 51%-75%   Comment TA to doff socks, introduced use of sock aide to don socks which pt was able to independently. BLE noted w/ increased edema. spoke with Filomena who requested BL ace wraps. TA to don ace wraps   Putting On/Taking Off Footwear CARE Score 2   Sit to Stand   Type of Assistance Needed Physical assistance   Physical Assistance Level 26%-50%   Comment Min A/CG w/ RW pending surface height   Sit to Stand CARE Score 3   Bed-Chair Transfer   Type of Assistance Needed Physical assistance "   Physical Assistance Level 25% or less   Comment Min A/CG SPT w/ RW   Chair/Bed-to-Chair Transfer CARE Score 3   Toileting Hygiene   Type of Assistance Needed Physical assistance   Physical Assistance Level 25% or less   Comment able to complete hygiene; Min A/CG in stance during CM   Toileting Hygiene CARE Score 3   Toilet Transfer   Type of Assistance Needed Physical assistance   Physical Assistance Level 26%-50%   Comment [place]/CG w/ RW on standard toilet height   Toilet Transfer CARE Score 3   Exercise Tools   Exercise Tools Yes   UE Ergometer Pt engaged in UE ergometer for 5 minutes in prograde and 5 minutes in retrograde with minimal resistance (level 1.0) focusing on increasing pts UB strength, endurance and functional activity tolerance to increase independence with ADL tasks. Short rest break required in between sets for fatigue mgmt. Education provided on energy conservation and breathing techniques with Positive carryover of provided education.   Cognition   Overall Cognitive Status Impaired   Arousal/Participation Alert;Cooperative   Attention Attends with cues to redirect   Orientation Level Oriented X4   Memory Decreased short term memory;Decreased recall of recent events;Decreased recall of precautions   Following Commands Follows one step commands without difficulty   Activity Tolerance   Activity Tolerance Patient tolerated treatment well   Assessment   Treatment Assessment Pt participated in skilled OT session with focus on ADL retraining, functional transfer training, UE strengthening/ROM, endurance training, compensatory technique education, and continued education. See flowsheet for details of session and current functional status. Pt is limited by weakness, impaired balance, decreased endurance, increased fall risk, decreased ADLS, decreased IADLS, decreased activity tolerance, SOB upon exertion, decreased cognition, and decreased strength and requires skilled OT services to increase  independence and safety with ADL completion in prep for DC home.  Occupational Therapy POC:      Barriers: cognition, endurance, activity tolerance, endurance, higher level balance, dec functional reach due to body habitus     Plan: LHAE education for LB dressing, higher level balance retraining (PT has been trialing with no AD, keeping RW when in room w/ staff), endurance training, UB strengthening, functional cog and IADL mgmt including med mgmt, finance mgmt and meal prep.      DC date: Monday 6/13 w/ OPPT        *please include this POC to your daily treatment note to allow for continuity of care. Adjust POC as necessary.*     Eli Cash OTR/L      Prognosis Good   Problem List Decreased strength;Decreased endurance;Impaired balance;Decreased mobility;Decreased coordination;Decreased cognition;Impaired judgement;Decreased safety awareness;Obesity   Barriers to Discharge Decreased caregiver support;Inaccessible home environment   Plan   Treatment/Interventions ADL retraining;Functional transfer training;Therapeutic exercise;Endurance training;Patient/family training;Equipment eval/education;Compensatory technique education;Gait training   Progress Progressing toward goals   OT Therapy Minutes   OT Time In 0830   OT Time Out 1000   OT Total Time (minutes) 90   OT Mode of treatment - Individual (minutes) 90   OT Mode of treatment - Concurrent (minutes) 0   OT Mode of treatment - Group (minutes) 0   OT Mode of treatment - Co-treat (minutes) 0   OT Mode of Treatment - Total time(minutes) 90 minutes   OT Cumulative Minutes 270

## 2025-06-06 NOTE — ASSESSMENT & PLAN NOTE
Home:  Atenolol-chlorthalidone 50-25 mg qd/Diovan 320 mg qd  Here:  Atenolol 50 mg qd/Losartan 25mg qd  On 6/4/25, added Losartan at 25mg qd  Has Hydralazine 10 mg q8hrs prn SBP >165  No changes today 6/7/25.  Will continue to hold Chlorthalidone

## 2025-06-06 NOTE — ASSESSMENT & PLAN NOTE
MRI of the brain that showed large bilateral SDH  S/p bilateral [place] holes for evacuation of SDH 5/25/25  MMA was not able to be done due to anatomical considerations  For no full AC/AP for 2 weeks = 6/11/25 then need to clear with NS  D/W NS today = they will order followup CTH for 6/9/25 and will see him after done

## 2025-06-06 NOTE — ASSESSMENT & PLAN NOTE
"Had an OP ultrasound done for elevated total bilirubin that showed cystic lesions in the pancreas for which further imaging was recommended  MRI done thereafter as an OP showed \"Numerous cystic lesions scattered throughout the pancreas with a dominant lesion measuring 18 mm in the body. Worrisome features include ductal dilation up to 6 mm in the tail the pancreas and abrupt change in caliber of the pancreatic duct. Management recommendation: Because of presence of multiple worrisome features, surgical consultation/management with consideration for EUS recommended.  Management and follow up recommendations for cystic pancreatic lesions are based on Institutional consensus and international evidence-based Kyoto guidelines for the management of intraductal papillary mucinous neoplasm of the pancreas. Pancreatology 24 (2024) 255-270\".  Per IMs note in the hospital:  \"Reached out to primary team and surgery oncall as imaging findings are suggestive of malignancy. According to surgery they recommend out patient follow up with surg onc for possible biopsy and out patient GI follow up. Surgery called the surg onc office to schedule the appointment for patient. I did a STAT referral for GI outpatient. Primary team will inform the patient regarding imaging findings and the referrals\".  Most recent CMP on 6/3/25 with normal total bilirubin/LFT  "

## 2025-06-06 NOTE — PROGRESS NOTES
ST Updates:   Recommendations for discharge: family to assist with IADL tasks at baseline  Continued Services: no further ST required at discharge  Discharge Date: 6/16  Family Education/Training: has not been initiated  Continued Treatment Plan: functional problem solving, memory, sequencing, safety, and executive functioning tasks.       06/06/25 1000   Pain Assessment   Pain Assessment Tool 0-10   Pain Score No Pain   Restrictions/Precautions   Precautions Aspiration;Bed/chair alarms;Cognitive;Fall Risk;Pressure Ulcer;Supervision on toilet/commode   Weight Bearing Restrictions No   ROM Restrictions No   Comprehension   Comprehension (FIM) 5 - Understands basic directions and conversation   Expression   Expression (FIM) 5 - Needs help/cues only RARELY (< 10% of the time)   Social Interaction   Social Interaction (FIM) 6 - Interacts appropriately with others BUT requires extra  time   Problem Solving   Problem solving (FIM) 4 - Solves basic problems 75-89% of time   Memory   Memory (FIM) 4 - Recognizes/recalls/performs 75-89%   Speech/Language/Cognition Assessmetn   Treatment Assessment Pt seen for skilled speech therapy session targeting cognitive linguistic communication skills. Pt alert and interactive- completed the following skilled therapy tasks.     Drawing conclusions task with ID activities by description- pt was given scenarios to determine what was being described- able to complete with 9/14acc increasing with verbal cues for context and gestures to aid in putting the clues together.     Functional money tasks with adding two coins- pt was able to complete with 36/40acc increasing with verbal cues to check work and recalculate responses.     Functional money task with breaking down total amounts into denominations (single amounts)- pt able to complete with 13/15acc, noted pt mostly using dimes and roshan amounts for change and needing cues to challenge himself for using most concise coin amounts.  Discussed with pt that his wife completed household bills but he uses cash and credit when out shopping.     Assisted back to room, pt able to wheel self in WC with some assistance for object negotiation to not hit items in hallway. Transferred with min A to recliner and set up with all items nearby.    Pt overall is improving with skilled SLP services and will cont to benefit to maximize overall cognitive linguistic communication abilities at this time.    SLP Therapy Minutes   SLP Time In 1000   SLP Time Out 1100   SLP Total Time (minutes) 60   SLP Mode of treatment - Individual (minutes) 60   SLP Mode of treatment - Concurrent (minutes) 0   SLP Mode of treatment - Group (minutes) 0   SLP Mode of treatment - Co-treat (minutes) 0   SLP Mode of Treatment - Total time(minutes) 60 minutes   SLP Cumulative Minutes 210   Therapy Time missed   Time missed? No

## 2025-06-06 NOTE — DISCHARGE INSTR - AVS FIRST PAGE
DISCHARGE INSTRUCTIONS: [facility] ACUTE REHABILITATION [place]    Bring these instructions with you to your Outpatient Physician appointments so they can order and follow-up any additional lab work or imaging recommended at time of discharge.    Resume follow-up with all your prior providers that you have established care prior to this hospitalization.  Discuss with primary care physician (PCP) if you have additional questions.     It  is you or your caregivers responsibility to obtain follow-up MEDICATION REFILLS  As indicated through your Primary Care Physician (PCP) and other outpatient specialty provider(s) after discharge.  Please follow-up with your PCP as soon as possible after discharge to set-up follow-up management and when appropriate refills.      You remain a fall and injury risk which could be severe.  Your risk of fall has decreased however since admission to acute rehab.  Caregiver training has been completed with our staff.  Appropriate supervision +/- assistance as instructed during your rehab course is recommended to decrease risk of fall and injury.    Continue skilled therapy as discussed after discharge to further decrease this risk    If you (or your health care proxy) have any questions or concerns regarding your acute rehabilitation stay including issues with medications, rehabilitation, and follow-up plan, please call:          [facility] Acute Rehabilitation Unit in [place] at [phone] or [phone].      Should you develop fevers, chills, new weakness, changes in sensation, difficulty speaking, facial weakness, confusion, shortness of breath, chest pain, or other concerning symptoms please call 911 and/or obtain transportation to nearest ER immediately.    Should you develop worsening pain, swelling, or drainage notify your surgeon right away or obtain transportation to nearest ER for evaluation.    Should you develop feelings of significant hopelessness,  "severe depression, severe anxiety, or suicide you should call 911 or obtain transportation to nearest ER.    24-7 Nationwide suicide and crisis lifeline call \"750\"  National Suicide Prevention Lifeline is 1-185.898.9188 and is available 24 hrs/7 days a week.   [facility] Crisis [phone] is available 24 hrs/7 days for mental health  [facility] Crisis [phone] is available 24 hrs/7 days for mental health   [facility] Crisis [phone]    PHYSICIANS to see:  Please see your doctors listed in the follow up providers section of your discharge paperwork, and take the discharge paperwork with you to your appointments.    IMAGING to follow-up:  Follow-up CT head as discussed below.  This was ordered by Neurosurgery.  Please get the test 2-3 days prior to your appointment with Neurosurgery.    Please CALL [facility] Central Scheduling at [phone] to schedule        SKIN CARE INSTRUCTIONS to follow:  Monitor incision(s) for increased redness, swelling, pain/tenderness, discharge/pus and promptly notify your surgeon should these develop.  Should you develop significant pain, swelling, or drainage obtain transportation to nearest emergency room for immediate evaluation if unable to reach your surgeon promptly   Should you develop uncontrolled pain, fever, chills, sweats, changes in strength, sensation, or color of this area obtain transportation to nearest emergency room for immediate evaluation.    Monitor skin for increased redness or breakdown and promptly notify your physician should these develop  If instructed while in [facility] - be sure you stand +/- walk every 1-2 hours and if advised use appropriate supervision/assistance to optimally offload your buttock/sacral region.  While seated or lying in bed shift positions and from side to side often.  Can use barrier type cream such as Hydraguard 2 times per day and as needed.    Turn patient (yourself) fully every 2 hours " while in bed.       Due to the following you are at increased risk of skin breakdown/wounds/worsening wounds:  Impaired mobility  Impaired nutrition/intake/low albumin  Medical co-morbidities  Bowel/bladder dysfunction    Buttocks/Sacrum  Turn as full as possible off sacrum/buttocks every 2 hours  Use Cushion while in chair or wheelchair  Weight shift every 10-15 minutes while in chair  Keep skin clean and dry as possible.  Remove wet or soiled clothing/linens promptly  Use barrier cream or similar 2 times per day   Monitor skin for increased breakdown which you are at risk of and notify nursing, PCP, or other physician providers should this occur right away    Heels/bony edges of feet  Float heels off edge of pillow for added pressure relief.  Monitor heels and bony protuberances and notify physician or nursing for any increased redness, bogginess, or breakdown    For your sacral wound, please cleanse with mild soap and water every day, then pat dry.  After that, apply thin layer of Calazime paste to the sacrum 3 times a day.  Please offload your sacrum is much as possible by sitting in comfortable cushions, shifting weight every 30 minutes, and going on at least 4 short walks per day.  Primary management of your sacral wound(s) is per your PCP.      Cranial surgical incision care  1. Please shower every other day starting on day 3 from surgery.      a. Use a baby shampoo on body AND head incision.      b. Rinse off shampoo and pat dry with a clean towel every other day.      c. Wipe one TAISHA wipe over incision daily; if you showered that day, use wipe after shower  2. Avoid rubbing the incision but gently massage hair.   3. Do not immerse the incisions in water for 6 weeks.   4. Do not apply any creams or ointments to the incision, unless otherwise instructed by . [facility].   5. Do not use a hair dryer, and avoid hair products such as mousse, oils, and gels as well asdyes/perms. Do not  brush your hair away from the incision since this will put strain on thesutureline.  6. Monitor incision daily. Contact office if increasing redness, warmth, or discharge.    Ensure appropriate wound care to optimize chances for healing and decrease risks of complications.    Your wound(s) remains at risk for worsening and infection.    - Should you develop significant pain, swelling, or drainage obtain transportation to nearest emergency room for immediate evaluation if unable to reach your surgeon promptly   - Should you develop uncontrolled pain, fever, chills, sweats, changes in strength, sensation, or color of this area obtain transportation to nearest emergency room for immediate evaluation.      WEIGHTBEARING/ACTIVITY PRECAUTIONS to follow:  Weightbearing as tolerated.  Activity:   1. Do not lift, push or pull more than 10 pounds for 2 weeks.   2. Avoid bending, lifting and twisting for 2 weeks. No running. No athletic activities until cleared.  3. No driving until cleared by Neurosurgery and OT.   4. Continue to change bed linens and pajamas more frequently. Wear clean clothes daily.   5. May walk as tolerated. Recommend 4 short walks daily.    Driving restrictions:  You are recommended against driving until cleared by an outpatient physician.     You should NOT operate a motor vehicle while under the influence of an opiate medication, muscle relaxant, or other sedative.  Doing so may lead to an accident resulting in serious injury or death to yourself or others.  You have agreed to avoid driving when under the influence of this medication.      Work restrictions:  You should NOT return to work (if working) until cleared by an outpatient physician.    You should not operate heavy machinery (if applicable) until cleared by an outpatient physician.      Alcohol restrictions:  You are recommended to not drink alcohol at this time unless cleared by an outpatient physician.     Smoking restrictions:  You are  recommended to not smoke nicotine.  Smoking increases your risk of heart attack, stroke, emphysema/COPD, and lung cancer.      Cannabis restrictions:  You are recommended to not smoke/ingest/consume/use cannabis/marijuana at this time unless cleared by an outpatient physician.      MEDICATIONS:  Please see a full list of your medications outlined in the After Visit Summary that is attached to these Discharge Instructions.  Please note changes may have been made to your medications please refer to your discharge paperwork for your current medications and take this list with you to all your doctors appointments for your doctors to review.  Please do not resume a home medication unless the medication reconciliation sheet indicates to do so, please do not assume that a medication that you were given a prescription for is the same as a medication you have at home based on both medications having the same name as dosages and frequency may have changed.      Unless specifically noted in your medication list provided to you in your discharge paper work do not resume prior vitamins, minerals, or supplements you may have been taking prior to your hospitalization unless instructed by an outpatient physician in the future.  Discuss with your primary care at next visit if applicable.        Acetaminophen (Tylenol) Dosing Warning:    You may have up to 3000 mg of acetaminophen (Tylenol) from all sources spread out over a 24 hours period.  Do not have more than that, as this can increase your risk of liver injury which can be serious.      NSAID Warning:  Please avoid NSAID (including but not limited to advil, aleve, motrin, naproxen, ibuprofen, mobic, meloxicam, diclofenac etc) medications as NSAID medications may increase your risk of bleeding (which can be life-threatening), stroke, worsening kidney disease, heart attack, developing/worsening GI ulcers, worsening heart failure, worsening liver (cirrhosis) disease, potentially  delay bone healing.         Aspirin instructions  TAKE aspirin 81 mg daily unless instructed otherwise by a doctor.    This medication will need to be managed by your outpatient physician(s) after discharge.  Follow-up with the appropriate provider as soon as possible to ensure appropriate use.    This is a blood thinner.  It is being recommended for use by you (or your family) to decrease the risk of clotting which can be severely disabling and even life-threatening.  Even when provided as recommended it can cause severe disabling and even life-threatening bleeding.  Too much or too little of this blood thinner further increases your risk of this medication causing serious and even life-threatening complications such as severe bleeding, clots, strokes, and death.  With that said, at this time based on best available evidence and consensus agreement, your physicians recommend you take aspirin based on your overall risks and benefits in your specific medical situation.      If you (or your family/caregiver) notice black stools, bloody stools, vomit blood, develop new weakness, slurred speech, confusion or have any other concerning symptoms call 911 or obtain transportation to nearest emergency room immediately.       MEDICAL MANAGEMENT AT HOME specific to you:    Diabetes Management:  Please check your blood sugars 2 times daily before meals and at bedtime and record them to provide to your doctors, contact your family doctor as soon as possible for blood sugars higher than 220 or lower than 100.     Follow-up with PCP/family doctor regularly to ensure blood sugars remain adequately controlled.      Monitor for signs or symptoms of low blood sugar (hypoglycemia)- such as sweating, trembling, feeling hungry, worried, more tired (More severe signs of hypoglycemia could be trouble walking, confusion, passing out)  - If present obtain blood sugar    If blood sugar less than 70 take quick source of sugar such as:  - at  least half cup of juice   - 4-5 saltine crackers  - 1 tablespoon of sugar or honey  - 3-4 glucose tablets  (Avoid foods that you have history of allergy)    Recheck blood sugar within 30 minutes.  If still low repeat providing additional quick sources of sugar.  If still not improving or symptoms worsening/not improving seek medical attention right away or obtain medical transport/call 911.    Hypertension Management:  Only take the medications prescribed for you at time of discharge - overly high or low blood pressure increases your risk for health complications    Follow-up with PCP/family doctor regularly to ensure blood pressure remains adequately controlled.      Please check your blood pressure prior to taking your blood pressure medications and keep a log that you will bring with you to your follow-up doctors' appointments.    Please contact your family doctor or cardiologist immediately for a blood pressure below 110/50 and do not take your blood pressure medications until speaking with them.  Please contact your family doctor or cardiologist as soon as possible for blood pressure greater than 156/88.      Urinary retention risk:  You may have had or are at risk for urinary retention (bladder filling up with excessive urine and inability to adequately expel it).  Urinary retention can lead to significant kidney injury and infection which can be serious.  If you do not urinate for 8 hours or more or you have the urge to urinate and are unable to, please obtain transportation to nearest emergency room (or urologist office), for likely placement of qureshi.  If you develop fever, chills, sweats, confusion, or other concerning symptoms seek medical management right away.  Follow-up with primary care and if needed urology after discharge.    Bowel management:  - Ideally you would have 1 well formed BM every 1-2 days.  Adjust bowel regimen based on that goal or as close to that as possible  - If constipated, start  with taking miralax 1-2 times per day and senna twice daily  - If still constipated you can take either oral or by rectum dulcolax (but not at the same time)  - If still constipated after dulcolax than you may temporarily take mag citrate x1 (but should try to avoid this if possible as this can occasionally cause abnormal electrolytes and other complications and notify your physician if you need to take)  - If unable to go for more than 4 days notify your physician for additional recommendations    - Follow-up with your primary care physician at next visit  - If you develop significant abdominal pain, nausea/vomiting, or other concerns seek medical attention right away.      For pain - try to use over the counter topicals (creams/gels) as discussed or acetaminophen 1-2 regular or extra-strength Tylenol up to 2-3 times per day.  Could consider heat or ice as well maximum 20 minutes at a time.  Do not use heat or ice if using topicals at the same time.  Notify primary care and/or orthopedics for poorly controlled pain for additional recommendations.      Special Notes:  Please get BMP 6/23/25 = results to your PCP   Please get CT head 2-3 days prior to your Neurosurgery appointment.  The order is already in the computer but you will need to call [phone] to schedule a time to get the test done  NO DIOVAN for now  Losartan has taken the place of Diovan  NO Metformin for now. Please do blood sugars as above.  Followup with Gastroenterology and Surgical Oncology for the pancreatic cyst.  Referrals have already been placed; please call to make appointments with both.  A referral was placed for you to see Cardiology to discuss having a LOOP recorder placed.  This is a very small monitor placed under the skin to monitor your heart rate and detect any abnormal heart rhythms that may have caused your stroke.  Since you have seen Dr Dutta in the past, you can return to him.    Please note a summary of your hospital stay  with relevant information for your doctors will try to be sent to them.  Please confirm with your doctors at your follow up visits that they have received this summary and have them contact [facility] Medical Records if they have not received them along with any other medical records they may require.     [facility] Phone Number:  [phone]

## 2025-06-06 NOTE — PROGRESS NOTES
06/06/25 1230   Pain Assessment   Pain Assessment Tool 0-10   Pain Score No Pain   Restrictions/Precautions   Precautions Aspiration;Bed/chair alarms;Fall Risk;Cognitive;Pressure Ulcer;Suicidal   Weight Bearing Restrictions No   ROM Restrictions No   Cognition   Overall Cognitive Status Impaired   Arousal/Participation Alert;Cooperative   Attention Attends with cues to redirect   Orientation Level Oriented X4   Memory Decreased short term memory;Decreased recall of recent events;Decreased recall of precautions   Following Commands Follows one step commands without difficulty   Sit to Stand   Type of Assistance Needed Incidental touching;Adaptive equipment   Comment with SPC/no AD   Sit to Stand CARE Score 4   Bed-Chair Transfer   Type of Assistance Needed Physical assistance;Incidental touching;Adaptive equipment   Physical Assistance Level 25% or less   Comment JAQUELINE no AD, MIN/CGA with SPC   Chair/Bed-to-Chair Transfer CARE Score 3   Transfer Bed/Chair/Wheelchair   Adaptive Equipment None;Cane   Walk 10 Feet   Type of Assistance Needed Physical assistance;Verbal cues;Adaptive equipment   Physical Assistance Level 26%-50%   Comment JAQUELINE with SPC, MIN to heavy JAQUELINE with SPC   Walk 10 Feet CARE Score 3   Walk 50 Feet with Two Turns   Type of Assistance Needed Physical assistance;Verbal cues;Adaptive equipment   Physical Assistance Level 26%-50%   Comment JAQUELINE with SPC, MIN to heavy JAQUELINE with SPC   Walk 50 Feet with Two Turns CARE Score 3   Walk 150 Feet   Type of Assistance Needed Physical assistance;Verbal cues;Adaptive equipment   Physical Assistance Level 26%-50%   Comment JAQUELINE with SPC, MIN to heavy JAQUELINE with SPC   Walk 150 Feet CARE Score 3   Walking 10 Feet on Uneven Surfaces   Type of Assistance Needed Physical assistance;Verbal cues;Adaptive equipment   Physical Assistance Level 26%-50%   Comment with SPC over floor mat   Walking 10 Feet on Uneven Surfaces CARE Score 3   Ambulation   Primary Mode of  "Locomotion Prior to Admission Walk   Distance Walked (feet) 150 ft  (x3, 100' x2)   Assist Device Cane  (none)   Gait Pattern Inconsistant Rose;Decreased foot clearance;Forward Flexion;Wide JESÚS;Shuffle;Improper weight shift   Limitations Noted In Balance;Coordination;Endurance;Heel Strike;Posture;Safety;Speed;Strength;Swing   Provided Assistance with: Balance;Direction   Walk Assist Level Contact Guard;Minimum Assist   Does the patient walk? 2. Yes   Wheelchair mobility   Does the patient use a wheelchair? 0. No   Curb or Single Stair   Style negotiated Curb   Type of Assistance Needed Physical assistance;Verbal cues;Adaptive equipment   Physical Assistance Level 26%-50%   Comment with SPC, VC's for sequencing   1 Step (Curb) CARE Score 3   Stairs   Type Curb   # of Steps 1   Weight Bearing Precautions Fall Risk   Assist Devices Cane   Therapeutic Interventions   Neuromuscular Re-Education stepping over canes x6 reps MODA, several LOB and unable to clear toes. \"angeline says\", pt asked to toe tap color cordinating with foot that was given, MODA. Amb x150' no AD( B hands on hips) x2 reps   Equipment Use   NuStep L2 x15 min   Assessment   Treatment Assessment Pt participated in skilled PT session with increased focus on gait, balance, coordination, increased curb step management and endurance. Pt cont to require seated rest breaks between task 2/2 fatigue. Pt stood up on his own during session when placing items for his next task. He required VC\"s to sit back down and wait until someone is next to him. Pt cont to have increased shuffled gait with SPC and no AD. Stepping over canes he required MODA 2/2 toes catching and he was unable to correct balance. Pt will cont POC as tolerated with cont focus on gait, balance, coordination, increased stair management, safety management and insight into current deficits.   Problem List Decreased strength;Decreased endurance;Impaired balance;Decreased mobility;Decreased " coordination;Decreased cognition;Impaired judgement;Decreased safety awareness;Obesity   Barriers to Discharge Decreased caregiver support;Inaccessible home environment   PT Barriers   Functional Limitation Ramp negotiation;Stair negotiation;Standing;Transfers;Walking   PT Therapy Minutes   PT Time In 1230   PT Time Out 1400   PT Total Time (minutes) 90   PT Mode of treatment - Individual (minutes) 90   PT Mode of treatment - Concurrent (minutes) 0   PT Mode of treatment - Group (minutes) 0   PT Mode of treatment - Co-treat (minutes) 0   PT Mode of Treatment - Total time(minutes) 90 minutes   PT Cumulative Minutes 270   Therapy Time missed   Time missed? No

## 2025-06-06 NOTE — ASSESSMENT & PLAN NOTE
Lab Results   Component Value Date    HGBA1C 6.2 (H) 05/24/2025       Recent Labs     06/05/25  1133 06/05/25  1543 06/05/25 2047 06/06/25  0603   POCGLU 112 118 119 101       Blood Sugar Average: Last 72 hrs:  (P) [id number]  - plan to restart Metformin during [facility] stay  - continue SSI and accuchecks per IM

## 2025-06-06 NOTE — ASSESSMENT & PLAN NOTE
Home meds: atenolol-chlorthalidone 50-25, valsartan 320  Here: atenolol 50 mg, losartan 25 mg  - continue titration for normotension

## 2025-06-06 NOTE — PROGRESS NOTES
"Progress Note - Hospitalist   Name: Deonte Greenfield 74 y.o. male I MRN: [id number]  Unit/Bed#: -01 I Date of Admission: 6/3/2025   Date of Service: 6/7/2025 I Hospital Day: 4    Assessment & Plan  Acute on chronic intracranial subdural hematoma (HCC)  MRI of the brain that showed large bilateral SDH  S/p bilateral [place] holes for evacuation of SDH 5/25/25  MMA was not able to be done due to anatomical considerations  For no full AC/AP for 2 weeks = 6/9-11/25 then need to clear with NS  D/W NS 6/6/25 = they will order followup CTH for 6/9/25 and will see him after done  Acute CVA (cerebrovascular accident) (HCC)  Acute CVA right superior cerebellum  Was found on CTH 5/30, confirmed by MRI  CTA of the head/neck showed \"Moderate (approximately 50%) stenosis in the 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 No significant stenosis of the cervical left internal carotid artery. Severe stenosis origin of the dominant left vertebral artery. Hypoplastic right vertebral artery. No high-grade intracranial stenosis, large vessel occlusion or aneurysm\"  Neurology saw in consult for the CVA.  They recommended that the patient start ASA 81mg daily when cleared by Neurosurgery as above.    For LOOP recorder implanted as an outpatient.  He did have a previous Ziopatch done that was negative for atrial fibrillation.    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  Continue statin  Pancreatic cyst  Had an OP ultrasound done for elevated total bilirubin that showed cystic lesions in the pancreas for which further imaging was recommended  MRI done thereafter as an OP showed \"Numerous cystic lesions scattered throughout the pancreas with a dominant lesion measuring 18 mm in the body. Worrisome features include ductal dilation up to 6 mm in the tail the pancreas and abrupt change in caliber of the pancreatic duct. Management " "recommendation: Because of presence of multiple worrisome features, surgical consultation/management with consideration for EUS recommended.  Management and follow up recommendations for cystic pancreatic lesions are based on Institutional consensus and international evidence-based [place] guidelines for the management of intraductal papillary mucinous neoplasm of the pancreas. Pancreatology 24 (2024) 255-270\".  Per IMs note in the hospital:  \"Reached out to primary team and surgery oncall as imaging findings are suggestive of malignancy. According to surgery they recommend out patient follow up with surg onc for possible biopsy and out patient GI follow up. Surgery called the surg onc office to schedule the appointment for patient. I did a STAT referral for GI outpatient. Primary team will inform the patient regarding imaging findings and the referrals\".  Most recent CMP on 6/3/25 with normal total bilirubin/LFT  Diabetes mellitus (HCC)  HbA1C 6.2 on 5/24/25  Home:  Metformin 1000 mg BID  Here:  QID Accuchecks/SSI  Will restart Metformin when needed/able  HTN (hypertension)  Home:  Atenolol-chlorthalidone 50-25 mg qd/Diovan 320 mg qd  Here:  Atenolol 50 mg qd/Losartan 25mg qd  On 6/4/25, added Losartan at 25mg qd  Has Hydralazine 10 mg q8hrs prn SBP >165  No changes today 6/7/25.  Will continue to hold Chlorthalidone  Thrombocytopenia (HCC)  Chronic  Follows with H-O as OP  Currently platelet count is 223 on 6/6/25  Mixed hyperlipidemia  Takes Lipitor 10 mg qd at home = continue here  Normocytic anemia  Continue Iron 325 mg QOD  Gout  Continue Allopurinol 300mg qd as at home  No recent flares  CKD (chronic kidney disease)  Baseline 0.7-0.9  Stable  Left shoulder pain  Per PMR  Bilateral leg edema  Says gets at home if sleeps in chair as opposed to bed and is L>R as is now  Continue to ACE wrap  Leg wound, left  WC saw in acute hospital stay  Continue local WC  Tx per PMR    The above assessment and plan was reviewed " and updated as determined by my evaluation of the patient on 6/7/2025.    History of Present Illness   Patient seen and examined. Patients overnight issues or events were reviewed with nursing staff. New or overnight issues include the following:   No new or overnight issues.  Offers no complaints    Review of Systems   All other systems reviewed and are negative.      Objective :  Temp:  [98 °F (36.7 °C)-98.4 °F (36.9 °C)] 98.3 °F (36.8 °C)  HR:  [60-72] 64  BP: (119-152)/(57-76) 119/57  Resp:  [17-18] 17  SpO2:  [95 %-97 %] 95 %  O2 Device: None (Room air)    Invasive Devices       None                   Physical Exam  General Appearance: no distress, non toxic appearing  HEENT: PERRLA, conjuctiva normal; oropharynx clear; mucous membranes moist   Neck:  Supple, normal ROM  Lungs: CTA, normal respiratory effort, no retractions, expiratory effort normal  CV: regular rate and rhythm; no rubs/murmurs/gallops, PMI normal   ABD: soft; ND/NT; +BS  EXT: mild lower leg edema L>R  Skin: normal turgor, normal texture  Psych: affect normal, mood normal  Neuro: AAO        The above physical exam was reviewed and updated as determined by my evaluation of the patient on 6/7/2025.      Lab Results: I have reviewed the following results:  Results from last 7 days   Lab Units 06/06/25  1219 06/03/25  0601   WBC Thousand/uL 7.24 7.45   HEMOGLOBIN g/dL 11.0* 10.6*   HEMATOCRIT % 35.1* 33.1*   PLATELETS Thousands/uL 223 225     Results from last 7 days   Lab Units 06/06/25  1219 06/03/25  0601   SODIUM mmol/L 138 135   POTASSIUM mmol/L 4.2 4.1   CHLORIDE mmol/L 104 101   CO2 mmol/L 26 28   BUN mg/dL 24 21   CREATININE mg/dL 0.74 0.68   CALCIUM mg/dL 9.1 9.1         Results from last 7 days   Lab Units 05/31/25  1007   INR  1.20*     Results from last 7 days   Lab Units 06/07/25  0613 06/06/25  2034 06/06/25  1544   POC GLUCOSE mg/dl 111 134 106       Imaging Results Review: No pertinent imaging studies reviewed.  Other Study Results  Review: No additional pertinent studies reviewed.    Review of Scheduled Meds: Medications  reviewed and reconciled as needed  Current Facility-Administered Medications   Medication Dose Route Frequency Provider Last Rate    acetaminophen  650 mg Oral Q6H PRN JOSE ALEJANDRO Bradley      allopurinol  300 mg Oral Daily JOSE ALEJANDRO Bradley      atenolol  50 mg Oral Daily JOSE ALEJANDRO Bradley      And    [Held by provider] chlorthalidone  25 mg Oral Daily JOSE ALEJANDRO Bradley      atorvastatin  10 mg Oral HS JOSE ALEJANDRO Bradley      chlorhexidine  15 mL Mouth/Throat Q12H BRIDGET JOSE ALEJANDRO Bradley      Diclofenac Sodium  2 g Topical 4x Daily PRN JOSE ALEJANDRO Bradley      enoxaparin  30 mg Subcutaneous Q12H BRIDGET JOSE ALEJANDRO Bradley      ferrous sulfate  325 mg Oral Every Other Day JOSE ALEJANDRO Bradley      hydrALAZINE  10 mg Oral Q8H PRN JOSE ALEJANDRO Bradley      insulin lispro  1-5 Units Subcutaneous TID AC JOSE ALEJANDRO Bradley      insulin lispro  1-5 Units Subcutaneous HS JOSE ALEJANDRO Bradley      lidocaine   Topical 4x Daily PRN JOSE ALEJANDRO Bradley      losartan  25 mg Oral Daily JOSE ALEJANDRO Bradley      polyethylene glycol  17 g Oral Daily JOSE ALEJANDRO rBadley      senna-docusate sodium  1 tablet Oral HS JOSE ALEJANDRO Bradley         VTE Pharmacologic Prophylaxis: Lovenox  Code Status: Level 1 - Full Code  Current Length of Stay: 4 day(s)    Administrative Statements     ** Please Note:  voice to text software may have been used in the creation of this document. Although proof errors in transcription or interpretation are a potential of such software**

## 2025-06-06 NOTE — ASSESSMENT & PLAN NOTE
MRI of the brain that showed large bilateral SDH  S/p bilateral [place] holes for evacuation of SDH 5/25/25  MMA was not able to be done due to anatomical considerations  For no full AC/AP for 2 weeks = 6/9-11/25 then need to clear with NS  D/W NS 6/6/25 = they will order followup CTH for 6/9/25 and will see him after done

## 2025-06-06 NOTE — PROGRESS NOTES
Progress Note - PMR   Name: Deonte Greenfield 74 y.o. male I MRN: [id number]  Unit/Bed#: -01 I Date of Admission: 6/3/2025   Date of Service: 6/6/2025 I Hospital Day: 3     Assessment & Plan  Acute on chronic intracranial subdural hematoma (HCC)  > Originally presented in early April due to unwitnessed fall and found to have small subdural hemorrhage which subsequently appeared to resolve upon repeat CT head imaging. Patient opted to have MRI performed in outpatient setting and MRI team sent him to ER for large bilateral high convexity SDHs with mass effect. Presented to ED on 5/24 where he received bilateral Yael holes with 4 subdural drains placed  > Drains removed on 5/27 and 5/28  > completed 7 days of Keppra for seizure prophylaxis    Repeat CT head STAT if GCS declines more than 2 points in 1 hour  SBP < 160  INR < 1.4  Plt > 100  Hold all therapeutic doses of AC / AP therapy, he is not cleared to start asa per neurology recommendations for stroke management  Ok for dvt prophylaxis, continue lovenox  PT/OT  Diabetes mellitus (HCC)  Lab Results   Component Value Date    HGBA1C 6.2 (H) 05/24/2025       Recent Labs     06/05/25  1133 06/05/25  1543 06/05/25  2047 06/06/25  0603   POCGLU 112 118 119 101       Blood Sugar Average: Last 72 hrs:  (P) [id number]  - plan to restart Metformin during [facility] stay  - continue SSI and accuchecks per IM  Thrombocytopenia (HCC)  Baseline 100-250  Trend biweekly  Goal > 100  OP follow up with heme onc  Mixed hyperlipidemia  - Continue statin  - Last LDL: 62 on 5/31/2025  Normocytic anemia  - baseline 12ish, now ranging at 10-11 likely in the setting of blood loss  - Continue iron supplementation  Acute CVA (cerebrovascular accident) (HCC)  --MRI brain 5/24:  New, large bilateral high convexity subdural hematomas, right greater than left, which are predominantly subacute in timing, with mass effect on the bilateral cerebral hemispheres, and effacement of the sulci.  No significant midline shift noted. FLAIR signal hyperintensity along the sulci of the right frontal and parietal convexities, which may represent trace associated subarachnoid hemorrhage. No acute infarct. Mild chronic white matter microangiopathic changes involving both cerebral hemispheres.  --CT head 5/30: Acute appearing infarct in right superior cerebellum. Increase in volume of hyperdense blood products bilaterally  --Echo 4/2025: EF 65%. Normal systolic function. Grade 1 diastolic relaxation. 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   LDL 62  A1c 6.2  TTE: 65%     Given new stroke on MRI with concurrent SDH, patient will need ASA when cleared by NSGY after repeat CTH. Given recent negative ziopatch in April 2025, will need loop recorder outpatient.      Lipitor 10 mg qHS  Normotension  Start ASA 81 mg when cleared by NSGY for AP/AC, potentially after CTH completed 6/11 if stable  Outpatient loop recorder  PT/OT  SLP to evaluate language and cognition s/p cerebral infarction  Gout  - continue Allopurinol  Pancreatic cyst  > Patient had MRI abdomen completed outpatient (5/22, d/t jaundice & thrombocytopenia), with numerous pancreatic cystic lesions concerning for malignancy. This showed numerous cystic lesions scattered throughout the pancreas that were concerning for malignancy at this time.   >Patient's family was informed of these and are aware that following discharge from acute rehab they will need to follow-up with the surgical oncology team as well as GI for further evaluation and treatment of these.  Patient and spouse both aware.    - Surg Onc & GI referrals placed by trauma surgery during acute hospitalization discharge  HTN (hypertension)  Home meds: atenolol-chlorthalidone 50-25, valsartan 320  Here: atenolol 50 mg, losartan 25 mg  - continue titration for normotension  CKD (chronic kidney disease)  Lab  Results   Component Value Date    EGFR 94 06/03/2025    EGFR 89 06/02/2025    EGFR 88 05/31/2025    CREATININE 0.68 06/03/2025    CREATININE 0.76 06/02/2025    CREATININE 0.78 05/31/2025     - Adequate GFR while here, previously diagnosed with CKD 2  - has appointment to establish with nephrology on 7/21/25  Superficial foreign body of left leg without major open wound and without infection  -wound care consulted while in acute care  - Left Pretibial leg wound: cleanse left lower leg wound with soap and water, pat dry, apply xeroform to wound bed and cover with foam dressing. Change every other day.   Oropharyngeal dysphagia  Last seen by SLP on 6/1/25    - continue recommended diet of regular texture and thin liquids  - Aspiration precautions and compensatory swallowing strategies: upright posture, slow rate of feeding, small bites/sips, and alternating bites and sips   - SLP consulted  Left shoulder pain  - new pain for patient  - pain at AC joint, but could not provoke pain other than on palpation  - Differential includes AC arthritis, glenohumeral arthritis, MSK strain, rotator cuff tendinopahty  - Doesn't seem like needs K-tape as no sulcus sign  - PT/OT  - XR if hindering therapies  Bilateral leg edema  Ace wrap      1-Sacrum MASD: Three small partial thickness wounds measured together that are pink in color and fragile. No drainage noted.Periwound is fragile and hyperpigmented.      Skin care plans:  1-Cleanse B/L Heels and Sacro-Buttocks with soap and water. Pat dry. Apply Silicone Border Foam (Mepilex) to areas. [place] with P for Prevention and change every 3 days or PRN soilage/displacement. Peel back and inspect Q-shift.  2-Elevate heels to offload pressure.  3-Ehob cushion in chair when out of bed.  4-Moisturize skin daily with skin nourishing cream.  5-Turn/reposition q2h for pressure re-distribution on skin.  6-Left Pretibial leg wound: cleanse left lower leg wound with soap and water, pat dry, apply  xeroform to wound bed and cover with foam dressing. Change every other day  7-Sacrum: Cleanse with mild soap and water and pat dry. Apply thin layer of Calazime paste to sacrum TID and prn.      Subjective   74-year-old male with history of type 2 diabetes, stage II CKD, hypertension, hyperlipidemia presenting initially to [facility] in April with unwitnessed fall with subdural hemorrhage seem to improve and resolved on subsequent imaging but eventually had a decline and an MRI was completed showing have a large bilateral high convexity subdural hematomas and was recommended to go to the emergency room for which an MMA intervention was not amenable and he was started on Keppra for seizure prophylaxis and did have a bur hole evacuation with subdural drain placement bilaterally.  Course complicated by a acute right superior cerebellar stroke for which she is currently on aspirin when cleared by neurosurgery and will need a loop recorder in the future.     Chief Complaint: f/u TBI and f/u ambulatory dysfunction    Interval: Patient seen and evaluated in the room without any acute issues overnight.  Some swelling in the lower extremities with Ace wrap around it.  He is sleeping in the chair with the legs up however encouraged him to try sleeping in the bed with the head of the bed elevated try and stimulate something similar to the chair with having his feet slightly more elevated today.  Also to be seen by neurosurgery next week and a CAT scan of the head to be completed on Monday per conversation. Patient denies fever, chills, nausea, emesis, cough, shortness of breath, diarrhea, or constipation. Sleep was fine, mood stable. Pain is controlled.  No new labs to review today.  Completing 100% meals, voiding without incontinence and last bowel movement was on 6/4      Objective :  Temp:  [97.4 °F (36.3 °C)-98.7 °F (37.1 °C)] 97.8 °F (36.6 °C)  HR:  [68-76] 68  BP: (108-160)/(58-71) 160/71  Resp:  [18] 18  SpO2:  [96 %-99  %] 99 %  O2 Device: None (Room air)    Functional Update:  Physical Therapy Occupational Therapy Speech Therapy   Weight Bearing Status: Full Weight Bearing  Transfers: Moderate Assistance  Bed Mobility: Moderate Assistance  Amulation Distance (ft): 50 feet  Ambulation: Moderate Assistance  Assistive Device for Ambulation: Hand Hold Assistance  Number of Stairs: 12  Assistive Device for Stairs: Lehft Hand Rail  Stair Assistance: Minimal Assistance  Discharge Recommendations: Home with:  DC Home with:: Family Support, Outpatient Physical Therapy   Eating:  (Set-up)  Grooming: Minimal Assistance  Bathing: Moderate Assistance  Bathing: Moderate Assistance  Upper Body Dressing: Supervision  Lower Body Dressing: Moderate Assistance  Toileting: Moderate Assistance  Tub/Shower Transfer: Moderate Assistance  Toilet Transfer: Minimal Assistance  Cognition: Exceptions to WNL  Cognition: Decreased Memory, Decreased Executive Functions, Decreased Attention, Decreased Safety  Orientation: Person, Place, Time, Situation   Mode of Communication: Verbal  Cognition: Exceptions to WNL  Cognition: Decreased Memory, Decreased Executive Functions  Orientation: Person, Place, Time, Situation  Swallowing: Within Defined Limits  Diet Recommendations: Regular Diet, Thin  Discharge Recommendations: Home with:  DC Home with:: Family Support       Physical Exam  Vitals reviewed.   Constitutional:       General: He is not in acute distress.     Appearance: He is obese.   HENT:      Head: Normocephalic.      Comments: Lateral drain and bur hole sites healing well     Right Ear: External ear normal.      Left Ear: External ear normal.      Nose: Nose normal. No rhinorrhea.      Mouth/Throat:      Mouth: Mucous membranes are moist.      Pharynx: Oropharynx is clear.     Eyes:      General: No scleral icterus.      Cardiovascular:      Rate and Rhythm: Normal rate.      Pulses: Normal pulses.   Pulmonary:      Effort: Pulmonary effort is normal. No  respiratory distress.   Abdominal:      General: There is no distension.      Palpations: Abdomen is soft.     Musculoskeletal:      Right lower leg: Edema present.      Left lower leg: Edema present.     Skin:     General: Skin is warm and dry.     Neurological:      Mental Status: He is alert and oriented to person, place, and time.     Psychiatric:         Mood and Affect: Mood normal.         Behavior: Behavior normal.           Scheduled Meds:  Current Facility-Administered Medications   Medication Dose Route Frequency Provider Last Rate    acetaminophen  650 mg Oral Q6H PRN JOSE ALEJANDRO Bradley      allopurinol  300 mg Oral Daily Evon Price, JOSE ALEJANDRO      atenolol  50 mg Oral Daily JOSE ALEJANDRO Bradley      And    [Held by provider] chlorthalidone  25 mg Oral Daily Evon Price, JOSE ALEJANDRO      atorvastatin  10 mg Oral HS JOSE ALEJANDRO Bradley      chlorhexidine  15 mL Mouth/Throat Q12H [facility] JOSE ALEJANDRO Bradley      Diclofenac Sodium  2 g Topical 4x Daily PRN JOSE ALEJANDRO Bradley      enoxaparin  30 mg Subcutaneous Q12H [facility] JOSE ALEJANDRO Bradley      ferrous sulfate  325 mg Oral Every Other Day Evon Price, JOSE ALEJANDRO      hydrALAZINE  10 mg Oral Q8H PRN JOSE ALEJANDRO Bradley      insulin lispro  1-5 Units Subcutaneous TID AC JOSE ALEJANDRO Bradley      insulin lispro  1-5 Units Subcutaneous HS JOSE ALEJANDRO Bradley      lidocaine   Topical 4x Daily PRN JOSE ALEJANDRO Bradley      losartan  25 mg Oral Daily Evon Price, JOSE ALEJANDRO      polyethylene glycol  17 g Oral Daily Evon Price, JOSE ALEJANDRO      senna-docusate sodium  1 tablet Oral HS JOSE ALEJANDRO Bradley           Lab Results: I have reviewed the following results:  Results from last 7 days   Lab Units 06/03/25  0601 06/02/25  0559 05/31/25  1007   HEMOGLOBIN g/dL 10.6* 10.6* 10.9*   HEMATOCRIT % 33.1* 33.7* 33.9*   WBC Thousand/uL 7.45 7.98 7.10   PLATELETS  Thousands/uL 225 215 226     Results from last 7 days   Lab Units 06/03/25  0601 06/02/25  0559 05/31/25  1007   BUN mg/dL 21 26* 29*   SODIUM mmol/L 135 136 137   POTASSIUM mmol/L 4.1 4.8 4.2   CHLORIDE mmol/L 101 103 102   CREATININE mg/dL 0.68 0.76 0.78   AST U/L 21  --  18   ALT U/L 23  --  17       Results from last 7 days   Lab Units 05/31/25  1007   PROTIME seconds 15.5*   INR  1.20*          Tre Flores, DO  Physical Medicine and Rehabilitation  [facility]    I have spent a total time of 35 minutes in caring for this patient on the day of the visit/encounter including Counseling / Coordination of care, Documenting in the medical record, and Communicating with other healthcare professionals .

## 2025-06-06 NOTE — TELEPHONE ENCOUNTER
Pt wife Jing called office requesting to speak with Dr Cartwright. Please have PCP call back.  Pt wife can be reached at Phone#: [phone]  Thank you.

## 2025-06-07 LAB
GLUCOSE SERPL-MCNC: 111 MG/DL (ref 65–140)
GLUCOSE SERPL-MCNC: 119 MG/DL (ref 65–140)
GLUCOSE SERPL-MCNC: 125 MG/DL (ref 65–140)
GLUCOSE SERPL-MCNC: 139 MG/DL (ref 65–140)

## 2025-06-07 PROCEDURE — 97530 THERAPEUTIC ACTIVITIES: CPT

## 2025-06-07 PROCEDURE — 97129 THER IVNTJ 1ST 15 MIN: CPT

## 2025-06-07 PROCEDURE — 82948 REAGENT STRIP/BLOOD GLUCOSE: CPT

## 2025-06-07 PROCEDURE — 97110 THERAPEUTIC EXERCISES: CPT

## 2025-06-07 PROCEDURE — 99232 SBSQ HOSP IP/OBS MODERATE 35: CPT | Performed by: NURSE PRACTITIONER

## 2025-06-07 PROCEDURE — 97130 THER IVNTJ EA ADDL 15 MIN: CPT

## 2025-06-07 PROCEDURE — 97112 NEUROMUSCULAR REEDUCATION: CPT

## 2025-06-07 RX ADMIN — ENOXAPARIN SODIUM 30 MG: 30 INJECTION SUBCUTANEOUS at 09:18

## 2025-06-07 RX ADMIN — ALLOPURINOL 300 MG: 300 TABLET ORAL at 09:17

## 2025-06-07 RX ADMIN — POLYETHYLENE GLYCOL 3350 17 G: 17 POWDER, FOR SOLUTION ORAL at 09:17

## 2025-06-07 RX ADMIN — ATORVASTATIN CALCIUM 10 MG: 10 TABLET, FILM COATED ORAL at 22:00

## 2025-06-07 RX ADMIN — ENOXAPARIN SODIUM 30 MG: 30 INJECTION SUBCUTANEOUS at 22:00

## 2025-06-07 RX ADMIN — ATENOLOL 50 MG: 50 TABLET ORAL at 09:16

## 2025-06-07 RX ADMIN — CHLORHEXIDINE GLUCONATE 15 ML: 1.2 SOLUTION ORAL at 22:00

## 2025-06-07 RX ADMIN — CHLORHEXIDINE GLUCONATE 15 ML: 1.2 SOLUTION ORAL at 09:18

## 2025-06-07 RX ADMIN — LOSARTAN POTASSIUM 25 MG: 25 TABLET, FILM COATED ORAL at 09:17

## 2025-06-07 NOTE — PROGRESS NOTES
"   06/07/25 1000   Pain Assessment   Pain Assessment Tool 0-10   Pain Score No Pain   Restrictions/Precautions   Precautions Aspiration;Bed/chair alarms;Fall Risk;Cognitive;Pressure Ulcer;Suicidal   Weight Bearing Restrictions No   ROM Restrictions No   Cognition   Overall Cognitive Status Impaired   Arousal/Participation Alert;Cooperative   Attention Attends with cues to redirect   Orientation Level Oriented X4   Memory Decreased short term memory;Decreased recall of recent events;Decreased recall of precautions   Following Commands Follows one step commands without difficulty   Subjective   Subjective \"ready to go\"   Sit to Stand   Type of Assistance Needed Incidental touching;Verbal cues;Supervision   Physical Assistance Level No physical assistance   Comment CGA w/ less UE support/push   Sit to Stand CARE Score 4   Bed-Chair Transfer   Type of Assistance Needed Verbal cues;Supervision;Incidental touching   Physical Assistance Level No physical assistance   Comment CGA w/ RW   Chair/Bed-to-Chair Transfer CARE Score 4   Walk 10 Feet   Type of Assistance Needed Supervision;Verbal cues   Physical Assistance Level No physical assistance   Comment no physA w/ RW   Walk 10 Feet CARE Score 4   Walk 50 Feet with Two Turns   Type of Assistance Needed Verbal cues;Supervision   Physical Assistance Level No physical assistance   Comment no physA w/ RW   Walk 50 Feet with Two Turns CARE Score 4   Walk 150 Feet   Type of Assistance Needed Verbal cues;Supervision   Physical Assistance Level No physical assistance   Comment no physA w/ RW   Walk 150 Feet CARE Score 4   Ambulation   Primary Mode of Locomotion Prior to Admission Walk   Distance Walked (feet) 350 ft  (80 ft x 2)   Assist Device Walker   Gait Pattern Decreased foot clearance;Slow Rose   Limitations Noted In Balance;Endurance   Does the patient walk? 2. Yes   Wheel 50 Feet with Two Turns   Reason if not Attempted Activity not applicable   Wheel 50 Feet with Two " Turns CARE Score 9   Wheel 150 Feet   Reason if not Attempted Activity not applicable   Wheel 150 Feet CARE Score 9   Curb or Single Stair   Style negotiated Single stair   Type of Assistance Needed Physical assistance   Physical Assistance Level 26%-50%   Comment Performed at regular stairs with a lateral approach descending/ascending   1 Step (Curb) CARE Score 3   4 Steps   Type of Assistance Needed Physical assistance   Physical Assistance Level 26%-50%   Comment Performed at regular stairs with a lateral approach descending/ascending   4 Steps CARE Score 3   12 Steps   Type of Assistance Needed Physical assistance   Physical Assistance Level 26%-50%   Comment Performed at regular stairs with a lateral approach descending/ascending   12 Steps CARE Score 3   Stairs   Type Stairs   # of Steps 13   Weight Bearing Precautions   (Lateral approach (Both UE on one side))   Therapeutic Interventions   Neuromuscular Re-Education Repeated STS from various target surfaces. Performed w/ 1 UE and or push from B/L thighs x 4 sets, until fatigue for ea set   Equipment Use   NuStep LVL 3 x 10 mins   Assessment   Family/Caregiver Present Patient demonstrated consistent progress during todays session. Focused on endurance versus balance with ambulation this date, as noted by use of RW versus the SPC. Patient was able to complete a full flight of stairs, however required assistance to complete. Patient demonstrating good overall endurance with fair LE strength. Patient will continue to benefit from skilled PT to enable him to reach his maximal level of function.   Problem List Decreased strength;Decreased endurance;Impaired balance;Decreased mobility;Decreased coordination;Decreased cognition;Impaired judgement;Decreased safety awareness;Obesity   Barriers to Discharge Decreased caregiver support;Inaccessible home environment   Plan   Treatment/Interventions Functional transfer training;LE strengthening/ROM;Elevations;Therapeutic  exercise;Endurance training;Patient/family training;Gait training;Bed mobility   Progress Progressing toward goals   PT Therapy Minutes   PT Time In 1000   PT Time Out 1100   PT Total Time (minutes) 60   PT Mode of treatment - Individual (minutes) 60   PT Mode of treatment - Concurrent (minutes) 0   PT Mode of treatment - Group (minutes) 0   PT Mode of treatment - Co-treat (minutes) 0   PT Mode of Treatment - Total time(minutes) 60 minutes   PT Cumulative Minutes 330

## 2025-06-07 NOTE — PLAN OF CARE
Problem: PAIN - ADULT  Goal: Verbalizes/displays adequate comfort level or baseline comfort level  Description: Interventions:  - Encourage patient to monitor pain and request assistance  - Assess pain using appropriate pain scale  - Administer analgesics as ordered based on type and severity of pain and evaluate response  - Implement non-pharmacological measures as appropriate and evaluate response  - Consider cultural and social influences on pain and pain management  - Notify physician/advanced practitioner if interventions unsuccessful or patient reports new pain  - Educate patient/family on pain management process including their role and importance of  reporting pain   - Provide non-pharmacologic/complimentary pain relief interventions  Outcome: Progressing     Problem: INFECTION - ADULT  Goal: Absence or prevention of progression during hospitalization  Description: INTERVENTIONS:  - Assess and monitor for signs and symptoms of infection  - Monitor lab/diagnostic results  - Monitor all insertion sites, i.e. indwelling lines, tubes, and drains  - Monitor endotracheal if appropriate and nasal secretions for changes in amount and color  - [place] appropriate cooling/warming therapies per order  - Administer medications as ordered  - Instruct and encourage patient and family to use good hand hygiene technique  - Identify and instruct in appropriate isolation precautions for identified infection/condition  Outcome: Progressing     Problem: SAFETY ADULT  Goal: Patient will remain free of falls  Description: INTERVENTIONS:  - Educate patient/family on patient safety including physical limitations  - Instruct patient to call for assistance with activity   - Consider consulting OT/PT to assist with strengthening/mobility based on AM PAC & JH-HLM score  - Consult OT/PT to assist with strengthening/mobility   - Keep Call bell within reach  - Keep bed low and locked with side rails adjusted as appropriate  - Keep  care items and personal belongings within reach  - Initiate and maintain comfort rounds  - Make Fall Risk Sign visible to staff  - Offer Toileting every 2 Hours, in advance of need  - Initiate/Maintain bed/chair alarm  - Obtain necessary fall risk management equipment:    - Apply yellow socks and bracelet for high fall risk patients  - Consider moving patient to room near nurses station  Outcome: Progressing  Goal: Maintain or return to baseline ADL function  Description: INTERVENTIONS:  -  Assess patient's ability to carry out ADLs; assess patient's baseline for ADL function and identify physical deficits which impact ability to perform ADLs (bathing, care of mouth/teeth, toileting, grooming, dressing, etc.)  - Assess/evaluate cause of self-care deficits   - Assess range of motion  - Assess patient's mobility; develop plan if impaired  - Assess patient's need for assistive devices and provide as appropriate  - Encourage maximum independence but intervene and supervise when necessary  - Involve family in performance of ADLs  - Assess for home care needs following discharge   - Consider OT consult to assist with ADL evaluation and planning for discharge  - Provide patient education as appropriate  - Monitor functional capacity and physical performance, use of AM PAC & -HLM   - Monitor gait, balance and fatigue with ambulation    Outcome: Progressing  Goal: Maintains/Returns to pre admission functional level  Description: INTERVENTIONS:  - Perform AM-PAC 6 Click Basic Mobility/ Daily Activity assessment daily.  - Set and communicate daily mobility goal to care team and patient/family/caregiver.   - Collaborate with rehabilitation services on mobility goals if consulted  - Perform Range of Motion 3 times a day.  - Reposition patient every 2 hours.  - Dangle patient 3 times a day  - Stand patient 3 times a day  - Ambulate patient 3 times a day  - Out of bed to chair for meals  - Out of bed for toileting  - Record  patient progress and toleration of activity level   Outcome: Progressing     Problem: DISCHARGE PLANNING  Goal: Discharge to home or other facility with appropriate resources  Description: INTERVENTIONS:  - Identify barriers to discharge w/patient and caregiver  - Arrange for needed discharge resources and transportation as appropriate  - Identify discharge learning needs (meds, wound care, etc.)  - Arrange for interpretive services to assist at discharge as needed  - Refer to Case Management Department for coordinating discharge planning if the patient needs post-hospital services based on physician/advanced practitioner order or complex needs related to functional status, cognitive ability, or social support system  Outcome: Progressing

## 2025-06-07 NOTE — PROGRESS NOTES
06/07/25 1230   Pain Assessment   Pain Assessment Tool 0-10   Pain Score No Pain   Restrictions/Precautions   Precautions Fall Risk;Cognitive;Bed/chair alarms;Supervision on toilet/commode;Aspiration   Sit to Stand   Type of Assistance Needed Incidental touching   Sit to Stand CARE Score 4   Bed-Chair Transfer   Type of Assistance Needed Incidental touching   Comment CGA to CS with RW, pt performed fxnl ambulation in therapy gym and in hallways with noted x 1 obstacle strike with RW req CGA to correct.   Chair/Bed-to-Chair Transfer CARE Score 4   Functional Standing Tolerance   Time 25 min   Activity Card pattern copy with working memory/immediate recall demands in stance with fxnl turns and reaching to simulate fxnl demands for IADL/ADL fxn.   Comments Pt provided with visual card pattern oriented to R and pt required to recall 3 card sequence, locate cards from cluttered field, and then recreate pattern on vertical surface oriented to R requiring working memory, delayed recall, and fxnl balance/standing tolerance. Pt req CG/CS when in stance. Pt provided with edu on fxnl memory strategies including repeat/rehearse. Pt with poor carryover of strategy with dec sustained attention limiting direction follow and carryover. Downgraded to 2 card recall with improved success noted.   Exercise Tools   UE Ergometer Pt engaged in UE ergometer for 5 minutes in prograde and 5 minutes in retrograde with minimal resistance (level 1.0) focusing on increasing pts UB strength, endurance and functional activity tolerance to increase independence with ADL tasks . Pt performed with short rest between sets to manage fatigue.   Cognition   Overall Cognitive Status Impaired   Arousal/Participation Alert;Cooperative;Responsive   Attention Attends with cues to redirect   Orientation Level Oriented X4   Memory Decreased short term memory   Following Commands Follows one step commands with increased time or repetition   Comments Pt  performed formalized cognitive testing of MOCA to assess fxnl cognitive status and current level of function to provide and design appropriate tx strategies and education. see below for details on scoring. Pt scored overall 21/30 on MOCA 8.1 indicating mild cognitive impairment. Greatest deficits in area of delayed recall and attention. Pt provided with extensive education on scoring and engaged in appropriate collaborative goal setting following results.   Assessment   Treatment Assessment Pt engaged in100 min of skilled OT tx session with focus on fxnl mobility/xfers, UB endurance/act tolerance/strengthening, formalized cognitive testing/training/education, and fxnl balance/standing tolerance. see above for further details. Pt performed MOCA 8.1 today scoring a 21/30 indicating mild cognitive impairment with greatest areas of deficit including delayed recall, memory, attention. pt provided with edu on results and implications for treatment sessions and education. pt agreeable and understanding. Please repeat prior to d/c to assess progress. Pt is progressing well towards goals. Overall CGA/CS for fxnl mob and xfers. Please in future sessions address fxnl IADL management of meal prep and medications. Incorporate fxnl recall with appropriate memory strategies in addtion to fxnl strengthening, endurance, act tolerance, balance, and education.    OT Therapy Minutes   OT Time In 1230   OT Time Out 1410   OT Total Time (minutes) 100   OT Mode of treatment - Individual (minutes) 100   OT Mode of treatment - Concurrent (minutes) 0   OT Mode of treatment - Group (minutes) 0   OT Mode of treatment - Co-treat (minutes) 0   OT Mode of Treatment - Total time(minutes) 100 minutes   OT Cumulative Minutes 370       Erasmo Cognitive Assessment:      SCORE DEFICIT RANGE Comments                                         COGNITIVE FXN                    MOCA (8.1)         Education Level = 12 years     Visuospatial/executive  functioning      Naming 3/3     Memory: 1st trial:      Memory: 2nd trial:      Attention/concentration      List of letters:       Serial Seven Subtraction: 2/3     Language/sentence repetition:      Language Fluency:  0     Abstract/Correlational Thinkin/2     Delayed Recall:      Orientation:      Memory Index Score 815                                        Total Score 21/30 mild

## 2025-06-08 LAB
GLUCOSE SERPL-MCNC: 104 MG/DL (ref 65–140)
GLUCOSE SERPL-MCNC: 107 MG/DL (ref 65–140)
GLUCOSE SERPL-MCNC: 153 MG/DL (ref 65–140)
GLUCOSE SERPL-MCNC: 97 MG/DL (ref 65–140)

## 2025-06-08 PROCEDURE — 82948 REAGENT STRIP/BLOOD GLUCOSE: CPT

## 2025-06-08 PROCEDURE — 99232 SBSQ HOSP IP/OBS MODERATE 35: CPT | Performed by: PHYSICIAN ASSISTANT

## 2025-06-08 RX ADMIN — CHLORHEXIDINE GLUCONATE 15 ML: 1.2 SOLUTION ORAL at 21:39

## 2025-06-08 RX ADMIN — ATORVASTATIN CALCIUM 10 MG: 10 TABLET, FILM COATED ORAL at 21:39

## 2025-06-08 RX ADMIN — INSULIN LISPRO 1 UNITS: 100 INJECTION, SOLUTION INTRAVENOUS; SUBCUTANEOUS at 21:43

## 2025-06-08 RX ADMIN — POLYETHYLENE GLYCOL 3350 17 G: 17 POWDER, FOR SOLUTION ORAL at 08:00

## 2025-06-08 RX ADMIN — ENOXAPARIN SODIUM 30 MG: 30 INJECTION SUBCUTANEOUS at 21:39

## 2025-06-08 RX ADMIN — ENOXAPARIN SODIUM 30 MG: 30 INJECTION SUBCUTANEOUS at 08:00

## 2025-06-08 RX ADMIN — CHLORHEXIDINE GLUCONATE 15 ML: 1.2 SOLUTION ORAL at 08:45

## 2025-06-08 RX ADMIN — ATENOLOL 50 MG: 50 TABLET ORAL at 08:00

## 2025-06-08 RX ADMIN — FERROUS SULFATE TAB 325 MG (65 MG ELEMENTAL FE) 325 MG: 325 (65 FE) TAB at 08:00

## 2025-06-08 RX ADMIN — ALLOPURINOL 300 MG: 300 TABLET ORAL at 08:00

## 2025-06-08 RX ADMIN — LOSARTAN POTASSIUM 25 MG: 25 TABLET, FILM COATED ORAL at 08:00

## 2025-06-08 RX ADMIN — SENNOSIDES AND DOCUSATE SODIUM 1 TABLET: 50; 8.6 TABLET ORAL at 21:39

## 2025-06-08 NOTE — ASSESSMENT & PLAN NOTE
MRI of the brain that showed large bilateral SDH  S/p bilateral [place] holes for evacuation of SDH 5/25/25  MMA was not able to be done due to anatomical considerations  For no full AC/AP for 2 weeks = 6/9-11/25 then need to clear with NS  D/W NS 6/6/25 = they will order followup CTH for 6/9/25 and will see him after done

## 2025-06-08 NOTE — ASSESSMENT & PLAN NOTE
Home:  Atenolol-chlorthalidone 50-25 mg qd/Diovan 320 mg qd  Here:  Atenolol 50 mg qd/Losartan 25mg qd  On 6/4/25, added Losartan at 25mg qd  Has Hydralazine 10 mg q8hrs prn SBP >165  BP higher this AM but overall trend acceptable. No changes today.  Will continue to hold Chlorthalidone

## 2025-06-08 NOTE — PLAN OF CARE
Problem: PAIN - ADULT  Goal: Verbalizes/displays adequate comfort level or baseline comfort level  Description: Interventions:  - Encourage patient to monitor pain and request assistance  - Assess pain using appropriate pain scale  - Administer analgesics as ordered based on type and severity of pain and evaluate response  - Implement non-pharmacological measures as appropriate and evaluate response  - Consider cultural and social influences on pain and pain management  - Notify physician/advanced practitioner if interventions unsuccessful or patient reports new pain  - Educate patient/family on pain management process including their role and importance of  reporting pain   - Provide non-pharmacologic/complimentary pain relief interventions  Outcome: Progressing     Problem: INFECTION - ADULT  Goal: Absence or prevention of progression during hospitalization  Description: INTERVENTIONS:  - Assess and monitor for signs and symptoms of infection  - Monitor lab/diagnostic results  - Monitor all insertion sites, i.e. indwelling lines, tubes, and drains  - Monitor endotracheal if appropriate and nasal secretions for changes in amount and color  - [place] appropriate cooling/warming therapies per order  - Administer medications as ordered  - Instruct and encourage patient and family to use good hand hygiene technique  - Identify and instruct in appropriate isolation precautions for identified infection/condition  Outcome: Progressing     Problem: SAFETY ADULT  Goal: Patient will remain free of falls  Description: INTERVENTIONS:  - Educate patient/family on patient safety including physical limitations  - Instruct patient to call for assistance with activity   - Consider consulting OT/PT to assist with strengthening/mobility based on AM PAC & JH-HLM score  - Consult OT/PT to assist with strengthening/mobility   - Keep Call bell within reach  - Keep bed low and locked with side rails adjusted as appropriate  - Keep  care items and personal belongings within reach  - Initiate and maintain comfort rounds  - Make Fall Risk Sign visible to staff  - Offer Toileting every 2 Hours, in advance of need  - Initiate/Maintain bed/chair alarm  - Obtain necessary fall risk management equipment:    - Apply yellow socks and bracelet for high fall risk patients  - Consider moving patient to room near nurses station  Outcome: Progressing  Goal: Maintain or return to baseline ADL function  Description: INTERVENTIONS:  -  Assess patient's ability to carry out ADLs; assess patient's baseline for ADL function and identify physical deficits which impact ability to perform ADLs (bathing, care of mouth/teeth, toileting, grooming, dressing, etc.)  - Assess/evaluate cause of self-care deficits   - Assess range of motion  - Assess patient's mobility; develop plan if impaired  - Assess patient's need for assistive devices and provide as appropriate  - Encourage maximum independence but intervene and supervise when necessary  - Involve family in performance of ADLs  - Assess for home care needs following discharge   - Consider OT consult to assist with ADL evaluation and planning for discharge  - Provide patient education as appropriate  - Monitor functional capacity and physical performance, use of AM PAC & -HLM   - Monitor gait, balance and fatigue with ambulation    Outcome: Progressing  Goal: Maintains/Returns to pre admission functional level  Description: INTERVENTIONS:  - Perform AM-PAC 6 Click Basic Mobility/ Daily Activity assessment daily.  - Set and communicate daily mobility goal to care team and patient/family/caregiver.   - Collaborate with rehabilitation services on mobility goals if consulted  - Perform Range of Motion 3 times a day.  - Reposition patient every 2 hours.  - Dangle patient 3 times a day  - Stand patient 3 times a day  - Ambulate patient 3 times a day  - Out of bed to chair for meals  - Out of bed for toileting  - Record  patient progress and toleration of activity level   Outcome: Progressing     Problem: DISCHARGE PLANNING  Goal: Discharge to home or other facility with appropriate resources  Description: INTERVENTIONS:  - Identify barriers to discharge w/patient and caregiver  - Arrange for needed discharge resources and transportation as appropriate  - Identify discharge learning needs (meds, wound care, etc.)  - Arrange for interpretive services to assist at discharge as needed  - Refer to Case Management Department for coordinating discharge planning if the patient needs post-hospital services based on physician/advanced practitioner order or complex needs related to functional status, cognitive ability, or social support system  Outcome: Progressing

## 2025-06-08 NOTE — PLAN OF CARE
Problem: SAFETY ADULT  Goal: Patient will remain free of falls  Description: INTERVENTIONS:  - Educate patient/family on patient safety including physical limitations  - Instruct patient to call for assistance with activity   - Consider consulting OT/PT to assist with strengthening/mobility based on AM PAC & JH-HLM score  - Consult OT/PT to assist with strengthening/mobility   - Keep Call bell within reach  - Keep bed low and locked with side rails adjusted as appropriate  - Keep care items and personal belongings within reach  - Initiate and maintain comfort rounds  - Make Fall Risk Sign visible to staff  - Offer Toileting every 2 Hours, in advance of need  - Initiate/Maintain bed/chair alarm  - Obtain necessary fall risk management equipment:    - Apply yellow socks and bracelet for high fall risk patients  - Consider moving patient to room near nurses station  Outcome: Progressing  Goal: Maintain or return to baseline ADL function  Description: INTERVENTIONS:  -  Assess patient's ability to carry out ADLs; assess patient's baseline for ADL function and identify physical deficits which impact ability to perform ADLs (bathing, care of mouth/teeth, toileting, grooming, dressing, etc.)  - Assess/evaluate cause of self-care deficits   - Assess range of motion  - Assess patient's mobility; develop plan if impaired  - Assess patient's need for assistive devices and provide as appropriate  - Encourage maximum independence but intervene and supervise when necessary  - Involve family in performance of ADLs  - Assess for home care needs following discharge   - Consider OT consult to assist with ADL evaluation and planning for discharge  - Provide patient education as appropriate  - Monitor functional capacity and physical performance, use of AM PAC & JH-HLM   - Monitor gait, balance and fatigue with ambulation    Outcome: Progressing  Goal: Maintains/Returns to pre admission functional level  Description:  INTERVENTIONS:  - Perform AM-PAC 6 Click Basic Mobility/ Daily Activity assessment daily.  - Set and communicate daily mobility goal to care team and patient/family/caregiver.   - Collaborate with rehabilitation services on mobility goals if consulted  - Perform Range of Motion 3 times a day.  - Reposition patient every 2 hours.  - Dangle patient 3 times a day  - Stand patient 3 times a day  - Ambulate patient 3 times a day  - Out of bed to chair for meals  - Out of bed for toileting  - Record patient progress and toleration of activity level   Outcome: Progressing

## 2025-06-08 NOTE — PROGRESS NOTES
"Progress Note - Hospitalist   Name: Deonte Greenfield 74 y.o. male I MRN: [id number]  Unit/Bed#: -01 I Date of Admission: 6/3/2025   Date of Service: 6/8/2025 I Hospital Day: 5    Assessment & Plan  Acute on chronic intracranial subdural hematoma (HCC)  MRI of the brain that showed large bilateral SDH  S/p bilateral [place] holes for evacuation of SDH 5/25/25  MMA was not able to be done due to anatomical considerations  For no full AC/AP for 2 weeks = 6/9-11/25 then need to clear with NS  D/W NS 6/6/25 = they will order followup CTH for 6/9/25 and will see him after done  Acute CVA (cerebrovascular accident) (HCC)  Acute CVA right superior cerebellum  Was found on CTH 5/30, confirmed by MRI  CTA of the head/neck showed \"Moderate (approximately 50%) stenosis in the 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 No significant stenosis of the cervical left internal carotid artery. Severe stenosis origin of the dominant left vertebral artery. Hypoplastic right vertebral artery. No high-grade intracranial stenosis, large vessel occlusion or aneurysm\"  Neurology saw in consult for the CVA.  They recommended that the patient start ASA 81mg daily when cleared by Neurosurgery as above.    For LOOP recorder implanted as an outpatient.  He did have a previous Ziopatch done that was negative for atrial fibrillation.    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  Continue statin  Pancreatic cyst  Had an OP ultrasound done for elevated total bilirubin that showed cystic lesions in the pancreas for which further imaging was recommended  MRI done thereafter as an OP showed \"Numerous cystic lesions scattered throughout the pancreas with a dominant lesion measuring 18 mm in the body. Worrisome features include ductal dilation up to 6 mm in the tail the pancreas and abrupt change in caliber of the pancreatic duct. Management " "recommendation: Because of presence of multiple worrisome features, surgical consultation/management with consideration for EUS recommended.  Management and follow up recommendations for cystic pancreatic lesions are based on Institutional consensus and international evidence-based [place] guidelines for the management of intraductal papillary mucinous neoplasm of the pancreas. Pancreatology 24 (2024) 255-270\".  Per IMs note in the hospital:  \"Reached out to primary team and surgery oncall as imaging findings are suggestive of malignancy. According to surgery they recommend out patient follow up with surg onc for possible biopsy and out patient GI follow up. Surgery called the surg onc office to schedule the appointment for patient. I did a STAT referral for GI outpatient. Primary team will inform the patient regarding imaging findings and the referrals\".  Most recent CMP on 6/3/25 with normal total bilirubin/LFT  Diabetes mellitus (HCC)  HbA1C 6.2 on 5/24/25  Home:  Metformin 1000 mg BID  Here:  QID Accuchecks/SSI  Will restart Metformin when needed/able  HTN (hypertension)  Home:  Atenolol-chlorthalidone 50-25 mg qd/Diovan 320 mg qd  Here:  Atenolol 50 mg qd/Losartan 25mg qd  On 6/4/25, added Losartan at 25mg qd  Has Hydralazine 10 mg q8hrs prn SBP >165  BP higher this AM but overall trend acceptable. No changes today.  Will continue to hold Chlorthalidone  Thrombocytopenia (HCC)  Chronic  Follows with H-O as OP  Currently platelet count is 223 on 6/6/25  Mixed hyperlipidemia  Takes Lipitor 10 mg qd at home = continue here  Normocytic anemia  Continue Iron 325 mg QOD  Gout  Continue Allopurinol 300mg qd as at home  No recent flares  CKD (chronic kidney disease)  Baseline 0.7-0.9  Stable  Left shoulder pain  Per PMR  Bilateral leg edema  Says gets at home if sleeps in chair as opposed to bed and is L>R as is now  Continue to ACE wrap  Leg wound, left  WC saw in acute hospital stay  Continue local WC  Tx per " PMR    The above assessment and plan was reviewed and updated as determined by my evaluation of the patient on 6/8/2025.    History of Present Illness   Patient seen and examined. Patients overnight issues or events were reviewed with nursing staff. New or overnight issues include the following:     Pt seen in his room. He states that he is doing well He denies any current complaints.    A 10 point review of systems was negative except for what is noted in the HPI.    Objective :  Temp:  [97.3 °F (36.3 °C)-98.5 °F (36.9 °C)] 97.3 °F (36.3 °C)  HR:  [57-62] 62  BP: (111-150)/(55-72) 148/72  Resp:  [17-20] 20  SpO2:  [96 %] 96 %  O2 Device: None (Room air)    Invasive Devices       None                   Physical Exam  General Appearance: NAD; pleasant  HEENT: PERRLA, conjuctiva normal; mucous membranes moist; face symmetrical  Neck:  Supple  Lungs: clear bilaterally, normal respiratory effort, no retractions, expiratory effort normal, on room air  CV: regular rate and rhythm, no murmurs rubs or gallops noted   ABD: soft non tender, +BS x4  EXT: DP pulses intact, no lymphadenopathy, + minor edema Lt > Rt  Skin: normal turgor, normal texture, cranial incisions healing well.  Psych: affect normal, mood normal  Neuro: Awake and alert.  The above physical exam was reviewed and updated as determined by my evaluation of the patient on 6/8/2025.      Lab Results: I have reviewed the following results:  Results from last 7 days   Lab Units 06/06/25  1219 06/03/25  0601   WBC Thousand/uL 7.24 7.45   HEMOGLOBIN g/dL 11.0* 10.6*   HEMATOCRIT % 35.1* 33.1*   PLATELETS Thousands/uL 223 225     Results from last 7 days   Lab Units 06/06/25  1219 06/03/25  0601   SODIUM mmol/L 138 135   POTASSIUM mmol/L 4.2 4.1   CHLORIDE mmol/L 104 101   CO2 mmol/L 26 28   BUN mg/dL 24 21   CREATININE mg/dL 0.74 0.68   CALCIUM mg/dL 9.1 9.1             Results from last 7 days   Lab Units 06/08/25  0645 06/07/25 2051 06/07/25  1539   POC GLUCOSE  mg/dl 104 125 139       Imaging Results Review: No pertinent imaging studies reviewed.  Other Study Results Review: Other studies reviewed include: blood sugars    Review of Scheduled Meds: Medications  reviewed and reconciled as needed  Current Facility-Administered Medications   Medication Dose Route Frequency Provider Last Rate    acetaminophen  650 mg Oral Q6H PRN JOSE ALEJANDRO Bradley      allopurinol  300 mg Oral Daily JOSE ALEJANDRO Bradley      atenolol  50 mg Oral Daily JOSE ALEJANDRO Bradley      And    [Held by provider] chlorthalidone  25 mg Oral Daily Evon Price, JOSE ALEJANDRO      atorvastatin  10 mg Oral HS Evon Price, JOSE ALEJANDRO      chlorhexidine  15 mL Mouth/Throat Q12H BRIDGET JOSE ALEJANDRO Bradley      Diclofenac Sodium  2 g Topical 4x Daily PRN JOSE ALEJANDRO Bradley      enoxaparin  30 mg Subcutaneous Q12H BRIDGET JOSE ALEJANDRO Bradley      ferrous sulfate  325 mg Oral Every Other Day JOSE ALEJANDRO Bradley      hydrALAZINE  10 mg Oral Q8H PRN JOSE ALEJANDRO Bradley      insulin lispro  1-5 Units Subcutaneous TID AC JOSE ALEJANDRO Bradley      insulin lispro  1-5 Units Subcutaneous HS JOSE ALEJANDRO Bradley      lidocaine   Topical 4x Daily PRN JOSE ALEJANDRO Bradley      losartan  25 mg Oral Daily Evon Price, JOSE ALEJANDRO      polyethylene glycol  17 g Oral Daily Evon Price, JOSE ALEJANDRO      senna-docusate sodium  1 tablet Oral HS JOSE ALEJANDRO Bradley         VTE Pharmacologic Prophylaxis: Lovenox  Code Status: Level 1 - Full Code  Current Length of Stay: 5 day(s)    Administrative Statements   I have spent a total time of 30 minutes in caring for this patient on the day of the visit/encounter including Diagnostic results, Prognosis, Risks and benefits of tx options, Instructions for management, Patient and family education, Importance of tx compliance, Risk factor reductions, Impressions, Counseling / Coordination of care,  Documenting in the medical record, Reviewing/placing orders in the medical record (including tests, medications, and/or procedures), Obtaining or reviewing history  , and Communicating with other healthcare professionals .  ** Please Note:  voice to text software may have been used in the creation of this document. Although proof errors in transcription or interpretation are a potential of such software**

## 2025-06-09 ENCOUNTER — APPOINTMENT (INPATIENT)
Dept: RADIOLOGY | Facility: HOSPITAL | Age: 75
DRG: 949 | End: 2025-06-09
Payer: COMMERCIAL

## 2025-06-09 LAB
GLUCOSE SERPL-MCNC: 107 MG/DL (ref 65–140)
GLUCOSE SERPL-MCNC: 126 MG/DL (ref 65–140)
GLUCOSE SERPL-MCNC: 131 MG/DL (ref 65–140)
GLUCOSE SERPL-MCNC: 87 MG/DL (ref 65–140)

## 2025-06-09 PROCEDURE — 97130 THER IVNTJ EA ADDL 15 MIN: CPT

## 2025-06-09 PROCEDURE — 99024 POSTOP FOLLOW-UP VISIT: CPT | Performed by: NEUROLOGICAL SURGERY

## 2025-06-09 PROCEDURE — 97530 THERAPEUTIC ACTIVITIES: CPT

## 2025-06-09 PROCEDURE — 99232 SBSQ HOSP IP/OBS MODERATE 35: CPT | Performed by: NURSE PRACTITIONER

## 2025-06-09 PROCEDURE — 70450 CT HEAD/BRAIN W/O DYE: CPT

## 2025-06-09 PROCEDURE — 97129 THER IVNTJ 1ST 15 MIN: CPT

## 2025-06-09 PROCEDURE — 99232 SBSQ HOSP IP/OBS MODERATE 35: CPT | Performed by: STUDENT IN AN ORGANIZED HEALTH CARE EDUCATION/TRAINING PROGRAM

## 2025-06-09 PROCEDURE — 97112 NEUROMUSCULAR REEDUCATION: CPT

## 2025-06-09 PROCEDURE — 82948 REAGENT STRIP/BLOOD GLUCOSE: CPT

## 2025-06-09 RX ADMIN — ATORVASTATIN CALCIUM 10 MG: 10 TABLET, FILM COATED ORAL at 21:36

## 2025-06-09 RX ADMIN — LOSARTAN POTASSIUM 25 MG: 25 TABLET, FILM COATED ORAL at 08:09

## 2025-06-09 RX ADMIN — ENOXAPARIN SODIUM 30 MG: 30 INJECTION SUBCUTANEOUS at 21:36

## 2025-06-09 RX ADMIN — SENNOSIDES AND DOCUSATE SODIUM 1 TABLET: 50; 8.6 TABLET ORAL at 21:36

## 2025-06-09 RX ADMIN — ENOXAPARIN SODIUM 30 MG: 30 INJECTION SUBCUTANEOUS at 08:09

## 2025-06-09 RX ADMIN — CHLORHEXIDINE GLUCONATE 15 ML: 1.2 SOLUTION ORAL at 21:36

## 2025-06-09 RX ADMIN — ATENOLOL 50 MG: 50 TABLET ORAL at 08:09

## 2025-06-09 RX ADMIN — CHLORHEXIDINE GLUCONATE 15 ML: 1.2 SOLUTION ORAL at 08:09

## 2025-06-09 RX ADMIN — ALLOPURINOL 300 MG: 300 TABLET ORAL at 08:09

## 2025-06-09 RX ADMIN — POLYETHYLENE GLYCOL 3350 17 G: 17 POWDER, FOR SOLUTION ORAL at 08:09

## 2025-06-09 NOTE — ASSESSMENT & PLAN NOTE
- Wound care consulted while in acute care  - Left Pretibial leg wound: cleanse left lower leg wound with soap and water, pat dry, apply xeroform to wound bed and cover with foam dressing. Change every other day.

## 2025-06-09 NOTE — ASSESSMENT & PLAN NOTE
Hx of mild thrombocytopenia - been fluctuating and present for the last 10 years  Following with [facility] - seen on 5/16/2025   Felt to be secondary to mild thrombopoietin deficiency secondary to his hepatic steatosis      Plan:   Maintain goal plt > 100  Encourage ongoing outpt follow-up w/ PCP and heme-onc upon d/c

## 2025-06-09 NOTE — ASSESSMENT & PLAN NOTE
--MRI brain 5/24:  New, large bilateral high convexity subdural hematomas, right greater than left, which are predominantly subacute in timing, with mass effect on the bilateral cerebral hemispheres, and effacement of the sulci. No significant midline shift noted. FLAIR signal hyperintensity along the sulci of the right frontal and parietal convexities, which may represent trace associated subarachnoid hemorrhage. No acute infarct. Mild chronic white matter microangiopathic changes involving both cerebral hemispheres.  --CT head 5/30: Acute appearing infarct in right superior cerebellum. Increase in volume of hyperdense blood products bilaterally  --Echo 4/2025: EF 65%. Normal systolic function. Grade 1 diastolic relaxation. 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   LDL 62  A1c 6.2  TTE: 65%     Given new stroke on MRI with concurrent SDH, patient will need ASA when cleared by NSGY after repeat CTH. Given recent negative ziopatch in April 2025, will need loop recorder outpatient.      Lipitor 10 mg qHS  Normotension  Start ASA 81 mg when cleared by NSGY for AP/AC, CTH stable  Outpatient loop recorder  PT/OT  SLP to evaluate language and cognition s/p cerebral infarction

## 2025-06-09 NOTE — PROGRESS NOTES
"OT Daily Treatment Note     06/09/25 1030   Pain Assessment   Pain Assessment Tool 0-10   Pain Score No Pain   Restrictions/Precautions   Precautions Aspiration;Bed/chair alarms;Cognitive;Fall Risk;Supervision on toilet/commode   Weight Bearing Restrictions No   ROM Restrictions No   Lifestyle   Autonomy \"I'm moving!\"   Sit to Stand   Type of Assistance Needed Supervision   Physical Assistance Level No physical assistance   Comment With RW   Sit to Stand CARE Score 4   Bed-Chair Transfer   Type of Assistance Needed Incidental touching   Physical Assistance Level No physical assistance   Comment CGA with RW   Chair/Bed-to-Chair Transfer CARE Score 4   Meal Prep   Meal Prep Level Walker   Meal Prep Level of Assistance Close supervision;Moderate verbal cues   Meal Preparation Pt participated in meal preparation activity. Pt able to obtain task items without cues. Pt required assistance to control stovetop and identify correct burner. Additionally, pt demonstrated decreased safety with walker, and required mod VC for RW positioning during meal preparation task. Pt required min VC for sequencing. Recommend close supervision for home.   Light Housekeeping   Light Housekeeping Level Walker   Light Housekeeping Level of Assistance Close supervision   Light Housekeeping Pt engaged in dish washing activity. Pt able to maintain balance with RW positioned in front of sink. Pt demonstrated appropriate sequencing and thoroughness. No cues required.   Commmunity Re-entry   Community Re-entry Level Walker   Community Re-entry Level of Assistance Contact guard   Community Re-entry Pt engaged in functional mobility task with use of RW to address home and community distances, endurance, standing tolerance, safety awareness, and strength to enhance participation in ADLs and IADLs.   Functional Standing Tolerance   Time 17 minutes   Activity Card matching and pipe tree   Comments Pt engaged in card matching and construction activities " to focus on functional standing tolerance, functional reach, dynamic standing balance, activity tolerance, endurance, attention, problem-solving, and visual scanning. Pt able to match all given cards, and demonstrated few limitations in visual scanning. Pt then participated in construction activity, and was provided with a visual aid. Pt instructed to identify correct items based on visual aid. Pt demonstrated good standing tolerance and problem-solving.   Cognition   Overall Cognitive Status Impaired   Arousal/Participation Alert;Cooperative   Attention Attends with cues to redirect   Orientation Level Oriented X4   Memory Decreased short term memory;Decreased recall of precautions   Following Commands Follows one step commands with increased time or repetition   Comments Pt demonstrated decreased safety awareness, judgment, and insight during meal preparation task, requiring mod VC for RW placement for safety.   Activity Tolerance   Activity Tolerance Patient tolerated treatment well   Assessment   Treatment Assessment Pt engaged in 90-minute skilled OT session to address meal preparation, standing tolerance, dynamic standing balance, functional reach, safety awareness, judgment, insight, endurance, activity tolerance, visual scanning, problem-solving, and community reintegration. Pt demonstrated good standing balance, functional reach, endurance, and activity tolerance. Pt continues to demonstrate limitations in safety awareness, judgment, insight, visual scanning, and problem-solving. Pt would benefit from continued OT services to address the abovementioned skills.   Prognosis Good   Problem List Decreased strength;Decreased endurance;Impaired balance;Decreased mobility;Decreased coordination;Decreased cognition;Impaired judgement;Decreased safety awareness   Barriers to Discharge Inaccessible home environment;Decreased caregiver support   Plan   Treatment/Interventions ADL retraining;Functional transfer  training;LE strengthening/ROM;Therapeutic exercise;Endurance training;Cognitive reorientation;Patient/family training;Compensatory technique education   Progress Improving as expected   OT Therapy Minutes   OT Time In 1030   OT Time Out 1200   OT Total Time (minutes) 90   OT Mode of treatment - Individual (minutes) 90   OT Mode of treatment - Concurrent (minutes) 0   OT Mode of treatment - Group (minutes) 0   OT Mode of treatment - Co-treat (minutes) 0   OT Mode of Treatment - Total time(minutes) 90 minutes   OT Cumulative Minutes 460

## 2025-06-09 NOTE — PROGRESS NOTES
Progress Note - Neurosurgery   Name: Deonte Attarian 74 y.o. male I MRN: [id number]  Unit/Bed#: [facility] 963-01 I Date of Admission: 6/3/2025   Date of Service: 2025 I Hospital Day: 6    Assessment & Plan  Acute on chronic intracranial subdural hematoma (HCC)  S/p bilateral KYLIE HOLES for SDH evac (Dr. Grady, 2025)  Reports a fall on 2025 with progressively worsening gait dysfunction; also with concern for slowed speech per family  Outpatient work up noted bilateral SDH for which he was advised to go to the ED for evaluation  No reported AC/AP med use   Pt is being seen today for his approximate 2 week post-op visit for repeat CTH and incision check   He is currently admitted in [facility]. He offers no complaints today. Denies any HA. Pt states he is working well with therapy. He is looking forward to d/c home next week.      Imagin/9 repeat CTH: Stable to slightly decreased size of mixed density bilateral cerebral convexity subdural hematomas admixed with foci of air. No significant midline shift.    MRI brain: Within the confines of an extensively motion-degraded examination, which significantly reduces diagnostic sensitivity and results in a limited examination: Evolving recent right cerebellar infarct with mild local mass effect. No significant change in mixed density bilateral cerebral convexity subdural hematomas.   CTH: Stable small recent right cerebellar infarct. No significant change in mixed density bilateral cerebral convexity subdural hematomas admixed with foci of air     Plan:   Continue to closely monitor neuro exam   Frequent neuro checks per primary team   Repeat STAT CTH with any acute decline in GCS > 2pts or more in 1hr   Maintain normotensive BP goals, SBP < 160, MAP > 65   Case and imaging reviewed this am on rounds.   Repeat CTH reveals expected post-op changes w/ some improvement/decrease in odalis SD collections.   Pt continues to improve on exam   Continue local wound  care to incision   6/9 - staples and sutures removed at the bedside today   Continue to keep clean and dry --> head to be washed every other day   Will plan for repeat incision check in 1 week   Completed 1 week of keppra for seizure ppx post-op   Hold all AC/AP meds   Neurology is requsting ASA 81mg to be started once cleared by nsgy team given stroke   Plan for repeat CTH in 1 week - Monday 6/16 - and will determine if cleared to start ASA 81mg then pending result   Correct any coagulopathy   Maintain goal INR < 1.4, plt > 100 - see above   DVT ppx: SCDs, SQ lovenox   Medical management per primary team   Pain control per primary team   PT/OT   Social work following for assistance with dispo once medically cleared      Neurosurgery will follow from the periphery at this time. Will plan to follow-up with the pt again in 1 week with a repeat CTH. Please reach out with any further questions or concerns.  Thrombocytopenia (HCC)  Hx of mild thrombocytopenia - been fluctuating and present for the last 10 years  Following with [facility] gonzales-onc - seen on 5/16/2025   Felt to be secondary to mild thrombopoietin deficiency secondary to his hepatic steatosis      Plan:   Maintain goal plt > 100  Encourage ongoing outpt follow-up w/ PCP and heme-onc upon d/c   Acute CVA (cerebrovascular accident) (HCC)  Acute R superior cerebellar stroke   - noted on CTH on 5/30, confirmed on MRI     Plan:   Continue on the stroke pathway per neurology   Neurology recommended ASA 81mg qd once cleared by our team   At this time, would continue to hold all AC/AP meds --> will plan for repeat CTH in 1 week and determine if cleared to start ASA after that image is reviewed     Please contact the Secure[employer] role for the Neurosurgery service with any questions/concerns.    Subjective   Pt seen and examined this am on vito MANLEY. Pt is being seen today for his approximate 2 week hospital follow-up appt. patient offers no complaints today.  He sitting up  "in the chair eating breakfast.  He states has been working well with PT and OT.  He states that he has walking much steadier and has been able to ambulate with a walker, with a cane, and independently.  He does use the walker most frequently.  Patient is looking forward to his discharge home next week.    Objective :  Temp:  [97.7 °F (36.5 °C)-98.6 °F (37 °C)] 98 °F (36.7 °C)  HR:  [56-58] 58  BP: (131-154)/(62-68) 154/68  Resp:  [16-18] 18  SpO2:  [96 %-98 %] 98 %  O2 Device: None (Room air)    I/O         06/07 0701  06/08 0700 06/08 0701  06/09 0700 06/09 0701  06/10 0700    P.O. 600 1060 320    Total Intake(mL/kg) 600 (5.9) 1060 (10.5) 320 (3.2)    Urine (mL/kg/hr) 1 (0)      Stool 0      Total Output 1      Net +599 +1060 +320           Unmeasured Urine Occurrence 6 x 5 x     Unmeasured Stool Occurrence 3 x 1 x           Physical Exam Neurological Exam  General appearance: alert, appears stated age, cooperative and no distress  Head:   - Status post bilateral bur holes  - Incisions have some mild scabbing on it but are overall healing well.  There is no surrounding erythema, edema, or drainage.  No tenderness.  Eyes: EOMI, PERRL  Neck: supple, symmetrical, trachea midline and NT  Back: no kyphosis present, no tenderness to percussion or palpation  Lungs: non labored breathing, no respiratory distress on room air  Heart: regular heart rate  Neurologic:   Mental status: Alert, oriented x3, thought content appropriate  Cranial nerves: grossly intact (Cranial nerves II-XII)  - Very subtle dysarthria  Sensory: normal to light touch bilaterally throughout  Motor: moving all extremities  - LUE: 4/5  -RUE: 5/5  -Bilateral lower extremities intact  Coordination: Subtle left upper extremity drift.      Lab Results: I have reviewed the following results:  No results for input(s): \"WBC\", \"HGB\", \"HCT\", \"PLT\", \"BANDSPCT\", \"SODIUM\", \"K\", \"CL\", \"CO2\", \"BUN\", \"CREATININE\", \"GLUC\", \"CAIONIZED\", \"MG\", \"PHOS\", \"AST\", \"ALT\", \"ALB\", " "\"TBILI\", \"DBILI\", \"ALKPHOS\", \"PTT\", \"INR\", \"HSTNI0\", \"HSTNI2\", \"BNP\", \"LACTICACID\" in the last 72 hours.    VTE Pharmacologic Prophylaxis: Sequential compression device (Venodyne)  and Enoxaparin (Lovenox)    "

## 2025-06-09 NOTE — ASSESSMENT & PLAN NOTE
Lab Results   Component Value Date    EGFR 90 06/06/2025    EGFR 94 06/03/2025    EGFR 89 06/02/2025    CREATININE 0.74 06/06/2025    CREATININE 0.68 06/03/2025    CREATININE 0.76 06/02/2025     - Adequate GFR while here, previously diagnosed with CKD 2  - has appointment to establish with nephrology on 7/21/25

## 2025-06-09 NOTE — ASSESSMENT & PLAN NOTE
"MRI of the brain that showed large bilateral SDH  S/p bilateral [place] holes for evacuation of SDH 5/25/25  MMA was not able to be done due to anatomical considerations  For no full AC/AP for 2 weeks = 6/9-11/25 then need to clear with NS  D/W NS 6/6/25 = followup CTH for 6/9/25 done = \"Stable to slightly decreased size of mixed density bilateral cerebral convexity subdural hematomas admixed with foci of air. No significant midline shift\"   NS to see him in f/u in R/T the CTH done today 6/9 and if oK to start ASA 81mg qd  "

## 2025-06-09 NOTE — ASSESSMENT & PLAN NOTE
> Originally presented in early April due to unwitnessed fall and found to have small subdural hemorrhage which subsequently appeared to resolve upon repeat CT head imaging. Patient opted to have MRI performed in outpatient setting and MRI team sent him to ER for large bilateral high convexity SDHs with mass effect. Presented to ED on 5/24 where he received bilateral Yael holes with 4 subdural drains placed  > Drains removed on 5/27 and 5/28  > completed 7 days of Keppra for seizure prophylaxis  > CTH on 6/9/25 with stable evolution of SDH's and postoperative pneumocephalus    Repeat CT head STAT if GCS declines more than 2 points in 1 hour  SBP < 160  INR < 1.4  Plt > 100  Hold all therapeutic doses of AC / AP therapy, he is not cleared to start asa per neurology recommendations for stroke management  Ok for dvt prophylaxis, continue lovenox  PT/OT

## 2025-06-09 NOTE — ASSESSMENT & PLAN NOTE
Home:  Atenolol-chlorthalidone 50-25 mg qd/Diovan 320 mg qd  Here:  Atenolol 50 mg qd/Losartan 25mg qd  Has Hydralazine 10 mg q8hrs prn SBP >165  BP higher this AM but overall trend acceptable.   No changes today 6/9/25 but may need to increase Losartan  Will continue to hold Chlorthalidone

## 2025-06-09 NOTE — CASE MANAGEMENT
Clinical update [facility] via [employer] [facility] [phone], determiation pending.

## 2025-06-09 NOTE — ASSESSMENT & PLAN NOTE
Last seen by SLP on 6/6/25    - continue recommended diet of regular texture and thin liquids  - Aspiration precautions and compensatory swallowing strategies: upright posture, slow rate of feeding, small bites/sips, and alternating bites and sips   - SLP consulted

## 2025-06-09 NOTE — ASSESSMENT & PLAN NOTE
Lab Results   Component Value Date    HGBA1C 6.2 (H) 05/24/2025       Recent Labs     06/08/25  1543 06/08/25 2037 06/09/25  0557 06/09/25  1158   POCGLU 107 153* 87 107       Blood Sugar Average: Last 72 hrs:  (P) [id number]  - plan to restart Metformin during [facility] stay  - continue SSI and accuchecks per IM

## 2025-06-09 NOTE — PROGRESS NOTES
Progress Note - PMR   Name: Deonte Greenfield 74 y.o. male I MRN: [id number]  Unit/Bed#: -01 I Date of Admission: 6/3/2025   Date of Service: 6/9/2025 I Hospital Day: 6     Assessment & Plan  Acute on chronic intracranial subdural hematoma (HCC)  > Originally presented in early April due to unwitnessed fall and found to have small subdural hemorrhage which subsequently appeared to resolve upon repeat CT head imaging. Patient opted to have MRI performed in outpatient setting and MRI team sent him to ER for large bilateral high convexity SDHs with mass effect. Presented to ED on 5/24 where he received bilateral Yael holes with 4 subdural drains placed  > Drains removed on 5/27 and 5/28  > completed 7 days of Keppra for seizure prophylaxis  > CTH on 6/9/25 with stable evolution of SDH's and postoperative pneumocephalus    Repeat CT head STAT if GCS declines more than 2 points in 1 hour  SBP < 160  INR < 1.4  Plt > 100  Hold all therapeutic doses of AC / AP therapy, he is not cleared to start asa per neurology recommendations for stroke management  Ok for dvt prophylaxis, continue lovenox  PT/OT  Diabetes mellitus (HCC)  Lab Results   Component Value Date    HGBA1C 6.2 (H) 05/24/2025       Recent Labs     06/08/25  1543 06/08/25  2037 06/09/25  0557 06/09/25  1158   POCGLU 107 153* 87 107       Blood Sugar Average: Last 72 hrs:  (P) [id number]  - plan to restart Metformin during [facility] stay  - continue SSI and accuchecks per IM  Thrombocytopenia (HCC)  Baseline 100-250  Trend biweekly  Goal > 100  OP follow up with heme onc  Mixed hyperlipidemia  - Continue statin  - Last LDL: 62 on 5/31/2025  Normocytic anemia  - baseline 12ish, now ranging at 10-11 likely in the setting of blood loss  - Continue iron supplementation  Acute CVA (cerebrovascular accident) (HCC)  --MRI brain 5/24:  New, large bilateral high convexity subdural hematomas, right greater than left, which are predominantly subacute in timing,  with mass effect on the bilateral cerebral hemispheres, and effacement of the sulci. No significant midline shift noted. FLAIR signal hyperintensity along the sulci of the right frontal and parietal convexities, which may represent trace associated subarachnoid hemorrhage. No acute infarct. Mild chronic white matter microangiopathic changes involving both cerebral hemispheres.  --CT head 5/30: Acute appearing infarct in right superior cerebellum. Increase in volume of hyperdense blood products bilaterally  --Echo 4/2025: EF 65%. Normal systolic function. Grade 1 diastolic relaxation. 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   LDL 62  A1c 6.2  TTE: 65%     Given new stroke on MRI with concurrent SDH, patient will need ASA when cleared by NSGY after repeat CTH. Given recent negative ziopatch in April 2025, will need loop recorder outpatient.      Lipitor 10 mg qHS  Normotension  Start ASA 81 mg when cleared by NSGY for AP/AC, CTH stable  Outpatient loop recorder  PT/OT  SLP to evaluate language and cognition s/p cerebral infarction  Gout  - continue Allopurinol  Pancreatic cyst  > Patient had MRI abdomen completed outpatient (5/22, d/t jaundice & thrombocytopenia), with numerous pancreatic cystic lesions concerning for malignancy. This showed numerous cystic lesions scattered throughout the pancreas that were concerning for malignancy at this time.   >Patient's family was informed of these and are aware that following discharge from acute rehab they will need to follow-up with the surgical oncology team as well as GI for further evaluation and treatment of these.  Patient and spouse both aware.    - Surg Onc & GI referrals placed by trauma surgery during acute hospitalization discharge  HTN (hypertension)  Home meds: atenolol-chlorthalidone 50-25, valsartan 320  Here: atenolol 50 mg, losartan 25 mg  - continue titration  for normotension  CKD (chronic kidney disease)  Lab Results   Component Value Date    EGFR 90 06/06/2025    EGFR 94 06/03/2025    EGFR 89 06/02/2025    CREATININE 0.74 06/06/2025    CREATININE 0.68 06/03/2025    CREATININE 0.76 06/02/2025     - Adequate GFR while here, previously diagnosed with CKD 2  - has appointment to establish with nephrology on 7/21/25  Superficial foreign body of left leg without major open wound and without infection  - Wound care consulted while in acute care  - Left Pretibial leg wound: cleanse left lower leg wound with soap and water, pat dry, apply xeroform to wound bed and cover with foam dressing. Change every other day.   Oropharyngeal dysphagia  Last seen by SLP on 6/6/25    - continue recommended diet of regular texture and thin liquids  - Aspiration precautions and compensatory swallowing strategies: upright posture, slow rate of feeding, small bites/sips, and alternating bites and sips   - SLP consulted  Left shoulder pain  - new pain for patient  - pain at AC joint, but could not provoke pain other than on palpation  - Differential includes AC arthritis, glenohumeral arthritis, MSK strain, rotator cuff tendinopahty  - Doesn't seem like needs K-tape as no sulcus sign  - PT/OT  - XR if hindering therapies  Bilateral leg edema  Ace wrap  Leg wound, left        1-Sacrum MASD: Three small partial thickness wounds measured together that are pink in color and fragile. No drainage noted.Periwound is fragile and hyperpigmented.      Skin care plans:  1-Cleanse B/L Heels and Sacro-Buttocks with soap and water. Pat dry. Apply Silicone Border Foam (Mepilex) to areas. Gaston with P for Prevention and change every 3 days or PRN soilage/displacement. Peel back and inspect Q-shift.  2-Elevate heels to offload pressure.  3-Ehob cushion in chair when out of bed.  4-Moisturize skin daily with skin nourishing cream.  5-Turn/reposition q2h for pressure re-distribution on skin.  6-Left Pretibial leg  wound: cleanse left lower leg wound with soap and water, pat dry, apply xeroform to wound bed and cover with foam dressing. Change every other day  7-Sacrum: Cleanse with mild soap and water and pat dry. Apply thin layer of Calazime paste to sacrum TID and prn.      Subjective   74-year-old male with history of type 2 diabetes, stage II CKD, hypertension, hyperlipidemia presenting initially to [facility] in April with unwitnessed fall with subdural hemorrhage seem to improve and resolved on subsequent imaging but eventually had a decline and an MRI was completed showing have a large bilateral high convexity subdural hematomas and was recommended to go to the emergency room for which an MMA intervention was not amenable and he was started on Keppra for seizure prophylaxis and did have a bur hole evacuation with subdural drain placement bilaterally.  Course complicated by a acute right superior cerebellar stroke for which she is currently on aspirin when cleared by neurosurgery and will need a loop recorder in the future.     Chief Complaint: f/u TBI and f/u ambulatory dysfunction    Interval: No acute events overnight. Seen this afternoon at bedside while eating lunch. Slept well, feeling well. Looking forward to therapy. Excited that his CTH was stable. Last BM 6/8    He notes that he is looking forward to going home so he can watch his family garden tomatoes. He has no acute concerns today. Intense rehab program is going well.    Denies fever, chills, headaches, changes in vision, chest pain, shortness of breath, presyncope, abdominal pain, nausea, diarrhea, constipation, and insomnia.       Objective :  Temp:  [97.7 °F (36.5 °C)-98.6 °F (37 °C)] 98 °F (36.7 °C)  HR:  [56-58] 58  BP: (131-154)/(62-68) 154/68  Resp:  [16-18] 18  SpO2:  [96 %-98 %] 98 %  O2 Device: None (Room air)    Functional Update:  Physical Therapy Occupational Therapy Speech Therapy   Weight Bearing Status: Full Weight Bearing  Transfers:  Moderate Assistance  Bed Mobility: Moderate Assistance  Amulation Distance (ft): 50 feet  Ambulation: Moderate Assistance  Assistive Device for Ambulation: Hand Hold Assistance  Number of Stairs: 12  Assistive Device for Stairs: Lehft Hand Rail  Stair Assistance: Minimal Assistance  Discharge Recommendations: Home with:  DC Home with:: Family Support, Outpatient Physical Therapy   Eating:  (setup)  Grooming: Incidental Touching  Bathing: Minimal Assistance  Bathing: Minimal Assistance  Upper Body Dressing: Supervision  Lower Body Dressing: Minimal Assistance, Moderate Assistance  Toileting: Minimal Assistance  Tub/Shower Transfer: Moderate Assistance  Toilet Transfer: Minimal Assistance, Moderate Assistance  Cognition: Exceptions to WNL  Cognition: Decreased Executive Functions, Decreased Attention  Orientation: Person, Place, Time, Situation   Mode of Communication: Verbal  Cognition: Exceptions to WNL  Cognition: Decreased Memory, Decreased Executive Functions  Orientation: Person, Place, Time, Situation  Swallowing: Within Defined Limits  Diet Recommendations: Regular Diet, Thin  Discharge Recommendations: Home with:  DC Home with:: Family Support       Physical Exam  Vitals reviewed.   Constitutional:       General: He is not in acute distress.     Appearance: He is obese.   HENT:      Head: Normocephalic.      Comments: Bilateral temporal incisions C/D/I     Right Ear: External ear normal.      Left Ear: External ear normal.      Nose: Nose normal. No rhinorrhea.      Mouth/Throat:      Mouth: Mucous membranes are moist.      Pharynx: Oropharynx is clear.     Eyes:      Conjunctiva/sclera: Conjunctivae normal.       Cardiovascular:      Rate and Rhythm: Normal rate.      Pulses: Normal pulses.   Pulmonary:      Effort: Pulmonary effort is normal. No respiratory distress.      Breath sounds: Normal breath sounds.   Abdominal:      General: There is no distension.      Palpations: Abdomen is soft.      Musculoskeletal:      Right lower leg: No edema.      Left lower leg: No edema.     Skin:     General: Skin is warm and dry.     Neurological:      Mental Status: He is alert and oriented to person, place, and time.     Psychiatric:         Mood and Affect: Mood normal.         Behavior: Behavior normal.           Scheduled Meds:  Current Facility-Administered Medications   Medication Dose Route Frequency Provider Last Rate    acetaminophen  650 mg Oral Q6H PRN Evon Price, JOSE ALEJANDRO      allopurinol  300 mg Oral Daily Evon Price, JOSE ALEJANDRO      atenolol  50 mg Oral Daily Evon Price, AMMONNP      And    [Held by provider] chlorthalidone  25 mg Oral Daily Evon Price, AMMONNP      atorvastatin  10 mg Oral HS Evon Price, JOSE ALEJANDRO      chlorhexidine  15 mL Mouth/Throat Q12H [facility] Evon Price, JOSE ALEJANDRO      Diclofenac Sodium  2 g Topical 4x Daily PRN Evon Price, JOSE ALEJANDRO      enoxaparin  30 mg Subcutaneous Q12H [facility] Evon Price, JOSE ALEJANDRO      ferrous sulfate  325 mg Oral Every Other Day Evon Price, JOSE ALEJANDRO      hydrALAZINE  10 mg Oral Q8H PRN Evon Price, AMMONNP      insulin lispro  1-5 Units Subcutaneous TID AC Evon Price, CRNP      insulin lispro  1-5 Units Subcutaneous HS Evon Price, JOSE ALEJANDRO      lidocaine   Topical 4x Daily PRN Evon Price, JOSE ALEJANDRO      losartan  25 mg Oral Daily Evon Price, AMMONNP      polyethylene glycol  17 g Oral Daily Evon Price, CRNP      senna-docusate sodium  1 tablet Oral HS Evon Price, JOSE ALEJANDRO           Lab Results: I have reviewed the following results:  Results from last 7 days   Lab Units 06/06/25  1219 06/03/25  0601   HEMOGLOBIN g/dL 11.0* 10.6*   HEMATOCRIT % 35.1* 33.1*   WBC Thousand/uL 7.24 7.45   PLATELETS Thousands/uL 223 225     Results from last 7 days   Lab Units 06/06/25  1219 06/03/25  0601   BUN mg/dL 24 21   SODIUM mmol/L 138 135   POTASSIUM mmol/L  4.2 4.1   CHLORIDE mmol/L 104 101   CREATININE mg/dL 0.74 0.68   AST U/L  --  21   ALT U/L  --  23                  Martinez Ramirez MD  PGY-2 Resident Physician  Physical Medicine and Rehabilitation

## 2025-06-09 NOTE — PROGRESS NOTES
"Progress Note - Hospitalist   Name: Deonte Greenfield 74 y.o. male I MRN: [id number]  Unit/Bed#: -01 I Date of Admission: 6/3/2025   Date of Service: 6/9/2025 I Hospital Day: 6    Assessment & Plan  Acute on chronic intracranial subdural hematoma (HCC)  MRI of the brain that showed large bilateral SDH  S/p bilateral Yael holes for evacuation of SDH 5/25/25  MMA was not able to be done due to anatomical considerations  For no full AC/AP for 2 weeks = 6/9-11/25 then need to clear with NS  D/W NS 6/6/25 = followup CTH for 6/9/25 done = \"Stable to slightly decreased size of mixed density bilateral cerebral convexity subdural hematomas admixed with foci of air. No significant midline shift\"   NS to see him in f/u in R/T the CTH done today 6/9 and if oK to start ASA 81mg qd  Acute CVA (cerebrovascular accident) (HCC)  Acute CVA right superior cerebellum  Was found on CTH 5/30, confirmed by MRI  CTA of the head/neck showed \"Moderate (approximately 50%) stenosis in the 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 No significant stenosis of the cervical left internal carotid artery. Severe stenosis origin of the dominant left vertebral artery. Hypoplastic right vertebral artery. No high-grade intracranial stenosis, large vessel occlusion or aneurysm\"  Neurology saw in consult for the CVA.  They recommended that the patient start ASA 81mg daily when cleared by Neurosurgery as above.    For LOOP recorder implanted as an outpatient.  He did have a previous Ziopatch done that was negative for atrial fibrillation.    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  Continue statin  Pancreatic cyst  Had an OP ultrasound done for elevated total bilirubin that showed cystic lesions in the pancreas for which further imaging was recommended  MRI done thereafter as an OP showed \"Numerous cystic lesions scattered throughout the pancreas with " "a dominant lesion measuring 18 mm in the body. Worrisome features include ductal dilation up to 6 mm in the tail the pancreas and abrupt change in caliber of the pancreatic duct. Management recommendation: Because of presence of multiple worrisome features, surgical consultation/management with consideration for EUS recommended.  Management and follow up recommendations for cystic pancreatic lesions are based on Institutional consensus and international evidence-based Kyoto guidelines for the management of intraductal papillary mucinous neoplasm of the pancreas. Pancreatology 24 (2024) 255-270\".  Per IMs note in the hospital:  \"Reached out to primary team and surgery oncall as imaging findings are suggestive of malignancy. According to surgery they recommend out patient follow up with surg onc for possible biopsy and out patient GI follow up. Surgery called the surg onc office to schedule the appointment for patient. I did a STAT referral for GI outpatient. Primary team will inform the patient regarding imaging findings and the referrals\".  Most recent CMP on 6/3/25 with normal total bilirubin/LFT  Diabetes mellitus (HCC)  HbA1C 6.2 on 5/24/25  Home:  Metformin 1000 mg BID  Here:  QID Accuchecks/SSI  Will restart Metformin when needed/able  HTN (hypertension)  Home:  Atenolol-chlorthalidone 50-25 mg qd/Diovan 320 mg qd  Here:  Atenolol 50 mg qd/Losartan 25mg qd  Has Hydralazine 10 mg q8hrs prn SBP >165  BP higher this AM but overall trend acceptable.   No changes today 6/9/25 but may need to increase Losartan  Will continue to hold Chlorthalidone  Thrombocytopenia (HCC)  Chronic  Follows with H-O as OP  Currently platelet count was 223 on 6/6/25  Mixed hyperlipidemia  Takes Lipitor 10 mg qd at home = continue here  Normocytic anemia  Continue Iron 325 mg QOD  Gout  Continue Allopurinol 300mg qd as at home  No recent flares  CKD (chronic kidney disease)  Baseline 0.7-0.9  Stable  Left shoulder pain  Per " PMR  Bilateral leg edema  Says gets at home if sleeps in chair as opposed to bed and is L>R as is now  Continue to ACE wrap  Leg wound, left  WC saw in acute hospital stay  Continue local WC  Tx per PMR    The above assessment and plan was reviewed and updated as determined by my evaluation of the patient on 6/9/2025.    History of Present Illness   Patient seen and examined. Patients overnight issues or events were reviewed with nursing staff. New or overnight issues include the following:   No new or overnight issues.  Offers no complaints    Review of Systems   All other systems reviewed and are negative.      Objective :  Temp:  [97.7 °F (36.5 °C)-98.6 °F (37 °C)] 98 °F (36.7 °C)  HR:  [56-58] 58  BP: (131-154)/(62-68) 154/68  Resp:  [16-18] 18  SpO2:  [96 %-98 %] 98 %  O2 Device: None (Room air)    Invasive Devices       None                   Physical Exam  General Appearance: no distress, non toxic appearing  HEENT: PERRLA, conjuctiva normal; oropharynx clear; mucous membranes moist   Neck:  Supple, normal ROM  Lungs: CTA, normal respiratory effort, no retractions, expiratory effort normal  CV: regular rate and rhythm; no rubs/murmurs/gallops, PMI normal   ABD: soft; ND/NT; +BS  EXT: very mild lower leg edema  Skin: normal turgor, normal texture  Psych: affect normal, mood normal  Neuro: AA        The above physical exam was reviewed and updated as determined by my evaluation of the patient on 6/9/2025.      Lab Results: I have reviewed the following results:  Results from last 7 days   Lab Units 06/06/25  1219 06/03/25  0601   WBC Thousand/uL 7.24 7.45   HEMOGLOBIN g/dL 11.0* 10.6*   HEMATOCRIT % 35.1* 33.1*   PLATELETS Thousands/uL 223 225     Results from last 7 days   Lab Units 06/06/25  1219 06/03/25  0601   SODIUM mmol/L 138 135   POTASSIUM mmol/L 4.2 4.1   CHLORIDE mmol/L 104 101   CO2 mmol/L 26 28   BUN mg/dL 24 21   CREATININE mg/dL 0.74 0.68   CALCIUM mg/dL 9.1 9.1             Results from last 7  days   Lab Units 06/09/25  0557 06/08/25 2037 06/08/25  1543   POC GLUCOSE mg/dl 87 153* 107       Imaging Results Review: No pertinent imaging studies reviewed.  Other Study Results Review: No additional pertinent studies reviewed.    Review of Scheduled Meds: Medications  reviewed and reconciled as needed  Current Facility-Administered Medications   Medication Dose Route Frequency Provider Last Rate    acetaminophen  650 mg Oral Q6H PRN Evon Price, JOSE ALEJANDRO      allopurinol  300 mg Oral Daily Evon Price, CRNP      atenolol  50 mg Oral Daily Evon Price, CRNP      And    [Held by provider] chlorthalidone  25 mg Oral Daily Evon Price, CRNP      atorvastatin  10 mg Oral HS Evon Price, JOSE ALEJANDRO      chlorhexidine  15 mL Mouth/Throat Q12H [facility] Evon Price, CRNP      Diclofenac Sodium  2 g Topical 4x Daily PRN Evon Price, CRNP      enoxaparin  30 mg Subcutaneous Q12H [facility] Evon Price, CRNP      ferrous sulfate  325 mg Oral Every Other Day Evon Price, CRNP      hydrALAZINE  10 mg Oral Q8H PRN Evon Price, CRNP      insulin lispro  1-5 Units Subcutaneous TID AC Evon Price, CRNP      insulin lispro  1-5 Units Subcutaneous HS Evon Price, CRNP      lidocaine   Topical 4x Daily PRN Evon Price, CRNP      losartan  25 mg Oral Daily Evon Price, CRNP      polyethylene glycol  17 g Oral Daily Evon Price, CRNP      senna-docusate sodium  1 tablet Oral HS Evon Price, CRNP         VTE Pharmacologic Prophylaxis: Lovenox  Code Status: Level 1 - Full Code  Current Length of Stay: 6 day(s)    Administrative Statements     ** Please Note:  voice to text software may have been used in the creation of this document. Although proof errors in transcription or interpretation are a potential of such software**

## 2025-06-09 NOTE — PROGRESS NOTES
06/09/25 1230   Pain Assessment   Pain Assessment Tool 0-10   Pain Score No Pain   Comprehension   Comprehension (FIM) 5 - Understands basic directions and conversation   Expression   Expression (FIM) 5 - Needs help/cues only RARELY (< 10% of the time)   Social Interaction   Social Interaction (FIM) 6 - Interacts appropriately with others BUT requires extra  time   Problem Solving   Problem solving (FIM) 4 - Solves basic problems 75-89% of time   Memory   Memory (FIM) 4 - Recognizes/recalls/performs 75-89%   Speech/Language/Cognition Assessmetn   Treatment Assessment Patient in room eating lunch meal upon SLP entering. SLP introducing self and building rapport with pt briefly as is new to pt's care. He demonstrates good orientation to time, situation, and self. SLP assessed pt's recall of previous SLP tasks to which he recalled all portions with Froy. Then SLP discussed pt's prior dysphagia evaluation and pt recalls that he does not need a special diet but that he should implement some swallowing strategies during meals and that someone needs to be checking in on him once in awhile. Pt expressed appreciation for the reminders of his strategies as he admits he wasn't using them. Neurosurgeon team entering to assess pt's surgical intervention site and remove sutures and staples at this time. Pt initially asking where they were removing them from, demonstrating some reduced insight and recall. Further, this reveals some reduced attention (title-neurosurgeon, stated but pt not attending) and possibly some health literacy. SLP discussed with pt that attending to a speaker is not only pragmatically appropriate, but it helps us to store the information we learn when we pay attention into our memory. If we don't first attend, we can't remember. Pt expressing understanding. Pt completing remaining coin calculation that was initiated last session in ST. He completed with 14/15 accuracy. He showed improvement in being more  concise and using less of the smaller coins as compared to last session. Pt is therefore showing benefit to skilled SLP services, but would continue to benefit from additional sessions while on the [facility] to further improve his functional problem solving, memory, sequencing, safety, and executive functioning skills necessary for reducing burden of care at time of discharge.   SLP Therapy Minutes   SLP Time In 1230   SLP Time Out 1300   SLP Total Time (minutes) 30   SLP Mode of treatment - Individual (minutes) 30   SLP Mode of treatment - Concurrent (minutes) 0   SLP Mode of treatment - Group (minutes) 0   SLP Mode of treatment - Co-treat (minutes) 0   SLP Mode of Treatment - Total time(minutes) 30 minutes   SLP Cumulative Minutes 240   Therapy Time missed   Time missed? No

## 2025-06-09 NOTE — ASSESSMENT & PLAN NOTE
Acute R superior cerebellar stroke   - noted on CTH on 5/30, confirmed on MRI     Plan:   Continue on the stroke pathway per neurology   Neurology recommended ASA 81mg qd once cleared by our team   At this time, would continue to hold all AC/AP meds --> will plan for repeat CTH in 1 week and determine if cleared to start ASA after that image is reviewed

## 2025-06-09 NOTE — ASSESSMENT & PLAN NOTE
S/p bilateral KYLIE HOLES for SDH evac (Dr. Grady, 2025)  Reports a fall on 2025 with progressively worsening gait dysfunction; also with concern for slowed speech per family  Outpatient work up noted bilateral SDH for which he was advised to go to the ED for evaluation  No reported AC/AP med use   Pt is being seen today for his approximate 2 week post-op visit for repeat CTH and incision check   He is currently admitted in [facility]. He offers no complaints today. Denies any HA. Pt states he is working well with therapy. He is looking forward to d/c home next week.      Imagin/9 repeat CTH: Stable to slightly decreased size of mixed density bilateral cerebral convexity subdural hematomas admixed with foci of air. No significant midline shift.    MRI brain: Within the confines of an extensively motion-degraded examination, which significantly reduces diagnostic sensitivity and results in a limited examination: Evolving recent right cerebellar infarct with mild local mass effect. No significant change in mixed density bilateral cerebral convexity subdural hematomas.   CTH: Stable small recent right cerebellar infarct. No significant change in mixed density bilateral cerebral convexity subdural hematomas admixed with foci of air     Plan:   Continue to closely monitor neuro exam   Frequent neuro checks per primary team   Repeat STAT CTH with any acute decline in GCS > 2pts or more in 1hr   Maintain normotensive BP goals, SBP < 160, MAP > 65   Case and imaging reviewed this am on rounds.   Repeat CTH reveals expected post-op changes w/ some improvement/decrease in odalis SD collections.   Pt continues to improve on exam   Continue local wound care to incision    - staples and sutures removed at the bedside today   Continue to keep clean and dry --> head to be washed every other day   Will plan for repeat incision check in 1 week   Completed 1 week of keppra for seizure ppx post-op   Hold all  AC/AP meds   Neurology is requsting ASA 81mg to be started once cleared by nsgy team given stroke   Plan for repeat CTH in 1 week - Monday 6/16 - and will determine if cleared to start ASA 81mg then pending result   Correct any coagulopathy   Maintain goal INR < 1.4, plt > 100 - see above   DVT ppx: SCDs, SQ lovenox   Medical management per primary team   Pain control per primary team   PT/OT   Social work following for assistance with dispo once medically cleared      Neurosurgery will follow from the periphery at this time. Will plan to follow-up with the pt again in 1 week with a repeat CTH. Please reach out with any further questions or concerns.

## 2025-06-09 NOTE — PROGRESS NOTES
06/09/25 0830   Pain Assessment   Pain Assessment Tool 0-10   Pain Score No Pain   Restrictions/Precautions   Precautions Fall Risk;Cognitive;Bed/chair alarms;Supervision on toilet/commode   Weight Bearing Restrictions No   ROM Restrictions No   Cognition   Overall Cognitive Status Impaired   Arousal/Participation Alert;Cooperative;Responsive   Attention Attends with cues to redirect   Orientation Level Oriented X4   Memory Decreased short term memory   Following Commands Follows one step commands with increased time or repetition   Sit to Stand   Type of Assistance Needed Supervision;Incidental touching   Comment CS/CGA with no AD/SPC   Sit to Stand CARE Score 4   Bed-Chair Transfer   Type of Assistance Needed Incidental touching;Supervision;Adaptive equipment   Comment CS/CGA with no AD/SPC   Chair/Bed-to-Chair Transfer CARE Score 4   Transfer Bed/Chair/Wheelchair   Adaptive Equipment None;Cane   Walk 10 Feet   Type of Assistance Needed Supervision;Adaptive equipment   Comment CS/CGA with no AD/SPC   Walk 10 Feet CARE Score 4   Walk 50 Feet with Two Turns   Type of Assistance Needed Incidental touching;Adaptive equipment   Comment CGA with SPC/NO AD   Walk 50 Feet with Two Turns CARE Score 4   Walk 150 Feet   Type of Assistance Needed Physical assistance;Verbal cues;Incidental touching;Adaptive equipment   Physical Assistance Level 25% or less   Comment MIN/CGA with SPC/no AD   Walk 150 Feet CARE Score 3   Ambulation   Primary Mode of Locomotion Prior to Admission Walk   Distance Walked (feet) 150 ft  (x2, 350', 100' x2)   Assist Device Cane  (none)   Gait Pattern Inconsistant Rose;Decreased foot clearance;Forward Flexion;Lateral deviation;Wide JESÚS;Shuffle;Improper weight shift   Limitations Noted In Coordination;Device Management;Endurance;Heel Strike;Speed;Strength;Swing;Posture;Safety   Provided Assistance with: Direction;Balance   Walk Assist Level Contact Guard;Supervision   Does the patient walk? 2.  Yes   Wheelchair mobility   Does the patient use a wheelchair? 0. No   Curb or Single Stair   Style negotiated Single stair   Type of Assistance Needed Incidental touching;Physical assistance;Adaptive equipment   Physical Assistance Level 25% or less   Comment CGA/JAQUELINE with LHR descending B hands on rail   1 Step (Curb) CARE Score 3   4 Steps   Type of Assistance Needed Physical assistance;Incidental touching;Adaptive equipment   Physical Assistance Level 25% or less   Comment CGA/JAQUELINE with LHR descending B hands on rail   4 Steps CARE Score 3   12 Steps   Type of Assistance Needed Physical assistance;Incidental touching;Adaptive equipment   Physical Assistance Level 25% or less   Comment CGA/JAQUELINE with LHR descending B hands on rail   12 Steps CARE Score 3   Stairs   Type Stairs   # of Steps 12   Weight Bearing Precautions Fall Risk   Assist Devices Single Rail   Toilet Transfer   Type of Assistance Needed Incidental touching;Adaptive equipment   Comment CS/CGA with RW   Toilet Transfer CARE Score 4   Toilet Transfer   Surface Assessed Standard Toilet   Therapeutic Interventions   Strengthening seated LAQ, hip flex, ankle DF/PF 3 x10 reps   Flexibility SKM and upper trap stretch to B shoulders/neck.   Neuromuscular Re-Education amb with no AD x350', SPC x150' x2. X1 LOB needing JAQUELINE to correct with SPC.   Modalities MHP to L shoulder x15 min   Equipment Use   NuStep L3 x15 min BLE/UE   Assessment   Treatment Assessment Pt participated in skilled PT session with increased focus on gait with no AD and SPC, increased endurance, strengthening and balance. Pt noted increased balance this session with SPC but had notable R lateral lean. Pt states he slept in the recliner and may have slept leaning to the R. Pt was given a gentle stretch to B upper straps and neck as he is fixed in a flexed neck position most of the day. Pt will cont POC as tolerated with cont focus on gait, balance, safety, increased righting reactions  and stair management.   Problem List Decreased strength;Decreased endurance;Impaired balance;Decreased mobility;Decreased coordination;Decreased cognition;Impaired judgement;Decreased safety awareness;Obesity   Barriers to Discharge Decreased caregiver support;Inaccessible home environment   PT Barriers   Functional Limitation Ramp negotiation;Stair negotiation;Standing;Transfers;Walking   Plan   Treatment/Interventions Functional transfer training;LE strengthening/ROM;Therapeutic exercise;Endurance training;Cognitive reorientation;Patient/family training;Bed mobility;Gait training   Progress Progressing toward goals   PT Therapy Minutes   PT Time In 0830   PT Time Out 1000   PT Total Time (minutes) 90   PT Mode of treatment - Individual (minutes) 90   PT Mode of treatment - Concurrent (minutes) 0   PT Mode of treatment - Group (minutes) 0   PT Mode of treatment - Co-treat (minutes) 0   PT Mode of Treatment - Total time(minutes) 90 minutes   PT Cumulative Minutes 420   Therapy Time missed   Time missed? No

## 2025-06-10 LAB
GLUCOSE SERPL-MCNC: 101 MG/DL (ref 65–140)
GLUCOSE SERPL-MCNC: 102 MG/DL (ref 65–140)
GLUCOSE SERPL-MCNC: 126 MG/DL (ref 65–140)
GLUCOSE SERPL-MCNC: 149 MG/DL (ref 65–140)

## 2025-06-10 PROCEDURE — 97530 THERAPEUTIC ACTIVITIES: CPT

## 2025-06-10 PROCEDURE — 99232 SBSQ HOSP IP/OBS MODERATE 35: CPT | Performed by: STUDENT IN AN ORGANIZED HEALTH CARE EDUCATION/TRAINING PROGRAM

## 2025-06-10 PROCEDURE — 97535 SELF CARE MNGMENT TRAINING: CPT

## 2025-06-10 PROCEDURE — 97116 GAIT TRAINING THERAPY: CPT

## 2025-06-10 PROCEDURE — 99232 SBSQ HOSP IP/OBS MODERATE 35: CPT | Performed by: NURSE PRACTITIONER

## 2025-06-10 PROCEDURE — 97110 THERAPEUTIC EXERCISES: CPT

## 2025-06-10 PROCEDURE — 82948 REAGENT STRIP/BLOOD GLUCOSE: CPT

## 2025-06-10 RX ADMIN — CHLORHEXIDINE GLUCONATE 15 ML: 1.2 SOLUTION ORAL at 21:56

## 2025-06-10 RX ADMIN — FERROUS SULFATE TAB 325 MG (65 MG ELEMENTAL FE) 325 MG: 325 (65 FE) TAB at 08:31

## 2025-06-10 RX ADMIN — CHLORHEXIDINE GLUCONATE 15 ML: 1.2 SOLUTION ORAL at 08:32

## 2025-06-10 RX ADMIN — POLYETHYLENE GLYCOL 3350 17 G: 17 POWDER, FOR SOLUTION ORAL at 08:31

## 2025-06-10 RX ADMIN — DICLOFENAC SODIUM 2 G: 10 GEL TOPICAL at 10:39

## 2025-06-10 RX ADMIN — ATORVASTATIN CALCIUM 10 MG: 10 TABLET, FILM COATED ORAL at 21:56

## 2025-06-10 RX ADMIN — SENNOSIDES AND DOCUSATE SODIUM 1 TABLET: 50; 8.6 TABLET ORAL at 21:56

## 2025-06-10 RX ADMIN — ENOXAPARIN SODIUM 30 MG: 30 INJECTION SUBCUTANEOUS at 21:56

## 2025-06-10 RX ADMIN — ATENOLOL 50 MG: 50 TABLET ORAL at 08:32

## 2025-06-10 RX ADMIN — ENOXAPARIN SODIUM 30 MG: 30 INJECTION SUBCUTANEOUS at 08:31

## 2025-06-10 RX ADMIN — ALLOPURINOL 300 MG: 300 TABLET ORAL at 08:31

## 2025-06-10 RX ADMIN — LOSARTAN POTASSIUM 25 MG: 25 TABLET, FILM COATED ORAL at 08:32

## 2025-06-10 NOTE — PROGRESS NOTES
Progress Note - PMR   Name: Deonte Greenfield 74 y.o. male I MRN: [id number]  Unit/Bed#: -01 I Date of Admission: 6/3/2025   Date of Service: 6/10/2025 I Hospital Day: 7     Assessment & Plan  Acute on chronic intracranial subdural hematoma (HCC)  > Originally presented in early April due to unwitnessed fall and found to have small subdural hemorrhage which subsequently appeared to resolve upon repeat CT head imaging. Patient opted to have MRI performed in outpatient setting and MRI team sent him to ER for large bilateral high convexity SDHs with mass effect. Presented to ED on 5/24 where he received bilateral [place] holes with 4 subdural drains placed  > Drains removed on 5/27 and 5/28  > completed 7 days of Keppra for seizure prophylaxis  > CTH on 6/9/25 with stable evolution of SDH's and postoperative pneumocephalus. NSGY to follow up in 1 week although note that is still not cleared for aspirin use    Repeat CT head STAT if GCS declines more than 2 points in 1 hour  SBP < 160  INR < 1.4  Plt > 100  Hold all therapeutic doses of AC / AP therapy, he is not cleared to start asa per neurology recommendations for stroke management  Ok for dvt prophylaxis, continue lovenox  PT/OT  Diabetes mellitus (HCC)  Lab Results   Component Value Date    HGBA1C 6.2 (H) 05/24/2025       Recent Labs     06/09/25  1533 06/09/25  2048 06/10/25  0614 06/10/25  1139   POCGLU 131 126 101 102       Blood Sugar Average: Last 72 hrs:  (P) [id number]  - plan to restart Metformin during [facility] stay  - continue SSI and accuchecks per IM  Thrombocytopenia (HCC)  Baseline 100-250  Trend biweekly  Goal > 100  OP follow up with heme onc  Mixed hyperlipidemia  - Continue statin  - Last LDL: 62 on 5/31/2025  Normocytic anemia  - baseline 12ish, now ranging at 10-11 likely in the setting of blood loss  - Continue iron supplementation  Acute CVA (cerebrovascular accident) (HCC)  --MRI brain 5/24:  New, large bilateral high convexity  subdural hematomas, right greater than left, which are predominantly subacute in timing, with mass effect on the bilateral cerebral hemispheres, and effacement of the sulci. No significant midline shift noted. FLAIR signal hyperintensity along the sulci of the right frontal and parietal convexities, which may represent trace associated subarachnoid hemorrhage. No acute infarct. Mild chronic white matter microangiopathic changes involving both cerebral hemispheres.  --CT head 5/30: Acute appearing infarct in right superior cerebellum. Increase in volume of hyperdense blood products bilaterally  --Echo 4/2025: EF 65%. Normal systolic function. Grade 1 diastolic relaxation. 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   LDL 62  A1c 6.2  TTE: 65%     Given new stroke on MRI with concurrent SDH, patient will need ASA when cleared by NSGY after repeat CTH. Given recent negative ziopatch in April 2025, will need loop recorder outpatient.      Lipitor 10 mg qHS  Normotension  Start ASA 81 mg when cleared by NSGY for AP/AC, CTH stable  Outpatient loop recorder  PT/OT  SLP to evaluate language and cognition s/p cerebral infarction  Gout  - continue Allopurinol  Pancreatic cyst  > Patient had MRI abdomen completed outpatient (5/22, d/t jaundice & thrombocytopenia), with numerous pancreatic cystic lesions concerning for malignancy. This showed numerous cystic lesions scattered throughout the pancreas that were concerning for malignancy at this time.   >Patient's family was informed of these and are aware that following discharge from acute rehab they will need to follow-up with the surgical oncology team as well as GI for further evaluation and treatment of these.  Patient and spouse both aware.    - Surg Onc & GI referrals placed by trauma surgery during acute hospitalization discharge  HTN (hypertension)  Home meds:  atenolol-chlorthalidone 50-25, valsartan 320  Here: atenolol 50 mg, losartan 25 mg  - continue titration for normotension  CKD (chronic kidney disease)  Lab Results   Component Value Date    EGFR 90 06/06/2025    EGFR 94 06/03/2025    EGFR 89 06/02/2025    CREATININE 0.74 06/06/2025    CREATININE 0.68 06/03/2025    CREATININE 0.76 06/02/2025     - Adequate GFR while here, previously diagnosed with CKD 2  - has appointment to establish with nephrology on 7/21/25  Superficial foreign body of left leg without major open wound and without infection  - Wound care consulted while in acute care  - Left Pretibial leg wound: cleanse left lower leg wound with soap and water, pat dry, apply xeroform to wound bed and cover with foam dressing. Change every other day.   Oropharyngeal dysphagia  Last seen by SLP on 6/6/25    - continue recommended diet of regular texture and thin liquids  - Aspiration precautions and compensatory swallowing strategies: upright posture, slow rate of feeding, small bites/sips, and alternating bites and sips   - SLP consulted  Left shoulder pain  - new pain for patient  - pain at AC joint, but could not provoke pain other than on palpation  - Differential includes AC arthritis, glenohumeral arthritis, MSK strain, rotator cuff tendinopahty  - Doesn't seem like needs K-tape as no sulcus sign  - PT/OT  - XR if hindering therapies  Bilateral leg edema  Ace wrap  Leg wound, left        1-Sacrum MASD: Three small partial thickness wounds measured together that are pink in color and fragile. No drainage noted.Periwound is fragile and hyperpigmented.      Skin care plans:  1-Cleanse B/L Heels and Sacro-Buttocks with soap and water. Pat dry. Apply Silicone Border Foam (Mepilex) to areas. [place] with P for Prevention and change every 3 days or PRN soilage/displacement. Peel back and inspect Q-shift.  2-Elevate heels to offload pressure.  3-Ehob cushion in chair when out of bed.  4-Moisturize skin daily with skin  nourishing cream.  5-Turn/reposition q2h for pressure re-distribution on skin.  6-Left Pretibial leg wound: cleanse left lower leg wound with soap and water, pat dry, apply xeroform to wound bed and cover with foam dressing. Change every other day  7-Sacrum: Cleanse with mild soap and water and pat dry. Apply thin layer of Calazime paste to sacrum TID and prn.      Subjective   74-year-old male with history of type 2 diabetes, stage II CKD, hypertension, hyperlipidemia presenting initially to [facility] in April with unwitnessed fall with subdural hemorrhage seem to improve and resolved on subsequent imaging but eventually had a decline and an MRI was completed showing have a large bilateral high convexity subdural hematomas and was recommended to go to the emergency room for which an MMA intervention was not amenable and he was started on Keppra for seizure prophylaxis and did have a bur hole evacuation with subdural drain placement bilaterally.  Course complicated by a acute right superior cerebellar stroke for which she is currently on aspirin when cleared by neurosurgery and will need a loop recorder in the future.     Chief Complaint: f/u TBI and f/u ambulatory dysfunction    Interval: No acute events overnight. Seen this morning at bedside recliner getting ready for PT. Slept well, feeling well. Looking forward to therapy. Last BM 6/10    He says that his right knee and hip pain has started again, with intermittent shooting pains from his right medial knee shooting to his right groin, especially with hip extension. Denies new numbness or weakness.      Objective :  Temp:  [97.7 °F (36.5 °C)-98.3 °F (36.8 °C)] 97.7 °F (36.5 °C)  HR:  [61-67] 67  BP: (101-134)/(52-70) 134/70  Resp:  [18] 18  SpO2:  [97 %-98 %] 98 %  O2 Device: None (Room air)    Functional Update:  Physical Therapy Occupational Therapy Speech Therapy   Weight Bearing Status: Full Weight Bearing  Transfers: Moderate Assistance  Bed Mobility:  Moderate Assistance  Amulation Distance (ft): 50 feet  Ambulation: Moderate Assistance  Assistive Device for Ambulation: Hand Hold Assistance  Number of Stairs: 12  Assistive Device for Stairs: Lehft Hand Rail  Stair Assistance: Minimal Assistance  Discharge Recommendations: Home with:  DC Home with:: Family Support, Outpatient Physical Therapy   Eating:  (setup)  Grooming: Incidental Touching  Bathing: Minimal Assistance  Bathing: Minimal Assistance  Upper Body Dressing: Supervision  Lower Body Dressing: Minimal Assistance, Moderate Assistance  Toileting: Minimal Assistance  Tub/Shower Transfer: Moderate Assistance  Toilet Transfer: Minimal Assistance, Moderate Assistance  Cognition: Exceptions to WNL  Cognition: Decreased Executive Functions, Decreased Attention  Orientation: Person, Place, Time, Situation   Mode of Communication: Verbal  Cognition: Exceptions to WNL  Cognition: Decreased Memory, Decreased Executive Functions  Orientation: Person, Place, Time, Situation  Swallowing: Within Defined Limits  Diet Recommendations: Regular Diet, Thin  Discharge Recommendations: Home with:  DC Home with:: Family Support       Physical Exam  Vitals reviewed.   Constitutional:       General: He is not in acute distress.     Appearance: He is obese.   HENT:      Head: Normocephalic.      Comments: Bilateral incisions clean, dry, intact     Right Ear: External ear normal.      Left Ear: External ear normal.      Nose: Nose normal. No rhinorrhea.      Mouth/Throat:      Mouth: Mucous membranes are moist.      Pharynx: Oropharynx is clear.     Eyes:      Conjunctiva/sclera: Conjunctivae normal.       Cardiovascular:      Rate and Rhythm: Normal rate.      Pulses: Normal pulses.   Pulmonary:      Effort: Pulmonary effort is normal. No respiratory distress.      Breath sounds: Normal breath sounds.   Abdominal:      General: There is no distension.     Musculoskeletal:      Right lower leg: No edema.      Left lower leg: No  edema.     Skin:     General: Skin is warm and dry.     Neurological:      Mental Status: He is alert and oriented to person, place, and time. Mental status is at baseline.     Psychiatric:         Mood and Affect: Mood normal.         Behavior: Behavior normal.           Scheduled Meds:  Current Facility-Administered Medications   Medication Dose Route Frequency Provider Last Rate    acetaminophen  650 mg Oral Q6H PRN Evon Price, JOSE ALEJANDRO      allopurinol  300 mg Oral Daily Evon Price, JOSE ALEJANDRO      atenolol  50 mg Oral Daily JOSE ALEJANDRO Bradley      And    [Held by provider] chlorthalidone  25 mg Oral Daily Evon Price, AMMONNP      atorvastatin  10 mg Oral HS Evon Price, JOSE ALEJANDRO      chlorhexidine  15 mL Mouth/Throat Q12H BRIDGET Evon Price, JOSE ALEJANDRO      Diclofenac Sodium  2 g Topical 4x Daily PRN Evon Price, AMMONNP      enoxaparin  30 mg Subcutaneous Q12H BRIDGET Evon Price, JOSE ALEJANDRO      ferrous sulfate  325 mg Oral Every Other Day Evon Price, JOSE ALEJANDRO      hydrALAZINE  10 mg Oral Q8H PRN Evon Price, AMMONNP      insulin lispro  1-5 Units Subcutaneous TID AC Evon Price, CRNP      insulin lispro  1-5 Units Subcutaneous HS Evon Price, JOSE ALEJANDRO      lidocaine   Topical 4x Daily PRN Evon Price, JOSE ALEJANDRO      losartan  25 mg Oral Daily Evon Price, JOSE ALEJANDRO      polyethylene glycol  17 g Oral Daily Evon Price, JOSE ALEJANDRO      senna-docusate sodium  1 tablet Oral HS Evon Price, JOSE ALEJANDRO           Lab Results: I have reviewed the following results:  Results from last 7 days   Lab Units 06/06/25  1219   HEMOGLOBIN g/dL 11.0*   HEMATOCRIT % 35.1*   WBC Thousand/uL 7.24   PLATELETS Thousands/uL 223     Results from last 7 days   Lab Units 06/06/25  1219   BUN mg/dL 24   SODIUM mmol/L 138   POTASSIUM mmol/L 4.2   CHLORIDE mmol/L 104   CREATININE mg/dL 0.74                  Martinez Ramirez MD  PGY-2 Resident  Physician  Physical Medicine and Rehabilitation

## 2025-06-10 NOTE — PLAN OF CARE
Problem: SAFETY ADULT  Goal: Patient will remain free of falls  Description: INTERVENTIONS:  - Educate patient/family on patient safety including physical limitations  - Instruct patient to call for assistance with activity   - Consider consulting OT/PT to assist with strengthening/mobility based on AM PAC & JH-HLM score  - Consult OT/PT to assist with strengthening/mobility   - Keep Call bell within reach  - Keep bed low and locked with side rails adjusted as appropriate  - Keep care items and personal belongings within reach  - Initiate and maintain comfort rounds  - Make Fall Risk Sign visible to staff  - Offer Toileting every 2 Hours, in advance of need  - Initiate/Maintain bed/chair alarm  - Obtain necessary fall risk management equipment:    - Apply yellow socks and bracelet for high fall risk patients  - Consider moving patient to room near nurses station  6/9/2025 2056 by Yisel Oneill RN  Outcome: Progressing  6/9/2025 2055 by Yisel Oneill, RN  Outcome: Progressing

## 2025-06-10 NOTE — PROGRESS NOTES
06/10/25 1030   Pain Assessment   Pain Assessment Tool 0-10   Pain Score 5   Pain Location/Orientation Orientation: Right  (knee to  groin)   Pain Onset/Description Descriptor: Burning;Descriptor: Sharp   Patient's Stated Pain Goal No pain   Hospital Pain Intervention(s)   (nurse in to apply pain  cream)   Restrictions/Precautions   Braces or Orthoses   (ace wrap to  lower legs)   Cognition   Overall Cognitive Status Impaired   Arousal/Participation Alert;Cooperative   Attention Attends with cues to redirect   Following Commands Follows one step commands without difficulty   Subjective   Subjective I prefer to use then RW - I feel more steady   Sit to Stand   Type of Assistance Needed Supervision   Physical Assistance Level No physical assistance   Comment to RW or with cane   Sit to Stand CARE Score 4   Bed-Chair Transfer   Type of Assistance Needed Supervision   Physical Assistance Level No physical assistance   Comment RW or cane   Chair/Bed-to-Chair Transfer CARE Score 4   Walk 10 Feet   Type of Assistance Needed Supervision;Adaptive equipment   Physical Assistance Level No physical assistance   Comment RW or cane   Walk 10 Feet CARE Score 4   Walk 50 Feet with Two Turns   Type of Assistance Needed Supervision;Adaptive equipment   Physical Assistance Level No physical assistance   Comment RW or cane   Walk 50 Feet with Two Turns CARE Score 4   Walk 150 Feet   Type of Assistance Needed Supervision;Adaptive equipment   Physical Assistance Level No physical assistance   Comment RW   Walk 150 Feet CARE Score 4   Ambulation   Primary Mode of Locomotion Prior to Admission Walk   Distance Walked (feet) 265 ft  (with RW then 65',145' with cane)   Assist Device Roller Walker;Cane   Gait Pattern Inconsistant Rose;Decreased foot clearance;Decreased R stance   Limitations Noted In Balance;Endurance;Heel Strike;Posture;Strength   Provided Assistance with: Direction   Walk Assist Level Close Supervision;Supervision  (  with cane)   Does the patient walk? 2. Yes   Curb or Single Stair   Style negotiated Curb   Type of Assistance Needed Supervision   Physical Assistance Level No physical assistance   Comment with RW  ( prefers to not use  then caen for  curb step)   1 Step (Curb) CARE Score 4   4 Steps   Type of Assistance Needed Incidental touching   Physical Assistance Level No physical assistance   Comment wiht  L rail  up and one hand then  1/4 turn for  down with 2 hands on rail   4 Steps CARE Score 4   12 Steps   Type of Assistance Needed Incidental touching   Physical Assistance Level No physical assistance   Comment down with 1/4 turn - 2 hands on L rail   -  then up with R hand on  rail only   12 Steps CARE Score 4   Toilet Transfer   Type of Assistance Needed Supervision;Adaptive equipment   Physical Assistance Level No physical assistance   Comment with RW   Toilet Transfer CARE Score 4   Equipment Use   NuStep L-3 with  all 4 extrem x 10'   Assessment   Treatment Assessment pt participated in 60' session with  pt starting with cane adnd reporting  pain in  R thigh with noted slight buckle then  requested  to use RW  - overall - pt reports that he prefers to use the  RW and will at home - jeffrey  for  HERMILO. pt did better after session progressed with abillity to use the cane with close S . pt cued  for 1/4 turn going down steps  to ensure enough of space for 2nd foot   PT Barriers   Physical Impairment Decreased strength;Decreased endurance;Impaired balance;Decreased mobility;Decreased cognition;Impaired judgement;Pain   Functional Limitation Car transfers;Stair negotiation;Standing;Transfers;Walking   Plan   Treatment/Interventions Functional transfer training;Elevations;LE strengthening/ROM;Therapeutic exercise;Endurance training;Bed mobility;Gait training   PT Therapy Minutes   PT Time In 1030   PT Time Out 1130   PT Total Time (minutes) 60   PT Mode of treatment - Individual (minutes) 60   PT Mode of treatment -  Concurrent (minutes) 0   PT Mode of treatment - Group (minutes) 0   PT Mode of treatment - Co-treat (minutes) 0   PT Mode of Treatment - Total time(minutes) 60 minutes   PT Cumulative Minutes 480

## 2025-06-10 NOTE — PLAN OF CARE
Problem: SAFETY ADULT  Goal: Patient will remain free of falls  Description: INTERVENTIONS:  - Educate patient/family on patient safety including physical limitations  - Instruct patient to call for assistance with activity   - Consider consulting OT/PT to assist with strengthening/mobility based on AM PAC & JH-HLM score  - Consult OT/PT to assist with strengthening/mobility   - Keep Call bell within reach  - Keep bed low and locked with side rails adjusted as appropriate  - Keep care items and personal belongings within reach  - Initiate and maintain comfort rounds  - Make Fall Risk Sign visible to staff  - Offer Toileting every 2 Hours, in advance of need  - Initiate/Maintain bed/chair alarm  - Obtain necessary fall risk management equipment:    - Apply yellow socks and bracelet for high fall risk patients  - Consider moving patient to room near nurses station  Outcome: Progressing

## 2025-06-10 NOTE — PROGRESS NOTES
"OT Daily Treatment Note     06/10/25 0700   Pain Assessment   Pain Assessment Tool 0-10   Pain Score 4   Pain Location/Orientation Orientation: Right;Location: Leg   Pain Onset/Description Descriptor: [facility] Pain Intervention(s) Emotional support;Shower/Bath   Restrictions/Precautions   Precautions Aspiration;Bed/chair alarms;Cognitive;Fall Risk;Supervision on toilet/commode   Weight Bearing Restrictions No   ROM Restrictions No   Lifestyle   Autonomy \"You're here early.\"   Eating   Type of Assistance Needed Set-up / clean-up   Physical Assistance Level No physical assistance   Comment Seated in recliner. Pt able to eat with set-up assistance.   Eating CARE Score 5   Oral Hygiene   Type of Assistance Needed Supervision   Physical Assistance Level No physical assistance   Comment In stance at sink   Oral Hygiene CARE Score 4   Shower/Bathe Self   Type of Assistance Needed Supervision   Physical Assistance Level No physical assistance   Comment Shower. Pt able to wash 10/10 body parts with supervision. Pt able to wash UB and LB seated on tub transfer bench, and able to stand with use of grab bars for vernon and rear washing. Required min VC for thoroughness. No noted LOB.   Shower/Bathe Self CARE Score 4   Upper Body Dressing   Type of Assistance Needed Supervision   Physical Assistance Level No physical assistance   Comment Pt able to don shirt over head and BLE with supervision.   Upper Body Dressing CARE Score 4   Lower Body Dressing   Type of Assistance Needed Physical assistance   Physical Assistance Level 25% or less   Comment Pt required assistance to doff pants, but able to doff underwear with supervision. Pt able to don underwear and pants over legs when seated, and able to stand with RW to don over bilateral hips.   Lower Body Dressing CARE Score 3   Putting On/Taking Off Footwear   Type of Assistance Needed Physical assistance   Physical Assistance Level 26%-50%   Comment Pt able to doff socks " independently, but requires TA to don ace wraps for edema management. Pt able to use sock aid to don socks, but required min A to adjust.   Putting On/Taking Off Footwear CARE Score 3   Sit to Stand   Type of Assistance Needed Supervision   Physical Assistance Level No physical assistance   Comment With RW   Sit to Stand CARE Score 4   Bed-Chair Transfer   Type of Assistance Needed Supervision   Physical Assistance Level No physical assistance   Comment Close supervision with RW   Chair/Bed-to-Chair Transfer CARE Score 4   Toileting Hygiene   Type of Assistance Needed Supervision   Physical Assistance Level No physical assistance   Comment Pt able to complete CM and hygiene with close supervision.   Toileting Hygiene CARE Score 4   Toilet Transfer   Type of Assistance Needed Supervision   Physical Assistance Level No physical assistance   Comment Pt able to sit down and stand up from standard toilet with use of RW and close supervision.   Toilet Transfer CARE Score 4   Cognition   Overall Cognitive Status Impaired   Arousal/Participation Alert;Cooperative   Attention Attends with cues to redirect   Orientation Level Oriented X4   Memory Decreased short term memory;Decreased recall of precautions   Following Commands Follows one step commands without difficulty   Comments Pt continues to demonstrate decreased safety awareness, requiring cues for hand placement when completing functional transfers and close supervision.   Activity Tolerance   Activity Tolerance Patient tolerated treatment well   Assessment   Treatment Assessment Pt engaged in 90-minute skilled OT session to address ADL performance, endurance, activity tolerance, standing tolerance, dynamic standing balance, safety awareness, judgment, insight, strength, problem-solving, and sequencing. Pt able to tolerate shower well. Pt demonstrated decreased sequencing skills during shower, often washing body parts in no particular order. Decreased strength,  endurance, balance, activity tolerance, judgment, safety awareness, and insight continue to limit pt in ADL tasks. Pt would benefit from continued OT services to address the abovementioned skills to be discharged on Monday, 6/16.   Prognosis Good   Problem List Decreased strength;Decreased endurance;Impaired balance;Decreased mobility;Decreased coordination;Decreased cognition;Impaired judgement;Decreased safety awareness   Barriers to Discharge Inaccessible home environment;Decreased caregiver support   Barriers to Discharge Comments Pt scheduled to d/c on Monday, 6/16.   Plan   Treatment/Interventions ADL retraining;Functional transfer training;LE strengthening/ROM;Therapeutic exercise;Endurance training;Cognitive reorientation;Patient/family training;Compensatory technique education   Progress Improving as expected   OT Therapy Minutes   OT Time In 0700   OT Time Out 0830   OT Total Time (minutes) 90   OT Mode of treatment - Individual (minutes) 90   OT Mode of treatment - Concurrent (minutes) 0   OT Mode of treatment - Group (minutes) 0   OT Mode of treatment - Co-treat (minutes) 0   OT Mode of Treatment - Total time(minutes) 90 minutes   OT Cumulative Minutes 550   Therapy Time missed   Time missed? No

## 2025-06-10 NOTE — PROGRESS NOTES
"Progress Note - Hospitalist   Name: Deonte Greenfield 74 y.o. male I MRN: [id number]  Unit/Bed#: -01 I Date of Admission: 6/3/2025   Date of Service: 6/10/2025 I Hospital Day: 7    Assessment & Plan  Acute on chronic intracranial subdural hematoma (HCC)  MRI of the brain that showed large bilateral SDH  S/p bilateral Yael holes for evacuation of SDH 5/25/25  MMA was not able to be done due to anatomical considerations  For no full AC/AP for 2 weeks = 6/9-11/25 then need to clear with NS  D/W NS 6/6/25 = followup CTH for 6/9/25 done = \"Stable to slightly decreased size of mixed density bilateral cerebral convexity subdural hematomas admixed with foci of air. No significant midline shift\"  NS saw him in f/u in R/T the CTH done 6/9.  They said repeat CTH on 6/16 and then will determine if cleared to start ASA 81mg qd.  They also took out staples and sutures.  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 No significant stenosis of the cervical left internal carotid artery. Severe stenosis origin of the dominant left vertebral artery. Hypoplastic right vertebral artery. No high-grade intracranial stenosis, large vessel occlusion or aneurysm\"  Neurology saw in consult for the CVA.  They recommended that the patient start ASA 81mg daily when cleared by Neurosurgery as above.    For LOOP recorder implanted as an outpatient.  He did have a previous Ziopatch done that was negative for atrial fibrillation.    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  Continue statin  Pancreatic cyst  Had an OP ultrasound done for elevated total bilirubin that showed cystic lesions in the pancreas for which further imaging " "was recommended  MRI done thereafter as an OP showed \"Numerous cystic lesions scattered throughout the pancreas with a dominant lesion measuring 18 mm in the body. Worrisome features include ductal dilation up to 6 mm in the tail the pancreas and abrupt change in caliber of the pancreatic duct. Management recommendation: Because of presence of multiple worrisome features, surgical consultation/management with consideration for EUS recommended.  Management and follow up recommendations for cystic pancreatic lesions are based on Institutional consensus and international evidence-based Kyoto guidelines for the management of intraductal papillary mucinous neoplasm of the pancreas. Pancreatology 24 (2024) 255-270\".  Per IMs note in the hospital:  \"Reached out to primary team and surgery oncall as imaging findings are suggestive of malignancy. According to surgery they recommend out patient follow up with surg onc for possible biopsy and out patient GI follow up. Surgery called the surg onc office to schedule the appointment for patient. I did a STAT referral for GI outpatient. Primary team will inform the patient regarding imaging findings and the referrals\".  Most recent CMP on 6/3/25 with normal total bilirubin/LFT  Diabetes mellitus (HCC)  HbA1C 6.2 on 5/24/25  Home:  Metformin 1000 mg BID  Here:  QID Accuchecks/SSI  Will restart Metformin when needed/able  HTN (hypertension)  Home:  Atenolol-chlorthalidone 50-25 mg qd/Diovan 320 mg qd  Here:  Atenolol 50 mg qd/Losartan 25mg qd  Has Hydralazine 10 mg q8hrs prn SBP >165  BP higher this AM but overall trend acceptable.   No changes today 6/10/25   Will continue to hold Chlorthalidone  Thrombocytopenia (HCC)  Chronic  Follows with H-O as OP  Currently platelet count was 223 on 6/6/25  Mixed hyperlipidemia  Takes Lipitor 10 mg qd at home = continue here  Normocytic anemia  Continue Iron 325 mg QOD  Gout  Continue Allopurinol 300mg qd as at home  No recent flares  CKD " (chronic kidney disease)  Baseline 0.7-0.9  Stable  Left shoulder pain  Per PMR  Bilateral leg edema  Says gets at home if sleeps in chair as opposed to bed and is L>R as is now  Continue to ACE wrap  Leg wound, left  WC saw in acute hospital stay  Continue local WC  Tx per PMR    The above assessment and plan was reviewed and updated as determined by my evaluation of the patient on 6/10/2025.    History of Present Illness   Patient seen and examined. Patients overnight issues or events were reviewed with nursing staff. New or overnight issues include the following:   No new or overnight issues.  Offers no complaints    Review of Systems   All other systems reviewed and are negative.      Objective :  Temp:  [97.7 °F (36.5 °C)-98.3 °F (36.8 °C)] 97.7 °F (36.5 °C)  HR:  [61-67] 67  BP: (101-134)/(52-70) 134/70  Resp:  [18] 18  SpO2:  [97 %-98 %] 98 %  O2 Device: None (Room air)    Invasive Devices       None                   Physical Exam  General Appearance: no distress, nontoxic appearing  HEENT:  External ear normal.  Nose normal w/o drainage. Mucous membranes are moist. Oropharynx is clear. Conjunctiva clear w/o icterus or redness.  Neck:  Supple, normal ROM  Lungs: BBS without crackles/wheeze/rhonchi; respirations unlabored with normal inspiratory/expiratory effort.  No retractions noted.  On RA  CV: regular rate and rhythm; no rubs/murmurs/gallops, PMI normal   ABD: Abdomen is soft.  Bowel sounds all quadrants.  Nontender with no distention.    EXT: no edema  Skin: normal turgor, normal texture  Psych: affect normal, mood normal  Neuro: AAO       The above physical exam was reviewed and updated as determined by my evaluation of the patient on 6/10/2025.      Lab Results: I have reviewed the following results:  Results from last 7 days   Lab Units 06/06/25  1219   WBC Thousand/uL 7.24   HEMOGLOBIN g/dL 11.0*   HEMATOCRIT % 35.1*   PLATELETS Thousands/uL 223     Results from last 7 days   Lab Units  06/06/25  1219   SODIUM mmol/L 138   POTASSIUM mmol/L 4.2   CHLORIDE mmol/L 104   CO2 mmol/L 26   BUN mg/dL 24   CREATININE mg/dL 0.74   CALCIUM mg/dL 9.1             Results from last 7 days   Lab Units 06/10/25  1139 06/10/25  0614 06/09/25  2048   POC GLUCOSE mg/dl 102 101 126       Imaging Results Review: No pertinent imaging studies reviewed.  Other Study Results Review: No additional pertinent studies reviewed.    Review of Scheduled Meds: Medications  reviewed and reconciled as needed  Current Facility-Administered Medications   Medication Dose Route Frequency Provider Last Rate    acetaminophen  650 mg Oral Q6H PRN JOSE ALEJANDRO Bradley      allopurinol  300 mg Oral Daily JOSE ALEJANDRO Bradley      atenolol  50 mg Oral Daily JOSE ALEJANDRO Bradley      And    [Held by provider] chlorthalidone  25 mg Oral Daily JOSE ALEJANDRO Bradley      atorvastatin  10 mg Oral HS JOSE ALEJANDRO Bradley      chlorhexidine  15 mL Mouth/Throat Q12H BRIDGET JOSE ALEJANDRO Bradley      Diclofenac Sodium  2 g Topical 4x Daily PRN JOSE ALEJANDRO Bradley      enoxaparin  30 mg Subcutaneous Q12H BRIDGET JOSE ALEJANDRO Bradley      ferrous sulfate  325 mg Oral Every Other Day JOSE ALEJANDRO Bradley      hydrALAZINE  10 mg Oral Q8H PRN JOSE ALEJANDRO Bradley      insulin lispro  1-5 Units Subcutaneous TID AC JOSE ALEJANDRO Bradley      insulin lispro  1-5 Units Subcutaneous HS JOSE ALEJANDRO Bradley      lidocaine   Topical 4x Daily PRN JOSE ALEJANDRO Bradley      losartan  25 mg Oral Daily JOSE ALEJANDRO Bradley      polyethylene glycol  17 g Oral Daily JOSE ALEJANDRO Bradley      senna-docusate sodium  1 tablet Oral HS JOSE ALEJANDRO Bradley         VTE Pharmacologic Prophylaxis: Lovenox  Code Status: Level 1 - Full Code  Current Length of Stay: 7 day(s)    Administrative Statements     ** Please Note:  voice to text software may have been used in the  creation of this document. Although proof errors in transcription or interpretation are a potential of such software**

## 2025-06-10 NOTE — ASSESSMENT & PLAN NOTE
Lab Results   Component Value Date    HGBA1C 6.2 (H) 05/24/2025       Recent Labs     06/09/25  1533 06/09/25  2048 06/10/25  0614 06/10/25  1139   POCGLU 131 126 101 102       Blood Sugar Average: Last 72 hrs:  (P) [id number]  - plan to restart Metformin during [facility] stay  - continue SSI and accuchecks per IM

## 2025-06-10 NOTE — PROGRESS NOTES
06/10/25 1500   Pain Assessment   Pain Assessment Tool 0-10   Pain Score 5   Pain Location/Orientation Orientation: Right  (ant thigh)   Pain Onset/Description Descriptor: Burning;Descriptor: Sharp   Patient's Stated Pain Goal No pain   Hospital Pain Intervention(s) Cold applied   Restrictions/Precautions   Precautions Bed/chair alarms;Cognitive;Fall Risk;Pain   Cognition   Overall Cognitive Status Impaired   Arousal/Participation Alert;Cooperative   Subjective   Subjective cont to report occ pain in R ant thigh- agreeable to use the cane   Roll Left and Right   Type of Assistance Needed Incidental touching;Verbal cues   Physical Assistance Level No physical assistance   Comment no padgett l- increase time and effor tneeded   Roll Left and Right CARE Score 4   Sit to Lying   Type of Assistance Needed Incidental touching;Verbal cues   Physical Assistance Level No physical assistance   Comment cues needed to  turn bodyon and angle - performed without then with bedcane wiht increase ease with it   Sit to Lying CARE Score 4   Lying to Sitting on Side of Bed   Type of Assistance Needed Incidental touching;Verbal cues;Adaptive equipment   Physical Assistance Level No physical assistance   Comment with and without  bedcane with increase ease with it   Lying to Sitting on Side of Bed CARE Score 4   Sit to Stand   Type of Assistance Needed Supervision;Adaptive equipment   Physical Assistance Level No physical assistance   Comment cane   Sit to Stand CARE Score 4   Bed-Chair Transfer   Type of Assistance Needed Supervision;Adaptive equipment   Physical Assistance Level No physical assistance   Comment cane   Chair/Bed-to-Chair Transfer CARE Score 4   Walk 10 Feet   Type of Assistance Needed Supervision;Adaptive equipment   Physical Assistance Level No physical assistance   Comment cane   Walk 10 Feet CARE Score 4   Walk 50 Feet with Two Turns   Type of Assistance Needed Supervision;Adaptive equipment   Physical Assistance  Level No physical assistance   Comment cane   Walk 50 Feet with Two Turns CARE Score 4   Walk 150 Feet   Type of Assistance Needed Supervision;Adaptive equipment   Physical Assistance Level No physical assistance   Comment cane   Walk 150 Feet CARE Score 4   Ambulation   Primary Mode of Locomotion Prior to Admission Walk   Distance Walked (feet) 265 ft  (145)   Assist Device Cane   Gait Pattern Inconsistant Rose;Slow Rose;Decreased foot clearance;Step through   Limitations Noted In Balance;Endurance;Posture;Speed;Strength   Provided Assistance with: Direction   Walk Assist Level Close Supervision   Findings trial in session ith cane in R and L hand - pt is used to using  his R but tried L for more support for  R LE   Does the patient walk? 2. Yes   Therapeutic Interventions   Strengthening 2# fro B knee ext and L  hip flex  x 30 reps  ( only 15 reps for  R hip flex then stopped 2* iincreasing pain   Assessment   Treatment Assessment pt seen for brief  session including amb with cane, bed mobility and  strengthening ex - pt issued  HOs for bedcane as this  made  bed transfers much easier for pt  - pt agreeable and will share the info with his wife to order . trial of cane in  L hand to  A with off loading wt thru R Le -pt with proper sequenicing and report of  more support but stil locc reverted back to R hand. CP to  R ant thigh after session for pain relief   PT Barriers   Physical Impairment Decreased strength;Decreased endurance;Impaired balance;Decreased mobility;Pain   Functional Limitation Car transfers;Stair negotiation;Standing;Transfers;Walking   Plan   Treatment/Interventions Functional transfer training;LE strengthening/ROM;Elevations;Therapeutic exercise;Endurance training;Bed mobility;Gait training   PT Therapy Minutes   PT Time In 1500   PT Time Out 1530   PT Total Time (minutes) 30   PT Mode of treatment - Individual (minutes) 30   PT Mode of treatment - Concurrent (minutes) 0   PT Mode of  treatment - Group (minutes) 0   PT Mode of treatment - Co-treat (minutes) 0   PT Mode of Treatment - Total time(minutes) 30 minutes   PT Cumulative Minutes 510

## 2025-06-10 NOTE — CASE MANAGEMENT
Received call from Consuelo at [facility] providing approval for contd stay, lcd 6/15 with update or dc notification on 6/16.

## 2025-06-10 NOTE — ASSESSMENT & PLAN NOTE
Home:  Atenolol-chlorthalidone 50-25 mg qd/Diovan 320 mg qd  Here:  Atenolol 50 mg qd/Losartan 25mg qd  Has Hydralazine 10 mg q8hrs prn SBP >165  BP higher this AM but overall trend acceptable.   No changes today 6/10/25   Will continue to hold Chlorthalidone

## 2025-06-10 NOTE — ASSESSMENT & PLAN NOTE
> Originally presented in early April due to unwitnessed fall and found to have small subdural hemorrhage which subsequently appeared to resolve upon repeat CT head imaging. Patient opted to have MRI performed in outpatient setting and MRI team sent him to ER for large bilateral high convexity SDHs with mass effect. Presented to ED on 5/24 where he received bilateral Yael holes with 4 subdural drains placed  > Drains removed on 5/27 and 5/28  > completed 7 days of Keppra for seizure prophylaxis  > CTH on 6/9/25 with stable evolution of SDH's and postoperative pneumocephalus. NSGY to follow up in 1 week although note that is still not cleared for aspirin use    Repeat CT head STAT if GCS declines more than 2 points in 1 hour  SBP < 160  INR < 1.4  Plt > 100  Hold all therapeutic doses of AC / AP therapy, he is not cleared to start asa per neurology recommendations for stroke management  Ok for dvt prophylaxis, continue lovenox  PT/OT

## 2025-06-10 NOTE — ASSESSMENT & PLAN NOTE
"MRI of the brain that showed large bilateral SDH  S/p bilateral [place] holes for evacuation of SDH 5/25/25  MMA was not able to be done due to anatomical considerations  For no full AC/AP for 2 weeks = 6/9-11/25 then need to clear with NS  D/W NS 6/6/25 = followup CTH for 6/9/25 done = \"Stable to slightly decreased size of mixed density bilateral cerebral convexity subdural hematomas admixed with foci of air. No significant midline shift\"  NS saw him in f/u in R/T the CTH done 6/9.  They said repeat CTH on 6/16 and then will determine if cleared to start ASA 81mg qd.  They also took out staples and sutures.  Per NS = wash incision QOD  "

## 2025-06-11 LAB
GLUCOSE SERPL-MCNC: 110 MG/DL (ref 65–140)
GLUCOSE SERPL-MCNC: 114 MG/DL (ref 65–140)
GLUCOSE SERPL-MCNC: 121 MG/DL (ref 65–140)
GLUCOSE SERPL-MCNC: 98 MG/DL (ref 65–140)

## 2025-06-11 PROCEDURE — 97130 THER IVNTJ EA ADDL 15 MIN: CPT

## 2025-06-11 PROCEDURE — 99232 SBSQ HOSP IP/OBS MODERATE 35: CPT | Performed by: NURSE PRACTITIONER

## 2025-06-11 PROCEDURE — 97129 THER IVNTJ 1ST 15 MIN: CPT

## 2025-06-11 PROCEDURE — 97110 THERAPEUTIC EXERCISES: CPT

## 2025-06-11 PROCEDURE — 97530 THERAPEUTIC ACTIVITIES: CPT

## 2025-06-11 PROCEDURE — 82948 REAGENT STRIP/BLOOD GLUCOSE: CPT

## 2025-06-11 PROCEDURE — 99232 SBSQ HOSP IP/OBS MODERATE 35: CPT | Performed by: STUDENT IN AN ORGANIZED HEALTH CARE EDUCATION/TRAINING PROGRAM

## 2025-06-11 RX ORDER — LOSARTAN POTASSIUM 50 MG/1
50 TABLET ORAL DAILY
Status: DISCONTINUED | OUTPATIENT
Start: 2025-06-12 | End: 2025-06-16 | Stop reason: HOSPADM

## 2025-06-11 RX ADMIN — ALLOPURINOL 300 MG: 300 TABLET ORAL at 09:36

## 2025-06-11 RX ADMIN — POLYETHYLENE GLYCOL 3350 17 G: 17 POWDER, FOR SOLUTION ORAL at 09:36

## 2025-06-11 RX ADMIN — DICLOFENAC SODIUM 2 G: 10 GEL TOPICAL at 17:26

## 2025-06-11 RX ADMIN — SENNOSIDES AND DOCUSATE SODIUM 1 TABLET: 50; 8.6 TABLET ORAL at 22:02

## 2025-06-11 RX ADMIN — ATORVASTATIN CALCIUM 10 MG: 10 TABLET, FILM COATED ORAL at 22:02

## 2025-06-11 RX ADMIN — LOSARTAN POTASSIUM 25 MG: 25 TABLET, FILM COATED ORAL at 09:36

## 2025-06-11 RX ADMIN — ENOXAPARIN SODIUM 30 MG: 30 INJECTION SUBCUTANEOUS at 22:02

## 2025-06-11 RX ADMIN — CHLORHEXIDINE GLUCONATE 15 ML: 1.2 SOLUTION ORAL at 22:02

## 2025-06-11 RX ADMIN — ENOXAPARIN SODIUM 30 MG: 30 INJECTION SUBCUTANEOUS at 09:36

## 2025-06-11 RX ADMIN — ATENOLOL 50 MG: 50 TABLET ORAL at 09:36

## 2025-06-11 RX ADMIN — CHLORHEXIDINE GLUCONATE 15 ML: 1.2 SOLUTION ORAL at 09:36

## 2025-06-11 RX ADMIN — DICLOFENAC SODIUM 2 G: 10 GEL TOPICAL at 09:36

## 2025-06-11 NOTE — PROGRESS NOTES
06/11/25 0800   Pain Assessment   Pain Assessment Tool 0-10   Pain Score 5   Pain Location/Orientation Orientation: Right;Location: Leg   Effect of Pain on Daily Activities Patient declined stretching, ice, etc. Wished to receive ointment with RN after session.   Restrictions/Precautions   Precautions Bed/chair alarms;Cognitive;Fall Risk;Pain   Weight Bearing Restrictions No   ROM Restrictions No   General   Change In Medical/Functional Status Patient needing banadage change and therefore PT waiting to do ACE wraps.   Cognition   Overall Cognitive Status Impaired   Arousal/Participation Alert;Cooperative   Subjective   Subjective Patient ready for PT session   Sit to Stand   Type of Assistance Needed Supervision;Adaptive equipment   Physical Assistance Level No physical assistance   Comment DS with RW; CS with SPC   Sit to Stand CARE Score 4   Bed-Chair Transfer   Type of Assistance Needed Supervision;Adaptive equipment   Physical Assistance Level No physical assistance   Comment DS with RW; CS with SPC   Chair/Bed-to-Chair Transfer CARE Score 4   Car Transfer   Comment (S)  please complete next session   Walk 10 Feet   Type of Assistance Needed Supervision;Adaptive equipment   Physical Assistance Level No physical assistance   Comment DS with RW; CS with SPC   Walk 10 Feet CARE Score 4   Walk 50 Feet with Two Turns   Type of Assistance Needed Supervision;Adaptive equipment   Physical Assistance Level No physical assistance   Comment DS with RW; CS with SPC   Walk 50 Feet with Two Turns CARE Score 4   Walk 150 Feet   Type of Assistance Needed Supervision;Adaptive equipment   Physical Assistance Level No physical assistance   Comment CS with SPC   Walk 150 Feet CARE Score 4   Walking 10 Feet on Uneven Surfaces   Comment (S)  please take outside in upcoming days   Ambulation   Primary Mode of Locomotion Prior to Admission Walk   Distance Walked (feet) 125 ft  (+ 260 SPC)   Assist Device Cane;Walker   Gait Pattern  "Inconsistant Rose;Decreased foot clearance;Wide JESÚS;Trendelenburg   Limitations Noted In Balance;Endurance;Heel Strike;Speed;Strength;Swing   Findings dec speed with SPC noted placing him at higher risk for falls; will complete outcome measures next session. Patient reports feeling more confident using RW and states he will likely use this device at home   Does the patient walk? 2. Yes   Wheel 50 Feet with Two Turns   Reason if not Attempted Activity not applicable   Wheel 50 Feet with Two Turns CARE Score 9   Wheel 150 Feet   Reason if not Attempted Activity not applicable   Wheel 150 Feet CARE Score 9   Curb or Single Stair   Style negotiated Curb   Type of Assistance Needed Supervision;Adaptive equipment   Physical Assistance Level No physical assistance   Comment CS with RW on 8\" curb step   1 Step (Curb) CARE Score 4   Therapeutic Interventions   Strengthening Access Code: [email or web address]  URL: [email or web address]  Date: 06/11/2025  Prepared by: Tootie Keenan    Exercises  - Supine Bridge  - 3 sets - 10 reps - 3 hold  - Clamshell  - 3 sets - 10 reps - 3 hold  - Seated Long Arc Quad  - 3 sets - 10 reps - 3 hold  - Sit to Stand  - 3 sets - 10 reps  - Heel Raises with Counter Support  - 3 sets - 10 reps  - Mini Squat with Counter Support  - 3 sets - 10 reps  - Standing March with Counter Support  - 3 sets - 10 reps  - Standing Hip Abduction with Counter Support  - 3 sets - 10 reps  - Standing Single Leg Stance with Counter Support  - 4 sets - 30 hold  - Side Stepping with Counter Support  - 4 sets - 10 reps  - Narrow Stance with Counter Support  - 4 sets - 30 hold   Other initated the above HEP. Pack kept at PT desk. Did not complete in full. Please highlight the exercises as completed to ensure review of all prior to discharge   Equipment Use   NuStep L2x10 minutes BLE only   Assessment   Treatment Assessment Patient making progress with skilled PT intervention. His discharge is scheduled for " Monday with OPPT services. Family will be invited to come in for brief observation Friday as he states they will be unable to come in tomorrow. PT initiated HEP for continued strength and balance at home. These remain his biggest deficits impacting his safety with functional mobility. He requires DS with use of RW and CS with use of SPC. Patient states he feels more comfortable with use of RW and will likely chose to utilize this AD upon discharge. Please continue to work with LRAD to dec risk for falls.   Problem List Decreased strength;Decreased endurance;Impaired balance;Decreased mobility;Decreased coordination;Decreased cognition;Impaired judgement;Decreased safety awareness   PT Barriers   Functional Limitation Car transfers;Stair negotiation;Standing;Transfers;Walking   PT Therapy Minutes   PT Time In 0800   PT Time Out 0900   PT Total Time (minutes) 60   PT Mode of treatment - Individual (minutes) 60   PT Mode of treatment - Concurrent (minutes) 0   PT Mode of treatment - Group (minutes) 0   PT Mode of treatment - Co-treat (minutes) 0   PT Mode of Treatment - Total time(minutes) 60 minutes   PT Cumulative Minutes 570

## 2025-06-11 NOTE — PROGRESS NOTES
Progress Note - PMR   Name: Deonte Greenfield 74 y.o. male I MRN: [id number]  Unit/Bed#: -01 I Date of Admission: 6/3/2025   Date of Service: 6/11/2025 I Hospital Day: 8     Assessment & Plan  Acute on chronic intracranial subdural hematoma (HCC)  > Originally presented in early April due to unwitnessed fall and found to have small subdural hemorrhage which subsequently appeared to resolve upon repeat CT head imaging. Patient opted to have MRI performed in outpatient setting and MRI team sent him to ER for large bilateral high convexity SDHs with mass effect. Presented to ED on 5/24 where he received bilateral [place] holes with 4 subdural drains placed  > Drains removed on 5/27 and 5/28  > completed 7 days of Keppra for seizure prophylaxis  > CTH on 6/9/25 with stable evolution of SDH's and postoperative pneumocephalus. NSGY to follow up in 1 week although note that is still not cleared for aspirin use    Repeat CT head STAT if GCS declines more than 2 points in 1 hour  SBP < 160  INR < 1.4  Plt > 100  Hold all therapeutic doses of AC / AP therapy, he is not cleared to start asa per neurology recommendations for stroke management  Ok for dvt prophylaxis, continue lovenox  PT/OT  Diabetes mellitus (HCC)  Lab Results   Component Value Date    HGBA1C 6.2 (H) 05/24/2025       Recent Labs     06/10/25  1610 06/10/25  2106 06/11/25  0630 06/11/25  1112   POCGLU 126 149* 121 114       Blood Sugar Average: Last 72 hrs:  (P) 116.3993414787301657  - plan to restart Metformin during [facility] stay  - continue SSI and accuchecks per IM  Thrombocytopenia (HCC)  Baseline 100-250  Trend biweekly  Goal > 100  OP follow up with heme onc  Mixed hyperlipidemia  - Continue statin  - Last LDL: 62 on 5/31/2025  Normocytic anemia  - baseline 12ish, now ranging at 10-11 likely in the setting of blood loss  - Continue iron supplementation  Acute CVA (cerebrovascular accident) (HCC)  --MRI brain 5/24:  New, large bilateral high convexity  subdural hematomas, right greater than left, which are predominantly subacute in timing, with mass effect on the bilateral cerebral hemispheres, and effacement of the sulci. No significant midline shift noted. FLAIR signal hyperintensity along the sulci of the right frontal and parietal convexities, which may represent trace associated subarachnoid hemorrhage. No acute infarct. Mild chronic white matter microangiopathic changes involving both cerebral hemispheres.  --CT head 5/30: Acute appearing infarct in right superior cerebellum. Increase in volume of hyperdense blood products bilaterally  --Echo 4/2025: EF 65%. Normal systolic function. Grade 1 diastolic relaxation. 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   LDL 62  A1c 6.2  TTE: 65%     Given new stroke on MRI with concurrent SDH, patient will need ASA when cleared by NSGY after repeat CTH. Given recent negative ziopatch in April 2025, will need loop recorder outpatient.      Lipitor 10 mg qHS  Normotension  Start ASA 81 mg when cleared by NSGY for AP/AC, CTH stable  Outpatient loop recorder  PT/OT  SLP to evaluate language and cognition s/p cerebral infarction  Gout  - continue Allopurinol  Pancreatic cyst  > Patient had MRI abdomen completed outpatient (5/22, d/t jaundice & thrombocytopenia), with numerous pancreatic cystic lesions concerning for malignancy. This showed numerous cystic lesions scattered throughout the pancreas that were concerning for malignancy at this time.   >Patient's family was informed of these and are aware that following discharge from acute rehab they will need to follow-up with the surgical oncology team as well as GI for further evaluation and treatment of these.  Patient and spouse both aware.    - Surg Onc & GI referrals placed by trauma surgery during acute hospitalization discharge  HTN (hypertension)  Home meds:  atenolol-chlorthalidone 50-25, valsartan 320  Here: atenolol 50 mg, losartan 25 mg  - continue titration for normotension  CKD (chronic kidney disease)  Lab Results   Component Value Date    EGFR 90 06/06/2025    EGFR 94 06/03/2025    EGFR 89 06/02/2025    CREATININE 0.74 06/06/2025    CREATININE 0.68 06/03/2025    CREATININE 0.76 06/02/2025     - Adequate GFR while here, previously diagnosed with CKD 2  - has appointment to establish with nephrology on 7/21/25  Superficial foreign body of left leg without major open wound and without infection  - Wound care consulted while in acute care  - Left Pretibial leg wound: cleanse left lower leg wound with soap and water, pat dry, apply xeroform to wound bed and cover with foam dressing. Change every other day.   Oropharyngeal dysphagia  Last seen by SLP on 6/6/25    - continue recommended diet of regular texture and thin liquids  - Aspiration precautions and compensatory swallowing strategies: upright posture, slow rate of feeding, small bites/sips, and alternating bites and sips   - SLP consulted  Left shoulder pain  - new pain for patient  - pain at AC joint, but could not provoke pain other than on palpation  - Differential includes AC arthritis, glenohumeral arthritis, MSK strain, rotator cuff tendinopahty  - Doesn't seem like needs K-tape as no sulcus sign  - PT/OT  - XR if hindering therapies  Bilateral leg edema  Ace wrap  Leg wound, left  Seen by wound care today    Subjective   Patient is a 74-year-old male with history of type 2 diabetes, stage II CKD, hyperlipidemia, hypertension presenting to [facility] in April with an unwitnessed fall and several hemorrhages that seem to be improving on his subsequent imaging however had a decline and an MRI was completed showing large bilateral high convexity subdural hematomas with recommendation to go to the ER where he was evaluated and MMA intervention was not amenable and he was started on Keppra for seizure  prophylaxis and eventually had a bur hole evacuation of subdural drain placement bilaterally. Course copied by acute right superior cerebellar stroke. Plan initiate aspirin when cleared by neurosurgery in an outpatient loop recorder     Chief Complaint: f/u TBI and f/u ambulatory dysfunction    Interval: Patient was seen and evaluated in room without any acute issues overnight.  Also seen during occupational therapy session working on medication management.  For family training tomorrow anticipation for discharge next week.  Having any fevers, chills, nausea vomiting or cough or shortness of breath or diarrhea constipation.  Sleeping well overnight and enjoys participating in therapy.  Completing variable amounts of his meal between 25 and 100% but averaging 75 to 100%.  He is voiding without incontinence and last bowel movement on 6/11.  No new labs to review today    Objective :  Temp:  [97.6 °F (36.4 °C)-98.1 °F (36.7 °C)] 97.6 °F (36.4 °C)  HR:  [55-70] 55  BP: (124-174)/(57-79) 165/72  Resp:  [16-19] 18  SpO2:  [94 %-97 %] 97 %  O2 Device: None (Room air)    Functional Update:  Physical Therapy Occupational Therapy Speech Therapy   Weight Bearing Status: Full Weight Bearing  Transfers: Supervision  Bed Mobility: Supervision  Amulation Distance (ft): 150 feet  Ambulation: Supervision  Assistive Device for Ambulation: Roller Walker  Number of Stairs: 1  Assistive Device for Stairs: Lehft Hand Rail  Stair Assistance: Supervision  Discharge Recommendations: Home with:  DC Home with:: Family Support, Outpatient Physical Therapy   Eating:  (setup)  Grooming: Supervision  Bathing: Supervision  Bathing: Supervision  Upper Body Dressing: Supervision  Lower Body Dressing: Minimal Assistance  Toileting: Supervision  Tub/Shower Transfer: Supervision  Toilet Transfer: Supervision  Cognition: Exceptions to WNL  Cognition: Decreased Memory, Decreased Attention  Orientation: Person, Place, Time, Situation   Mode of  Communication: Verbal  Cognition: Exceptions to WNL  Cognition: Decreased Memory, Decreased Executive Functions  Orientation: Person, Place, Time, Situation  Swallowing: Within Defined Limits  Diet Recommendations: Regular Diet, Thin  Discharge Recommendations: Home with:  DC Home with:: Family Support     Physical Exam  Vitals reviewed.   Constitutional:       General: He is not in acute distress.     Appearance: He is obese.   HENT:      Head: Normocephalic.      Comments: Bilateral drain and bur hole sites healing well and scabbing     Right Ear: External ear normal.      Left Ear: External ear normal.      Nose: Nose normal. No rhinorrhea.      Mouth/Throat:      Mouth: Mucous membranes are moist.      Pharynx: Oropharynx is clear.     Eyes:      General: No scleral icterus.      Cardiovascular:      Rate and Rhythm: Normal rate.      Pulses: Normal pulses.   Pulmonary:      Effort: Pulmonary effort is normal. No respiratory distress.   Abdominal:      General: There is no distension.      Palpations: Abdomen is soft.     Musculoskeletal:      Right lower leg: Edema present.      Left lower leg: Edema present.      Comments: Improved     Skin:     General: Skin is warm and dry.      Comments: Reviewed right left pretibial wound site     Neurological:      Mental Status: He is alert and oriented to person, place, and time.     Psychiatric:         Mood and Affect: Mood normal.         Behavior: Behavior normal.           Scheduled Meds:  Current Facility-Administered Medications   Medication Dose Route Frequency Provider Last Rate    acetaminophen  650 mg Oral Q6H PRN JOSE ALEJANDRO Bradley      allopurinol  300 mg Oral Daily JOSE ALEJANDRO Bradley      atenolol  50 mg Oral Daily JOSE ALEJANDRO Bradley      And    [Held by provider] chlorthalidone  25 mg Oral Daily JOSE ALEJANDRO Bradley      atorvastatin  10 mg Oral HS JOSE ALEJANDRO Bradley      chlorhexidine  15 mL Mouth/Throat Q12H  BRIDGET JOSE ALEJANDRO Bradley      Diclofenac Sodium  2 g Topical 4x Daily PRN JOSE ALEJANDRO Bradley      enoxaparin  30 mg Subcutaneous Q12H BRIDGET JOSE ALEJANDRO Bradley      ferrous sulfate  325 mg Oral Every Other Day JOSE ALEJANDRO Bradley      hydrALAZINE  10 mg Oral Q8H PRN JOSE ALEJANDRO Bradley      insulin lispro  1-5 Units Subcutaneous TID AC JOSE ALEJANDRO Bradley      insulin lispro  1-5 Units Subcutaneous HS JOSE ALEJANDRO Bradley      lidocaine   Topical 4x Daily PRN JOSE ALEJANDRO Bradley      [START ON 6/12/2025] losartan  50 mg Oral Daily JOSE ALEJANDRO Bradley      polyethylene glycol  17 g Oral Daily JOSE ALEJANDRO Bradley      senna-docusate sodium  1 tablet Oral HS JOSE ALEJANDRO Bradley           Lab Results: I have reviewed the following results:  Results from last 7 days   Lab Units 06/06/25  1219   HEMOGLOBIN g/dL 11.0*   HEMATOCRIT % 35.1*   WBC Thousand/uL 7.24   PLATELETS Thousands/uL 223     Results from last 7 days   Lab Units 06/06/25  1219   BUN mg/dL 24   SODIUM mmol/L 138   POTASSIUM mmol/L 4.2   CHLORIDE mmol/L 104   CREATININE mg/dL 0.74              Tre Flores,   Physical Medicine and Rehabilitation  [facility]    I have spent a total time of 35 minutes in caring for this patient on the day of the visit/encounter including Counseling / Coordination of care, Documenting in the medical record, and Communicating with other healthcare professionals .

## 2025-06-11 NOTE — ASSESSMENT & PLAN NOTE
Lab Results   Component Value Date    HGBA1C 6.2 (H) 05/24/2025       Recent Labs     06/10/25  1610 06/10/25  2106 06/11/25  0630 06/11/25  1112   POCGLU 126 149* 121 114       Blood Sugar Average: Last 72 hrs:  (P) 116.4782150434708545  - plan to restart Metformin during [facility] stay  - continue SSI and accuchecks per IM

## 2025-06-11 NOTE — ASSESSMENT & PLAN NOTE
"MRI of the brain that showed large bilateral SDH  S/p bilateral [place] holes for evacuation of SDH 5/25/25  MMA was not able to be done due to anatomical considerations  For no full AC/AP for 2 weeks = 6/9-11/25 then need to clear with NS  D/W NS 6/6/25 = followup CTH for 6/9/25 done = \"Stable to slightly decreased size of mixed density bilateral cerebral convexity subdural hematomas admixed with foci of air. No significant midline shift\"  NS saw him in f/u in R/T the CTH done 6/9.  They said repeat CTH on 6/16 and then will determine if cleared to start ASA 81mg qd.  They also took out staples and sutures.  Per NS = wash incision QOD  "

## 2025-06-11 NOTE — ASSESSMENT & PLAN NOTE
Home:  Atenolol-chlorthalidone 50-25 mg qd/Diovan 320 mg qd  Here:  Atenolol 50 mg qd/Losartan 25mg qd  Has Hydralazine 10 mg q8hrs prn SBP >165  BP a bit too high.   Will increase Losartan to 50 mg qd to start 6/12/25   Will continue to hold Chlorthalidone

## 2025-06-11 NOTE — ASSESSMENT & PLAN NOTE
"Acute CVA right superior cerebellum  Was found on CTH 5/30, confirmed by MRI  CTA of the head/neck showed \"Moderate (approximately 50%) stenosis in the 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 No significant stenosis of the cervical left internal carotid artery. Severe stenosis origin of the dominant left vertebral artery. Hypoplastic right vertebral artery. No high-grade intracranial stenosis, large vessel occlusion or aneurysm\"  Neurology saw in consult for the CVA.  They recommended that the patient start ASA 81mg daily when cleared by Neurosurgery as above.    For LOOP recorder implant as an outpatient.  He did have a previous Ziopatch done that was negative for atrial fibrillation.    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  Continue statin  "

## 2025-06-11 NOTE — PROGRESS NOTES
"Progress Note - Hospitalist   Name: Deonte Greenfield 74 y.o. male I MRN: [id number]  Unit/Bed#: -01 I Date of Admission: 6/3/2025   Date of Service: 6/11/2025 I Hospital Day: 8    Assessment & Plan  Acute on chronic intracranial subdural hematoma (HCC)  MRI of the brain that showed large bilateral SDH  S/p bilateral Yael holes for evacuation of SDH 5/25/25  MMA was not able to be done due to anatomical considerations  For no full AC/AP for 2 weeks = 6/9-11/25 then need to clear with NS  D/W NS 6/6/25 = followup CTH for 6/9/25 done = \"Stable to slightly decreased size of mixed density bilateral cerebral convexity subdural hematomas admixed with foci of air. No significant midline shift\"  NS saw him in f/u in R/T the CTH done 6/9.  They said repeat CTH on 6/16 and then will determine if cleared to start ASA 81mg qd.  They also took out staples and sutures.  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 No significant stenosis of the cervical left internal carotid artery. Severe stenosis origin of the dominant left vertebral artery. Hypoplastic right vertebral artery. No high-grade intracranial stenosis, large vessel occlusion or aneurysm\"  Neurology saw in consult for the CVA.  They recommended that the patient start ASA 81mg daily when cleared by Neurosurgery as above.    For LOOP recorder implant as an outpatient.  He did have a previous Ziopatch done that was negative for atrial fibrillation.    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  Continue statin  Pancreatic cyst  Had an OP ultrasound done for elevated total bilirubin that showed cystic lesions in the pancreas for which further imaging " "was recommended  MRI done thereafter as an OP showed \"Numerous cystic lesions scattered throughout the pancreas with a dominant lesion measuring 18 mm in the body. Worrisome features include ductal dilation up to 6 mm in the tail the pancreas and abrupt change in caliber of the pancreatic duct. Management recommendation: Because of presence of multiple worrisome features, surgical consultation/management with consideration for EUS recommended.  Management and follow up recommendations for cystic pancreatic lesions are based on Institutional consensus and international evidence-based [place] guidelines for the management of intraductal papillary mucinous neoplasm of the pancreas. Pancreatology 24 (2024) 255-270\".  Per IMs note in the hospital:  \"Reached out to primary team and surgery oncall as imaging findings are suggestive of malignancy. According to surgery they recommend out patient follow up with surg onc for possible biopsy and out patient GI follow up. Surgery called the surg onc office to schedule the appointment for patient. I did a STAT referral for GI outpatient. Primary team will inform the patient regarding imaging findings and the referrals\".  Most recent CMP on 6/3/25 with normal total bilirubin/LFT  Diabetes mellitus (HCC)  HbA1C 6.2 on 5/24/25  Home:  Metformin 1000 mg BID  Here:  QID Accuchecks/SSI  Will restart Metformin when needed/able  HTN (hypertension)  Home:  Atenolol-chlorthalidone 50-25 mg qd/Diovan 320 mg qd  Here:  Atenolol 50 mg qd/Losartan 25mg qd  Has Hydralazine 10 mg q8hrs prn SBP >165  BP a bit too high.   Will increase Losartan to 50 mg qd to start 6/12/25   Will continue to hold Chlorthalidone  Thrombocytopenia (HCC)  Chronic  Follows with H-O as OP  Currently platelet count was 223 on 6/6/25  CBC 6/12/25  Mixed hyperlipidemia  Takes Lipitor 10 mg qd at home = continue here  Normocytic anemia  Continue Iron 325 mg QOD  Gout  Continue Allopurinol 300mg qd as at home  No recent " flares  CKD (chronic kidney disease)  Baseline 0.7-0.9  Stable  Left shoulder pain  Per PMR  Bilateral leg edema  Says gets at home if sleeps in chair as opposed to bed and is L>R as is now  Continue to ACE wrap  Leg wound, left  WC saw in acute hospital stay  Continue local WC  Tx per PMR    The above assessment and plan was reviewed and updated as determined by my evaluation of the patient on 6/11/2025.    History of Present Illness   Patient seen and examined. Patients overnight issues or events were reviewed with nursing staff. New or overnight issues include the following:   No new or overnight issues.  Offers no complaints    Review of Systems   All other systems reviewed and are negative.      Objective :  Temp:  [97.8 °F (36.6 °C)-98.1 °F (36.7 °C)] 98.1 °F (36.7 °C)  HR:  [63-70] 70  BP: (124-174)/(57-79) 142/69  Resp:  [16-19] 19  SpO2:  [94 %-98 %] 96 %  O2 Device: None (Room air)    Invasive Devices       None                   Physical Exam  General Appearance: no distress, non toxic appearing  HEENT: PERRLA, conjuctiva normal; oropharynx clear; mucous membranes moist   Neck:  Supple, normal ROM  Lungs: CTA, normal respiratory effort, no retractions, expiratory effort normal  CV: regular rate and rhythm; no rubs/murmurs/gallops, PMI normal   ABD: soft; ND/NT; +BS  EXT: + LE edema  Skin: normal turgor, normal texture  Psych: affect normal, mood normal  Neuro: AAO        The above physical exam was reviewed and updated as determined by my evaluation of the patient on 6/11/2025.      Lab Results: I have reviewed the following results:  Results from last 7 days   Lab Units 06/06/25  1219   WBC Thousand/uL 7.24   HEMOGLOBIN g/dL 11.0*   HEMATOCRIT % 35.1*   PLATELETS Thousands/uL 223     Results from last 7 days   Lab Units 06/06/25  1219   SODIUM mmol/L 138   POTASSIUM mmol/L 4.2   CHLORIDE mmol/L 104   CO2 mmol/L 26   BUN mg/dL 24   CREATININE mg/dL 0.74   CALCIUM mg/dL 9.1             Results from last 7  days   Lab Units 06/11/25  1112 06/11/25  0630 06/10/25  2106   POC GLUCOSE mg/dl 114 121 149*       Imaging Results Review: No pertinent imaging studies reviewed.  Other Study Results Review: No additional pertinent studies reviewed.    Review of Scheduled Meds: Medications  reviewed and reconciled as needed  Current Facility-Administered Medications   Medication Dose Route Frequency Provider Last Rate    acetaminophen  650 mg Oral Q6H PRN Evon Price, JOSE ALEJANDRO      allopurinol  300 mg Oral Daily Evon Price, CRNP      atenolol  50 mg Oral Daily Evon Price, CRNP      And    [Held by provider] chlorthalidone  25 mg Oral Daily Evon Price, CRNP      atorvastatin  10 mg Oral HS Evon Price, JOSE ALEJANDRO      chlorhexidine  15 mL Mouth/Throat Q12H [facility] Evon Price, CRNP      Diclofenac Sodium  2 g Topical 4x Daily PRN Evon Price, CRNP      enoxaparin  30 mg Subcutaneous Q12H [facility] Evon Price, CRNP      ferrous sulfate  325 mg Oral Every Other Day Evon Price, CRNP      hydrALAZINE  10 mg Oral Q8H PRN Evon Price, CRNP      insulin lispro  1-5 Units Subcutaneous TID AC Evon Price, CRNP      insulin lispro  1-5 Units Subcutaneous HS Evon Price, CRNP      lidocaine   Topical 4x Daily PRN Evon Price, CRNP      losartan  25 mg Oral Daily Evon Price, CRNP      polyethylene glycol  17 g Oral Daily Evon Price, CRNP      senna-docusate sodium  1 tablet Oral HS Evon Price, CRNP         VTE Pharmacologic Prophylaxis: Lovenox  Code Status: Level 1 - Full Code  Current Length of Stay: 8 day(s)    Administrative Statements     ** Please Note:  voice to text software may have been used in the creation of this document. Although proof errors in transcription or interpretation are a potential of such software**

## 2025-06-11 NOTE — WOUND OSTOMY CARE
Progress Note - Wound   Deonte Attarian 74 y.o. male MRN: [id number]  Unit/Bed#: -01 Encounter: [id number]        Assessment:   Patient is seen for wound care follow-up. Seen sitting OOB in recliner. Min assist to stand. Reports continence of bowel and bladder. Able to feed self.     Findings:  B/L heels are dry intact and latisha with no skin loss or wounds present. Recommend preventative foam dressings and proper offloading/ repositioning.        Sacral MASD: resolved. Intact, pink hyperpigmented tissue noted. No skin loss or drainage. Recommend continuing with calazime.    Left Pretibial Leg Wound: resolved. Intact pink epithelial tissue. No skin loss or drainage noted. Leave PIERO.     No induration, fluctuance, odor, warmth/temperature differences, redness, or purulence noted to the above noted wounds and skin areas assessed. New dressings applied per orders listed below. Patient tolerated well- no s/s of non-verbal pain or discomfort observed during the encounter. Bedside nurse aware of plan of care. See flow sheets for more detailed assessment findings.      Orders listed below and wound care will sign off, call or Secure Chat Message with questions.     Skin care plans:  1-Cleanse B/L Heels with soap and water. Pat dry. Apply Silicone Border Foam (Mepilex) to areas. Gaston with P for Prevention and change every 3 days or PRN soilage/displacement. Peel back and inspect Q-shift.  2-Elevate heels to offload pressure.  3-Ehob cushion in chair when out of bed.  4-Moisturize skin daily with skin nourishing cream.  5-Turn/reposition q2h for pressure re-distribution on skin.  6-Sacrum: Cleanse with mild soap and water and pat dry. Apply thin layer of Calazime paste to sacrum three times a day and prn.      WOUNDS:  Wound 05/24/25 Traumatic Skin Tear Pretibial Left;Lateral (Active)   Wound Image   06/11/25 4460   Wound Description Intact;Pink;Epithelialization 06/11/25 1500   Non-staged Wound Description Not  applicable 06/11/25 1500   Wound Length (cm) 0 cm 06/11/25 0937   Wound Width (cm) 0 cm 06/11/25 0937   Wound Depth (cm) 0 cm 06/11/25 0937   Wound Surface Area (cm^2) 0 cm^2 06/11/25 0937   Wound Volume (cm^3) 0 cm^3 06/11/25 0937   Calculated Wound Volume (cm^3) 0 cm^3 06/11/25 0937   Change in Wound Size % 100 06/11/25 0937   Drainage Amount None 06/11/25 1500   Drainage Description Other (Comment) 06/11/25 1500   Antonia-wound Assessment Intact 06/11/25 1500   Treatments Cleansed;Site care 06/11/25 1500   Wound Site Closure None 06/11/25 1500   Dressing Open to air 06/11/25 1500   Wound packed? No 06/11/25 1500   Dressing Changed Other (Comment) 06/11/25 1500   Patient Tolerance Tolerated well 06/11/25 1500   Dressing Status Other (Comment) 06/11/25 1500       Wound 06/04/25 Irritant Contact Dermatitis Incontinence Sacrum Mid;Left (Active)   Wound Image   06/11/25 0942   Wound Description Intact 06/11/25 1500   Pressure Injury Stage OTH 06/11/25 1500   Non-staged Wound Description Not applicable 06/11/25 1500   Wound Length (cm) 0 cm 06/11/25 1500   Wound Width (cm) 0 cm 06/11/25 1500   Wound Depth (cm) 0 cm 06/11/25 1500   Wound Surface Area (cm^2) 0 cm^2 06/11/25 1500   Wound Volume (cm^3) 0 cm^3 06/11/25 1500   Calculated Wound Volume (cm^3) 0 cm^3 06/11/25 1500   Change in Wound Size % 100 06/11/25 1500   Drainage Amount None 06/11/25 1500   Antonia-wound Assessment Fragile 06/11/25 1500   Treatments Cleansed;Site care 06/11/25 1500   Wound Site Closure None 06/11/25 1500   Dressing Protective barrier 06/11/25 1500   Wound packed? No 06/11/25 1500   Dressing Changed Changed 06/11/25 1500   Patient Tolerance Tolerated well 06/11/25 1500   Dressing Status Intact;Dry;Clean 06/11/25 1500                Sherry Bryant RN, BSN, CWOCN

## 2025-06-11 NOTE — PROGRESS NOTES
06/11/25 1040   Pain Assessment   Pain Assessment Tool 0-10   Pain Score 3   Pain Location/Orientation Orientation: Right;Location: Leg;Orientation: Lower   Hospital Pain Intervention(s) Repositioned   Restrictions/Precautions   Precautions Bed/chair alarms;Cognitive;Fall Risk;Pain;Supervision on toilet/commode   Comprehension   Comprehension (FIM) 5 - Needs help/cues, repetition only RARELY ( < 10% of the time)   Expression   Expression (FIM) 5 - Needs help/cues only RARELY (< 10% of the time)   Social Interaction   Social Interaction (FIM) 6 - Interacts appropriately with others BUT requires extra  time   Problem Solving   Problem solving (FIM) 4 - Solves basic problems 75-89% of time   Memory   Memory (FIM) 4 - Recognizes/recalls/performs 75-89%   Speech/Language/Cognition Assessment   Treatment Assessment Pt was seen in room for skilled ST session targeting cognitive linguistic communication skills. Upon entering room, pt was seated in recliner and awake awaiting SLP. SLP introduced self to pt as this SLP is new to pt's care team. SLP and pt participated in brief rapport building with SLP assessing orientation, LTM, recall and biographical information. Pt recalling previous placement of living, noting wife's name and children's name, where they live, where he lives and additional orientation questions. Pt was oriented x3 to month, year and location with verbal cues for MICKEY. Pt noted to recall previous dysphagia sessions as well as activities during evaluation and some treatment sessions with ST.     Began w/ introduction to memory strategies. SLP provided education to 4 memory strategies: writing, association, repetition and picture. SLP reviewed each memory strategy, a functional example for each in daily life, how each can be used in therapy and examples of memory strategies to improve STM and LTM. SLP discussed use of memory book to assisted in writing down information or writing down important  appointments. Pt recalled that his wife completes this with a calendar in their kitchen at home. Additionally, SLP discussed association, in which associating by linking it to a new thing or something you already know. SLP discussed repetition such as repeating aloud such as repeating directions PT or OT provides in therapy aloud or in head to ensure pt recalls. To 'picture it', SLP noted to pt that they can create a mental image, like a cartoon or a movie or try recall an image in their mind already in the past to reference and associate. Pt voiced that he has a hard time recalling what he completed in PT and reports awareness in possibly using strategy to help recall. Additional internal reminders discussed included rehearsal, chunking, acronyms and personal meaning. SLP provided visualization and orthographic visuals for pt while educating pt on various memory strategies. Will continue to provide continued education on various memory strategies and implement memory strategies in structured tasks to functionally use.    Targeted functional word finding with visualization and association strategy in which SLP provided pt with verbal attribute and asked pt to name the object based on the verbal attribute. Pt was able to complete task w/ 12/15 accuracy which increased to 15/15 once given verbal and semantic cues. Pt needed increase time for processing due to word finding difficulty. Pt noted to functionally use association strategy inially as well as repetition to help with verbal comprehension. Over time, noted to use visualization strategy with min-mod A w/ verbal cues.     Lastly targeted problem solving and reasoning in which SLP provided pt with verbal problems and course of action which did not work. SLP asked pt to explain other things to complete task. Pt was able to complete task w/ 6/8 which increased to 8/8 once given verbal and semantic cues. Pt noted difficulty with reasoning at times with higher level  thinking. For example, when asked 'what else can be done to lower blood pressure' pt voiced 'I would just eat more garlic'  then perseverated on why you should eat garlic and what to do about the smell. SLP educated pt on higher level processing and importance of thinking critically in situations, especially when dealing with nba. Once provided mod A w/ verbal cues, noted improvement in processing but relied heavily on SLP prompts and cues.  At this time, pt continues to benefit from ongoing skilled SLP services to maximize overall cognitive linguistic skills in attempts to decrease caregiver burden over time.   SLP Therapy Minutes   SLP Time In 1040   SLP Time Out 1110   SLP Total Time (minutes) 30   SLP Mode of treatment - Individual (minutes) 30   SLP Mode of treatment - Concurrent (minutes) 0   SLP Mode of treatment - Group (minutes) 0   SLP Mode of treatment - Co-treat (minutes) 0   SLP Mode of Treatment - Total time(minutes) 30 minutes   SLP Cumulative Minutes 270   Therapy Time missed   Time missed? No

## 2025-06-11 NOTE — PROGRESS NOTES
"OT daily treatment note       06/11/25 1300   Pain Assessment   Pain Assessment Tool 0-10   Pain Score 5   Pain Location/Orientation Orientation: Right;Location: Leg   Hospital Pain Intervention(s) Ambulation/increased activity   Restrictions/Precautions   Precautions Bed/chair alarms;Cognitive;Fall Risk;Pain;Supervision on toilet/commode   Lifestyle   Autonomy \"you guys are busting my chops\"   Sit to Stand   Type of Assistance Needed Supervision   Physical Assistance Level No physical assistance   Comment DS w/ RW   Sit to Stand CARE Score 4   Bed-Chair Transfer   Type of Assistance Needed Supervision   Physical Assistance Level No physical assistance   Comment DS w/ RW   Chair/Bed-to-Chair Transfer CARE Score 4   Toileting Hygiene   Type of Assistance Needed Supervision   Physical Assistance Level No physical assistance   Comment DS fro CM/hygiene   Toileting Hygiene CARE Score 4   Toilet Transfer   Type of Assistance Needed Supervision   Physical Assistance Level No physical assistance   Comment DS w/ RW on standard toilet height   Toilet Transfer CARE Score 4   Commmunity Re-entry   Community Re-entry Level Walker   Community Re-entry Level of Assistance Close supervision   Community Re-entry Pt participated in functional mobility task with use of RW to simulate home and community distances required for ADL/IADL completion. Task focused on increasing functional activity tolerance, endurance, strength and dynamic standing balance to improve independence with ADL completion.   Health Management   Health Management Pt engaged in medication review followed by tangible medication sorting activity. When reviewing medications, pt was unable to verbalize the purpose and frequency for his meds stating \"I don't know what meds I'm taking I just know I need to take them.\" Pt then began verbalizing some of the medications he was taking at baseline which was somewhat consistent with his current medicine regime. Pt then " engaged in sorting activity w/ use of 2x/day pill box. Pt did errors with 4 medications and increased time required to complete. Pt expressed some frustration with this activity stating this was not his routine at home. OT educated on importance of using a pill box to ensure accuracy w/ med mgmt at home. Pt admitted to sometimes forgetting if he had taken a medication or not and his solution was to take another day. OT strongly encouraged against it and educated on various side effects and their impacts. Pt verbalizes awareness of his choices and agrees to use of a pill box at home to decrease these incidents from occurring. OT educated that if he does not like to sort his meds then his wife can sort the pills (our recommendation regardless) and then he will be responsible for taking them. Pt in agreement to this. Medication cheat sheet given to the pt to take home along with a BP chart from [facility] educating on normal BP vs elevated. Pt appreciative of this. It is the final recommendation of this OT that pt has assistance w/ sorting and can then take the pills independently.   Cognition   Overall Cognitive Status Impaired   Arousal/Participation Alert;Cooperative   Attention Attends with cues to redirect   Orientation Level Oriented X4   Memory Decreased short term memory;Decreased recall of precautions   Following Commands Follows one step commands without difficulty   Assessment   Treatment Assessment Pt participated in skilled OT session with focus on medication mgmt. See flowsheet for details of session and current functional status. Pt is limited by impaired balance, decreased endurance, increased fall risk, pain, decreased activity tolerance, decreased cognition, and decreased strength and requires skilled OT services to increase independence and safety with ADL completion in prep for DC home. Plan to complete FT w/ wife tomorrow and cont w/ ADL/IADL retraining, endurance and UB strength training in upcoming  sessions.   Prognosis Good   Problem List Decreased strength;Decreased endurance;Impaired balance;Decreased mobility;Decreased coordination;Decreased cognition;Impaired judgement;Decreased safety awareness   Barriers to Discharge Inaccessible home environment;Decreased caregiver support   Plan   Treatment/Interventions ADL retraining;Functional transfer training;Therapeutic exercise;Endurance training;Cognitive reorientation;Patient/family training;Equipment eval/education;Compensatory technique education   Progress Progressing toward goals   OT Therapy Minutes   OT Time In 1300   OT Time Out 1442   OT Total Time (minutes) 102   OT Mode of treatment - Individual (minutes) 102   OT Mode of treatment - Concurrent (minutes) 0   OT Mode of treatment - Group (minutes) 0   OT Mode of treatment - Co-treat (minutes) 0   OT Mode of Treatment - Total time(minutes) 102 minutes   OT Cumulative Minutes 652   Therapy Time missed   Time missed? No

## 2025-06-12 PROBLEM — M25.512 LEFT SHOULDER PAIN: Status: RESOLVED | Noted: 2025-06-03 | Resolved: 2025-06-12

## 2025-06-12 LAB
ANION GAP SERPL CALCULATED.3IONS-SCNC: 5 MMOL/L (ref 4–13)
BASOPHILS # BLD AUTO: 0.08 THOUSANDS/ÂΜL (ref 0–0.1)
BASOPHILS NFR BLD AUTO: 1 % (ref 0–1)
BUN SERPL-MCNC: 19 MG/DL (ref 5–25)
CALCIUM SERPL-MCNC: 9.4 MG/DL (ref 8.4–10.2)
CHLORIDE SERPL-SCNC: 102 MMOL/L (ref 96–108)
CO2 SERPL-SCNC: 30 MMOL/L (ref 21–32)
CREAT SERPL-MCNC: 0.84 MG/DL (ref 0.6–1.3)
EOSINOPHIL # BLD AUTO: 0.16 THOUSAND/ÂΜL (ref 0–0.61)
EOSINOPHIL NFR BLD AUTO: 2 % (ref 0–6)
ERYTHROCYTE [DISTWIDTH] IN BLOOD BY AUTOMATED COUNT: 15.5 % (ref 11.6–15.1)
GFR SERPL CREATININE-BSD FRML MDRD: 86 ML/MIN/1.73SQ M
GLUCOSE SERPL-MCNC: 107 MG/DL (ref 65–140)
GLUCOSE SERPL-MCNC: 122 MG/DL (ref 65–140)
GLUCOSE SERPL-MCNC: 124 MG/DL (ref 65–140)
GLUCOSE SERPL-MCNC: 125 MG/DL (ref 65–140)
GLUCOSE SERPL-MCNC: 128 MG/DL (ref 65–140)
HCT VFR BLD AUTO: 37.3 % (ref 36.5–49.3)
HGB BLD-MCNC: 11.6 G/DL (ref 12–17)
IMM GRANULOCYTES # BLD AUTO: 0.04 THOUSAND/UL (ref 0–0.2)
IMM GRANULOCYTES NFR BLD AUTO: 1 % (ref 0–2)
LYMPHOCYTES # BLD AUTO: 1.77 THOUSANDS/ÂΜL (ref 0.6–4.47)
LYMPHOCYTES NFR BLD AUTO: 21 % (ref 14–44)
MCH RBC QN AUTO: 29.9 PG (ref 26.8–34.3)
MCHC RBC AUTO-ENTMCNC: 31.1 G/DL (ref 31.4–37.4)
MCV RBC AUTO: 96 FL (ref 82–98)
MONOCYTES # BLD AUTO: 0.88 THOUSAND/ÂΜL (ref 0.17–1.22)
MONOCYTES NFR BLD AUTO: 11 % (ref 4–12)
NEUTROPHILS # BLD AUTO: 5.49 THOUSANDS/ÂΜL (ref 1.85–7.62)
NEUTS SEG NFR BLD AUTO: 64 % (ref 43–75)
NRBC BLD AUTO-RTO: 0 /100 WBCS
PLATELET # BLD AUTO: 220 THOUSANDS/UL (ref 149–390)
PMV BLD AUTO: 12.9 FL (ref 8.9–12.7)
POTASSIUM SERPL-SCNC: 4.8 MMOL/L (ref 3.5–5.3)
RBC # BLD AUTO: 3.88 MILLION/UL (ref 3.88–5.62)
SODIUM SERPL-SCNC: 137 MMOL/L (ref 135–147)
WBC # BLD AUTO: 8.42 THOUSAND/UL (ref 4.31–10.16)

## 2025-06-12 PROCEDURE — 97530 THERAPEUTIC ACTIVITIES: CPT

## 2025-06-12 PROCEDURE — 85025 COMPLETE CBC W/AUTO DIFF WBC: CPT | Performed by: NURSE PRACTITIONER

## 2025-06-12 PROCEDURE — 80048 BASIC METABOLIC PNL TOTAL CA: CPT | Performed by: NURSE PRACTITIONER

## 2025-06-12 PROCEDURE — 99232 SBSQ HOSP IP/OBS MODERATE 35: CPT | Performed by: NURSE PRACTITIONER

## 2025-06-12 PROCEDURE — 99233 SBSQ HOSP IP/OBS HIGH 50: CPT | Performed by: STUDENT IN AN ORGANIZED HEALTH CARE EDUCATION/TRAINING PROGRAM

## 2025-06-12 PROCEDURE — 97112 NEUROMUSCULAR REEDUCATION: CPT

## 2025-06-12 PROCEDURE — 82247 BILIRUBIN TOTAL: CPT | Performed by: NURSE PRACTITIONER

## 2025-06-12 PROCEDURE — 82948 REAGENT STRIP/BLOOD GLUCOSE: CPT

## 2025-06-12 PROCEDURE — 84460 ALANINE AMINO (ALT) (SGPT): CPT | Performed by: NURSE PRACTITIONER

## 2025-06-12 PROCEDURE — 84450 TRANSFERASE (AST) (SGOT): CPT | Performed by: NURSE PRACTITIONER

## 2025-06-12 PROCEDURE — 97535 SELF CARE MNGMENT TRAINING: CPT

## 2025-06-12 PROCEDURE — 84075 ASSAY ALKALINE PHOSPHATASE: CPT | Performed by: NURSE PRACTITIONER

## 2025-06-12 RX ORDER — FERROUS SULFATE 325(65) MG
325 TABLET ORAL EVERY OTHER DAY
Status: CANCELLED
Start: 2025-06-12 | End: 2025-06-22

## 2025-06-12 RX ADMIN — ATENOLOL 50 MG: 50 TABLET ORAL at 08:25

## 2025-06-12 RX ADMIN — LOSARTAN POTASSIUM 50 MG: 50 TABLET, FILM COATED ORAL at 08:25

## 2025-06-12 RX ADMIN — DICLOFENAC SODIUM 2 G: 10 GEL TOPICAL at 20:01

## 2025-06-12 RX ADMIN — SENNOSIDES AND DOCUSATE SODIUM 1 TABLET: 50; 8.6 TABLET ORAL at 22:47

## 2025-06-12 RX ADMIN — POLYETHYLENE GLYCOL 3350 17 G: 17 POWDER, FOR SOLUTION ORAL at 08:25

## 2025-06-12 RX ADMIN — ENOXAPARIN SODIUM 30 MG: 30 INJECTION SUBCUTANEOUS at 20:05

## 2025-06-12 RX ADMIN — FERROUS SULFATE TAB 325 MG (65 MG ELEMENTAL FE) 325 MG: 325 (65 FE) TAB at 08:25

## 2025-06-12 RX ADMIN — ATORVASTATIN CALCIUM 10 MG: 10 TABLET, FILM COATED ORAL at 22:47

## 2025-06-12 RX ADMIN — ALLOPURINOL 300 MG: 300 TABLET ORAL at 08:25

## 2025-06-12 RX ADMIN — ENOXAPARIN SODIUM 30 MG: 30 INJECTION SUBCUTANEOUS at 08:25

## 2025-06-12 NOTE — ASSESSMENT & PLAN NOTE
HbA1C 6.2 on 5/24/25  Home:  Metformin 1000 mg BID  Here:  QID Accuchecks/SSI  BSs are currently   Will prob restart Metformin at discharge

## 2025-06-12 NOTE — TEAM CONFERENCE
Acute RehabilitationTeam Conference Note  Date: 6/12/2025   Time: 10:57 AM       Patient Name:  Deonte Greenfield       Medical Record Number: [id number]   YOB: 1950  Sex: Male          Room/Bed:  [facility]-01  Payor Info:  Payor: ANTWON  [facility]  REP / Product Type: Medicare PPO /      Admitting Diagnosis: Acute on chronic intracranial subdural hematoma (HCC) [I62.01, I62.03]   Admit Date/Time:  6/3/2025  3:27 PM  Admission Comments: No comment available     Primary Diagnosis:  Acute on chronic intracranial subdural hematoma (HCC)  Principal Problem: Acute on chronic intracranial subdural hematoma (HCC)    Patient Active Problem List    Diagnosis Date Noted    Bilateral leg edema 06/06/2025    Leg wound, left 06/06/2025    Gout 06/03/2025    Pancreatic cyst 06/03/2025    HTN (hypertension) 06/03/2025    CKD (chronic kidney disease) 06/03/2025    Superficial foreign body of left leg without major open wound and without infection 06/03/2025    Oropharyngeal dysphagia 06/03/2025    Left shoulder pain 06/03/2025    Acute CVA (cerebrovascular accident) ([facility]) 05/30/2025    Weight loss 05/28/2025    Frailty 05/28/2025    Acute pain 05/28/2025    Insomnia 05/28/2025    At risk for delirium 05/28/2025    Acute on chronic intracranial subdural hematoma (HCC) 05/24/2025    Leg swelling 05/24/2025    Severe obesity (BMI 35.0-39.9) with comorbidity ([facility]) 05/16/2025    Bradycardia, sinus 04/21/2025    Atrial septal aneurysm 04/09/2025    Normocytic anemia 04/09/2025    Syncope 04/07/2025    Thrombocytopenia ([facility]) 04/07/2025    Abnormal echocardiogram 04/07/2025    Groin pain, right 04/06/2025    Fall 04/05/2025    Hypocitraturia 01/18/2024    Neuropathy 09/12/2019    Hyperuricemia 06/03/2016    Mixed hyperlipidemia 06/03/2016    Onychomycosis 12/16/2015    PVD (peripheral vascular disease) ([facility]) 10/07/2015    POPPY (obstructive sleep apnea) 10/07/2015    Diabetes mellitus ([facility])  08/24/2015    S/P hip replacement 11/05/2014    Osteoarthritis 04/14/2008       Physical Therapy:    Weight Bearing Status: Full Weight Bearing  Transfers: Supervision  Bed Mobility: Supervision  Amulation Distance (ft): 150 feet  Ambulation: Supervision  Assistive Device for Ambulation: Roller Walker  Number of Stairs: 1  Assistive Device for Stairs: Lehft Hand Rail  Stair Assistance: Supervision  Discharge Recommendations: Home with:  DC Home with:: Family Support, Outpatient Physical Therapy    Patient making progress with skilled PT intervention. His discharge is scheduled for Monday with OPPT services. Family will be invited to come in for brief observation Friday as he states they will be unable to come in tomorrow. PT initiated HEP for continued strength and balance at home. These remain his biggest deficits impacting his safety with functional mobility. He requires DS with use of RW and CS with use of SPC. Patient states he feels more comfortable with use of RW and will likely chose to utilize this AD upon discharge. Please continue to work with LRAD to dec risk for falls.     Occupational Therapy:  Eating:  (setup)  Grooming: Supervision  Bathing: Supervision  Bathing: Supervision  Upper Body Dressing: Supervision  Lower Body Dressing: Minimal Assistance  Toileting: Supervision  Tub/Shower Transfer: Supervision  Toilet Transfer: Supervision  Cognition: Exceptions to WNL  Cognition: Decreased Memory, Decreased Attention  Orientation: Person, Place, Time, Situation  Discharge Recommendations: Home with:  DC Home with:: Family Support       06/04/2025     Pt is a 74 y.o. male admitted to [facility] on 5/24/2025 due to Acute on Chronic B/L SDH s/p Fall. Pt was transferred [facility] on 6/3/2025. Pt has PMH of Diabetes mellitus (HCC), History of kidney stones, hypertension, CKD stage 2, HLD, thrombocytopenia, gout, and bilateral KIANNA. See flowsheet for home set-up. Pt barriers  include decreased balance, decreased endurance, decreased activity tolerance, decreased strength, decreased range of motion, decreased judgment, decreased safety awareness, and decreased executive functioning. Decreased balance, endurance, activity tolerance, strength, and range of motion limit pt's participation in ADL tasks. Decreased executive functioning and judgment decrease pt's safety and participation during daily ADLs and IADLs. Pt has supportive family. Pt would benefit from skilled OT services for 2 weeks to focus on ADL retraining, functional transfer training, endurance training, balance training, strength training, increasing range of motion, and cognitive reorientation.     06/11/25: Pt is demonstrating good progress with occupational therapy and is progressing toward long term goals for ADL, IADL, and functional transfers/mobility. Pts long term goals for ADLs are Independent with LRAD. Pt is currently SUP for ADLs (requiring min A for LB dressing only). Pt continues to present with impairments in activity tolerance, endurance, standing balance/tolerance, UE strength, memory, safety , judgement , and attention . Occupational performance remains limited by risk for falls and home environment. Family training/education will be required prior to D/C. Pt will continue to benefit from skilled acute rehab OT services to address above mentioned barriers and maximize functional independence in baseline areas of occupation to meet established treatment goals with overall decreased burden of care. Plan of care to continue to focus on ADL Retraining , LB Dressing, UB dressing,  LHAE education/training, IADL training , Kitchen Mobilty, Meal preparation, Medication management , Functional Transfers, Functional Cognition, Functional Attention, Standing tolerance, Standing balance , DME training/education, Family training/education, Energy conservation training/education, healthy coping education, Leisure and  social pursuits, and community re-integration. Goals for the upcoming week are: primarily focus on IADL training and setup FT if necessary.    Anticipate DC date for 6/16 w/ OP PT only      Speech Therapy:  Mode of Communication: Verbal  Cognition: Exceptions to WNL  Cognition: Decreased Memory, Decreased Executive Functions  Orientation: Person, Place, Time, Situation  Swallowing: Within Defined Limits  Diet Recommendations: Regular Diet, Thin  Discharge Recommendations: Home with:  DC Home with:: Family Support  Pt completed skilled cognitive and beside swallow assessments.    In regards to swallow: Pt presenting with WFL oral and WFL pharyngeal swallow function. Recommend regular diet and thin liquids, set up as needed. No further follow up warranted.     In regards to cognition: Pt completed the CLQT+ on initial evaluation with a Composite Severity Rating score of 3.8 out of 4.0, correlating to overall WNL cognitive linguistic impairments at time of evaluation and in comparison to age matched peers ranging from 70-88 y/o. Pt scored at or above criterion cut score for 8 out of 10 tasks completed. Pt overall scoring WNL as compared to similar aged peers. However, pt demonstrating MILD cognitive deficits in executive functions and clock drawing. Discussed goals to maximize his skills- pt reported agreement to engaged in follow up cognitive therapy while on rehab unit. Pt also completed informal interview- pt orientated to place, situation and date. Lives at home with supportive family who can provided supervision and assistance. Pt was independent with meds but wife completed bills and household chores. Family can provide transportation. Reviewed rehab program and ELOS. Pt will cont to benefit from skilled SLP services targeting cognitive linguistic communication skills for increased independence and decreased burden of care.     Family training to be initiated in upcoming sessions. Pt suspected to meet goals of  supervision to mod I for overall cognitive linguistic communication skills.     This week, focus for continued services to target IADL tasks, functional cognitive tasks for sequencing, STM and working memory and executive functioning.    At this time, pt will continue to benefit from skilled cognitive linguistic tx session to maximize overall functional independence given overall cognitive linguistic skills in attempts to decreased caregiver burden over time.     Update from week: 6/11/2025 . Pt is being followed for cognitive linguistic tx sessions to where pt is making steady progress this week.     Continued cognitive barriers which present include: decreased attention, visual attention, ST memory recall , problem solving, reasoning, sequencing, organization of thoughts, and as well as executive functions, which still impacts pt's overall safety, functional cognitive communication skills as well as functional mobility. The following interventions are used to target these barriers, including verbal problem solving task, verbal working memory tasks, visual memory recall tasks, drawing conclusions activities, written sequencing tasks, verbal sequencing tasks, verbal reasoning tasks, higher level deductive reasoning activities, verbal review of current medications, written review of medications,  written health management tasks, verbal health management tasks, visual attention tasks, and family education/training.     Current level of functioning:   Comprehension: supervision  Expression: supervision  Social Interaction: mod I  Executive functions: min A  Memory: min A    Family training/education to be initiated in upcoming sessions as able. Pt is suspected to meet goals for supervision to mod I for overall cognitive linguistic communication skills. This week, focus for continued services to target IADL tasks, STM and working memory and executive functioning. At this time, pt will continue to benefit from skilled  cognitive linguistic tx session to maximize overall functional independence given overall cognitive linguistic skills in attempts to decreased caregiver burden over time.     Nursing Notes:  Appetite: Good  Diet Type: Diabetic                      Diet Patient/Family Education Complete: No                         Type of Wound Patient/Family Education: No  Bladder: Continent     Bladder Patient/Family Education: Yes  Bowel: Continent     Bowel Patient/Family Education: Yes  Pain Location/Orientation: Orientation: Right, Location: Leg  Pain Score: 0                       Hospital Pain Intervention(s): Ambulation/increased activity  Pain Patient/Family Education: Yes  Medication Management/Safety  Safe Administration: No  Reason for non-safe administration: will need eval  Medication Patient/Family Education Complete: No (ongoing)    Pt admitted s/p fall.  MRI of the brain that showed large bilateral SDH S/p bilateral [place] holes for evacuation of SDH 5/25/25 MMA was not able to be done due to anatomical considerations For no full AC/AP for 2 weeks = 6/11/25 then need to clear with NS Acute CVA-right superior cerebellum Was found on CTH 5/30, confirmed by MRI Neurology saw in consult for the CVA.  They recommended that the patient start ASA 81mg daily when cleared by Neurosurgery as above.   For LOOP recorder implanted as an outpatient.  He did have a previous Ziopatch done that was negative for atrial fibrillation.    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  Will need op f/u.   Diabetes mellitus- HbA1C 6.2 on 5/24/25 Home:  Metformin 1000 mg BID Here:  QID Accuchecks/SSI Will restart Metformin when needed/able HTN-Home:  Atenolol/chlorthalidone 50-25 mg qd/Diovan 320 mg qd Here:  Atenolol 50 mg qd/Losartan 25mg qd. Has Hydralazine 10 mg q8hrs prn SBP >165. Thrombocytopenia-Chronic Follows with H-O as OP Mixed  hyperlipidemia- on  Lipitor 10 mg qd as at home.   Gout-Allopurinol 300mg qd as at home CKD- Baseline 0.7-0.9.  Chronic L leg wound with local wound care every other day.  New L shoulder pain-PMR monitoring.  Wound care consulted for stage 2 sacral slit area.  Mepilex applied.  Pt is continent of bowel and bladder.  Pt requires alarms for safety.      This week we will encourage independence with ADLs.  We will monitor labs and vital signs.  We will educate pt/family about repositioning to prevent skin breakdown.  We will assist w repositioning and perform routine skin checks.  We will monitor for adequate pain control.  We will monitor for constipation and medicate pt as ordered. We will monitor incision/wound for healing and s/s of infection. We will provide pt/family with DM education as needed.We will increase safety awareness and keep pt free from falls.    Case Management:     Discharge Planning  Living Arrangements: Lives w/ Children, Lives w/ Spouse/significant other, Lives w/ Family members, Lives w/ Daughter, Lives w/ Son, Other (Comment) (grandchildren)  Support Systems: Family members, Spouse/significant other  Assistance Needed: TBD  Type of Current Residence: Other (Comment) (2 story home)  Current Home Care Services: No  Pt is participating well and making good progress. Dc is scheduled for next week for pt to return home w/outpt physical therapy. Cm following to arrange dc recommended services. Clinical update to be done 6/16 if pt is to not dc.     Is the patient actively participating in therapies? yes  List any modifications to the treatment plan:     Barriers Interventions   Balance righting reactions Ad training, family education   Le weakness Hep, therapy ex, use of nu step   Mild cog memory attention insight executive function I adl training, verbal reasoning tasks, family education, health mgmt, visual attention tasks             Is the patient making expected progress toward goals? yes  List  any update or changes to goals:     Medical Goals: Patient will be medically stable for discharge to [facility] upon completion of rehab program and Patient will be able to manage medical conditions and comorbid conditions with medications and follow up upon completion of rehab program    Weekly Team Goals:   Rehab Team Goals  ADL Team Goal: Patient will be independent with ADLs with least restrictive device upon completion of rehab program  Bowel/Bladder Team Goal: Patient will be independent with bladder/bowel management with least restrictive device upon completion of rehab program  Cognitive Team Goal: Patient will return to premorbid level of cognitive activity upon completion of rehab program    Discussion: pt presents with the above barriers and is making gains with stated interventions by the team. Pt continues to benefit from balance retraining and use of a device. Family would like pt to be able to participate with iadls as prior to April, but pt is limited with initiation and motivation. Pt is overall supervision with adls mobility and transfers. Pt is expected to reach goals of independent. Recommendations are for contd physical therapy     Anticipated Discharge Date:  6/16/25  [facility] Team Members Present:The following team members are supervising care for this patient and were present during this Weekly Team Conference.    Physician: Dr. Sandra DO  : Shelia Thakkar MSW  Registered Nurse: Rochelle Kwon RN  Physical Therapist: Tootie Dennison DPT  Occupational Therapist: Eli Cash MS, OTR/L  Speech Therapist: Marcy Odonnell MA, CCC-SLP

## 2025-06-12 NOTE — ASSESSMENT & PLAN NOTE
Lab Results   Component Value Date    HGBA1C 6.2 (H) 05/24/2025       Recent Labs     06/11/25  1547 06/11/25  2046 06/12/25  0544 06/12/25  1021   POCGLU 110 98 128 122       Blood Sugar Average: Last 72 hrs:  (P) [id number]  - Home med: Metformin 1000 mg BID --> restart per IM discretion  - continue SSI and accuchecks per IM

## 2025-06-12 NOTE — ASSESSMENT & PLAN NOTE
Originally on modified diet but now on regular and thins    - continue recommended diet of regular texture and thin liquids  - Aspiration precautions and compensatory swallowing strategies: upright posture, slow rate of feeding, small bites/sips, and alternating bites and sips   - SLP consulted

## 2025-06-12 NOTE — PROGRESS NOTES
"OT Daily Treatment Note     06/12/25 5716   Pain Assessment   Pain Assessment Tool 0-10   Pain Score 6   Pain Location/Orientation Orientation: Right;Location: Leg   Pain Onset/Description Descriptor: [facility] Pain Intervention(s) Emotional support;Shower/Bath   Restrictions/Precautions   Precautions Bed/chair alarms;Cognitive;Fall Risk;Pain;Supervision on toilet/commode   Weight Bearing Restrictions No   ROM Restrictions No   Lifestyle   Autonomy \"They switched my schedule.\"   Oral Hygiene   Type of Assistance Needed Supervision   Physical Assistance Level No physical assistance   Comment In stance at sink with RW.   Oral Hygiene CARE Score 4   Shower/Bathe Self   Type of Assistance Needed Supervision   Physical Assistance Level No physical assistance   Comment Shower. Pt able to wash 10/10 body parts with supervision. Able to stand with grab bars during majority of shower with no noted LOB. Pt utilized tub transfer bench for rest breaks, demonstrating good insight.   Shower/Bathe Self CARE Score 4   Upper Body Dressing   Type of Assistance Needed Supervision   Physical Assistance Level No physical assistance   Comment Seated in armed chair, pt able to don shirt over head and BUE with supervision.   Upper Body Dressing CARE Score 4   Lower Body Dressing   Type of Assistance Needed Supervision;Adaptive equipment   Physical Assistance Level No physical assistance   Comment Pt able to doff underwear and pants with reacher. Seated, able to don underwear over BLE with use of reacher. Pt able to don pants over BLE without AD. Able to stand with RW to don underwear and pants over bilateral hips.   Lower Body Dressing CARE Score 4   Putting On/Taking Off Footwear   Type of Assistance Needed Physical assistance   Physical Assistance Level 25% or less   Comment Seated, pt able to doff socks with LHAE. TA to don ace wraps for edema management. Pt able to use sock aid to don socks, but required min A to make " adjustments.   Putting On/Taking Off Footwear CARE Score 3   Sit to Stand   Type of Assistance Needed Supervision   Physical Assistance Level No physical assistance   Comment DS w/ RW   Sit to Stand CARE Score 4   Bed-Chair Transfer   Type of Assistance Needed Supervision   Physical Assistance Level No physical assistance   Comment DS w/ RW   Chair/Bed-to-Chair Transfer CARE Score 4   Cognition   Overall Cognitive Status Impaired   Arousal/Participation Alert;Cooperative   Attention Attends with cues to redirect   Orientation Level Oriented X4   Memory Decreased short term memory;Decreased recall of precautions   Following Commands Follows one step commands without difficulty   Comments Pt continues to demonstrate decreased judgment and safety awareness, requiring cues during ADL tasks.   Activity Tolerance   Activity Tolerance Patient tolerated treatment well   Assessment   Treatment Assessment Pt engaged in 90-minute skilled OT session with focus on ADL performance, functional transfer training, endurance, activity tolerance, dynamic standing balance, and compensatory strategy education. Pt is limited by decreased endurance, activity tolerance, balance, pain, judgment, and safety awareness, limiting participation in ADL tasks. Plan to continue ADL/IADL retraining, endurance and UB strengthening to increase independence and participation.   Prognosis Good   Problem List Decreased strength;Decreased endurance;Impaired balance;Decreased mobility;Decreased coordination;Decreased cognition;Impaired judgement;Decreased safety awareness   Barriers to Discharge Inaccessible home environment;Decreased caregiver support   Barriers to Discharge Comments Pt scheduled to d/c Mon., 6/16.   Plan   Treatment/Interventions ADL retraining;Functional transfer training;LE strengthening/ROM;Therapeutic exercise;Endurance training;Cognitive reorientation;Patient/family training;Compensatory technique education   Progress Progressing  toward goals   OT Therapy Minutes   OT Time In 0830   OT Time Out 1000   OT Total Time (minutes) 90   OT Mode of treatment - Individual (minutes) 90   OT Mode of treatment - Concurrent (minutes) 0   OT Mode of treatment - Group (minutes) 0   OT Mode of treatment - Co-treat (minutes) 0   OT Mode of Treatment - Total time(minutes) 90 minutes   OT Cumulative Minutes 742   Therapy Time missed   Time missed? No

## 2025-06-12 NOTE — ASSESSMENT & PLAN NOTE
"MRI of the brain that showed large bilateral SDH  S/p bilateral [place] holes for evacuation of SDH 5/25/25  MMA was not able to be done due to anatomical considerations  For no full AC/AP for 2 weeks = 6/9-11/25 then need to clear with NS  D/W NS 6/6/25 = followup CTH for 6/9/25 done = \"Stable to slightly decreased size of mixed density bilateral cerebral convexity subdural hematomas admixed with foci of air. No significant midline shift\"  NS saw him in f/u in R/T the CTH done 6/9.  They said repeat CTH on 6/15 and then will determine if cleared to start ASA 81mg qd (order is in the computer for 6/15 at 0700).  They also took out staples and sutures.  Per NS = wash incision QOD  "

## 2025-06-12 NOTE — CASE MANAGEMENT
Met w/pt and reviewed team update and confirmed dc for 6/16. Pt believes an appmt is made at [facility] for physical therapy but suggested cm follow up with his wife. Wife confirms an appmt is not yet made for pt. Cm phoned [facility] and scheduled appmt for 6/23 at 2 45. A second appmt was also made for 6/26 at 2 45.  Info placed on dc instructions.

## 2025-06-12 NOTE — PROGRESS NOTES
"Progress Note - Hospitalist   Name: Deonte Greenfield 74 y.o. male I MRN: [id number]  Unit/Bed#: -01 I Date of Admission: 6/3/2025   Date of Service: 6/12/2025 I Hospital Day: 9    Assessment & Plan  Acute on chronic intracranial subdural hematoma (HCC)  MRI of the brain that showed large bilateral SDH  S/p bilateral Yael holes for evacuation of SDH 5/25/25  MMA was not able to be done due to anatomical considerations  For no full AC/AP for 2 weeks = 6/9-11/25 then need to clear with NS  D/W NS 6/6/25 = followup CTH for 6/9/25 done = \"Stable to slightly decreased size of mixed density bilateral cerebral convexity subdural hematomas admixed with foci of air. No significant midline shift\"  NS saw him in f/u in R/T the CTH done 6/9.  They said repeat CTH on 6/15 and then will determine if cleared to start ASA 81mg qd (order is in the computer for 6/15 at 0700).  They also took out staples and sutures.  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 No significant stenosis of the cervical left internal carotid artery. Severe stenosis origin of the dominant left vertebral artery. Hypoplastic right vertebral artery. No high-grade intracranial stenosis, large vessel occlusion or aneurysm\"  Neurology saw in consult for the CVA.  They recommended that the patient start ASA 81mg daily when cleared by Neurosurgery as above.    For LOOP recorder implant as an outpatient.  He did have a previous Ziopatch done that was negative for atrial fibrillation.    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  Continue statin  Pancreatic cyst  Had an OP ultrasound done for elevated total bilirubin that showed cystic lesions " "in the pancreas for which further imaging was recommended  MRI done thereafter as an OP showed \"Numerous cystic lesions scattered throughout the pancreas with a dominant lesion measuring 18 mm in the body. Worrisome features include ductal dilation up to 6 mm in the tail the pancreas and abrupt change in caliber of the pancreatic duct. Management recommendation: Because of presence of multiple worrisome features, surgical consultation/management with consideration for EUS recommended.  Management and follow up recommendations for cystic pancreatic lesions are based on Institutional consensus and international evidence-based Kyoto guidelines for the management of intraductal papillary mucinous neoplasm of the pancreas. Pancreatology 24 (2024) 255-270\".  Per IMs note in the hospital:  \"Reached out to primary team and surgery oncall as imaging findings are suggestive of malignancy. According to surgery they recommend out patient follow up with surg onc for possible biopsy and out patient GI follow up. Surgery called the surg onc office to schedule the appointment for patient. I did a STAT referral for GI outpatient. Primary team will inform the patient regarding imaging findings and the referrals\".  Most recent CMP on 6/3/25 with normal total bilirubin/LFT  Diabetes mellitus (HCC)  HbA1C 6.2 on 5/24/25  Home:  Metformin 1000 mg BID  Here:  QID Accuchecks/SSI  BSs are currently   Will prob restart Metformin at discharge  HTN (hypertension)  Home:  Atenolol-chlorthalidone 50-25 mg qd/Diovan 320 mg qd  Here:  Atenolol 50 mg qd/Losartan 50 mg qd  Has Hydralazine 10 mg q8hrs prn SBP >165  BP a bit too high.   Increased Losartan to 50 mg qd to start 6/12/25   For the sake of simplicity for home 6/16, may be able to add back the chlorthalidone over the weekend and/or increase the Losartan to 100mg qd.  Losartan 50mg is equal to 80 mg of Valsartan so 100mg of Losartan would be = 160mg Valsartan.  Valsartan 320 mg cannot " be split in half.  Alternatively, could give him scripts for current meds for home but will see how he does.  Will continue to hold Chlorthalidone  Thrombocytopenia (HCC)  Chronic  Follows with H-O as OP  Platelet count was 223 on 6/6/25  CBC 6/12/25 = platelet count is 220  Mixed hyperlipidemia  Takes Lipitor 10 mg qd at home = continue here  Normocytic anemia  Continue Iron 325 mg QOD  Gout  Continue Allopurinol 300mg qd as at home  No recent flares  CKD (chronic kidney disease)  Baseline 0.7-0.9  Stable  Left shoulder pain  Per PMR  Bilateral leg edema  Says gets at home if sleeps in chair as opposed to bed and is L>R as is now  Continue to ACE wrap  Leg wound, left  WC saw in acute hospital stay  Continue local WC  Tx per PMR    The above assessment and plan was reviewed and updated as determined by my evaluation of the patient on 6/12/2025.    History of Present Illness   Patient seen and examined. Patients overnight issues or events were reviewed with nursing staff. New or overnight issues include the following:   No new or overnight issues.  Offers no complaints    Review of Systems   All other systems reviewed and are negative.      Objective :  Temp:  [97.6 °F (36.4 °C)-98.9 °F (37.2 °C)] 98.9 °F (37.2 °C)  HR:  [55-70] 60  BP: (119-165)/(69-72) 140/70  Resp:  [18] 18  SpO2:  [95 %-97 %] 95 %  O2 Device: None (Room air)    Invasive Devices       None                   Physical Exam  General Appearance: no distress, nontoxic appearing  HEENT:  External ear normal.  Nose normal w/o drainage. Mucous membranes are moist. Oropharynx is clear. Conjunctiva clear w/o icterus or redness.  Neck:  Supple, normal ROM  Lungs: BBS without crackles/wheeze/rhonchi; respirations unlabored with normal inspiratory/expiratory effort.  No retractions noted.  On RA  CV: regular rate and rhythm; no rubs/murmurs/gallops, PMI normal   ABD: Abdomen is soft.  Bowel sounds all quadrants.  Nontender with no distention.    EXT: + LE  edema L>R  Skin: normal turgor, normal texture  Psych: affect normal, mood normal  Neuro: AAO     The above physical exam was reviewed and updated as determined by my evaluation of the patient on 6/12/2025.      Lab Results: I have reviewed the following results:  Results from last 7 days   Lab Units 06/12/25  0612 06/06/25  1219   WBC Thousand/uL 8.42 7.24   HEMOGLOBIN g/dL 11.6* 11.0*   HEMATOCRIT % 37.3 35.1*   PLATELETS Thousands/uL 220 223     Results from last 7 days   Lab Units 06/12/25  0612 06/06/25  1219   SODIUM mmol/L 137 138   POTASSIUM mmol/L 4.8 4.2   CHLORIDE mmol/L 102 104   CO2 mmol/L 30 26   BUN mg/dL 19 24   CREATININE mg/dL 0.84 0.74   CALCIUM mg/dL 9.4 9.1             Results from last 7 days   Lab Units 06/12/25  0544 06/11/25  2046 06/11/25  1547   POC GLUCOSE mg/dl 128 98 110       Imaging Results Review: No pertinent imaging studies reviewed.  Other Study Results Review: No additional pertinent studies reviewed.    Review of Scheduled Meds: Medications  reviewed and reconciled as needed  Current Facility-Administered Medications   Medication Dose Route Frequency Provider Last Rate    acetaminophen  650 mg Oral Q6H PRN JOSE ALEJANDRO Bradley      allopurinol  300 mg Oral Daily JOSE ALEJANDRO Bradley      atenolol  50 mg Oral Daily JOSE ALEJANDRO Bradley      And    [Held by provider] chlorthalidone  25 mg Oral Daily JOSE ALEJANDRO Bradley      atorvastatin  10 mg Oral HS JOSE ALEJANDRO Bradley      Diclofenac Sodium  2 g Topical 4x Daily PRN JOSE ALEJANDRO Bradley      enoxaparin  30 mg Subcutaneous Q12H [facility] JOSE ALEJANDRO Bradley      ferrous sulfate  325 mg Oral Every Other Day JOSE ALEJANDRO Bradley      hydrALAZINE  10 mg Oral Q8H PRN JOSE ALEJANDRO Bradley      insulin lispro  1-5 Units Subcutaneous TID AC JOSE ALEJANDRO Bradley      insulin lispro  1-5 Units Subcutaneous HS JOSE ALEJANDRO Bradley      lidocaine   Topical 4x Daily  PRN JOSE ALEJANDRO Bradley      losartan  50 mg Oral Daily JOSE ALEJANDRO Bradley      polyethylene glycol  17 g Oral Daily JOSE ALEJANDRO Bradley      senna-docusate sodium  1 tablet Oral HS JOSE ALEJANDRO Bradley         VTE Pharmacologic Prophylaxis: Lovenox  Code Status: Level 1 - Full Code  Current Length of Stay: 9 day(s)    Administrative Statements     ** Please Note:  voice to text software may have been used in the creation of this document. Although proof errors in transcription or interpretation are a potential of such software**

## 2025-06-12 NOTE — PROGRESS NOTES
"OT daily treatment note       06/12/25 1230   Pain Assessment   Pain Assessment Tool 0-10   Pain Score No Pain   Restrictions/Precautions   Precautions Bed/chair alarms;Cognitive;Fall Risk;Supervision on toilet/commode   Lifestyle   Autonomy \"this one is trouble!\"   Cognition   Overall Cognitive Status Impaired   Arousal/Participation Alert;Cooperative   Attention Attends with cues to redirect   Orientation Level Oriented X4   Memory Decreased short term memory;Decreased recall of precautions   Following Commands Follows one step commands without difficulty   Activity Tolerance   Activity Tolerance Patient tolerated treatment well   Assessment   Treatment Assessment Session grossly focused on formalized family training with pt, spouse and son. OT educated on pts CLOF, DC recommendations and DME needs. Currently, pt is SUP for ADLs (will be IND by DC) and close supervision for IADL mgmt including med mgmt, meal prep, laundry and finance mgmt. OT then discussed barriers that are contributing to the pts CLOF which includes endurance, activity tolerance, forward functional reach, balance, cognitive impairments, decreased safety awareness, and decreased insight into deficits. Based on pts functional performance, DC recommendations are for IND within the home, SUP for showers, assistance w/ incisional care (provided family w/ supplies) and CS/assist with IADLS specifically w/ med mgmt and meal prep. OT then addressed DME needs which are shower chair and RW which pt already owns so no ordering was required. All questions were answered appropriately with no further questions or concerns at this time. Pt is set for DC home on Monday with OP PT. Plan to progress pt to IRP tomorrow.   Prognosis Good   Problem List Decreased strength;Decreased endurance;Impaired balance;Decreased mobility;Decreased coordination;Decreased cognition;Impaired judgement;Decreased safety awareness   Barriers to Discharge Inaccessible home " environment;Decreased caregiver support   Plan   Treatment/Interventions ADL retraining;Functional transfer training;Therapeutic exercise;Endurance training;Cognitive reorientation;Patient/family training;Equipment eval/education;Compensatory technique education   Progress Progressing toward goals   OT Therapy Minutes   OT Time In 1230   OT Time Out 1300   OT Total Time (minutes) 30   OT Mode of treatment - Individual (minutes) 30   OT Mode of treatment - Concurrent (minutes) 0   OT Mode of treatment - Group (minutes) 0   OT Mode of treatment - Co-treat (minutes) 0   OT Mode of Treatment - Total time(minutes) 30 minutes   OT Cumulative Minutes 772   Therapy Time missed   Time missed? No

## 2025-06-12 NOTE — ASSESSMENT & PLAN NOTE
Home:  Atenolol-chlorthalidone 50-25 mg qd/Diovan 320 mg qd  Here:  Atenolol 50 mg qd/Losartan 50 mg qd  Has Hydralazine 10 mg q8hrs prn SBP >165  BP a bit too high.   Increased Losartan to 50 mg qd to start 6/12/25   For the sake of simplicity for home 6/16, may be able to add back the chlorthalidone over the weekend and/or increase the Losartan to 100mg qd.  Losartan 50mg is equal to 80 mg of Valsartan so 100mg of Losartan would be = 160mg Valsartan.  Valsartan 320 mg cannot be split in half.  Alternatively, could give him scripts for current meds for home but will see how he does.  Will continue to hold Chlorthalidone

## 2025-06-12 NOTE — PROGRESS NOTES
"   06/12/25 1300   Pain Assessment   Pain Assessment Tool 0-10   Pain Score No Pain   Restrictions/Precautions   Precautions Fall Risk;Bed/chair alarms;Cognitive;Supervision on toilet/commode   General   Change In Medical/Functional Status (S)  Please progress tomorrow AM if appropriate for daytime IRP with RW   Subjective   Subjective Patient ready to participate in PT session   Roll Left and Right   Type of Assistance Needed Independent   Physical Assistance Level No physical assistance   Roll Left and Right CARE Score 6   Sit to Lying   Type of Assistance Needed Supervision   Physical Assistance Level No physical assistance   Comment patient able to get into and OOB on his R side (closest to door) set at 28\" high without use of railing with bed flat. Practiced 3x with getting his elbow underneath him first. PLEASE PRACTICE AGAIN TOMORROW   Sit to Lying CARE Score 4   Lying to Sitting on Side of Bed   Type of Assistance Needed Supervision   Physical Assistance Level No physical assistance   Comment patient able to get into and OOB on his R side (closest to door) set at 28\" high without use of railing with bed flat. Practiced 3x with getting his elbow underneath him first. PLEASE PRACTICE AGAIN TOMORROW   Lying to Sitting on Side of Bed CARE Score 4   Sit to Stand   Type of Assistance Needed Set-up / clean-up   Physical Assistance Level No physical assistance   Comment DS with RW   Sit to Stand CARE Score 5   Bed-Chair Transfer   Type of Assistance Needed Set-up / clean-up;Adaptive equipment   Physical Assistance Level No physical assistance   Comment DS with RW   Chair/Bed-to-Chair Transfer CARE Score 5   Car Transfer   Comment (S)  please complete next session; fmaily declined need to practice in/out of their personal car   Walk 10 Feet   Type of Assistance Needed Set-up / clean-up;Adaptive equipment   Physical Assistance Level No physical assistance   Comment RW   Walk 10 Feet CARE Score 5   Walk 50 Feet with " "Two Turns   Type of Assistance Needed Set-up / clean-up;Adaptive equipment   Physical Assistance Level No physical assistance   Comment RW   Walk 50 Feet with Two Turns CARE Score 5   Walk 150 Feet   Type of Assistance Needed Supervision;Adaptive equipment   Physical Assistance Level No physical assistance   Comment SPC   Walk 150 Feet CARE Score 4   Walking 10 Feet on Uneven Surfaces   Comment (S)  Please take outside next session in AM if able   Ambulation   Primary Mode of Locomotion Prior to Admission Walk   Distance Walked (feet) 125 ft  (x2 with RW)   Assist Device Roller Walker   Findings Patient to use RW at home initally. Recommending for safety and fall risk. Performed 150' with SPC at - plans to progress to SPC with OPPT   Does the patient walk? 2. Yes   Wheel 50 Feet with Two Turns   Reason if not Attempted Activity not applicable   Wheel 50 Feet with Two Turns CARE Score 9   Wheel 150 Feet   Reason if not Attempted Activity not applicable   Wheel 150 Feet CARE Score 9   Curb or Single Stair   Style negotiated Curb   Type of Assistance Needed Set-up / clean-up;Adaptive equipment   Physical Assistance Level No physical assistance   Comment RW on 8\" curb 2x   1 Step (Curb) CARE Score 5   Picking Up Object   Type of Assistance Needed Set-up / clean-up   Physical Assistance Level No physical assistance   Comment reacher   Picking Up Object CARE Score 5   Therapeutic Interventions   Strengthening (S)  PLEASE COMPLETE HEP IN PM SESSION TOMORROW   Neuromuscular Re-Education 150' with SPC walking with head turns (1x LOB needing Froy); 150' walking with SPC and carrying water mug in R hand   Other Reviewed with wife and son the following tips on \"How to Reduce Risk for Falls: 1. Patient should wear supportive footwear such as sneakers or gripped socks at all times. No bare foot or regular socks. 2. Use night lights throughout home for when the environment is dark to decrease risk of tripping on obstacles/items " on the floor. 3. Arrange the environment to reduce obstacles. Open concept is best.  Rearrange any cords, etc that may be a tripping hazard. Remove throw rugs. 4. If you have pets, ensure they will not be a tripping hazard, jump up onto patient, or hang near feet.   Other Comments   Comments (S)  Will place eval outcome measures below. Please complete tomorrow or this weekend.   Assessment   Treatment Assessment Patient engaged in PT treatment session focused on family observation and continued neuro re-ed. First portion of session completed with wife and son present. Education provided on recommendations. Patient to use RW initially upon discharge which they were in agreement with. We focused on bed mobility which he was able to complete without physical assistance. Please complete again next session. Tomorrow, please also work to progress patient to daytime IRP with RW. Notified wife of this progression and that he does not need physical assistance or S at home. Plan for tomorrow to ambualte outside in AM and complete HEP in PM. Otherwise PT to continue to focus on gait training with LRAD and neuro re-ed to improve fall risk and safety.   PT Therapy Minutes   PT Time In 1300   PT Time Out 1400   PT Total Time (minutes) 60   PT Mode of treatment - Individual (minutes) 60   PT Mode of treatment - Concurrent (minutes) 0   PT Mode of treatment - Group (minutes) 0   PT Mode of treatment - Co-treat (minutes) 0   PT Mode of Treatment - Total time(minutes) 60 minutes   PT Cumulative Minutes 630     Outcome Measure: Date:   6/4 Date:   TUG 32.48 seconds     Gait Speed 0.46 m/s

## 2025-06-12 NOTE — OCCUPATIONAL THERAPY NOTE
Occupational Therapy POC:      Barriers: cognition(memory and attention), endurance, activity tolerance, higher level balance     Plan: higher level balance retraining, endurance training, UB strengthening, LHAE for LB dressing, functional cog. PLEASE PROGRESS TO IRP TOMORROW     DC date: Monday 6/13 w/ OPPT        *please include this POC to your daily treatment note to allow for continuity of care. Adjust POC as necessary.*     Eli Cash OTR/L

## 2025-06-12 NOTE — ASSESSMENT & PLAN NOTE
Lab Results   Component Value Date    EGFR 86 06/12/2025    EGFR 90 06/06/2025    EGFR 94 06/03/2025    CREATININE 0.84 06/12/2025    CREATININE 0.74 06/06/2025    CREATININE 0.68 06/03/2025     - Adequate GFR while here, previously diagnosed with CKD 2  - has appointment to establish with nephrology on 7/21/25

## 2025-06-12 NOTE — PROGRESS NOTES
Progress Note - PMR   Name: Deonte Greenfield 74 y.o. male I MRN: [id number]  Unit/Bed#: -01 I Date of Admission: 6/3/2025   Date of Service: 6/12/2025 I Hospital Day: 9     Assessment & Plan  Acute on chronic intracranial subdural hematoma (HCC)  > Originally presented in early April due to unwitnessed fall and found to have small subdural hemorrhage which subsequently appeared to resolve upon repeat CT head imaging. Patient opted to have MRI performed in outpatient setting and MRI team sent him to ER for large bilateral high convexity SDHs with mass effect. Presented to ED on 5/24 where he received bilateral [place] holes with 4 subdural drains placed  > Drains removed on 5/27 and 5/28  > completed 7 days of Keppra for seizure prophylaxis  > CTH on 6/9/25 with stable evolution of SDH's and postoperative pneumocephalus. NSGY to follow up in 1 week although note that is still not cleared for aspirin use    Repeat CT head STAT if GCS declines more than 2 points in 1 hour  SBP < 160  INR < 1.4  Plt > 100  Hold all therapeutic doses of AC / AP therapy, he is not cleared to start asa per neurology recommendations for stroke management  Ok for dvt prophylaxis, continue lovenox  PT/OT  Diabetes mellitus (HCC)  Lab Results   Component Value Date    HGBA1C 6.2 (H) 05/24/2025       Recent Labs     06/11/25  1547 06/11/25  2046 06/12/25  0544 06/12/25  1021   POCGLU 110 98 128 122       Blood Sugar Average: Last 72 hrs:  (P) [id number]  - Home med: Metformin 1000 mg BID --> restart per IM discretion  - continue SSI and accuchecks per IM  Thrombocytopenia (HCC)  Baseline 100-250  Trend biweekly  Goal > 100  OP follow up with heme onc  Mixed hyperlipidemia  - Continue statin  - Last LDL: 62 on 5/31/2025  Normocytic anemia  - baseline 12ish, now ranging at 10-11 likely in the setting of blood loss  - Continue iron supplementation  Acute CVA (cerebrovascular accident) (HCC)  --MRI brain 5/24:  New, large bilateral  high convexity subdural hematomas, right greater than left, which are predominantly subacute in timing, with mass effect on the bilateral cerebral hemispheres, and effacement of the sulci. No significant midline shift noted. FLAIR signal hyperintensity along the sulci of the right frontal and parietal convexities, which may represent trace associated subarachnoid hemorrhage. No acute infarct. Mild chronic white matter microangiopathic changes involving both cerebral hemispheres.  --CT head 5/30: Acute appearing infarct in right superior cerebellum. Increase in volume of hyperdense blood products bilaterally  --Echo 4/2025: EF 65%. Normal systolic function. Grade 1 diastolic relaxation. 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   LDL 62  A1c 6.2  TTE: 65%     Given new stroke on MRI with concurrent SDH, patient will need ASA when cleared by NSGY after repeat CTH. Given recent negative ziopatch in April 2025, will need loop recorder outpatient.      Lipitor 10 mg qHS  Normotension  Start ASA 81 mg when cleared by NSGY for AP/AC, CTH stable  Outpatient loop recorder  PT/OT  SLP to evaluate language and cognition s/p cerebral infarction  Gout  - continue Allopurinol  Pancreatic cyst  > Patient had MRI abdomen completed outpatient (5/22, d/t jaundice & thrombocytopenia), with numerous pancreatic cystic lesions concerning for malignancy. This showed numerous cystic lesions scattered throughout the pancreas that were concerning for malignancy at this time.   >Patient's family was informed of these and are aware that following discharge from acute rehab they will need to follow-up with the surgical oncology team as well as GI for further evaluation and treatment of these.  Patient and spouse both aware.    - Surg Onc & GI referrals placed by trauma surgery during acute hospitalization discharge  HTN (hypertension)  Home meds:  atenolol-chlorthalidone 50-25, valsartan 320  Here: atenolol 50 mg, losartan 25 mg  - continue titration for normotension  CKD (chronic kidney disease)  Lab Results   Component Value Date    EGFR 86 06/12/2025    EGFR 90 06/06/2025    EGFR 94 06/03/2025    CREATININE 0.84 06/12/2025    CREATININE 0.74 06/06/2025    CREATININE 0.68 06/03/2025     - Adequate GFR while here, previously diagnosed with CKD 2  - has appointment to establish with nephrology on 7/21/25  Superficial foreign body of left leg without major open wound and without infection  - Wound care consulted while in acute care  - Left Pretibial leg wound: cleanse left lower leg wound with soap and water, pat dry, apply xeroform to wound bed and cover with foam dressing. Change every other day.   Oropharyngeal dysphagia  Originally on modified diet but now on regular and thins    - continue recommended diet of regular texture and thin liquids  - Aspiration precautions and compensatory swallowing strategies: upright posture, slow rate of feeding, small bites/sips, and alternating bites and sips   - SLP consulted  Left shoulder pain (Resolved: 6/12/2025)  - new pain for patient  - pain at AC joint, but could not provoke pain other than on palpation  - Differential includes AC arthritis, glenohumeral arthritis, MSK strain, rotator cuff tendinopahty  - Doesn't seem like needs K-tape as no sulcus sign  - PT/OT  - XR if hindering therapies  Bilateral leg edema  Ace wrap  Leg wound, left  Seen by wound care today    Subjective   Patient is a 74-year-old male with history of type 2 diabetes, stage II CKD, hyperlipidemia, hypertension presenting to [facility] in April with an unwitnessed fall and several hemorrhages that seem to be improving on his subsequent imaging however had a decline and an MRI was completed showing large bilateral high convexity subdural hematomas with recommendation to go to the ER where he was evaluated and MMA intervention was not amenable  and he was started on Keppra for seizure prophylaxis and eventually had a bur hole evacuation of subdural drain placement bilaterally. Course copied by acute right superior cerebellar stroke. Plan initiate aspirin when cleared by neurosurgery in an outpatient loop recorder     Chief Complaint: f/u TBI and f/u ambulatory dysfunction    Interval: No acute events overnight. Seen this afternoon at bedside. Slept well, feeling well. Looking forward to going home on Monday. Last BM 6/12    Discussed patient in teams conference today and have determined that patient has ADD of 6/16    He has no acute concerns today.  His right hip and right knee pain are stable.  He denies incisional pain, fever, chills, shortness of breath, chest pain.    Objective :  Temp:  [97.6 °F (36.4 °C)-98.9 °F (37.2 °C)] 98.9 °F (37.2 °C)  HR:  [55-60] 60  BP: (119-165)/(69-72) 140/70  Resp:  [18] 18  SpO2:  [95 %-97 %] 95 %  O2 Device: None (Room air)    Functional Update:  Physical Therapy Occupational Therapy Speech Therapy   Weight Bearing Status: Full Weight Bearing  Transfers: Supervision  Bed Mobility: Supervision  Amulation Distance (ft): 150 feet  Ambulation: Supervision  Assistive Device for Ambulation: Roller Walker  Number of Stairs: 1  Assistive Device for Stairs: Lehft Hand Rail  Stair Assistance: Supervision  Discharge Recommendations: Home with:  DC Home with:: Family Support, Outpatient Physical Therapy   Eating:  (setup)  Grooming: Supervision  Bathing: Supervision  Bathing: Supervision  Upper Body Dressing: Supervision  Lower Body Dressing: Minimal Assistance  Toileting: Supervision  Tub/Shower Transfer: Supervision  Toilet Transfer: Supervision  Cognition: Exceptions to WNL  Cognition: Decreased Memory, Decreased Attention  Orientation: Person, Place, Time, Situation   Mode of Communication: Verbal  Cognition: Exceptions to WNL  Cognition: Decreased Memory, Decreased Executive Functions  Orientation: Person, Place, Time,  Situation  Swallowing: Within Defined Limits  Diet Recommendations: Regular Diet, Thin  Discharge Recommendations: Home with:  DC Home with:: Family Support         Gen: No acute distress, Well-nourished, well-appearing.  HEENT: Moist mucus membranes, cranial incision C/D/I  Cardiovascular: Regular rate, rhythm, S1/S2. Distal pulses palpable  Heme/Extr: 1+ lower extremity pitting edema  Pulmonary: Non-labored breathing. Lungs CTAB  : No qureshi  GI: Soft, non-tender, non-distended. BS+  MSK: ROM is WFL in all extremities. No effusions or deformities. Bulk is symmetric. See below for MMT scores.   Integumentary: Skin is warm, dry. Left pretibial ulcer  Neuro: AAOx3, CN 2-12 grossly intact. Sensation intact to light touch throughout. Speech is intact. Appropriate to questioning. Tone is normal.   Psych: Normal mood and affect.      Scheduled Meds:  Current Facility-Administered Medications   Medication Dose Route Frequency Provider Last Rate    acetaminophen  650 mg Oral Q6H PRN JOSE ALEJANDRO Bradley      allopurinol  300 mg Oral Daily JOSE ALEJANDRO Bradley      atenolol  50 mg Oral Daily JOSE ALEJANDRO Bradley      And    [Held by provider] chlorthalidone  25 mg Oral Daily JOSE ALEJANDRO Bradley      atorvastatin  10 mg Oral HS JOSE ALEJANDRO Bradley      Diclofenac Sodium  2 g Topical 4x Daily PRN JOSE ALEJANDRO Bradley      enoxaparin  30 mg Subcutaneous Q12H [facility] JOSE ALEJANDRO Bradley      ferrous sulfate  325 mg Oral Every Other Day JOSE ALEJANDRO Bradley      hydrALAZINE  10 mg Oral Q8H PRN JOSE ALEJANDRO Bradley      insulin lispro  1-5 Units Subcutaneous TID AC JOSE ALEJANDRO Bradley      insulin lispro  1-5 Units Subcutaneous HS JOSE ALEJANDRO Bradley      lidocaine   Topical 4x Daily PRN JOSE ALEJANDRO Bradley      losartan  50 mg Oral Daily JOSE ALEJANDRO Bradley      polyethylene glycol  17 g Oral Daily JOSE ALEJANDRO Bradley       senna-docusate sodium  1 tablet Oral HS JOSE ALEJANDRO Bradley           Lab Results: I have reviewed the following results:  Results from last 7 days   Lab Units 06/12/25  0612 06/06/25  1219   HEMOGLOBIN g/dL 11.6* 11.0*   HEMATOCRIT % 37.3 35.1*   WBC Thousand/uL 8.42 7.24   PLATELETS Thousands/uL 220 223     Results from last 7 days   Lab Units 06/12/25  0612 06/06/25  1219   BUN mg/dL 19 24   SODIUM mmol/L 137 138   POTASSIUM mmol/L 4.8 4.2   CHLORIDE mmol/L 102 104   CREATININE mg/dL 0.84 0.74              Martinez Ramirez MD  PGY-2 Resident Physician  Physical Medicine and Rehabilitation

## 2025-06-12 NOTE — ASSESSMENT & PLAN NOTE
Chronic  Follows with H-O as OP  Platelet count was 223 on 6/6/25  CBC 6/12/25 = platelet count is 220

## 2025-06-13 ENCOUNTER — TELEPHONE (OUTPATIENT)
Age: 75
End: 2025-06-13

## 2025-06-13 LAB
ALP SERPL-CCNC: 123 U/L (ref 34–104)
ALT SERPL W P-5'-P-CCNC: 19 U/L (ref 7–52)
AST SERPL W P-5'-P-CCNC: 18 U/L (ref 13–39)
BILIRUB SERPL-MCNC: 0.77 MG/DL (ref 0.2–1)
GLUCOSE SERPL-MCNC: 129 MG/DL (ref 65–140)
GLUCOSE SERPL-MCNC: 162 MG/DL (ref 65–140)
GLUCOSE SERPL-MCNC: 82 MG/DL (ref 65–140)
GLUCOSE SERPL-MCNC: 98 MG/DL (ref 65–140)

## 2025-06-13 PROCEDURE — 97130 THER IVNTJ EA ADDL 15 MIN: CPT

## 2025-06-13 PROCEDURE — 97110 THERAPEUTIC EXERCISES: CPT

## 2025-06-13 PROCEDURE — 99232 SBSQ HOSP IP/OBS MODERATE 35: CPT | Performed by: STUDENT IN AN ORGANIZED HEALTH CARE EDUCATION/TRAINING PROGRAM

## 2025-06-13 PROCEDURE — 97535 SELF CARE MNGMENT TRAINING: CPT

## 2025-06-13 PROCEDURE — 97129 THER IVNTJ 1ST 15 MIN: CPT

## 2025-06-13 PROCEDURE — 97530 THERAPEUTIC ACTIVITIES: CPT

## 2025-06-13 PROCEDURE — 97116 GAIT TRAINING THERAPY: CPT

## 2025-06-13 PROCEDURE — 99231 SBSQ HOSP IP/OBS SF/LOW 25: CPT | Performed by: PHYSICIAN ASSISTANT

## 2025-06-13 PROCEDURE — 82948 REAGENT STRIP/BLOOD GLUCOSE: CPT

## 2025-06-13 RX ADMIN — DICLOFENAC SODIUM 2 G: 10 GEL TOPICAL at 19:58

## 2025-06-13 RX ADMIN — POLYETHYLENE GLYCOL 3350 17 G: 17 POWDER, FOR SOLUTION ORAL at 08:18

## 2025-06-13 RX ADMIN — ATORVASTATIN CALCIUM 10 MG: 10 TABLET, FILM COATED ORAL at 21:19

## 2025-06-13 RX ADMIN — LOSARTAN POTASSIUM 50 MG: 50 TABLET, FILM COATED ORAL at 08:18

## 2025-06-13 RX ADMIN — ATENOLOL 50 MG: 50 TABLET ORAL at 08:18

## 2025-06-13 RX ADMIN — SENNOSIDES AND DOCUSATE SODIUM 1 TABLET: 50; 8.6 TABLET ORAL at 21:19

## 2025-06-13 RX ADMIN — ENOXAPARIN SODIUM 30 MG: 30 INJECTION SUBCUTANEOUS at 08:18

## 2025-06-13 RX ADMIN — ALLOPURINOL 300 MG: 300 TABLET ORAL at 08:18

## 2025-06-13 RX ADMIN — INSULIN LISPRO 1 UNITS: 100 INJECTION, SOLUTION INTRAVENOUS; SUBCUTANEOUS at 21:49

## 2025-06-13 RX ADMIN — ENOXAPARIN SODIUM 30 MG: 30 INJECTION SUBCUTANEOUS at 21:19

## 2025-06-13 NOTE — ASSESSMENT & PLAN NOTE
> Originally presented in early April due to unwitnessed fall and found to have small subdural hemorrhage which subsequently appeared to resolve upon repeat CT head imaging. Patient opted to have MRI performed in outpatient setting and MRI team sent him to ER for large bilateral high convexity SDHs with mass effect. Presented to ED on 5/24 where he received bilateral [place] holes with 4 subdural drains placed  > Drains removed on 5/27 and 5/28  > completed 7 days of Keppra for seizure prophylaxis  > CTH on 6/9/25 with stable evolution of SDH's and postoperative pneumocephalus. NSGY to follow up in 1 week although note that is still not cleared for aspirin use    Repeat CT head STAT if GCS declines more than 2 points in 1 hour  SBP < 160  Plt > 100  Hold all therapeutic doses of AC / AP therapy, he is not cleared to start asa per neurology recommendations for stroke management  NSGY ordered CT for 6/15 in house  Ok for dvt prophylaxis, continue lovenox  PT/OT

## 2025-06-13 NOTE — ASSESSMENT & PLAN NOTE
"MRI of the brain that showed large bilateral SDH  S/p bilateral [place] holes for evacuation of SDH 5/25/25  MMA was not able to be done due to anatomical considerations  For no full AC/AP for 2 weeks = 6/9-11/25 then need to clear with NS  D/W NS 6/6/25 = followup CTH for 6/9/25 done = \"Stable to slightly decreased size of mixed density bilateral cerebral convexity subdural hematomas admixed with foci of air. No significant midline shift\"  NS saw him in f/u in R/T the CTH done 6/9.  They said repeat CTH on 6/15 and then will determine if cleared to start ASA 81mg qd (order is in the computer for 6/15 at 0700).  They also took out staples and sutures.  Per NS = wash incision QOD  "

## 2025-06-13 NOTE — PROGRESS NOTES
Progress Note - PMR   Name: Deonte Greenfield 74 y.o. male I MRN: [id number]  Unit/Bed#: -01 I Date of Admission: 6/3/2025   Date of Service: 6/13/2025 I Hospital Day: 10     Assessment & Plan  Acute on chronic intracranial subdural hematoma (HCC)  > Originally presented in early April due to unwitnessed fall and found to have small subdural hemorrhage which subsequently appeared to resolve upon repeat CT head imaging. Patient opted to have MRI performed in outpatient setting and MRI team sent him to ER for large bilateral high convexity SDHs with mass effect. Presented to ED on 5/24 where he received bilateral [place] holes with 4 subdural drains placed  > Drains removed on 5/27 and 5/28  > completed 7 days of Keppra for seizure prophylaxis  > CTH on 6/9/25 with stable evolution of SDH's and postoperative pneumocephalus. NSGY to follow up in 1 week although note that is still not cleared for aspirin use    Repeat CT head STAT if GCS declines more than 2 points in 1 hour  SBP < 160  Plt > 100  Hold all therapeutic doses of AC / AP therapy, he is not cleared to start asa per neurology recommendations for stroke management  NSGY ordered CT for 6/15 in house  Ok for dvt prophylaxis, continue lovenox  PT/OT  Diabetes mellitus (HCC)  Lab Results   Component Value Date    HGBA1C 6.2 (H) 05/24/2025       Recent Labs     06/12/25  1021 06/12/25  1548 06/12/25  2053 06/13/25  0609   POCGLU 122 107 124 98       Blood Sugar Average: Last 72 hrs:  (P) 115.4138374455711243  - Home med: Metformin 1000 mg BID --> restart per IM discretion  - continue SSI and accuchecks per IM  Thrombocytopenia (HCC)  Baseline 100-250  Trend biweekly  Goal > 100  OP follow up with heme onc  Mixed hyperlipidemia  - Continue statin  - Last LDL: 62 on 5/31/2025  Normocytic anemia  - baseline 12ish, now ranging at 10-11 likely in the setting of blood loss  - Continue iron supplementation  Acute CVA (cerebrovascular accident) (HCC)  --MRI brain 5/24:   New, large bilateral high convexity subdural hematomas, right greater than left, which are predominantly subacute in timing, with mass effect on the bilateral cerebral hemispheres, and effacement of the sulci. No significant midline shift noted. FLAIR signal hyperintensity along the sulci of the right frontal and parietal convexities, which may represent trace associated subarachnoid hemorrhage. No acute infarct. Mild chronic white matter microangiopathic changes involving both cerebral hemispheres.  --CT head 5/30: Acute appearing infarct in right superior cerebellum. Increase in volume of hyperdense blood products bilaterally  --Echo 4/2025: EF 65%. Normal systolic function. Grade 1 diastolic relaxation. 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   LDL 62  A1c 6.2  TTE: 65%     Given new stroke on MRI with concurrent SDH, patient will need ASA when cleared by NSGY after repeat CTH. Given recent negative ziopatch in April 2025, will need loop recorder outpatient.      Lipitor 10 mg qHS  Normotension  Start ASA 81 mg when cleared by NSGY   Outpatient loop recorder  PT/OT  SLP to continue language and cognition training/evaluation  Gout  - continue Allopurinol  Pancreatic cyst  > Patient had MRI abdomen completed outpatient (5/22, d/t jaundice & thrombocytopenia), with numerous pancreatic cystic lesions concerning for malignancy. This showed numerous cystic lesions scattered throughout the pancreas that were concerning for malignancy at this time.   >Patient's family was informed of these and are aware that following discharge from acute rehab they will need to follow-up with the surgical oncology team as well as GI for further evaluation and treatment of these.  Patient and spouse both aware.    - Surg Onc & GI referrals placed by trauma surgery during acute hospitalization discharge  HTN (hypertension)  Home meds:  atenolol-chlorthalidone 50-25, valsartan 320  Here: atenolol 50 mg, losartan 25 mg  - continue titration for normotension  CKD (chronic kidney disease)  Lab Results   Component Value Date    EGFR 86 06/12/2025    EGFR 90 06/06/2025    EGFR 94 06/03/2025    CREATININE 0.84 06/12/2025    CREATININE 0.74 06/06/2025    CREATININE 0.68 06/03/2025     - Adequate GFR while here, previously diagnosed with CKD 2  - has appointment to establish with nephrology on 7/21/25  Superficial foreign body of left leg without major open wound and without infection  - Wound care consulted while in acute care  - Left Pretibial leg wound: cleanse left lower leg wound with soap and water, pat dry, apply xeroform to wound bed and cover with foam dressing. Change every other day.   Oropharyngeal dysphagia  Originally on modified diet but now on regular and thins    - continue recommended diet of regular texture and thin liquids  - Aspiration precautions and compensatory swallowing strategies: upright posture, slow rate of feeding, small bites/sips, and alternating bites and sips   - SLP consulted  Bilateral leg edema  Ace wrap  Leg wound, left  Seen by wound care today    Subjective   Patient is a 74-year-old male with history of type 2 diabetes, stage II CKD, hyperlipidemia, hypertension presenting to [facility] in April with an unwitnessed fall and several hemorrhages that seem to be improving on his subsequent imaging however had a decline and an MRI was completed showing large bilateral high convexity subdural hematomas with recommendation to go to the ER where he was evaluated and MMA intervention was not amenable and he was started on Keppra for seizure prophylaxis and eventually had a bur hole evacuation of subdural drain placement bilaterally. Course copied by acute right superior cerebellar stroke. Plan initiate aspirin when cleared by neurosurgery in an outpatient loop recorder     Chief Complaint: f/u TBI and f/u ambulatory  dysfunction    Interval: No acute events overnight. Seen this morning at bedside. Slept well, feeling well. Looking forward to therapy and going home on Monday. Last BM 6/13    No acute concerns from patient.  He is looking forward to going outside with therapy at the time of our assessment, and is also excited to get in room privileges with a rolling walker today.  He notes that he will still ask for help when going to the bathroom    Objective :  Temp:  [97.8 °F (36.6 °C)-98.1 °F (36.7 °C)] 98 °F (36.7 °C)  HR:  [56-59] 59  BP: (117-144)/(55-65) 137/60  Resp:  [17-18] 17  SpO2:  [96 %-98 %] 98 %  O2 Device: None (Room air)    Functional Update:  Physical Therapy Occupational Therapy Speech Therapy   Weight Bearing Status: Full Weight Bearing  Transfers: Supervision  Bed Mobility: Supervision  Amulation Distance (ft): 150 feet  Ambulation: Supervision  Assistive Device for Ambulation: Roller Walker  Number of Stairs: 1  Assistive Device for Stairs: Lehft Hand Rail  Stair Assistance: Supervision  Discharge Recommendations: Home with:  DC Home with:: Family Support, Outpatient Physical Therapy   Eating:  (setup)  Grooming: Supervision  Bathing: Supervision  Bathing: Supervision  Upper Body Dressing: Supervision  Lower Body Dressing: Minimal Assistance  Toileting: Supervision  Tub/Shower Transfer: Supervision  Toilet Transfer: Supervision  Cognition: Exceptions to WNL  Cognition: Decreased Memory, Decreased Attention  Orientation: Person, Place, Time, Situation   Mode of Communication: Verbal  Cognition: Exceptions to WNL  Cognition: Decreased Memory, Decreased Executive Functions  Orientation: Person, Place, Time, Situation  Swallowing: Within Defined Limits  Diet Recommendations: Regular Diet, Thin  Discharge Recommendations: Home with:  DC Home with:: Family Support       Physical Exam  Vitals and nursing note reviewed.   Constitutional:       General: He is not in acute distress.     Appearance: He is  well-developed.      Comments: Pleasant   HENT:      Head:      Comments: Cranial incision clean, dry, intact     Right Ear: External ear normal.      Left Ear: External ear normal.     Eyes:      Conjunctiva/sclera: Conjunctivae normal.       Cardiovascular:      Rate and Rhythm: Normal rate and regular rhythm.      Heart sounds: No murmur heard.  Pulmonary:      Effort: Pulmonary effort is normal. No respiratory distress.      Breath sounds: Normal breath sounds.   Abdominal:      Palpations: Abdomen is soft.      Tenderness: There is no abdominal tenderness.     Musculoskeletal:         General: No swelling.      Cervical back: Neck supple.      Right lower leg: Edema present.      Left lower leg: Edema present.     Skin:     General: Skin is warm and dry.      Capillary Refill: Capillary refill takes less than 2 seconds.     Neurological:      General: No focal deficit present.      Mental Status: He is alert. Mental status is at baseline.     Psychiatric:         Mood and Affect: Mood normal.          Scheduled Meds:  Current Facility-Administered Medications   Medication Dose Route Frequency Provider Last Rate    acetaminophen  650 mg Oral Q6H PRN JOSE ALEJANDRO Bradley      allopurinol  300 mg Oral Daily JOSE ALEJANDRO Bardley      atenolol  50 mg Oral Daily JOSE ALEJANDRO Bradley      And    [Held by provider] chlorthalidone  25 mg Oral Daily JOSE ALEJANDRO Bradley      atorvastatin  10 mg Oral HS JOSE ALEJANDRO Bradley      Diclofenac Sodium  2 g Topical 4x Daily PRN JOSE ALEJANDRO Bradley      enoxaparin  30 mg Subcutaneous Q12H [facility] JOSE ALEJANDRO Bradley      ferrous sulfate  325 mg Oral Every Other Day JOSE ALEJANDRO Bradley      hydrALAZINE  10 mg Oral Q8H PRN JOSE ALEJANDRO Bradley      insulin lispro  1-5 Units Subcutaneous TID AC JOSE ALEJANDRO Bradley      insulin lispro  1-5 Units Subcutaneous HS JOSE ALEJANDRO Bradley      lidocaine   Topical  4x Daily PRN JOSE ALEJANDRO Bradley      losartan  50 mg Oral Daily JOSE ALEJANDRO Bradley      polyethylene glycol  17 g Oral Daily JOSE ALEJANDRO Bradley      senna-docusate sodium  1 tablet Oral HS JOSE ALEJANDRO Bradley           Lab Results: I have reviewed the following results:  Results from last 7 days   Lab Units 06/12/25  0612 06/06/25  1219   HEMOGLOBIN g/dL 11.6* 11.0*   HEMATOCRIT % 37.3 35.1*   WBC Thousand/uL 8.42 7.24   PLATELETS Thousands/uL 220 223     Results from last 7 days   Lab Units 06/12/25  0612 06/06/25  1219   BUN mg/dL 19 24   SODIUM mmol/L 137 138   POTASSIUM mmol/L 4.8 4.2   CHLORIDE mmol/L 102 104   CREATININE mg/dL 0.84 0.74   AST U/L 18  --    ALT U/L 19  --               Martinez Ramirez MD  PGY-2 Resident Physician  Physical Medicine and Rehabilitation

## 2025-06-13 NOTE — ASSESSMENT & PLAN NOTE
--MRI brain 5/24:  New, large bilateral high convexity subdural hematomas, right greater than left, which are predominantly subacute in timing, with mass effect on the bilateral cerebral hemispheres, and effacement of the sulci. No significant midline shift noted. FLAIR signal hyperintensity along the sulci of the right frontal and parietal convexities, which may represent trace associated subarachnoid hemorrhage. No acute infarct. Mild chronic white matter microangiopathic changes involving both cerebral hemispheres.  --CT head 5/30: Acute appearing infarct in right superior cerebellum. Increase in volume of hyperdense blood products bilaterally  --Echo 4/2025: EF 65%. Normal systolic function. Grade 1 diastolic relaxation. 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   LDL 62  A1c 6.2  TTE: 65%     Given new stroke on MRI with concurrent SDH, patient will need ASA when cleared by NSGY after repeat CTH. Given recent negative ziopatch in April 2025, will need loop recorder outpatient.      Lipitor 10 mg qHS  Normotension  Start ASA 81 mg when cleared by NSGY   Outpatient loop recorder  PT/OT  SLP to continue language and cognition training/evaluation

## 2025-06-13 NOTE — ASSESSMENT & PLAN NOTE
Home:  Atenolol-chlorthalidone 50-25 mg qd/Diovan 320 mg qd  Here:  Atenolol 50 mg qd/Losartan 50 mg qd  Has Hydralazine 10 mg q8hrs prn SBP >165  BP was bit too high but much better for the past 48 hours  Increased Losartan to 50 mg qd to start 6/12/25   For the sake of simplicity for home 6/16, may be able to add back the chlorthalidone over the weekend and/or increase the Losartan to 100mg qd.  Losartan 50mg is equal to 80 mg of Valsartan so 100mg of Losartan would be = 160mg Valsartan.  Valsartan 320 mg cannot be split in half.  Alternatively, could give him scripts for current meds for home but will see how he does.  Will continue to hold Chlorthalidone  No changes today

## 2025-06-13 NOTE — ASSESSMENT & PLAN NOTE
Lab Results   Component Value Date    HGBA1C 6.2 (H) 05/24/2025       Recent Labs     06/12/25  1021 06/12/25  1548 06/12/25 2053 06/13/25  0609   POCGLU 122 107 124 98       Blood Sugar Average: Last 72 hrs:  (P) [id number]  - Home med: Metformin 1000 mg BID --> restart per IM discretion  - continue SSI and accuchecks per IM

## 2025-06-13 NOTE — PLAN OF CARE
Problem: PAIN - ADULT  Goal: Verbalizes/displays adequate comfort level or baseline comfort level  Description: Interventions:  - Encourage patient to monitor pain and request assistance  - Assess pain using appropriate pain scale  - Administer analgesics as ordered based on type and severity of pain and evaluate response  - Implement non-pharmacological measures as appropriate and evaluate response  - Consider cultural and social influences on pain and pain management  - Notify physician/advanced practitioner if interventions unsuccessful or patient reports new pain  - Educate patient/family on pain management process including their role and importance of  reporting pain   - Provide non-pharmacologic/complimentary pain relief interventions  Outcome: Progressing     Problem: INFECTION - ADULT  Goal: Absence or prevention of progression during hospitalization  Description: INTERVENTIONS:  - Assess and monitor for signs and symptoms of infection  - Monitor lab/diagnostic results  - Monitor all insertion sites, i.e. indwelling lines, tubes, and drains  - Monitor endotracheal if appropriate and nasal secretions for changes in amount and color  - [place] appropriate cooling/warming therapies per order  - Administer medications as ordered  - Instruct and encourage patient and family to use good hand hygiene technique  - Identify and instruct in appropriate isolation precautions for identified infection/condition  Outcome: Progressing     Problem: SAFETY ADULT  Goal: Maintain or return to baseline ADL function  Description: INTERVENTIONS:  -  Assess patient's ability to carry out ADLs; assess patient's baseline for ADL function and identify physical deficits which impact ability to perform ADLs (bathing, care of mouth/teeth, toileting, grooming, dressing, etc.)  - Assess/evaluate cause of self-care deficits   - Assess range of motion  - Assess patient's mobility; develop plan if impaired  - Assess patient's need for  assistive devices and provide as appropriate  - Encourage maximum independence but intervene and supervise when necessary  - Involve family in performance of ADLs  - Assess for home care needs following discharge   - Consider OT consult to assist with ADL evaluation and planning for discharge  - Provide patient education as appropriate  - Monitor functional capacity and physical performance, use of AM PAC & JH-HLM   - Monitor gait, balance and fatigue with ambulation    Outcome: Progressing     Problem: DISCHARGE PLANNING  Goal: Discharge to home or other facility with appropriate resources  Description: INTERVENTIONS:  - Identify barriers to discharge w/patient and caregiver  - Arrange for needed discharge resources and transportation as appropriate  - Identify discharge learning needs (meds, wound care, etc.)  - Arrange for interpretive services to assist at discharge as needed  - Refer to Case Management Department for coordinating discharge planning if the patient needs post-hospital services based on physician/advanced practitioner order or complex needs related to functional status, cognitive ability, or social support system  Outcome: Progressing

## 2025-06-13 NOTE — PROGRESS NOTES
"   06/13/25 8982   Pain Assessment   Pain Assessment Tool 0-10   Pain Score No Pain   Restrictions/Precautions   Precautions Cognitive;Fall Risk   Weight Bearing Restrictions No   ROM Restrictions No   Subjective   Subjective \"I'm about 80% there\"   Sit to Lying   Type of Assistance Needed Independent   Comment height of bed 28\", HOB flat, use of R bedrail as pt will purchase for home use   Sit to Lying CARE Score 6   Lying to Sitting on Side of Bed   Type of Assistance Needed Independent   Comment height of bed 28\", HOB flat, use of R bedrail as pt will purchase for home use   Lying to Sitting on Side of Bed CARE Score 6   Sit to Stand   Type of Assistance Needed Independent   Comment RW   Sit to Stand CARE Score 6   Bed-Chair Transfer   Type of Assistance Needed Independent   Comment RW   Chair/Bed-to-Chair Transfer CARE Score 6   Transfer Bed/Chair/Wheelchair   Findings PT observed pt mobilize in room this session after granted IRPs, pt in/out of bathroom, transferred to/from recliner and to/from bed. Safely uses RW, had appropriate footwear on, no LOB. Pt reports feeling confident with IRPs but wishes he felt 100%   Walk 10 Feet   Type of Assistance Needed Independent   Comment RW   Walk 10 Feet CARE Score 6   Ambulation   Primary Mode of Locomotion Prior to Admission Walk   Assist Device Roller Walker   Findings short distances in room   Does the patient walk? 2. Yes   Therapeutic Interventions   Strengthening reviewed remainder of HEP this session including x30 of glute bridges, x30 of b/l clamshells, x30 LAQs (rest breaks taken as needed between reps). Pt with slow controlled movements with all exercises and performed with proper technique. Left HEP in pt's room   Assessment   Treatment Assessment Pt engaged in 40 min PT session with focus on assessing pt's mobility status in room as well as reviewing remainder of HEP. At this time pt performed all transfers safely in room with use of RW. No questions " regarding HEP exercises reviewed during session. Cont POC with potential d/c home Monday 6/16/25.   PT Therapy Minutes   PT Time In 1450   PT Time Out 1530   PT Total Time (minutes) 40   PT Mode of treatment - Individual (minutes) 40   PT Mode of treatment - Concurrent (minutes) 0   PT Mode of treatment - Group (minutes) 0   PT Mode of treatment - Co-treat (minutes) 0   PT Mode of Treatment - Total time(minutes) 40 minutes   PT Cumulative Minutes 730

## 2025-06-13 NOTE — ASSESSMENT & PLAN NOTE
Says gets at home if sleeps in chair as opposed to bed  L>R which is normal for him  Continue to ACE wrap

## 2025-06-13 NOTE — PROGRESS NOTES
06/13/25 1400   Pain Assessment   Pain Assessment Tool 0-10   Pain Score 3   Pain Location/Orientation Orientation: Right;Location: Groin;Location: Leg   Comprehension   Comprehension (FIM) 5 - Needs help/cues, repetition only RARELY ( < 10% of the time)   Expression   Expression (FIM) 5 - Needs help/cues only RARELY (< 10% of the time)   Social Interaction   Social Interaction (FIM) 4 - Needs redirecting for appropriate language or to initiate interaction   Problem Solving   Problem solving (FIM) 3 - Solves basic problmes 50-74% of time   Memory   Memory (FIM) 5 - [place] cues patient   Speech/Language/Cognition Assessment   Treatment Assessment Pt sitting in recliner chair, agreeable to skilled SLP session targeting problem solving. Pt completing task initiated in prior session where he was given a problem and a course of action that did not work. He was then asked to explain other things that he could try to solve the problem. He completed 3/7 reasonably, with three of the remaining approximations requiring some additional prompting for more details. One of the problems he was not able to reason with or identify an alternative action. Pt continues to be somewhat perseverative on each topic discussed, and oftentimes distracted, lending to poor topic maintenance and less than productive session. He is pleasant, but difficult to redirect as new information lends to another topic of discussion. He benefits from little engagement from SLP when he attempts to begin new conversation on differing topic. Pt does show eagerness to learn and appreciates education on attention skills and different strategies. Pt is recommended to continue to receive skilled SLP services to further improve his reasoning, problem solving, and attention skills necessary for reducing burden of care at time of discharge.   SLP Therapy Minutes   SLP Time In 1400   SLP Time Out 1430   SLP Total Time (minutes) 30   SLP Mode of treatment -  Individual (minutes) 30   SLP Mode of treatment - Concurrent (minutes) 0   SLP Mode of treatment - Group (minutes) 0   SLP Mode of treatment - Co-treat (minutes) 0   SLP Mode of Treatment - Total time(minutes) 30 minutes   SLP Cumulative Minutes 300   Therapy Time missed   Time missed? No

## 2025-06-13 NOTE — TELEPHONE ENCOUNTER
Pt. Wife calling to schedule urgent office visit for pancreatic cyst, pt. Being d/c from hospital on Monday, scheduled urgent visit for 6/27/25

## 2025-06-13 NOTE — PROGRESS NOTES
"OT Daily Treatment Note     06/13/25 7721   Pain Assessment   Pain Assessment Tool 0-10   Pain Score No Pain   Restrictions/Precautions   Precautions Bed/chair alarms;Cognitive;Fall Risk;Supervision on toilet/commode  (Pt given daytime IRP today. Bed/chair alarms and supervision on toilet only at night.)   Weight Bearing Restrictions No   ROM Restrictions No   Lifestyle   Autonomy \"I want to work on getting in and out of bed.\"   Oral Hygiene   Type of Assistance Needed Independent   Physical Assistance Level No physical assistance   Comment In stance at sink with RW. Pt given daytime IRP today.   Oral Hygiene CARE Score 6   Upper Body Dressing   Type of Assistance Needed Supervision   Physical Assistance Level No physical assistance   Comment Pt able to don shirt over head and BUE with supervision. Pt given daytime IRP today, but instructed to call for assistance when dressing. May alter in future sessions.   Upper Body Dressing CARE Score 4   Lower Body Dressing   Type of Assistance Needed Supervision;Adaptive equipment   Physical Assistance Level No physical assistance   Comment Pt able to doff and don underwear and pants with LHAE. Pt given daytime IRP today, but instructed to call for assistance when dressing. May alter in future sessions.   Lower Body Dressing CARE Score 4   Putting On/Taking Off Footwear   Type of Assistance Needed Physical assistance   Physical Assistance Level 26%-50%   Comment Seated, pt able to doff socks with LHAD. Requires TA for ace wraps for edema management. Able to don socks with sock aid, but requires assistance to make adjustments due to ace wraps.   Putting On/Taking Off Footwear CARE Score 3   Sit to Lying   Type of Assistance Needed Supervision   Physical Assistance Level No physical assistance   Comment Pt completed one bed transfer \"for practice.\" Able to sit on R side of level bed set at 28\" high, and use R bed rail to move to lying w/ supervision. Pt expressed he is " "purchasing bed rails for home.   Sit to Lying CARE Score 4   Lying to Sitting on Side of Bed   Type of Assistance Needed Supervision   Physical Assistance Level No physical assistance   Comment Pt completed one bed transfer \"for practice.\" Pt used R bed rail to move to sitting w/ supervision. Pt expressed he is purchasing bed rails for home.   Lying to Sitting on Side of Bed CARE Score 4   Sit to Stand   Type of Assistance Needed Independent   Physical Assistance Level No physical assistance   Comment With RW. Pt given daytime IRP today.   Sit to Stand CARE Score 6   Bed-Chair Transfer   Type of Assistance Needed Independent   Physical Assistance Level No physical assistance   Comment With RW. Pt given daytime IRP today.   Chair/Bed-to-Chair Transfer CARE Score 6   Toileting Hygiene   Type of Assistance Needed Independent   Physical Assistance Level No physical assistance   Comment With RW. Pt given daytime IRP today. Requires supervision at nighttime per daytime IRP protocols.   Toileting Hygiene CARE Score 6   Toilet Transfer   Type of Assistance Needed Independent   Physical Assistance Level No physical assistance   Comment With RW. Pt given daytime IRP today. Requires supervision at nighttime per daytime IRP protocols.   Toilet Transfer CARE Score 6   Commmunity Re-entry   Community Re-entry Level Walker   Community Re-entry Level of Assistance Close supervision   Community Re-entry Pt participated in functional mobility task with use of RW to address home and community distances required for ADL/IADL participation. Activity focused on increasing functional activity tolerance, endurance, strength, coordination, and balance to increase independence in ADL tasks.   Functional Standing Tolerance   Time ~20 minutes   Activity Abstraction tasks   Comments Pt engaged in 2 higher-level cognitive tasks in stance on a balance board and with gait belt and tabletop support, to address problem-solving, abstraction, form " constancy, judgment, decision-making, dynamic standing balance, endurance, activity tolerance, UB coordination, and object manipulation. Pt engaged in construction activity. Able to identify correct pieces with min VC. Pt demonstrated decreased problem-solving skills when selecting appropriately sized pieces. Pt engaged in abstract shape activity, and was instructed to form a square with small square, triangular, and denny-shaped tiles. Activity was graded down by providing a visual aid to assist due to demonstration of decreased abstraction and problem-solving. Pt was able to create a square using square and triangle tiles without utizilizing the visual aid.   Cognition   Overall Cognitive Status Impaired   Arousal/Participation Alert;Cooperative   Attention Attends with cues to redirect   Orientation Level Oriented X4   Memory Decreased short term memory;Decreased recall of precautions   Following Commands Follows one step commands without difficulty   Comments Pt provided daytime IRP today. Provided education on safety due to past demonstrations of decreased safety awareness, judgment, and insight.   Activity Tolerance   Activity Tolerance Patient tolerated treatment well   Assessment   Treatment Assessment Pt engaged in 90-minute skilled OT session focused on ADL performance, functional transfer training, community reintegration, endurance, activity tolerance, strength, dynamic standing balance, and higher-level cognitive skills. Pt continues to be limited by cognition (memory & attention), endurance, activity tolerance, and higher-level balance, which limits participation in ADLs and IADLs. Plan to continue higher-level balance training, endurance training, UB strengthening, LHAE education, and functional cognition reorientation to increase independence and engagement in ADL and IADL tasks. Pt scheduled to d/c home on Monday, 6/16.   Prognosis Good   Problem List Decreased strength;Decreased  endurance;Impaired balance;Decreased mobility;Decreased coordination;Decreased cognition;Impaired judgement;Decreased safety awareness   Barriers to Discharge Inaccessible home environment;Decreased caregiver support   Barriers to Discharge Comments Pt scheduled to d/c on Monday., 6/16.   Plan   Treatment/Interventions ADL retraining;Functional transfer training;LE strengthening/ROM;Therapeutic exercise;Endurance training;Cognitive reorientation;Patient/family training;Compensatory technique education   Progress Progressing toward goals   OT Therapy Minutes   OT Time In 0830   OT Time Out 1009   OT Total Time (minutes) 99   OT Mode of treatment - Individual (minutes) 99   OT Mode of treatment - Concurrent (minutes) 0   OT Mode of treatment - Group (minutes) 0   OT Mode of treatment - Co-treat (minutes) 0   OT Mode of Treatment - Total time(minutes) 99 minutes   OT Cumulative Minutes 871   Therapy Time missed   Time missed? No

## 2025-06-13 NOTE — PROGRESS NOTES
"Progress Note - Hospitalist   Name: Deonte Greenfield 74 y.o. male I MRN: [id number]  Unit/Bed#: -01 I Date of Admission: 6/3/2025   Date of Service: 6/13/2025 I Hospital Day: 10    Assessment & Plan  Acute on chronic intracranial subdural hematoma (HCC)  MRI of the brain that showed large bilateral SDH  S/p bilateral [place] holes for evacuation of SDH 5/25/25  MMA was not able to be done due to anatomical considerations  For no full AC/AP for 2 weeks = 6/9-11/25 then need to clear with NS  D/W NS 6/6/25 = followup CTH for 6/9/25 done = \"Stable to slightly decreased size of mixed density bilateral cerebral convexity subdural hematomas admixed with foci of air. No significant midline shift\"  NS saw him in f/u in R/T the CTH done 6/9.  They said repeat CTH on 6/15 and then will determine if cleared to start ASA 81mg qd (order is in the computer for 6/15 at 0700).  They also took out staples and sutures.  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 No significant stenosis of the cervical left internal carotid artery. Severe stenosis origin of the dominant left vertebral artery. Hypoplastic right vertebral artery. No high-grade intracranial stenosis, large vessel occlusion or aneurysm\"  Neurology saw in consult for the CVA.  They recommended that the patient start ASA 81mg daily when cleared by Neurosurgery as above.    For LOOP recorder implant as an outpatient.  He did have a previous Ziopatch done that was negative for atrial fibrillation.    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  Continue statin  Pancreatic cyst  Had an OP ultrasound done for elevated total bilirubin that showed cystic lesions " "in the pancreas for which further imaging was recommended  MRI done thereafter as an OP showed \"Numerous cystic lesions scattered throughout the pancreas with a dominant lesion measuring 18 mm in the body. Worrisome features include ductal dilation up to 6 mm in the tail the pancreas and abrupt change in caliber of the pancreatic duct. Management recommendation: Because of presence of multiple worrisome features, surgical consultation/management with consideration for EUS recommended.  Management and follow up recommendations for cystic pancreatic lesions are based on Institutional consensus and international evidence-based [place] guidelines for the management of intraductal papillary mucinous neoplasm of the pancreas. Pancreatology 24 (2024) 255-270\".  Per IMs note in the hospital:  \"Reached out to primary team and surgery oncall as imaging findings are suggestive of malignancy. According to surgery they recommend out patient follow up with surg onc for possible biopsy and out patient GI follow up. Surgery called the surg onc office to schedule the appointment for patient. I did a STAT referral for GI outpatient. Primary team will inform the patient regarding imaging findings and the referrals\".  Most recent CMP on 6/3/25 with normal total bilirubin/LFT  Diabetes mellitus (HCC)  HbA1C 6.2 on 5/24/25  Home:  Metformin 1000 mg BID  Here:  QID Accuchecks/SSI  BSs are currently   Will prob restart Metformin at discharge  HTN (hypertension)  Home:  Atenolol-chlorthalidone 50-25 mg qd/Diovan 320 mg qd  Here:  Atenolol 50 mg qd/Losartan 50 mg qd  Has Hydralazine 10 mg q8hrs prn SBP >165  BP was bit too high but much better for the past 48 hours  Increased Losartan to 50 mg qd to start 6/12/25   For the sake of simplicity for home 6/16, may be able to add back the chlorthalidone over the weekend and/or increase the Losartan to 100mg qd.  Losartan 50mg is equal to 80 mg of Valsartan so 100mg of Losartan would be = " 160mg Valsartan.  Valsartan 320 mg cannot be split in half.  Alternatively, could give him scripts for current meds for home but will see how he does.  Will continue to hold Chlorthalidone  No changes today  Thrombocytopenia (HCC)  Chronic  Follows with H-O as OP  Platelet count was 223 on 6/6/25  CBC 6/12/25 = platelet count is 220  Mixed hyperlipidemia  Takes Lipitor 10 mg qd at home = continue here  Normocytic anemia  Continue Iron 325 mg QOD  Gout  Continue Allopurinol 300mg qd as at home  No recent flares  CKD (chronic kidney disease)  Baseline 0.7-0.9  Stable  Bilateral leg edema  Says gets at home if sleeps in chair as opposed to bed  L>R which is normal for him  Continue to ACE wrap  Leg wound, left  WC saw in acute hospital stay  Continue local WC  Tx per PMR    The above assessment and plan was reviewed and updated as determined by my evaluation of the patient on 6/13/2025.    History of Present Illness   Patient seen and examined. Patients overnight issues or events were reviewed with nursing staff. New or overnight issues include the following:   Patient sitting up in chair eating lunch. No CP/SOB. Leg swelling at baseline, wrapped with Ace bandages. Appetite good    Review of Systems    Objective :  Temp:  [97.8 °F (36.6 °C)-98.1 °F (36.7 °C)] 98 °F (36.7 °C)  HR:  [56-59] 59  BP: (117-144)/(55-65) 137/60  Resp:  [17-18] 17  SpO2:  [96 %-98 %] 98 %  O2 Device: None (Room air)    Invasive Devices       None                   Physical Exam  General Appearance: NAD; pleasant  HEENT: conjuctiva normal; mucous membranes moist; incisions healing well  Neck:  Supple  Lungs: clear bilaterally, normal respiratory effort, no retractions, expiratory effort normal, on room air  CV: regular rate and rhythm, no murmurs rubs or gallops noted   ABD: soft non tender, +BS x4  EXT: DP pulses intact, no lymphadenopathy, bilateral chronic LE edema  Skin: normal turgor, normal texture, no rash  Psych: affect normal, mood  normal  Neuro: AAOx3    The above physical exam was reviewed and updated as determined by my evaluation of the patient on 6/13/2025.      Lab Results: I have reviewed the following results:  Results from last 7 days   Lab Units 06/12/25  0612 06/06/25  1219   WBC Thousand/uL 8.42 7.24   HEMOGLOBIN g/dL 11.6* 11.0*   HEMATOCRIT % 37.3 35.1*   PLATELETS Thousands/uL 220 223     Results from last 7 days   Lab Units 06/12/25  0612 06/06/25  1219   SODIUM mmol/L 137 138   POTASSIUM mmol/L 4.8 4.2   CHLORIDE mmol/L 102 104   CO2 mmol/L 30 26   BUN mg/dL 19 24   CREATININE mg/dL 0.84 0.74   CALCIUM mg/dL 9.4 9.1             Results from last 7 days   Lab Units 06/13/25  1031 06/13/25  0609 06/12/25  2053   POC GLUCOSE mg/dl 129 98 124         Review of Scheduled Meds: Medications  reviewed and reconciled as needed  Current Facility-Administered Medications   Medication Dose Route Frequency Provider Last Rate    acetaminophen  650 mg Oral Q6H PRN JOSE ALEJANDRO Bradley      allopurinol  300 mg Oral Daily JOSE ALEJANDRO Bradley      atenolol  50 mg Oral Daily JOSE ALEJANDRO Bradley      And    [Held by provider] chlorthalidone  25 mg Oral Daily JOSE ALEJANDRO Bradley      atorvastatin  10 mg Oral HS JOSE ALEJANDRO Bradley      Diclofenac Sodium  2 g Topical 4x Daily PRN JOSE ALEJANDRO Bradley      enoxaparin  30 mg Subcutaneous Q12H [facility] JOSE ALEJANDRO Bradley      ferrous sulfate  325 mg Oral Every Other Day JOSE ALEJANDRO Bradley      hydrALAZINE  10 mg Oral Q8H PRN JOSE ALEJANDRO Bradley      insulin lispro  1-5 Units Subcutaneous TID AC JOSE ALEJANDRO Bradley      insulin lispro  1-5 Units Subcutaneous HS JOSE ALEJANDRO Bradley      lidocaine   Topical 4x Daily PRN JOSE ALEJANDRO Bradley      losartan  50 mg Oral Daily JOSE ALEJANDRO Bradley      polyethylene glycol  17 g Oral Daily EvonJOSE ALEJANDRO Bertrand-docusate sodium  1 tablet Oral HS  JOSE ALEJANDRO Bradley         VTE Pharmacologic Prophylaxis: lovenox  Code Status: Level 1 - Full Code  Current Length of Stay: 10 day(s)    Administrative Statements     ** Please Note:  voice to text software may have been used in the creation of this document. Although proof errors in transcription or interpretation are a potential of such software**

## 2025-06-14 LAB
GLUCOSE SERPL-MCNC: 106 MG/DL (ref 65–140)
GLUCOSE SERPL-MCNC: 129 MG/DL (ref 65–140)
GLUCOSE SERPL-MCNC: 132 MG/DL (ref 65–140)
GLUCOSE SERPL-MCNC: 93 MG/DL (ref 65–140)

## 2025-06-14 PROCEDURE — 97530 THERAPEUTIC ACTIVITIES: CPT

## 2025-06-14 PROCEDURE — 82948 REAGENT STRIP/BLOOD GLUCOSE: CPT

## 2025-06-14 PROCEDURE — 97116 GAIT TRAINING THERAPY: CPT

## 2025-06-14 PROCEDURE — 97110 THERAPEUTIC EXERCISES: CPT

## 2025-06-14 PROCEDURE — 99231 SBSQ HOSP IP/OBS SF/LOW 25: CPT | Performed by: PHYSICIAN ASSISTANT

## 2025-06-14 RX ADMIN — ACETAMINOPHEN 650 MG: 325 TABLET ORAL at 20:54

## 2025-06-14 RX ADMIN — DICLOFENAC SODIUM 2 G: 10 GEL TOPICAL at 09:54

## 2025-06-14 RX ADMIN — SENNOSIDES AND DOCUSATE SODIUM 1 TABLET: 50; 8.6 TABLET ORAL at 20:54

## 2025-06-14 RX ADMIN — ENOXAPARIN SODIUM 30 MG: 30 INJECTION SUBCUTANEOUS at 20:53

## 2025-06-14 RX ADMIN — DICLOFENAC SODIUM 2 G: 10 GEL TOPICAL at 20:53

## 2025-06-14 RX ADMIN — ATORVASTATIN CALCIUM 10 MG: 10 TABLET, FILM COATED ORAL at 20:54

## 2025-06-14 RX ADMIN — ALLOPURINOL 300 MG: 300 TABLET ORAL at 09:46

## 2025-06-14 RX ADMIN — LOSARTAN POTASSIUM 50 MG: 50 TABLET, FILM COATED ORAL at 09:46

## 2025-06-14 RX ADMIN — POLYETHYLENE GLYCOL 3350 17 G: 17 POWDER, FOR SOLUTION ORAL at 09:46

## 2025-06-14 RX ADMIN — ACETAMINOPHEN 650 MG: 325 TABLET ORAL at 09:45

## 2025-06-14 RX ADMIN — ENOXAPARIN SODIUM 30 MG: 30 INJECTION SUBCUTANEOUS at 09:46

## 2025-06-14 RX ADMIN — FERROUS SULFATE TAB 325 MG (65 MG ELEMENTAL FE) 325 MG: 325 (65 FE) TAB at 09:46

## 2025-06-14 RX ADMIN — ATENOLOL 50 MG: 50 TABLET ORAL at 09:46

## 2025-06-14 NOTE — ASSESSMENT & PLAN NOTE
"MRI of the brain that showed large bilateral SDH  S/p bilateral [place] holes for evacuation of SDH 5/25/25  MMA was not able to be done due to anatomical considerations  For no full AC/AP for 2 weeks = 6/9-11/25 then need to clear with NS  D/W NS 6/6/25 = followup CTH for 6/9/25 done = \"Stable to slightly decreased size of mixed density bilateral cerebral convexity subdural hematomas admixed with foci of air. No significant midline shift\"  NS saw him in f/u in R/T the CTH done 6/9.  They said repeat CTH on 6/15 and then will determine if cleared to start ASA 81mg qd (order is in the computer for 6/15 at 0700).  They also took out staples and sutures.  Per NS = wash incision QOD  "

## 2025-06-14 NOTE — PROGRESS NOTES
06/14/25 0830   Pain Assessment   Pain Assessment Tool 0-10   Pain Score 5   Pain Location/Orientation Orientation: Right;Location: Groin   Pain Onset/Description Onset: Ongoing;Onset: Gradual;Descriptor: Sore   Patient's Stated Pain Goal No pain   Hospital Pain Intervention(s) Cold applied   Restrictions/Precautions   Precautions Cognitive;Fall Risk;Pain   Weight Bearing Restrictions No   ROM Restrictions No   Braces or Orthoses Other (Comment)  (ace wrap BLE)   Cognition   Overall Cognitive Status Impaired   Arousal/Participation Alert;Cooperative   Attention Attends with cues to redirect   Orientation Level Oriented X4   Memory Decreased short term memory;Decreased recall of precautions   Following Commands Follows one step commands without difficulty   Sit to Stand   Type of Assistance Needed Independent;Adaptive equipment   Comment RW   Sit to Stand CARE Score 6   Bed-Chair Transfer   Type of Assistance Needed Independent;Adaptive equipment   Comment RW   Chair/Bed-to-Chair Transfer CARE Score 6   Transfer Bed/Chair/Wheelchair   Adaptive Equipment Roller Walker   Car Transfer   Type of Assistance Needed Supervision;Adaptive equipment   Comment VC's for positioning   Car Transfer CARE Score 4   Walk 10 Feet   Type of Assistance Needed Independent;Adaptive equipment   Comment RW   Walk 10 Feet CARE Score 6   Walk 50 Feet with Two Turns   Type of Assistance Needed Independent;Adaptive equipment   Comment RW   Walk 50 Feet with Two Turns CARE Score 6   Walk 150 Feet   Type of Assistance Needed Set-up / clean-up;Adaptive equipment   Comment DS with RW   Walk 150 Feet CARE Score 5   Ambulation   Primary Mode of Locomotion Prior to Admission Walk   Distance Walked (feet) 150 ft  (125' x2)   Assist Device Roller Walker   Gait Pattern Inconsistant Rose;Slow Rose;Decreased foot clearance;Forward Flexion;Wide JESÚS;Shuffle;Improper weight shift   Limitations Noted In Balance;Coordination;Heel  "Strike;Speed;Strength;Swing   Provided Assistance with: Direction   Walk Assist Level Modified Independent;Distant Supervision   Does the patient walk? 2. Yes   Wheelchair mobility   Does the patient use a wheelchair? 0. No   Curb or Single Stair   Style negotiated Curb   Type of Assistance Needed Set-up / clean-up;Adaptive equipment   Comment with RW over 6\" curb   1 Step (Curb) CARE Score 5   Therapeutic Interventions   Modalities CP to R thigh/ groin area. Pt described as a \"over worked muscle\".   Other (S)  Gait speed with RW, SS .54 m/s. TUG with RW 16.97. Both indicate a limited community ambulator and min fall risk.   Equipment Use   NuStep L2 x15 min BLE/UE   Assessment   Treatment Assessment Pt participated in skilled PT session with increased focus on outcome measures, increased safety, endurance and strengthening. Pt limited slightly by R thigh/groin pain this session. With gait speed test he varied greatly 2/2 pain. Pt anticipates discharge home Monday 6/16 with a HEP and recommended OPPT services to cont to work on increased I and safety with LRAD.   Problem List Decreased strength;Decreased endurance;Impaired balance;Decreased mobility;Decreased coordination;Decreased cognition;Impaired judgement;Decreased safety awareness   Barriers to Discharge Decreased caregiver support   PT Barriers   Functional Limitation Car transfers;Stair negotiation   Plan   Treatment/Interventions Functional transfer training;LE strengthening/ROM;Endurance training;Therapeutic exercise;Cognitive reorientation;Gait training   Progress Progressing toward goals   Discharge Recommendation   Equipment Recommended Walker   PT Therapy Minutes   PT Time In 0830   PT Time Out 0930   PT Total Time (minutes) 60   PT Mode of treatment - Individual (minutes) 60   PT Mode of treatment - Concurrent (minutes) 0   PT Mode of treatment - Group (minutes) 0   PT Mode of treatment - Co-treat (minutes) 0   PT Mode of Treatment - Total " time(minutes) 60 minutes   PT Cumulative Minutes 790   Therapy Time missed   Time missed? No          Outcome Measure: Date:   6/4 Date:  6/14   TUG 32.48 seconds  16.97 sec   Gait Speed 0.46 m/s  0.54 m/s

## 2025-06-14 NOTE — ASSESSMENT & PLAN NOTE
HbA1C 6.2 on 5/24/25  Home:  Metformin 1000 mg BID  Here:  QID Accuchecks/SSI  BSs are currently , occasionally he spikes up in the evening before bed but overall well controlled  Will prob restart Metformin at discharge

## 2025-06-14 NOTE — PLAN OF CARE
Problem: PAIN - ADULT  Goal: Verbalizes/displays adequate comfort level or baseline comfort level  Description: Interventions:  - Encourage patient to monitor pain and request assistance  - Assess pain using appropriate pain scale  - Administer analgesics as ordered based on type and severity of pain and evaluate response  - Implement non-pharmacological measures as appropriate and evaluate response  - Consider cultural and social influences on pain and pain management  - Notify physician/advanced practitioner if interventions unsuccessful or patient reports new pain  - Educate patient/family on pain management process including their role and importance of  reporting pain   - Provide non-pharmacologic/complimentary pain relief interventions  Outcome: Progressing     Problem: INFECTION - ADULT  Goal: Absence or prevention of progression during hospitalization  Description: INTERVENTIONS:  - Assess and monitor for signs and symptoms of infection  - Monitor lab/diagnostic results  - Monitor all insertion sites, i.e. indwelling lines, tubes, and drains  - Monitor endotracheal if appropriate and nasal secretions for changes in amount and color  - Phyllis appropriate cooling/warming therapies per order  - Administer medications as ordered  - Instruct and encourage patient and family to use good hand hygiene technique  - Identify and instruct in appropriate isolation precautions for identified infection/condition  Outcome: Progressing     Problem: SAFETY ADULT  Goal: Patient will remain free of falls  Description: INTERVENTIONS:  - Educate patient/family on patient safety including physical limitations  - Instruct patient to call for assistance with activity   - Consider consulting OT/PT to assist with strengthening/mobility based on AM PAC & JH-HLM score  - Consult OT/PT to assist with strengthening/mobility   - Keep Call bell within reach  - Keep bed low and locked with side rails adjusted as appropriate  - Keep  care items and personal belongings within reach  - Initiate and maintain comfort rounds  - Make Fall Risk Sign visible to staff  - Offer Toileting every 2 Hours, in advance of need  - Initiate/Maintain bed/chair alarm  - Obtain necessary fall risk management equipment:    - Apply yellow socks and bracelet for high fall risk patients  - Consider moving patient to room near nurses station  Outcome: Progressing  Goal: Maintain or return to baseline ADL function  Description: INTERVENTIONS:  -  Assess patient's ability to carry out ADLs; assess patient's baseline for ADL function and identify physical deficits which impact ability to perform ADLs (bathing, care of mouth/teeth, toileting, grooming, dressing, etc.)  - Assess/evaluate cause of self-care deficits   - Assess range of motion  - Assess patient's mobility; develop plan if impaired  - Assess patient's need for assistive devices and provide as appropriate  - Encourage maximum independence but intervene and supervise when necessary  - Involve family in performance of ADLs  - Assess for home care needs following discharge   - Consider OT consult to assist with ADL evaluation and planning for discharge  - Provide patient education as appropriate  - Monitor functional capacity and physical performance, use of AM PAC & -HLM   - Monitor gait, balance and fatigue with ambulation    Outcome: Progressing  Goal: Maintains/Returns to pre admission functional level  Description: INTERVENTIONS:  - Perform AM-PAC 6 Click Basic Mobility/ Daily Activity assessment daily.  - Set and communicate daily mobility goal to care team and patient/family/caregiver.   - Collaborate with rehabilitation services on mobility goals if consulted  - Perform Range of Motion 3 times a day.  - Reposition patient every 2 hours.  - Dangle patient 3 times a day  - Stand patient 3 times a day  - Ambulate patient 3 times a day  - Out of bed to chair for meals  - Out of bed for toileting  - Record  patient progress and toleration of activity level   Outcome: Progressing     Problem: DISCHARGE PLANNING  Goal: Discharge to home or other facility with appropriate resources  Description: INTERVENTIONS:  - Identify barriers to discharge w/patient and caregiver  - Arrange for needed discharge resources and transportation as appropriate  - Identify discharge learning needs (meds, wound care, etc.)  - Arrange for interpretive services to assist at discharge as needed  - Refer to Case Management Department for coordinating discharge planning if the patient needs post-hospital services based on physician/advanced practitioner order or complex needs related to functional status, cognitive ability, or social support system  Outcome: Progressing

## 2025-06-14 NOTE — PROGRESS NOTES
"Progress Note - Hospitalist   Name: Deonte Greenfield 74 y.o. male I MRN: [id number]  Unit/Bed#: -01 I Date of Admission: 6/3/2025   Date of Service: 6/14/2025 I Hospital Day: 11    Assessment & Plan  Acute on chronic intracranial subdural hematoma (HCC)  MRI of the brain that showed large bilateral SDH  S/p bilateral [place] holes for evacuation of SDH 5/25/25  MMA was not able to be done due to anatomical considerations  For no full AC/AP for 2 weeks = 6/9-11/25 then need to clear with NS  D/W NS 6/6/25 = followup CTH for 6/9/25 done = \"Stable to slightly decreased size of mixed density bilateral cerebral convexity subdural hematomas admixed with foci of air. No significant midline shift\"  NS saw him in f/u in R/T the CTH done 6/9.  They said repeat CTH on 6/15 and then will determine if cleared to start ASA 81mg qd (order is in the computer for 6/15 at 0700).  They also took out staples and sutures.  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 No significant stenosis of the cervical left internal carotid artery. Severe stenosis origin of the dominant left vertebral artery. Hypoplastic right vertebral artery. No high-grade intracranial stenosis, large vessel occlusion or aneurysm\"  Neurology saw in consult for the CVA.  They recommended that the patient start ASA 81mg daily when cleared by Neurosurgery as above.    For LOOP recorder implant as an outpatient.  He did have a previous Ziopatch done that was negative for atrial fibrillation.    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  Continue statin  Pancreatic cyst  Had an OP ultrasound done for elevated total bilirubin that showed cystic lesions " "in the pancreas for which further imaging was recommended  MRI done thereafter as an OP showed \"Numerous cystic lesions scattered throughout the pancreas with a dominant lesion measuring 18 mm in the body. Worrisome features include ductal dilation up to 6 mm in the tail the pancreas and abrupt change in caliber of the pancreatic duct. Management recommendation: Because of presence of multiple worrisome features, surgical consultation/management with consideration for EUS recommended.  Management and follow up recommendations for cystic pancreatic lesions are based on Institutional consensus and international evidence-based [place] guidelines for the management of intraductal papillary mucinous neoplasm of the pancreas. Pancreatology 24 (2024) 255-270\".  Per IMs note in the hospital:  \"Reached out to primary team and surgery oncall as imaging findings are suggestive of malignancy. According to surgery they recommend out patient follow up with surg onc for possible biopsy and out patient GI follow up. Surgery called the surg onc office to schedule the appointment for patient. I did a STAT referral for GI outpatient. Primary team will inform the patient regarding imaging findings and the referrals\".  Most recent CMP on 6/3/25 with normal total bilirubin/LFT  Diabetes mellitus (HCC)  HbA1C 6.2 on 5/24/25  Home:  Metformin 1000 mg BID  Here:  QID Accuchecks/SSI  BSs are currently , occasionally he spikes up in the evening before bed but overall well controlled  Will prob restart Metformin at discharge  HTN (hypertension)  Home:  Atenolol-chlorthalidone 50-25 mg qd/Diovan 320 mg qd  Here:  Atenolol 50 mg qd/Losartan 50 mg qd  Has Hydralazine 10 mg q8hrs prn SBP >165  BP was bit too high but much better for the past 48 hours  Increased Losartan to 50 mg qd to start 6/12/25   For the sake of simplicity for home 6/16, may be able to add back the chlorthalidone over the weekend and/or increase the Losartan to 100mg qd.  " Losartan 50mg is equal to 80 mg of Valsartan so 100mg of Losartan would be = 160mg Valsartan.  Valsartan 320 mg cannot be split in half.  Alternatively, could give him scripts for current meds for home but will see how he does.  Will continue to hold Chlorthalidone  No changes today  Thrombocytopenia (HCC)  Chronic  Follows with H-O as OP  Platelet count was 223 on 6/6/25  CBC 6/12/25 = platelet count is 220  Mixed hyperlipidemia  Takes Lipitor 10 mg qd at home = continue here  Normocytic anemia  Continue Iron 325 mg QOD  Gout  Continue Allopurinol 300mg qd as at home  No recent flares  CKD (chronic kidney disease)  Baseline 0.7-0.9  Stable  Bilateral leg edema  Says gets at home if sleeps in chair as opposed to bed  L>R which is normal for him  Continue to ACE wrap  Leg wound, left  WC saw in acute hospital stay  Continue local WC  Tx per PMR    The above assessment and plan was reviewed and updated as determined by my evaluation of the patient on 6/14/2025.    History of Present Illness   Patient seen and examined. Patients overnight issues or events were reviewed with nursing staff. New or overnight issues include the following:   Patient sitting comfortably in chair. Denies pain. Leg swelling at baseline. Appetite good    Review of Systems    Objective :  Temp:  [98 °F (36.7 °C)-99 °F (37.2 °C)] 98 °F (36.7 °C)  HR:  [55-64] 55  BP: (111-146)/(58-67) 146/67  Resp:  [18-19] 19  SpO2:  [95 %-98 %] 98 %  O2 Device: None (Room air)    Invasive Devices       None                   Physical Exam  General Appearance: NAD; pleasant  HEENT: conjuctiva normal; mucous membranes moist; incisions healing well  Neck:  Supple  Lungs: clear bilaterally, normal respiratory effort, no retractions, expiratory effort normal, on room air  CV: regular rate and rhythm, no murmurs rubs or gallops noted   ABD: soft non tender, +BS x4  EXT: DP pulses intact, no lymphadenopathy, chronic odalis LE edema L>R  Skin: normal turgor, normal  texture, no rash  Psych: affect normal, mood normal  Neuro: AAOx3    The above physical exam was reviewed and updated as determined by my evaluation of the patient on 6/14/2025.      Lab Results: I have reviewed the following results:  Results from last 7 days   Lab Units 06/12/25  0612   WBC Thousand/uL 8.42   HEMOGLOBIN g/dL 11.6*   HEMATOCRIT % 37.3   PLATELETS Thousands/uL 220     Results from last 7 days   Lab Units 06/12/25  0612   SODIUM mmol/L 137   POTASSIUM mmol/L 4.8   CHLORIDE mmol/L 102   CO2 mmol/L 30   BUN mg/dL 19   CREATININE mg/dL 0.84   CALCIUM mg/dL 9.4             Results from last 7 days   Lab Units 06/14/25  0622 06/13/25 2056 06/13/25  1537   POC GLUCOSE mg/dl 106 162* 82           Review of Scheduled Meds: Medications  reviewed and reconciled as needed  Current Facility-Administered Medications   Medication Dose Route Frequency Provider Last Rate    acetaminophen  650 mg Oral Q6H PRN JOSE ALEJANDRO Bradley      allopurinol  300 mg Oral Daily JOSE ALEJANDRO Bradley      atenolol  50 mg Oral Daily JOSE ALEJANDRO Bradley      And    [Held by provider] chlorthalidone  25 mg Oral Daily JOSE ALEJANDRO Bradley      atorvastatin  10 mg Oral HS JOSE ALEJANDRO Bradley      Diclofenac Sodium  2 g Topical 4x Daily PRN JOSE ALEJANDRO Bradley      enoxaparin  30 mg Subcutaneous Q12H [facility] JOSE ALEJANDRO Bradley      ferrous sulfate  325 mg Oral Every Other Day JOSE ALEJANDRO Bradley      hydrALAZINE  10 mg Oral Q8H PRN JOSE ALEJANDRO Bradley      insulin lispro  1-5 Units Subcutaneous TID AC JOSE ALEJANDRO Bradley      insulin lispro  1-5 Units Subcutaneous HS JOSE ALEJANDRO Bradley      lidocaine   Topical 4x Daily PRN JOSE ALEJANDRO Bradley      losartan  50 mg Oral Daily JOSE ALEJANDRO Bradley      polyethylene glycol  17 g Oral Daily JOSE ALEJANDRO Bradley      senna-docusate sodium  1 tablet Oral HS JOSE ALEJANDRO Bradley          VTE Pharmacologic Prophylaxis: lovenox  Code Status: Level 1 - Full Code  Current Length of Stay: 11 day(s)    Administrative Statements     ** Please Note:  voice to text software may have been used in the creation of this document. Although proof errors in transcription or interpretation are a potential of such software**

## 2025-06-14 NOTE — PROGRESS NOTES
"   06/14/25 1030   Pain Assessment   Pain Assessment Tool 0-10   Pain Score 5   Pain Location/Orientation Orientation: Right;Location: Leg  (thigh)   Pain Onset/Description Onset: Ongoing;Frequency: Constant/Continuous;Descriptor: Aching  (pt describes as \"muscle cramp\")   Hospital Pain Intervention(s) Repositioned;Emotional support;Rest  (notified nsg that pt requesting pain cream, however pt recently had this applied and is not due yet for reapplication)   Sit to Stand   Type of Assistance Needed Independent;Adaptive equipment   Physical Assistance Level No physical assistance   Comment RW   Sit to Stand CARE Score 6   Bed-Chair Transfer   Type of Assistance Needed Independent;Adaptive equipment   Physical Assistance Level No physical assistance   Comment Pt reports IRPs w/RW have been going well, no LOB, no questions/concerns   Chair/Bed-to-Chair Transfer CARE Score 6   Exercise Tools   Exercise Tools Yes   Theraband Provided UE HEP (OT Toolkit) w/yellow theraband for increased BUE strength during fxl ADLs/IADLs. Recommended pt complete daily, 1x/day, [id number] each exercise, each arm. Pt receptive. Pt then completed [id number] alternating UEs of the following: elbow flexion, elbow extension, shoulder flexion, shoulder extension, shoulder abduction, horizontal abudction, diagonals up, and diagonals down. Pt tolerated well with min verbal/tactile cues to improve technique and positioning. Pt reported proximal BUE fatigue and required rest breaks between sets to manage. Pt denied UB pain.   Cognition   Overall Cognitive Status Impaired   Arousal/Participation Alert;Cooperative   Attention Attends with cues to redirect   Orientation Level Oriented X4   Memory Decreased short term memory;Decreased recall of precautions   Following Commands Follows one step commands without difficulty   Activity Tolerance   Activity Tolerance Patient tolerated treatment well   Assessment   Treatment Assessment Pt seen for 90min skilled " OT session focused on providing/educating pt on UE HEP for improved BUE strength/endurance during fxl ADL/IADL tasks, and decreased caregiver burden. See detailed descriptions of fxl performance above. Pt tolerated session well, although proximal UEs did fatigue as pt progressed through HEP, requiring frequent/prolonged rest breaks to manage. Pt cont to be limited by decreased strength, cognition (memory & attention), endurance, activity tolerance, and higher-level balance. Pt would benefit from continued skilled OT focused on D/C ADL tomorrow, higher-level balance training, endurance training, and fxl cognition. D/C anticipated home on Monday, 6/16.   Prognosis Good   Problem List Decreased strength;Decreased range of motion;Decreased endurance;Impaired balance;Decreased mobility;Decreased coordination;Decreased cognition;Impaired judgement;Decreased safety awareness;Obesity;Pain   Plan   Treatment/Interventions ADL retraining;Functional transfer training;Therapeutic exercise;Endurance training;Patient/family training;Equipment eval/education;Bed mobility;Compensatory technique education;Cognitive reorientation;Spoke to nursing   Progress Progressing toward goals   Discharge Recommendation   Rehab Resource Intensity Level, OT   (pending)   OT Therapy Minutes   OT Time In 1030   OT Time Out 1200   OT Total Time (minutes) 90   OT Mode of treatment - Individual (minutes) 90   OT Mode of treatment - Concurrent (minutes) 0   OT Mode of treatment - Group (minutes) 0   OT Mode of treatment - Co-treat (minutes) 0   OT Mode of Treatment - Total time(minutes) 90 minutes   OT Cumulative Minutes 961   Therapy Time missed   Time missed? No

## 2025-06-15 ENCOUNTER — APPOINTMENT (INPATIENT)
Dept: RADIOLOGY | Facility: HOSPITAL | Age: 75
DRG: 949 | End: 2025-06-15
Payer: COMMERCIAL

## 2025-06-15 LAB
GLUCOSE SERPL-MCNC: 100 MG/DL (ref 65–140)
GLUCOSE SERPL-MCNC: 103 MG/DL (ref 65–140)
GLUCOSE SERPL-MCNC: 108 MG/DL (ref 65–140)
GLUCOSE SERPL-MCNC: 127 MG/DL (ref 65–140)

## 2025-06-15 PROCEDURE — 97535 SELF CARE MNGMENT TRAINING: CPT

## 2025-06-15 PROCEDURE — 97129 THER IVNTJ 1ST 15 MIN: CPT

## 2025-06-15 PROCEDURE — 97530 THERAPEUTIC ACTIVITIES: CPT

## 2025-06-15 PROCEDURE — 70450 CT HEAD/BRAIN W/O DYE: CPT

## 2025-06-15 PROCEDURE — 97130 THER IVNTJ EA ADDL 15 MIN: CPT

## 2025-06-15 PROCEDURE — 99231 SBSQ HOSP IP/OBS SF/LOW 25: CPT | Performed by: PHYSICIAN ASSISTANT

## 2025-06-15 PROCEDURE — 82948 REAGENT STRIP/BLOOD GLUCOSE: CPT

## 2025-06-15 RX ADMIN — ALLOPURINOL 300 MG: 300 TABLET ORAL at 08:37

## 2025-06-15 RX ADMIN — SENNOSIDES AND DOCUSATE SODIUM 1 TABLET: 50; 8.6 TABLET ORAL at 21:18

## 2025-06-15 RX ADMIN — ACETAMINOPHEN 650 MG: 325 TABLET ORAL at 08:37

## 2025-06-15 RX ADMIN — DICLOFENAC SODIUM 2 G: 10 GEL TOPICAL at 21:27

## 2025-06-15 RX ADMIN — ATORVASTATIN CALCIUM 10 MG: 10 TABLET, FILM COATED ORAL at 21:18

## 2025-06-15 RX ADMIN — ENOXAPARIN SODIUM 30 MG: 30 INJECTION SUBCUTANEOUS at 08:37

## 2025-06-15 RX ADMIN — LOSARTAN POTASSIUM 50 MG: 50 TABLET, FILM COATED ORAL at 08:37

## 2025-06-15 RX ADMIN — ENOXAPARIN SODIUM 30 MG: 30 INJECTION SUBCUTANEOUS at 21:18

## 2025-06-15 RX ADMIN — POLYETHYLENE GLYCOL 3350 17 G: 17 POWDER, FOR SOLUTION ORAL at 08:37

## 2025-06-15 RX ADMIN — ATENOLOL 50 MG: 50 TABLET ORAL at 08:37

## 2025-06-15 NOTE — PLAN OF CARE
Problem: SAFETY ADULT  Goal: Patient will remain free of falls  Description: INTERVENTIONS:  - Educate patient/family on patient safety including physical limitations  - Instruct patient to call for assistance with activity   - Consider consulting OT/PT to assist with strengthening/mobility based on AM PAC & JH-HLM score  - Consult OT/PT to assist with strengthening/mobility   - Keep Call bell within reach  - Keep bed low and locked with side rails adjusted as appropriate  - Keep care items and personal belongings within reach  - Initiate and maintain comfort rounds  - Make Fall Risk Sign visible to staff  - Offer Toileting every 2 Hours, in advance of need  - Initiate/Maintain bed/chair alarm  - Obtain necessary fall risk management equipment:    - Apply yellow socks and bracelet for high fall risk patients  - Consider moving patient to room near nurses station  Outcome: Progressing  Goal: Maintain or return to baseline ADL function  Description: INTERVENTIONS:  -  Assess patient's ability to carry out ADLs; assess patient's baseline for ADL function and identify physical deficits which impact ability to perform ADLs (bathing, care of mouth/teeth, toileting, grooming, dressing, etc.)  - Assess/evaluate cause of self-care deficits   - Assess range of motion  - Assess patient's mobility; develop plan if impaired  - Assess patient's need for assistive devices and provide as appropriate  - Encourage maximum independence but intervene and supervise when necessary  - Involve family in performance of ADLs  - Assess for home care needs following discharge   - Consider OT consult to assist with ADL evaluation and planning for discharge  - Provide patient education as appropriate  - Monitor functional capacity and physical performance, use of AM PAC & JH-HLM   - Monitor gait, balance and fatigue with ambulation    Outcome: Progressing  Goal: Maintains/Returns to pre admission functional level  Description:  INTERVENTIONS:  - Perform AM-PAC 6 Click Basic Mobility/ Daily Activity assessment daily.  - Set and communicate daily mobility goal to care team and patient/family/caregiver.   - Collaborate with rehabilitation services on mobility goals if consulted  - Perform Range of Motion 3 times a day.  - Reposition patient every 2 hours.  - Dangle patient 3 times a day  - Stand patient 3 times a day  - Ambulate patient 3 times a day  - Out of bed to chair for meals  - Out of bed for toileting  - Record patient progress and toleration of activity level   Outcome: Progressing     Problem: DISCHARGE PLANNING  Goal: Discharge to home or other facility with appropriate resources  Description: INTERVENTIONS:  - Identify barriers to discharge w/patient and caregiver  - Arrange for needed discharge resources and transportation as appropriate  - Identify discharge learning needs (meds, wound care, etc.)  - Arrange for interpretive services to assist at discharge as needed  - Refer to Case Management Department for coordinating discharge planning if the patient needs post-hospital services based on physician/advanced practitioner order or complex needs related to functional status, cognitive ability, or social support system  Outcome: Progressing

## 2025-06-15 NOTE — ASSESSMENT & PLAN NOTE
Home:  Atenolol-chlorthalidone 50-25 mg qd/Diovan 320 mg qd  Here:  Atenolol 50 mg qd/Losartan 50 mg qd  Has Hydralazine 10 mg q8hrs prn SBP >165  BP was bit too high but much better for the past 48 hours  Increased Losartan to 50 mg qd to start 6/12/25   For the sake of simplicity for home 6/16, may be able to add back the chlorthalidone over the weekend and/or increase the Losartan to 100mg qd.  Losartan 50mg is equal to 80 mg of Valsartan so 100mg of Losartan would be = 160mg Valsartan.  Valsartan 320 mg cannot be split in half.  Alternatively, could give him scripts for current meds for home but will see how he does.  Will continue to hold Chlorthalidone  BP in AM has been at goal, does go up to 140's at night. No changes today

## 2025-06-15 NOTE — PROGRESS NOTES
06/15/25 1400   Pain Assessment   Pain Assessment Tool 0-10   Pain Score No Pain   Restrictions/Precautions   Precautions Cognitive;Fall Risk;Pain   Comprehension   Comprehension (FIM) 5 - Needs help/cues, repetition only RARELY ( < 10% of the time)   Expression   Expression (FIM) 5 - Needs help/cues only RARELY (< 10% of the time)   Social Interaction   Social Interaction (FIM) 5 - Interacts appropriately with others 90% of time   Problem Solving   Problem solving (FIM) 5 - Solves basic problems 90% of time   Memory   Memory (FIM) 4 - Recognizes/recalls/performs 75-89%   Speech/Language/Cognition Assessment   Treatment Assessment Pt was seen in room for skilled ST session targeting cognitive-linguistic communication skills. Upon entering room, pt's 2 sons and wife were present but left as session began. SLP and pt participated in brief rapport building as this SLP has not seen pt in some time. Pt noted to recall SLP and tasks previously completed. Pt noted to recall previous PT and OT sessions from earlier in day. Pt noting to recall d/c tomorrow and reports he is unsure about when he will start OP but reports 'I think they will finalize everything tomorrow for me. I will do whatever they want me to do!'     Began session with reviewing previously reviewed memory strategies which pt was unable to recall and needed MAX A w/ semantic cues to recall in 4/4. SLP reviewed again at length 4 memory strategies. Then targeted functional memory tasks with picture scenes. SLP provided pt with 5 picture scenes and asked pt to functionally use visualization and association strategy by recalling the images and SLP asking verbal questions about each after. Pt was able to complete task in task 1 with 7/10 increased to 8/10, task 2 8/10 increasing to 9/10, task 3 10/10, task 4 12/14 increasing to 13/14 and task 5 5/10 increasing to 7/10. Pt noted to enjoy task and throughout was observed to show improvement in functional memory  strategies. Initially relying on SLP for verbal cues and examples on how to functionally use association task. Pt noted to indep increase accuracy with functional use and would use repetition strategy as well to help with comprehension and increasing STM. Last task targeted PS and reasoning in which SLP provided a outcome and asked pt to identify what caused the situation based on the outcome. Pt was able to complete task in 9/10 trials increasing 10/10. Pt noted to need min A w/ problem solving and reasoning but improved over time in comprehension and identifying safety as well in task. Pt noted increase comprehension in tasks with given situations and overall noted continued difficulty with STM most this session.  At this time, pt continues to benefit from ongoing skilled SLP services to maximize overall cognitive linguistic skills in attempts to decrease caregiver burden over time.   SLP Therapy Minutes   SLP Time In 1400   SLP Time Out 1445   SLP Total Time (minutes) 45   SLP Mode of treatment - Individual (minutes) 45   SLP Mode of treatment - Concurrent (minutes) 0   SLP Mode of treatment - Group (minutes) 0   SLP Mode of treatment - Co-treat (minutes) 0   SLP Mode of Treatment - Total time(minutes) 45 minutes   SLP Cumulative Minutes 345   Therapy Time missed   Time missed? No

## 2025-06-15 NOTE — ASSESSMENT & PLAN NOTE
"MRI of the brain that showed large bilateral SDH  S/p bilateral [place] holes for evacuation of SDH 5/25/25  MMA was not able to be done due to anatomical considerations  For no full AC/AP for 2 weeks = 6/9-11/25 then need to clear with NS  D/W NS 6/6/25 = followup CTH for 6/9/25 done = \"Stable to slightly decreased size of mixed density bilateral cerebral convexity subdural hematomas admixed with foci of air. No significant midline shift\"  NS saw him in f/u in R/T the CTH done 6/9.  They said repeat CTH on 6/15 and then will determine if cleared to start ASA 81mg qd (order is in the computer for 6/15 at 0700).  They also took out staples and sutures.  Per NS = wash incision QOD  "

## 2025-06-15 NOTE — PROGRESS NOTES
"   06/15/25 3099   Pain Assessment   Pain Assessment Tool 0-10   Pain Score No Pain   Restrictions/Precautions   Precautions Cognitive;Fall Risk;Pain   Weight Bearing Restrictions No   ROM Restrictions No   Braces or Orthoses   (BLE ace wraps)   Cognition   Overall Cognitive Status Impaired   Arousal/Participation Alert;Cooperative   Attention Attends with cues to redirect   Orientation Level Oriented X4   Memory Decreased short term memory;Decreased recall of precautions   Following Commands Follows one step commands without difficulty   Sit to Stand   Type of Assistance Needed Adaptive equipment;Independent   Comment RW   Sit to Stand CARE Score 6   Bed-Chair Transfer   Type of Assistance Needed Adaptive equipment;Independent   Comment RW   Chair/Bed-to-Chair Transfer CARE Score 6   Transfer Bed/Chair/Wheelchair   Adaptive Equipment Roller Walker   Car Transfer   Type of Assistance Needed Supervision;Adaptive equipment   Comment increased time, 3\" cushion on seat to mimic SUV.   Car Transfer CARE Score 4   Walk 10 Feet   Type of Assistance Needed Adaptive equipment;Independent   Comment RW   Walk 10 Feet CARE Score 6   Walk 50 Feet with Two Turns   Type of Assistance Needed Adaptive equipment;Independent   Comment RW   Walk 50 Feet with Two Turns CARE Score 6   Walk 150 Feet   Type of Assistance Needed Adaptive equipment;Independent   Comment RW   Walk 150 Feet CARE Score 6   Walking 10 Feet on Uneven Surfaces   Type of Assistance Needed Supervision;Adaptive equipment   Comment RW over floor mat   Walking 10 Feet on Uneven Surfaces CARE Score 4   Ambulation   Primary Mode of Locomotion Prior to Admission Walk   Distance Walked (feet) 130 ft  (x2, 300', 150')   Assist Device Roller Walker   Gait Pattern Inconsistant Rose;Slow Rose;Decreased foot clearance;Wide JESÚS;Shuffle;Improper weight shift   Limitations Noted In Heel Strike;Speed;Swing;Balance   Provided Assistance with: Direction   Walk Assist Level " Modified Independent   Does the patient walk? 2. Yes   Wheelchair mobility   Does the patient use a wheelchair? 0. No   Curb or Single Stair   Style negotiated Curb   Type of Assistance Needed Set-up / clean-up;Adaptive equipment   Comment RW   1 Step (Curb) CARE Score 5   4 Steps   Type of Assistance Needed Supervision;Adaptive equipment   Comment 1 HR on FF   4 Steps CARE Score 4   12 Steps   Type of Assistance Needed Supervision;Adaptive equipment   Comment 1 HR on FF   12 Steps CARE Score 4   Stairs   Type Stairs;Curb   # of Steps 12   Weight Bearing Precautions Fall Risk   Assist Devices Single Rail;Roller Walker   Picking Up Object   Type of Assistance Needed Independent;Adaptive equipment   Comment RW and reacher   Picking Up Object CARE Score 6   Toilet Transfer   Type of Assistance Needed Independent;Adaptive equipment   Comment RW   Toilet Transfer CARE Score 6   Toilet Transfer   Surface Assessed Standard Toilet   Equipment Use   NuStep L2 x10 min BLE/UE   Assessment   Treatment Assessment Pt participated in skilled PT session with increased focus on care scores in preporation of discharge home tomorrow 6/16. Pt remains MI with use of RW but recommended S on uneven surfaces and community access. Pt remained S with a car transfer as he required VC's to sit down first and then swing BLE in. Pt has a HEP and is recommended OPPT services to increase safety awareness, balance and gait with LRAD.   Problem List Decreased strength;Decreased range of motion;Decreased endurance;Impaired balance;Decreased mobility;Decreased coordination;Decreased cognition;Impaired judgement;Decreased safety awareness;Obesity;Pain   Barriers to Discharge Decreased caregiver support   PT Barriers   Functional Limitation Car transfers;Stair negotiation   Plan   Treatment/Interventions Therapeutic exercise;Cognitive reorientation;Gait training   Progress Progressing toward goals   Discharge Recommendation   Equipment Recommended  Walker   PT Therapy Minutes   PT Time In 0930   PT Time Out 1100   PT Total Time (minutes) 90   PT Mode of treatment - Individual (minutes) 90   PT Mode of treatment - Concurrent (minutes) 0   PT Mode of treatment - Group (minutes) 0   PT Mode of treatment - Co-treat (minutes) 0   PT Mode of Treatment - Total time(minutes) 90 minutes   PT Cumulative Minutes 880   Therapy Time missed   Time missed? No

## 2025-06-15 NOTE — ASSESSMENT & PLAN NOTE
HbA1C 6.2 on 5/24/25  Home:  Metformin 1000 mg BID  Here:  QID Accuchecks/SSI  BSs are currently , occasionally he spikes up in the evening before bed but overall well controlled  Consider restarting Metformin at discharge

## 2025-06-15 NOTE — PROGRESS NOTES
"Progress Note - Hospitalist   Name: Deonte Greenfield 74 y.o. male I MRN: [id number]  Unit/Bed#: -01 I Date of Admission: 6/3/2025   Date of Service: 6/15/2025 I Hospital Day: 12    Assessment & Plan  Acute on chronic intracranial subdural hematoma (HCC)  MRI of the brain that showed large bilateral SDH  S/p bilateral [place] holes for evacuation of SDH 5/25/25  MMA was not able to be done due to anatomical considerations  For no full AC/AP for 2 weeks = 6/9-11/25 then need to clear with NS  D/W NS 6/6/25 = followup CTH for 6/9/25 done = \"Stable to slightly decreased size of mixed density bilateral cerebral convexity subdural hematomas admixed with foci of air. No significant midline shift\"  NS saw him in f/u in R/T the CTH done 6/9.  They said repeat CTH on 6/15 and then will determine if cleared to start ASA 81mg qd (order is in the computer for 6/15 at 0700).  They also took out staples and sutures.  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 No significant stenosis of the cervical left internal carotid artery. Severe stenosis origin of the dominant left vertebral artery. Hypoplastic right vertebral artery. No high-grade intracranial stenosis, large vessel occlusion or aneurysm\"  Neurology saw in consult for the CVA.  They recommended that the patient start ASA 81mg daily when cleared by Neurosurgery as above.    For LOOP recorder implant as an outpatient.  He did have a previous Ziopatch done that was negative for atrial fibrillation.    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  Continue statin  Pancreatic cyst  Had an OP ultrasound done for elevated total bilirubin that showed cystic lesions " "in the pancreas for which further imaging was recommended  MRI done thereafter as an OP showed \"Numerous cystic lesions scattered throughout the pancreas with a dominant lesion measuring 18 mm in the body. Worrisome features include ductal dilation up to 6 mm in the tail the pancreas and abrupt change in caliber of the pancreatic duct. Management recommendation: Because of presence of multiple worrisome features, surgical consultation/management with consideration for EUS recommended.  Management and follow up recommendations for cystic pancreatic lesions are based on Institutional consensus and international evidence-based Kyoto guidelines for the management of intraductal papillary mucinous neoplasm of the pancreas. Pancreatology 24 (2024) 255-270\".  Per IMs note in the hospital:  \"Reached out to primary team and surgery oncall as imaging findings are suggestive of malignancy. According to surgery they recommend out patient follow up with surg onc for possible biopsy and out patient GI follow up. Surgery called the surg onc office to schedule the appointment for patient. I did a STAT referral for GI outpatient. Primary team will inform the patient regarding imaging findings and the referrals\".  Most recent CMP on 6/3/25 with normal total bilirubin/LFT  Diabetes mellitus (HCC)  HbA1C 6.2 on 5/24/25  Home:  Metformin 1000 mg BID  Here:  QID Accuchecks/SSI  BSs are currently , occasionally he spikes up in the evening before bed but overall well controlled  Consider restarting Metformin at discharge  HTN (hypertension)  Home:  Atenolol-chlorthalidone 50-25 mg qd/Diovan 320 mg qd  Here:  Atenolol 50 mg qd/Losartan 50 mg qd  Has Hydralazine 10 mg q8hrs prn SBP >165  BP was bit too high but much better for the past 48 hours  Increased Losartan to 50 mg qd to start 6/12/25   For the sake of simplicity for home 6/16, may be able to add back the chlorthalidone over the weekend and/or increase the Losartan to 100mg qd. "  Losartan 50mg is equal to 80 mg of Valsartan so 100mg of Losartan would be = 160mg Valsartan.  Valsartan 320 mg cannot be split in half.  Alternatively, could give him scripts for current meds for home but will see how he does.  Will continue to hold Chlorthalidone  BP in AM has been at goal, does go up to 140's at night. No changes today  Thrombocytopenia (HCC)  Chronic  Follows with H-O as OP  Platelet count was 223 on 6/6/25  CBC 6/12/25 = platelet count is 220  Mixed hyperlipidemia  Takes Lipitor 10 mg qd at home = continue here  Normocytic anemia  Continue Iron 325 mg QOD  Gout  Continue Allopurinol 300mg qd as at home  No recent flares  CKD (chronic kidney disease)  Baseline 0.7-0.9  Stable  Bilateral leg edema  Says gets at home if sleeps in chair as opposed to bed  L>R which is normal for him  Continue to ACE wrap  Leg wound, left  WC saw in acute hospital stay  Continue local WC  Tx per PMR    The above assessment and plan was reviewed and updated as determined by my evaluation of the patient on 6/15/2025.    History of Present Illness   Patient seen and examined. Patients overnight issues or events were reviewed with nursing staff. New or overnight issues include the following:   Patient sitting up in chair. No new complaints. Looking forward to going home tomorrow. Appetite good    Review of Systems    Objective :  Temp:  [97.5 °F (36.4 °C)-97.6 °F (36.4 °C)] 97.5 °F (36.4 °C)  HR:  [55-57] 55  BP: (126-140)/(60-65) 126/62  Resp:  [17] 17  SpO2:  [95 %-97 %] 97 %  O2 Device: None (Room air)    Invasive Devices       None                   Physical Exam  General Appearance: NAD; pleasant  HEENT: conjuctiva normal; mucous membranes moist; face symmetrical  Neck:  Supple  Lungs: clear bilaterally, normal respiratory effort, no retractions, expiratory effort normal, on room air  CV: regular rate and rhythm, no murmurs rubs or gallops noted   ABD: soft non tender, +BS x4  EXT: DP pulses intact, no  lymphadenopathy, odalis LE edema-legs wrapped  Skin: normal turgor, normal texture, no rash  Psych: affect normal, mood normal  Neuro: AAOx3    The above physical exam was reviewed and updated as determined by my evaluation of the patient on 6/15/2025.      Lab Results: I have reviewed the following results:  Results from last 7 days   Lab Units 06/12/25  0612   WBC Thousand/uL 8.42   HEMOGLOBIN g/dL 11.6*   HEMATOCRIT % 37.3   PLATELETS Thousands/uL 220     Results from last 7 days   Lab Units 06/12/25  0612   SODIUM mmol/L 137   POTASSIUM mmol/L 4.8   CHLORIDE mmol/L 102   CO2 mmol/L 30   BUN mg/dL 19   CREATININE mg/dL 0.84   CALCIUM mg/dL 9.4             Results from last 7 days   Lab Units 06/15/25  0701 06/14/25 2059 06/14/25  1543   POC GLUCOSE mg/dl 103 129 132         Review of Scheduled Meds: Medications  reviewed and reconciled as needed  Current Facility-Administered Medications   Medication Dose Route Frequency Provider Last Rate    acetaminophen  650 mg Oral Q6H PRN JOSE ALEJANDRO Bradley      allopurinol  300 mg Oral Daily JOSE ALEJANDRO Bradley      atenolol  50 mg Oral Daily JOSE ALEJANDRO Bradley      And    [Held by provider] chlorthalidone  25 mg Oral Daily JOSE ALEJANDRO Bradley      atorvastatin  10 mg Oral HS JOSE ALEJANDRO Bradley      Diclofenac Sodium  2 g Topical 4x Daily PRN JOSE ALEJANDRO Bradley      enoxaparin  30 mg Subcutaneous Q12H [facility] JOSE ALEJANDRO Bradley      ferrous sulfate  325 mg Oral Every Other Day JOSE ALEJANDRO Bradley      hydrALAZINE  10 mg Oral Q8H PRN JOSE ALEJANDRO Bradley      insulin lispro  1-5 Units Subcutaneous TID AC JOSE ALEJANDRO Bradley      insulin lispro  1-5 Units Subcutaneous HS JOSE ALEJANDRO Bradley      lidocaine   Topical 4x Daily PRN JOSE ALEJANDRO Bradley      losartan  50 mg Oral Daily JOSE ALEJANDRO Bradley      polyethylene glycol  17 g Oral Daily JOSE ALEJANDRO Bradley       senna-docusate sodium  1 tablet Oral HS JOSE ALEJANDRO Bradley         VTE Pharmacologic Prophylaxis: lovenox  Code Status: Level 1 - Full Code  Current Length of Stay: 12 day(s)    Administrative Statements     ** Please Note:  voice to text software may have been used in the creation of this document. Although proof errors in transcription or interpretation are a potential of such software**

## 2025-06-15 NOTE — PROGRESS NOTES
"OT daily tx note     06/15/25 0700   Pain Assessment   Pain Assessment Tool 0-10   Pain Score No Pain   Restrictions/Precautions   Precautions Cognitive;Fall Risk;Pain   Weight Bearing Restrictions No   ROM Restrictions No   Braces or Orthoses Other (Comment)  (BLE ace wraps)   Lifestyle   Autonomy \"I am so excited to be outside again\"   Eating   Type of Assistance Needed Independent   Physical Assistance Level No physical assistance   Comment seated in recliner   Eating CARE Score 6   Oral Hygiene   Type of Assistance Needed Independent   Physical Assistance Level No physical assistance   Comment in stance at sink   Oral Hygiene CARE Score 6   Shower/Bathe Self   Type of Assistance Needed Supervision   Physical Assistance Level No physical assistance   Comment pt completed shower at mod seated level w/ SUP; pt able to wash 8/10 body parts w/ A needed for feet but completes at home using LHS. rec SUP and TTB for showers at home. pt able to wash incision and wife can assist as needed following POD #4   Shower/Bathe Self CARE Score 4   Upper Body Dressing   Type of Assistance Needed Independent   Physical Assistance Level No physical assistance   Comment seated   Upper Body Dressing CARE Score 6   Lower Body Dressing   Type of Assistance Needed Independent   Physical Assistance Level No physical assistance   Comment seated and w/ use of LHAE able to manage LB dressing   Lower Body Dressing CARE Score 6   Putting On/Taking Off Footwear   Type of Assistance Needed Independent   Physical Assistance Level No physical assistance   Comment ind for sock/shoe mgmt using LHAE; TA needed for ace wraps but states wife will assist w/ don/doff at home   Putting On/Taking Off Footwear CARE Score 6   Sit to Stand   Type of Assistance Needed Independent   Physical Assistance Level No physical assistance   Comment w/ RW   Sit to Stand CARE Score 6   Bed-Chair Transfer   Type of Assistance Needed Independent   Physical Assistance Level " No physical assistance   Comment w/ RW   Chair/Bed-to-Chair Transfer CARE Score 6   Toileting Hygiene   Type of Assistance Needed Independent   Physical Assistance Level No physical assistance   Toileting Hygiene CARE Score 6   Toilet Transfer   Type of Assistance Needed Independent   Physical Assistance Level No physical assistance   Toilet Transfer CARE Score 6   Cognition   Overall Cognitive Status Impaired   Arousal/Participation Alert;Cooperative   Attention Attends with cues to redirect   Orientation Level Oriented X4   Memory Decreased short term memory;Decreased recall of precautions   Following Commands Follows one step commands without difficulty   Activity Tolerance   Activity Tolerance Patient tolerated treatment well   Assessment   Treatment Assessment Pt engaged in skilled OT session with focus on ADL Retraining , LB Dressing, UB dressing,  LHAE education/training, Functional Transfers, Functional Cognition, Standing tolerance, Standing balance , Energy conservation training/education, healthy coping education, Leisure and social pursuits, and community re-integration. Pt has met all LTG with the highest level of independence. OT services are not warranted at this time at [facility] and pt will continue with OP PT following DC.   OT Therapy Minutes   OT Time In 0700   OT Time Out 0830   OT Total Time (minutes) 90   OT Mode of treatment - Individual (minutes) 90   OT Mode of treatment - Concurrent (minutes) 0   OT Mode of treatment - Group (minutes) 0   OT Mode of treatment - Co-treat (minutes) 0   OT Mode of Treatment - Total time(minutes) 90 minutes   OT Cumulative Minutes 1051   Therapy Time missed   Time missed? No

## 2025-06-16 VITALS
RESPIRATION RATE: 16 BRPM | DIASTOLIC BLOOD PRESSURE: 74 MMHG | SYSTOLIC BLOOD PRESSURE: 140 MMHG | OXYGEN SATURATION: 96 % | HEIGHT: 65 IN | BODY MASS INDEX: 37.19 KG/M2 | HEART RATE: 58 BPM | WEIGHT: 223.2 LBS | TEMPERATURE: 97.5 F

## 2025-06-16 PROBLEM — R13.12 OROPHARYNGEAL DYSPHAGIA: Status: RESOLVED | Noted: 2025-06-03 | Resolved: 2025-06-16

## 2025-06-16 LAB
GLUCOSE SERPL-MCNC: 95 MG/DL (ref 65–140)
GLUCOSE SERPL-MCNC: 96 MG/DL (ref 65–140)

## 2025-06-16 PROCEDURE — 99239 HOSP IP/OBS DSCHRG MGMT >30: CPT | Performed by: NURSE PRACTITIONER

## 2025-06-16 PROCEDURE — 99233 SBSQ HOSP IP/OBS HIGH 50: CPT | Performed by: STUDENT IN AN ORGANIZED HEALTH CARE EDUCATION/TRAINING PROGRAM

## 2025-06-16 PROCEDURE — 82948 REAGENT STRIP/BLOOD GLUCOSE: CPT

## 2025-06-16 PROCEDURE — 99024 POSTOP FOLLOW-UP VISIT: CPT | Performed by: NEUROLOGICAL SURGERY

## 2025-06-16 RX ORDER — ASPIRIN 81 MG/1
81 TABLET ORAL DAILY
Status: DISCONTINUED | OUTPATIENT
Start: 2025-06-16 | End: 2025-06-16 | Stop reason: HOSPADM

## 2025-06-16 RX ORDER — POTASSIUM CITRATE AND CITRIC ACID MONOHYDRATE 1100; 334 MG/5ML; MG/5ML
5 SOLUTION ORAL
Start: 2025-06-16

## 2025-06-16 RX ORDER — LOSARTAN POTASSIUM 50 MG/1
50 TABLET ORAL DAILY
Qty: 30 TABLET | Refills: 0 | Status: SHIPPED | OUTPATIENT
Start: 2025-06-17

## 2025-06-16 RX ORDER — ALLOPURINOL 300 MG/1
300 TABLET ORAL DAILY
Start: 2025-06-16

## 2025-06-16 RX ORDER — ATORVASTATIN CALCIUM 10 MG/1
10 TABLET, FILM COATED ORAL
Start: 2025-06-16

## 2025-06-16 RX ORDER — FERROUS SULFATE 325(65) MG
325 TABLET ORAL EVERY OTHER DAY
Start: 2025-06-16

## 2025-06-16 RX ORDER — ACETAMINOPHEN 325 MG/1
650 TABLET ORAL EVERY 6 HOURS PRN
Start: 2025-06-16 | End: 2025-06-30

## 2025-06-16 RX ORDER — ASPIRIN 81 MG/1
81 TABLET ORAL DAILY
Start: 2025-06-16

## 2025-06-16 RX ORDER — ATENOLOL AND CHLORTHALIDONE TABLET 50; 25 MG/1; MG/1
1 TABLET ORAL DAILY
Start: 2025-06-16

## 2025-06-16 RX ADMIN — ALLOPURINOL 300 MG: 300 TABLET ORAL at 08:26

## 2025-06-16 RX ADMIN — FERROUS SULFATE TAB 325 MG (65 MG ELEMENTAL FE) 325 MG: 325 (65 FE) TAB at 08:26

## 2025-06-16 RX ADMIN — ATENOLOL 50 MG: 50 TABLET ORAL at 08:26

## 2025-06-16 RX ADMIN — LOSARTAN POTASSIUM 50 MG: 50 TABLET, FILM COATED ORAL at 08:26

## 2025-06-16 RX ADMIN — ENOXAPARIN SODIUM 30 MG: 30 INJECTION SUBCUTANEOUS at 08:24

## 2025-06-16 NOTE — DISCHARGE SUMMARY
"Discharge Summary - Hospitalist   Name: Deonte Greenfield 74 y.o. male I MRN: [id number]  Unit/Bed#: -01 I Date of Admission: 6/3/2025   Date of Service: 6/16/2025 I Hospital Day: 13     Assessment & Plan  Acute on chronic intracranial subdural hematoma (HCC)  MRI of the brain that showed large bilateral SDH  S/p bilateral Guinda holes for evacuation of SDH 5/25/25  MMA was not able to be done due to anatomical considerations  For no full AC/AP for 2 weeks = 6/9-11/25 then need to clear with NS  D/W NS 6/6/25 = followup CTH for 6/9/25 done = \"Stable to slightly decreased size of mixed density bilateral cerebral convexity subdural hematomas admixed with foci of air. No significant midline shift\"  NS saw him in f/u in R/T the CTH done 6/9.  They said repeat CTH on 6/15 and then will determine if cleared to start ASA 81mg qd (order is in the computer for 6/15 at 0700).  They also took out staples and sutures.  Followup CTH done 6/15/25 = stable.  NS saw today 6/16 and cleared for ASA 81mg qd.  He will be for another CTH 2-3 days prior to his NS appt on 7/9/25.  Incision healed  Acute CVA (cerebrovascular accident) (HCC)  Acute CVA right superior cerebellum  Was found on CTH 5/30, confirmed by MRI  CTA of the head/neck showed \"Moderate (approximately 50%) stenosis in the 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 No significant stenosis of the cervical left internal carotid artery. Severe stenosis origin of the dominant left vertebral artery. Hypoplastic right vertebral artery. No high-grade intracranial stenosis, large vessel occlusion or aneurysm\"  Neurology saw in consult for the CVA.  They recommended that the patient start ASA 81mg daily when cleared by Neurosurgery as above.    For LOOP recorder implant as an outpatient.  He did have a previous Ziopatch done that was negative for atrial fibrillation.  He has seen Dr Dutta in the recent past and can return to him.  A referral for " "the LOOP was placed    ECHO showed LVEF 65% with grade 1 diastolic dysfunction and a small mobile septal aneurysm without thrombus.  Continue statin  Pancreatic cyst  Had an OP ultrasound done for elevated total bilirubin that showed cystic lesions in the pancreas for which further imaging was recommended  MRI done thereafter as an OP on 5/22/25 showed \"Numerous cystic lesions scattered throughout the pancreas with a dominant lesion measuring 18 mm in the body. Worrisome features include ductal dilation up to 6 mm in the tail the pancreas and abrupt change in caliber of the pancreatic duct. Management recommendation: Because of presence of multiple worrisome features, surgical consultation/management with consideration for EUS recommended.  Management and follow up recommendations for cystic pancreatic lesions are based on Institutional consensus and international evidence-based Kyoto guidelines for the management of intraductal papillary mucinous neoplasm of the pancreas. Pancreatology 24 (2024) 255-270\".  Per IMs note in the hospital:  \"Reached out to primary team and surgery oncall as imaging findings are suggestive of malignancy. According to surgery they recommend out patient follow up with surg onc for possible biopsy and out patient GI follow up. Surgery called the surg onc office to schedule the appointment for patient. I did a STAT referral for GI outpatient. Primary team will inform the patient regarding imaging findings and the referrals\".  Most recent CMP on 6/3/25 with normal total bilirubin/LFT  Diabetes mellitus (HCC)  HbA1C 6.2 on 5/24/25  Home:  Metformin 500 mg BID (he told me that recently as OP was reduced to 500mg BID from 1000 mg BID)  Here:  QID Accuchecks/SSI  BSs are currently , occasionally he spikes up in the evening before bed but overall well controlled  Blood sugars are great on no Metformin and therefore will not restart for now.  He knows to check his BSs twice daily and call if " >200.  HTN (hypertension)  Home:  Atenolol-chlorthalidone 50-25 mg qd/Diovan 320 mg qd  Here:  Atenolol 50 mg qd/Losartan 50 mg qd  Has Hydralazine 10 mg q8hrs prn SBP >165  Will add back Chlorthalidone 25mg qd 6/17/25  Will send home on Losartan 50mg qd as he is on here.  Can't get to dose equivalent of the Diovan that he was taking at home and jeffrey since adding back the Chlorthalidone to start tomorrow 6/17.  He will be getting a 30 day supply of the Losartan.  Will get BMP Monday 6/23/25  Thrombocytopenia (HCC)  Chronic  Follows with H-O as OP  Platelet count was 223 on 6/6/25  CBC 6/12/25 = platelet count is 220  Mixed hyperlipidemia  Takes Lipitor 10 mg qd at home = continue here  Normocytic anemia  Continue Iron 325 mg QOD  Gout  Continue Allopurinol 300mg qd as at home  No recent flares  CKD (chronic kidney disease)  Baseline 0.7-0.9  Stable  Bilateral leg edema  Says gets at home if sleeps in chair as opposed to bed  L>R which is normal for him but increased since he has been off Chlorthalidone   Continue to ACE wrap  Leg wound, left  WC saw in acute hospital stay  Continue local WC  Tx per PMR     Discharge Summary   Deonte Greenfield 74 y.o. male MRN: [id number]  Unit/Bed#: -01 Encounter: [id number]  Admission Date: 6/3/2025     Discharge Date: 6/16/25    Discharging Provider: Evon Price    PCP:  [phone]      HPI: Refer to previous hospitalization for complete record    Summary of [facility] Course:     During the course of the patient's stay in [facility], his blood sugars remained normal without restarting his Metformin.  He was not sent home on the Metformin and will be doing BID BS checks.  Neurosurgery did clear him to be back on ASA after they reviewed his followup CTH from 6/15/25.  He was on Atenolol 50mg qd and Losartan 50mg qd while here.  At home, he takes Atenolol-Chlorthalidone 50-25 daily.  BPs were such that his Chlorthalidone is able to be restarted tomorrow 6/17 which also should  "help with his LE edema (which is chronic but increased since here).  He was made aware of followup appointments that he needs for evaluation of a pancreatic cyst and to see Cardiology for a possible LOOP implant.      The patient was discharged in stable condition with family to home on 6/16/25.    Discharge Physical Examination:  /74   Pulse 58   Temp 97.5 °F (36.4 °C) (Oral)   Resp 16   Ht 5' 5\" (1.651 m)   Wt 101 kg (223 lb 3.2 oz)   SpO2 96%   BMI 37.14 kg/m²     General Appearance: no distress, non toxic appearing  HEENT: PERRLA, conjuctiva normal; oropharynx clear; mucous membranes moist   Neck:  Supple, normal ROM  Lungs: CTA, normal respiratory effort, no retractions, expiratory effort normal  CV: regular rate and rhythm; no rubs/murmurs/gallops, PMI normal   ABD: soft; ND/NT; +BS  EXT: + LE edema  Skin: normal turgor, normal texture  Psych: affect normal, mood normal  Neuro: AAO        Significant Findings, Care, Treatment and Services Provided: Acute comprehensive interdisciplinary inpatient rehabilitation including PT, OT, SLP, RN, CM, SW, dietary, psychology, etc.      Physiatry Additions/Comorbidities:    Acute on chronic intracranial subdural hematoma (HCC)  > Originally presented in early April due to unwitnessed fall and found to have small subdural hemorrhage which subsequently appeared to resolve upon repeat CT head imaging. Patient opted to have MRI performed in outpatient setting and MRI team sent him to ER for large bilateral high convexity SDHs with mass effect. Presented to ED on 5/24 where he received bilateral Yael holes with 4 subdural drains placed  > Drains removed on 5/27 and 5/28  > completed 7 days of Keppra for seizure prophylaxis  > CTH on 6/9/25 with stable evolution of SDH's and postoperative pneumocephalus. NSGY to follow up in 1 week although note that is still not cleared for aspirin use  > CT head 6/15 with interval decrease in size and hyperdense blood products of " subdural hematomas without midline shift     Repeat CT head STAT if GCS declines more than 2 points in 1 hour  SBP < 160  Plt > 100  Neurosurgery saw patient on 6/16 before discharge and cleared patient to start aspirin before going home  Follow-up with neurosurgery in 6 weeks  PT/OT program completed    Acute CVA (cerebrovascular accident) (HCC)  --MRI brain 5/24:  New, large bilateral high convexity subdural hematomas, right greater than left, which are predominantly subacute in timing, with mass effect on the bilateral cerebral hemispheres, and effacement of the sulci. No significant midline shift noted. FLAIR signal hyperintensity along the sulci of the right frontal and parietal convexities, which may represent trace associated subarachnoid hemorrhage. No acute infarct. Mild chronic white matter microangiopathic changes involving both cerebral hemispheres.  --CT head 5/30: Acute appearing infarct in right superior cerebellum. Increase in volume of hyperdense blood products bilaterally  --Echo 4/2025: EF 65%. Normal systolic function. Grade 1 diastolic relaxation. 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   LDL 62  A1c 6.2  TTE: 65%     Given new stroke on MRI with concurrent SDH, patient will need ASA when cleared by NSGY after repeat CTH. Given recent negative ziopatch in April 2025, will need loop recorder outpatient.      Lipitor 10 mg qHS  Normotension  Start ASA 81 mg  Outpatient loop recorder  PT/OT  SLP to continue language and cognition training/evaluation    Gout  - continue Allopurinol    Pancreatic cyst  > Patient had MRI abdomen completed outpatient (5/22, d/t jaundice & thrombocytopenia), with numerous pancreatic cystic lesions concerning for malignancy. This showed numerous cystic lesions scattered throughout the pancreas that were concerning for malignancy at this time.   >Patient's family was informed  of these and are aware that following discharge from acute rehab they will need to follow-up with the surgical oncology team as well as GI for further evaluation and treatment of these.  Patient and spouse both aware.     - Surg Onc & GI referrals placed by trauma surgery during acute hospitalization discharge    Superficial foreign body of left leg without major open wound and without infection  - Wound care consulted while in acute care  - Left Pretibial leg wound: cleanse left lower leg wound with soap and water, pat dry, apply xeroform to wound bed and cover with foam dressing. Change every other day.    Oropharyngeal dysphagia  Originally on modified diet but now on regular and thins     - continue recommended diet of regular texture and thin liquids  - Aspiration precautions and compensatory swallowing strategies: upright posture, slow rate of feeding, small bites/sips, and alternating bites and sips   - SLP consulted and discharging on regular diet     Acute Rehabilitation Center Course: Patient participated in a comprehensive interdisciplinary inpatient rehabilitation program which included involvment of Rehab MD, therapies (PT, OT, and/or SLP), RN, CM, SW, dietary, and psychology services.     Etiologic/Rehabilitation Diagnosis: Impairment of mobility, safety and Activities of Daily Living (ADLs) due to Brain Dysfunction:  02.22  Traumatic, Closed Injury    Functional Status Upon Admission to [facility]:  Physical Therapy: Froy transfers, Froy ambulation with '  Occupational Therapy: Froy UB dressing/bathing, modA LB bathing/dressing, Froy transfers, supervision ADL's  Speech Therapy: mild pharyngeal dysphagia, remaining on regular diet and thin liquids    Functional Status Upon Discharge from [facility]:   Physical Therapy Occupational Therapy Speech Therapy   Weight Bearing Status: Full Weight Bearing  Transfers: Supervision  Bed Mobility: Supervision  Amulation Distance (ft): 150 feet  Ambulation:  Supervision  Assistive Device for Ambulation: Roller Walker  Number of Stairs: 1  Assistive Device for Stairs: Lehft Hand Rail  Stair Assistance: Supervision  Discharge Recommendations: Home with:  DC Home with:: Family Support, Outpatient Physical Therapy    Eating:  (setup)  Grooming: Supervision  Bathing: Supervision  Bathing: Supervision  Upper Body Dressing: Supervision  Lower Body Dressing: Minimal Assistance  Toileting: Supervision  Tub/Shower Transfer: Supervision  Toilet Transfer: Supervision  Cognition: Exceptions to WNL  Cognition: Decreased Memory, Decreased Attention  Orientation: Person, Place, Time, Situation    Mode of Communication: Verbal  Cognition: Exceptions to WNL  Cognition: Decreased Memory, Decreased Executive Functions  Orientation: Person, Place, Time, Situation  Swallowing: Within Defined Limits  Diet Recommendations: Regular Diet, Thin  Discharge Recommendations: Home with:  DC Home with:: Family Suppo       Condition at Discharge: stable     Discharge instructions/Information to patient and family:   See after visit summary for information provided to patient and family.      Provisions for Follow-Up Care:  See after visit summary for information related to follow-up care and any pertinent home health orders.      Future Appointments   Date Time Provider Department Center   6/23/2025  2:45 PM Chacha Rowley, PT BE [facility]   6/26/2025  2:45 PM Chacha Rowley PT BE PT [facility]   6/27/2025 11:30 AM Scot Martines PA-C [facility]   7/3/2025  9:30 AM Garry Pollack DPM POD  [facility]   7/9/2025  1:00 PM Issac Grady MD NSURG [facility]   7/21/2025  8:30 AM Joselyn Reyes Bahamonde, MD NEPH [facility]           Disposition: Home    Planned Readmission: No    Discharge Statement   I spent 60 minutes or more discharging the patient.  I had direct contact with the patient on the day of discharge. Greater than 50% of  the total time was spent examining patient, answering all patient questions, arranging and discussing plan of care with patient and care team as well as directly providing post-discharge instructions. Additional time then spent on discharge activities.    Discharge Medications:  See after visit summary for reconciled discharge medications provided to patient and family.

## 2025-06-16 NOTE — PROGRESS NOTES
Progress Note - Neurosurgery   Name: Deonte Attarian 74 y.o. male I MRN: [id number]  Unit/Bed#: -01 I Date of Admission: 6/3/2025   Date of Service: 6/16/2025 I Hospital Day: 13    Assessment & Plan  Acute on chronic intracranial subdural hematoma (HCC)  S/p bilateral KYLIE HOLES for SDH evac (Dr. Grady, 5/25/2025)  Reports a fall on April 4, 2025 with progressively worsening gait dysfunction; also with concern for slowed speech per family  Outpatient work up noted bilateral SDH for which he was advised to go to the ED for evaluation  No prior AC/AP use     Imaging:  CT head 6/15/2025: Interval decrease in size hyperdense blood products within bilateral subdural hematomas.  No significant midline shift.  Evolving right cerebral infarct     Plan:   Continue to closely monitor neuro exam   Frequent neuro checks per primary team   Repeat STAT CTH with any acute decline in GCS > 2pts or more in 1hr   Maintain normotensive BP goals, SBP < 160, MAP > 65   Continue local wound care to incision   6/9 - staples and sutures removed at the bedside today   Continue to keep clean and dry  Completed 1 week of keppra for seizure ppx post-op   Neurology requsting ASA 81mg to be started once cleared by nsgy team given stroke   Cleared to initiate at this time. Discussed with [facility] team.  DVT ppx: SCDs, SQ lovenox   Medical management per primary team   Pain control per primary team   PT/OT   Social work following for assistance with dispo once medically cleared      Neurosurgery will sign off and follow up outpatient for 6 week post op visit with Dr. Grady.  Please reach out with any further questions or concerns.  Thrombocytopenia (HCC)  Hx of mild thrombocytopenia - been fluctuating and present for the last 10 years  Following with [facility] heme-onc - seen on 5/16/2025   Felt to be secondary to mild thrombopoietin deficiency secondary to his hepatic steatosis      Plan:   Maintain goal plt > 100  Encourage ongoing outpt  "follow-up w/ PCP and heme-onc upon d/c   Acute CVA (cerebrovascular accident) (HCC)  Acute R superior cerebellar stroke   - noted on CTH on 5/30, confirmed on MRI     Plan:   Continue on the stroke pathway per neurology   Neurology recommended ASA 81mg qd once cleared by our team   Cleared to initiate ASA 81mg daily at this time    Please contact the SecureChat role for the Neurosurgery service with any questions/concerns.    Subjective   Standing in room. Patient states he feels about 85-90% better. His goal in 90-95%. Eager for discharge home. No acute concerns.     Objective :  Temp:  [97.5 °F (36.4 °C)-98.3 °F (36.8 °C)] 97.5 °F (36.4 °C)  HR:  [53-58] 58  BP: (139-149)/(63-74) 140/74  Resp:  [16-18] 16  SpO2:  [94 %-96 %] 96 %  O2 Device: None (Room air)    I/O         06/14 0701  06/15 0700 06/15 0701  06/16 0700 06/16 0701  06/17 0700    P.O. 740 480 240    Total Intake(mL/kg) 740 (7.3) 480 (4.8) 240 (2.4)    Net +740 +480 +240           Unmeasured Urine Occurrence 7 x      Unmeasured Stool Occurrence 1 x            Physical Exam  Constitutional:       Appearance: Normal appearance.   HENT:      Head: Normocephalic.      Comments: Small scabbing of the incisions. No erythema or redness.     Eyes:      Extraocular Movements: Extraocular movements intact.      Pupils: Pupils are equal, round, and reactive to light.       Skin:     General: Skin is warm and dry.     Neurological:      Mental Status: He is alert and oriented to person, place, and time.      Cranial Nerves: No cranial nerve deficit.      Motor: No weakness.     Psychiatric:         Mood and Affect: Mood normal.         Behavior: Behavior normal.         Lab Results: I have reviewed the following results:  No results for input(s): \"WBC\", \"HGB\", \"HCT\", \"PLT\", \"BANDSPCT\", \"SODIUM\", \"K\", \"CL\", \"CO2\", \"BUN\", \"CREATININE\", \"GLUC\", \"CAIONIZED\", \"MG\", \"PHOS\", \"AST\", \"ALT\", \"ALB\", \"TBILI\", \"DBILI\", \"ALKPHOS\", \"PTT\", \"INR\", \"HSTNI0\", \"HSTNI2\", \"BNP\", " "\"LACTICACID\" in the last 72 hours.    Imaging Results Review: I personally reviewed the following image studies in PACS and associated radiology reports: CT head. My interpretation of the radiology images/reports is: See above.  Other Study Results Review: No additional pertinent studies reviewed.    VTE Pharmacologic Prophylaxis: Sequential compression device (Venodyne)  and Enoxaparin (Lovenox)  "

## 2025-06-16 NOTE — ASSESSMENT & PLAN NOTE
Home meds: atenolol-chlorthalidone 50-25, valsartan 320  Here: atenolol 50 mg, losartan 50 mg  - continue titration for normotension

## 2025-06-16 NOTE — PROGRESS NOTES
Progress Note - PMR   Name: Deonte Greenfield 74 y.o. male I MRN: [id number]  Unit/Bed#: -01 I Date of Admission: 6/3/2025   Date of Service: 6/16/2025 I Hospital Day: 13     Assessment & Plan  Acute on chronic intracranial subdural hematoma (HCC)  > Originally presented in early April due to unwitnessed fall and found to have small subdural hemorrhage which subsequently appeared to resolve upon repeat CT head imaging. Patient opted to have MRI performed in outpatient setting and MRI team sent him to ER for large bilateral high convexity SDHs with mass effect. Presented to ED on 5/24 where he received bilateral Reader holes with 4 subdural drains placed  > Drains removed on 5/27 and 5/28  > completed 7 days of Keppra for seizure prophylaxis  > CTH on 6/9/25 with stable evolution of SDH's and postoperative pneumocephalus. NSGY to follow up in 1 week although note that is still not cleared for aspirin use  > CT head 6/15 with interval decrease in size and hyperdense blood products of subdural hematomas without midline shift    Repeat CT head STAT if GCS declines more than 2 points in 1 hour  SBP < 160  Plt > 100  Neurosurgery saw patient on 6/16 before discharge and cleared patient to start aspirin before going home  Follow-up with neurosurgery in 6 weeks  PT/OT program completed  Diabetes mellitus (HCC)  Lab Results   Component Value Date    HGBA1C 6.2 (H) 05/24/2025       Recent Labs     06/15/25  1055 06/15/25  1536 06/15/25  2113 06/16/25  0635   POCGLU 127 100 108 96       Blood Sugar Average: Last 72 hrs:  (P) 112.3445677855766339  - Home med: Metformin 1000 mg BID --> restart per IM discretion  - continue SSI and accuchecks per IM  Thrombocytopenia (HCC)  Baseline 100-250  Trend biweekly  Goal > 100  OP follow up with heme onc  Mixed hyperlipidemia  - Continue statin  - Last LDL: 62 on 5/31/2025  Normocytic anemia  - baseline 12ish, now ranging at 10-11 likely in the setting of blood loss  - Continue iron  supplementation  Acute CVA (cerebrovascular accident) (HCC)  --MRI brain 5/24:  New, large bilateral high convexity subdural hematomas, right greater than left, which are predominantly subacute in timing, with mass effect on the bilateral cerebral hemispheres, and effacement of the sulci. No significant midline shift noted. FLAIR signal hyperintensity along the sulci of the right frontal and parietal convexities, which may represent trace associated subarachnoid hemorrhage. No acute infarct. Mild chronic white matter microangiopathic changes involving both cerebral hemispheres.  --CT head 5/30: Acute appearing infarct in right superior cerebellum. Increase in volume of hyperdense blood products bilaterally  --Echo 4/2025: EF 65%. Normal systolic function. Grade 1 diastolic relaxation. 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   LDL 62  A1c 6.2  TTE: 65%     Given new stroke on MRI with concurrent SDH, patient will need ASA when cleared by NSGY after repeat CTH. Given recent negative ziopatch in April 2025, will need loop recorder outpatient.      Lipitor 10 mg qHS  Normotension  Start ASA 81 mg  Outpatient loop recorder  PT/OT  SLP to continue language and cognition training/evaluation  Gout  - continue Allopurinol  Pancreatic cyst  > Patient had MRI abdomen completed outpatient (5/22, d/t jaundice & thrombocytopenia), with numerous pancreatic cystic lesions concerning for malignancy. This showed numerous cystic lesions scattered throughout the pancreas that were concerning for malignancy at this time.   >Patient's family was informed of these and are aware that following discharge from acute rehab they will need to follow-up with the surgical oncology team as well as GI for further evaluation and treatment of these.  Patient and spouse both aware.    - Surg Onc & GI referrals placed by trauma surgery during acute  hospitalization discharge  HTN (hypertension)  Home meds: atenolol-chlorthalidone 50-25, valsartan 320  Here: atenolol 50 mg, losartan 50 mg  - continue titration for normotension  CKD (chronic kidney disease)  Lab Results   Component Value Date    EGFR 86 06/12/2025    EGFR 90 06/06/2025    EGFR 94 06/03/2025    CREATININE 0.84 06/12/2025    CREATININE 0.74 06/06/2025    CREATININE 0.68 06/03/2025     - Adequate GFR while here, previously diagnosed with CKD 2  - has appointment to establish with nephrology on 7/21/25  Superficial foreign body of left leg without major open wound and without infection  - Wound care consulted while in acute care  - Left Pretibial leg wound: cleanse left lower leg wound with soap and water, pat dry, apply xeroform to wound bed and cover with foam dressing. Change every other day.   Oropharyngeal dysphagia  Originally on modified diet but now on regular and thins    - continue recommended diet of regular texture and thin liquids  - Aspiration precautions and compensatory swallowing strategies: upright posture, slow rate of feeding, small bites/sips, and alternating bites and sips   - SLP consulted  Bilateral leg edema  Ace wrap  Leg wound, left  Seen by wound care, continuing wound care going home    Subjective   Patient is a 74-year-old male with history of type 2 diabetes, stage II CKD, hyperlipidemia, hypertension presenting to [facility] in April with an unwitnessed fall and several hemorrhages that seem to be improving on his subsequent imaging however had a decline and an MRI was completed showing large bilateral high convexity subdural hematomas with recommendation to go to the ER where he was evaluated and MMA intervention was not amenable and he was started on Keppra for seizure prophylaxis and eventually had a bur hole evacuation of subdural drain placement bilaterally. Course copied by acute right superior cerebellar stroke. Plan initiate aspirin when cleared by neurosurgery  in an outpatient loop recorder     Chief Complaint: f/u TBI and f/u ambulatory dysfunction    Interval: No acute events overnight. Seen this morning at bedside. Slept well, feeling well. Looking forward to going home. Last BM 6/14.  CT head from yesterday with improved appearance of bilateral subdural hematomas    Patient is excited to go home, & reports that he is probably at about 85-95% of where he used to be, and that this is acceptable for him.  Hoping to go home and fold more laundry    Objective :  Temp:  [97.5 °F (36.4 °C)-98.3 °F (36.8 °C)] 97.5 °F (36.4 °C)  HR:  [53-58] 58  BP: (139-149)/(63-74) 140/74  Resp:  [16-18] 16  SpO2:  [94 %-96 %] 96 %  O2 Device: None (Room air)    Functional Update:  Physical Therapy Occupational Therapy Speech Therapy   Weight Bearing Status: Full Weight Bearing  Transfers: Supervision  Bed Mobility: Supervision  Amulation Distance (ft): 150 feet  Ambulation: Supervision  Assistive Device for Ambulation: Roller Walker  Number of Stairs: 1  Assistive Device for Stairs: Lehft Hand Rail  Stair Assistance: Supervision  Discharge Recommendations: Home with:  DC Home with:: Family Support, Outpatient Physical Therapy   Eating:  (setup)  Grooming: Supervision  Bathing: Supervision  Bathing: Supervision  Upper Body Dressing: Supervision  Lower Body Dressing: Minimal Assistance  Toileting: Supervision  Tub/Shower Transfer: Supervision  Toilet Transfer: Supervision  Cognition: Exceptions to WNL  Cognition: Decreased Memory, Decreased Attention  Orientation: Person, Place, Time, Situation   Mode of Communication: Verbal  Cognition: Exceptions to WNL  Cognition: Decreased Memory, Decreased Executive Functions  Orientation: Person, Place, Time, Situation  Swallowing: Within Defined Limits  Diet Recommendations: Regular Diet, Thin  Discharge Recommendations: Home with:  DC Home with:: Family Support          Gen: No acute distress, Well-nourished, well-appearing.  HEENT: Moist mucus  membranes. cranial incisions clean, dry, intact  Cardiovascular: Regular rate, rhythm, S1/S2. Distal pulses palpable  Heme/Extr: No edema/clubbing/cyanosis  Pulmonary: Non-labored breathing. Lungs CTAB  : No qureshi  GI: Soft, non-tender, non-distended. BS+  MSK: ROM is WFL in all extremities. No effusions or deformities. Bulk is symmetric. See below for MMT scores.   Integumentary: Skin is warm, dry. No rashes or ulcers.  Neuro: AAOx3, CN 2-12 intact. Sensation intact to light touch throughout. Speech is intact. Appropriate to questioning. Tone is normal.      MMT:   Strength:   Right  Left  Site  Right  Left  Site    5 5  S Ab: Shoulder Abductors  5  5  HF: Hip Flexors    5 5  EF: Elbow Flexors       5  5  EE: Elbow Extensors  5  5  KE: Knee Extensors            5  5  FF: Finger Flexors  5  5  PF: Plantar Flexors    5  5  HI: Hand Intrinsics       Psych: Normal mood and affect.      Scheduled Meds:  Current Facility-Administered Medications   Medication Dose Route Frequency Provider Last Rate    acetaminophen  650 mg Oral Q6H PRN JOSE ALEJANDRO Bradley      allopurinol  300 mg Oral Daily JOSE ALEJANDRO Bradley      aspirin  81 mg Oral Daily JOSE ALEJANDRO Bradley      atenolol  50 mg Oral Daily JOSE ALEJANDRO Bradley      And    [Held by provider] chlorthalidone  25 mg Oral Daily JOSE ALEJANDRO Bradley      atorvastatin  10 mg Oral HS JOSE ALEJANDRO Bradley      Diclofenac Sodium  2 g Topical 4x Daily PRN JOSE ALEJANDRO Bradley      enoxaparin  30 mg Subcutaneous Q12H [facility] JOSE ALEJANDRO Bradley      ferrous sulfate  325 mg Oral Every Other Day JOSE ALEJANDRO Bradley      hydrALAZINE  10 mg Oral Q8H PRN JOSE ALEJANDRO Bradley      insulin lispro  1-5 Units Subcutaneous TID AC JOSE ALEJANDRO Bradley      insulin lispro  1-5 Units Subcutaneous HS JOSE ALEJANDRO Bradley      lidocaine   Topical 4x Daily PRN JOSE ALEJANDRO Bradley      losartan  50 mg  Oral Daily JOSE ALEJANDRO Bradley      polyethylene glycol  17 g Oral Daily JOSE ALEJANDRO Bradley      senna-docusate sodium  1 tablet Oral HS JOSE ALEJANDRO Bradley           Lab Results: I have reviewed the following results:  Results from last 7 days   Lab Units 06/12/25  0612   HEMOGLOBIN g/dL 11.6*   HEMATOCRIT % 37.3   WBC Thousand/uL 8.42   PLATELETS Thousands/uL 220     Results from last 7 days   Lab Units 06/12/25  0612   BUN mg/dL 19   SODIUM mmol/L 137   POTASSIUM mmol/L 4.8   CHLORIDE mmol/L 102   CREATININE mg/dL 0.84   AST U/L 18   ALT U/L 19              Martinez Ramirez MD  PGY-2 Resident Physician  Physical Medicine and Rehabilitation

## 2025-06-16 NOTE — ASSESSMENT & PLAN NOTE
Says gets at home if sleeps in chair as opposed to bed  L>R which is normal for him but increased since he has been off Chlorthalidone   Continue to ACE wrap

## 2025-06-16 NOTE — ASSESSMENT & PLAN NOTE
S/p bilateral KYLIE HOLES for SDH evac (Dr. Grady, 5/25/2025)  Reports a fall on April 4, 2025 with progressively worsening gait dysfunction; also with concern for slowed speech per family  Outpatient work up noted bilateral SDH for which he was advised to go to the ED for evaluation  No prior AC/AP use     Imaging:  CT head 6/15/2025: Interval decrease in size hyperdense blood products within bilateral subdural hematomas.  No significant midline shift.  Evolving right cerebral infarct     Plan:   Continue to closely monitor neuro exam   Frequent neuro checks per primary team   Repeat STAT CTH with any acute decline in GCS > 2pts or more in 1hr   Maintain normotensive BP goals, SBP < 160, MAP > 65   Continue local wound care to incision   6/9 - staples and sutures removed at the bedside today   Continue to keep clean and dry  Completed 1 week of keppra for seizure ppx post-op   Neurology requsting ASA 81mg to be started once cleared by nsgy team given stroke   Cleared to initiate at this time. Discussed with ARC team.  DVT ppx: SCDs, SQ lovenox   Medical management per primary team   Pain control per primary team   PT/OT   Social work following for assistance with dispo once medically cleared      Neurosurgery will sign off and follow up outpatient for 6 week post op visit with Dr. Grady.  Please reach out with any further questions or concerns.

## 2025-06-16 NOTE — ASSESSMENT & PLAN NOTE
"Had an OP ultrasound done for elevated total bilirubin that showed cystic lesions in the pancreas for which further imaging was recommended  MRI done thereafter as an OP on 5/22/25 showed \"Numerous cystic lesions scattered throughout the pancreas with a dominant lesion measuring 18 mm in the body. Worrisome features include ductal dilation up to 6 mm in the tail the pancreas and abrupt change in caliber of the pancreatic duct. Management recommendation: Because of presence of multiple worrisome features, surgical consultation/management with consideration for EUS recommended.  Management and follow up recommendations for cystic pancreatic lesions are based on Institutional consensus and international evidence-based Kyoto guidelines for the management of intraductal papillary mucinous neoplasm of the pancreas. Pancreatology 24 (2024) 255-270\".  Per IMs note in the hospital:  \"Reached out to primary team and surgery oncall as imaging findings are suggestive of malignancy. According to surgery they recommend out patient follow up with surg onc for possible biopsy and out patient GI follow up. Surgery called the surg onc office to schedule the appointment for patient. I did a STAT referral for GI outpatient. Primary team will inform the patient regarding imaging findings and the referrals\".  Most recent CMP on 6/3/25 with normal total bilirubin/LFT  "

## 2025-06-16 NOTE — ASSESSMENT & PLAN NOTE
Lab Results   Component Value Date    HGBA1C 6.2 (H) 05/24/2025       Recent Labs     06/15/25  1055 06/15/25  1536 06/15/25  2113 06/16/25  0635   POCGLU 127 100 108 96       Blood Sugar Average: Last 72 hrs:  (P) [id number]  - Home med: Metformin 1000 mg BID --> restart per IM discretion  - continue SSI and accuchecks per IM

## 2025-06-16 NOTE — ASSESSMENT & PLAN NOTE
Acute R superior cerebellar stroke   - noted on CTH on 5/30, confirmed on MRI     Plan:   Continue on the stroke pathway per neurology   Neurology recommended ASA 81mg qd once cleared by our team   Cleared to initiate ASA 81mg daily at this time

## 2025-06-16 NOTE — NURSING NOTE
AVS reviewed with patient, pt's spouse, and sons.  All questions concerned answered.  All education and care plans resolved.  Pt adequate for d/c home with family with continued therapy services.

## 2025-06-16 NOTE — ASSESSMENT & PLAN NOTE
HbA1C 6.2 on 5/24/25  Home:  Metformin 500 mg BID (he told me that recently as OP was reduced to 500mg BID from 1000 mg BID)  Here:  QID Accuchecks/SSI  BSs are currently , occasionally he spikes up in the evening before bed but overall well controlled  Blood sugars are great on no Metformin and therefore will not restart for now.  He knows to check his BSs twice daily and call if >200.

## 2025-06-16 NOTE — ASSESSMENT & PLAN NOTE
- Continue statin  - Last LDL: 62 on 5/31/2025   WBC is elevated. Please call patient to see if she has been sick.  Also HA1C is still high. Needs follow up appointment to discuss treatment.

## 2025-06-16 NOTE — ASSESSMENT & PLAN NOTE
--MRI brain 5/24:  New, large bilateral high convexity subdural hematomas, right greater than left, which are predominantly subacute in timing, with mass effect on the bilateral cerebral hemispheres, and effacement of the sulci. No significant midline shift noted. FLAIR signal hyperintensity along the sulci of the right frontal and parietal convexities, which may represent trace associated subarachnoid hemorrhage. No acute infarct. Mild chronic white matter microangiopathic changes involving both cerebral hemispheres.  --CT head 5/30: Acute appearing infarct in right superior cerebellum. Increase in volume of hyperdense blood products bilaterally  --Echo 4/2025: EF 65%. Normal systolic function. Grade 1 diastolic relaxation. 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   LDL 62  A1c 6.2  TTE: 65%     Given new stroke on MRI with concurrent SDH, patient will need ASA when cleared by NSGY after repeat CTH. Given recent negative ziopatch in April 2025, will need loop recorder outpatient.      Lipitor 10 mg qHS  Normotension  Start ASA 81 mg  Outpatient loop recorder  PT/OT  SLP to continue language and cognition training/evaluation

## 2025-06-16 NOTE — ASSESSMENT & PLAN NOTE
"Acute CVA right superior cerebellum  Was found on CTH 5/30, confirmed by MRI  CTA of the head/neck showed \"Moderate (approximately 50%) stenosis in the 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 No significant stenosis of the cervical left internal carotid artery. Severe stenosis origin of the dominant left vertebral artery. Hypoplastic right vertebral artery. No high-grade intracranial stenosis, large vessel occlusion or aneurysm\"  Neurology saw in consult for the CVA.  They recommended that the patient start ASA 81mg daily when cleared by Neurosurgery as above.    For LOOP recorder implant as an outpatient.  He did have a previous Ziopatch done that was negative for atrial fibrillation.  He has seen Dr Dutta in the recent past and can return to him.  A referral for the LOOP was placed    ECHO showed LVEF 65% with grade 1 diastolic dysfunction and a small mobile septal aneurysm without thrombus.  Continue statin  "

## 2025-06-16 NOTE — ASSESSMENT & PLAN NOTE
"MRI of the brain that showed large bilateral SDH  S/p bilateral [place] holes for evacuation of SDH 5/25/25  MMA was not able to be done due to anatomical considerations  For no full AC/AP for 2 weeks = 6/9-11/25 then need to clear with NS  D/W NS 6/6/25 = followup CTH for 6/9/25 done = \"Stable to slightly decreased size of mixed density bilateral cerebral convexity subdural hematomas admixed with foci of air. No significant midline shift\"  NS saw him in f/u in R/T the CTH done 6/9.  They said repeat CTH on 6/15 and then will determine if cleared to start ASA 81mg qd (order is in the computer for 6/15 at 0700).  They also took out staples and sutures.  Followup CTH done 6/15/25 = stable.  NS saw today 6/16 and cleared for ASA 81mg qd.  He will be for another CTH 2-3 days prior to his NS appt on 7/9/25.  Incision healed  "

## 2025-06-16 NOTE — ASSESSMENT & PLAN NOTE
> Originally presented in early April due to unwitnessed fall and found to have small subdural hemorrhage which subsequently appeared to resolve upon repeat CT head imaging. Patient opted to have MRI performed in outpatient setting and MRI team sent him to ER for large bilateral high convexity SDHs with mass effect. Presented to ED on 5/24 where he received bilateral Yael holes with 4 subdural drains placed  > Drains removed on 5/27 and 5/28  > completed 7 days of Keppra for seizure prophylaxis  > CTH on 6/9/25 with stable evolution of SDH's and postoperative pneumocephalus. NSGY to follow up in 1 week although note that is still not cleared for aspirin use  > CT head 6/15 with interval decrease in size and hyperdense blood products of subdural hematomas without midline shift    Repeat CT head STAT if GCS declines more than 2 points in 1 hour  SBP < 160  Plt > 100  Neurosurgery saw patient on 6/16 before discharge and cleared patient to start aspirin before going home  Follow-up with neurosurgery in 6 weeks  PT/OT program completed

## 2025-06-17 ENCOUNTER — TRANSITIONAL CARE MANAGEMENT (OUTPATIENT)
Dept: INTERNAL MEDICINE CLINIC | Facility: CLINIC | Age: 75
End: 2025-06-17

## 2025-06-17 NOTE — OCCUPATIONAL THERAPY NOTE
OT Discharge Summary Note     Principal Problem:    Acute on chronic intracranial subdural hematoma (HCC)  Active Problems:    Diabetes mellitus (HCC)    Thrombocytopenia (HCC)    Mixed hyperlipidemia    Normocytic anemia    Acute CVA (cerebrovascular accident) (HCC)    Gout    Pancreatic cyst    HTN (hypertension)    CKD (chronic kidney disease)    Superficial foreign body of left leg without major open wound and without infection    Bilateral leg edema    Leg wound, left        Occupational Therapy LTG's  Eating Oral care Bathing LB dress UB dress   Eating Goal: 06. Independent - Patient completes the activity by him/herself with no assistance from a helper. Oral Hygiene Goal: 06. Independent - Patient completes the activity by him/herself with no assistance from a helper.  Shower/bathe self Goal: 04. Supervision or touching assistance- [place] provides VERBAL CUES or supervision throughout activity. Lower body dressing Goal: 06. Independent - Patient completes the activity by him/herself with no assistance from a helper. Upper body dressing Goal: 06. Independent - Patient completes the activity by him/herself with no assistance from a helper.   Toileting Toilet txf Func txf IADL Med    Toileting hygiene Goal: 06. Independent - Patient completes the activity by him/herself with no assistance from a helper. Toilet transfer Goal: 06. Independent - Patient completes the activity by him/herself with no assistance from a helper.   Assist Level: Supervision Assist Level: Supervision       Pt has made good  progress during time of stay at the [facility]. Pt is overall functioning at Independent  for ADLs, Independent  for functional transfers and Independent  for functional mobility. Prior to DC Home, DME recommendations include shower chair, long handled equipment , and RW which pt already owned so ordering DME was not required. Based on pts functional performance, pt is safe to DC Home w/ recommendations noted above. No  further OT services required, pt to cont w/ OP PT.       This writer served as the pts primary occupational therapist throughout their stay at the [facility].     - Eli Cash MS OTR/L, [facility]  - [phone]

## 2025-06-17 NOTE — CASE MANAGEMENT
Team dc summary - pt made good progress and returned home w/family support and contd outpt physical therapy at [facility]. Appmt scheduled and placed on pts dc instructions. Pt had all necessary dme. Family present for therapy training and for dc instructions. Family provided transport home.

## 2025-06-17 NOTE — SPEECH THERAPY NOTE
[facility] Speech Therapy Discharge Summary     Pt admitted to [facility] on 6/3/2025 dx Acute on Chronic B/L SDH s/p Fall. Pt completed skilled cognitive and beside swallow assessments.    In regards to swallow: Pt presenting with WFL oral and WFL pharyngeal swallow function. Recommend regular diet and thin liquids, set up as needed. No further follow up warranted.     In regards to cognition: Pt completed the CLQT+ on initial evaluation with a Composite Severity Rating score of 3.8 out of 4.0, correlating to overall WNL cognitive linguistic impairments at time of evaluation and in comparison to age matched peers ranging from 70-88 y/o. Pt scored at or above criterion cut score for 8 out of 10 tasks completed. Pt overall scoring WNL as compared to similar aged peers. However, pt demonstrating MILD cognitive deficits in executive functions and clock drawing. Discussed goals to maximize his skills- pt reported agreement to engaged in follow up cognitive therapy while on rehab unit. Continued cognitive barriers which present include: decreased attention, visual attention, ST memory recall , problem solving, reasoning, sequencing, organization of thoughts, and as well as executive functions, which still impacts pt's overall safety, functional cognitive communication skills as well as functional mobility. The following interventions are used to target these barriers, including verbal problem solving task, verbal working memory tasks, visual memory recall tasks, drawing conclusions activities, written sequencing tasks, verbal sequencing tasks, verbal reasoning tasks, higher level deductive reasoning activities, verbal review of current medications, written review of medications,  written health management tasks, verbal health management tasks, visual attention tasks, and family education/training.     Current level of functioning:   Comprehension: supervision  Expression: supervision  Social Interaction:  supervision  Executive functions: supervision  Memory: min A     Pt was successfully discharged home on 6/13/2025 with further SLP services recommended to maximize overall skills at this time.

## 2025-06-18 ENCOUNTER — APPOINTMENT (OUTPATIENT)
Dept: LAB | Facility: CLINIC | Age: 75
End: 2025-06-18
Attending: NURSE PRACTITIONER
Payer: COMMERCIAL

## 2025-06-18 DIAGNOSIS — D64.9 ANEMIA, UNSPECIFIED TYPE: ICD-10-CM

## 2025-06-18 DIAGNOSIS — E11.22 TYPE 2 DIABETES MELLITUS WITH STAGE 2 CHRONIC KIDNEY DISEASE, WITHOUT LONG-TERM CURRENT USE OF INSULIN  (HCC): ICD-10-CM

## 2025-06-18 DIAGNOSIS — N18.9 CHRONIC KIDNEY DISEASE, UNSPECIFIED CKD STAGE: ICD-10-CM

## 2025-06-18 DIAGNOSIS — N18.2 TYPE 2 DIABETES MELLITUS WITH STAGE 2 CHRONIC KIDNEY DISEASE, WITHOUT LONG-TERM CURRENT USE OF INSULIN  (HCC): ICD-10-CM

## 2025-06-18 DIAGNOSIS — D69.6 THROMBOCYTOPENIA (HCC): ICD-10-CM

## 2025-06-18 LAB
ANION GAP SERPL CALCULATED.3IONS-SCNC: 4 MMOL/L (ref 4–13)
BUN SERPL-MCNC: 20 MG/DL (ref 5–25)
CALCIUM SERPL-MCNC: 8.9 MG/DL (ref 8.4–10.2)
CHLORIDE SERPL-SCNC: 101 MMOL/L (ref 96–108)
CO2 SERPL-SCNC: 30 MMOL/L (ref 21–32)
CREAT SERPL-MCNC: 0.75 MG/DL (ref 0.6–1.3)
GFR SERPL CREATININE-BSD FRML MDRD: 90 ML/MIN/1.73SQ M
GLUCOSE P FAST SERPL-MCNC: 123 MG/DL (ref 65–99)
POTASSIUM SERPL-SCNC: 4.6 MMOL/L (ref 3.5–5.3)
SODIUM SERPL-SCNC: 135 MMOL/L (ref 135–147)

## 2025-06-18 PROCEDURE — 80048 BASIC METABOLIC PNL TOTAL CA: CPT

## 2025-06-18 PROCEDURE — 36415 COLL VENOUS BLD VENIPUNCTURE: CPT

## 2025-06-19 ENCOUNTER — TELEPHONE (OUTPATIENT)
Dept: NEUROLOGY | Facility: CLINIC | Age: 75
End: 2025-06-19

## 2025-06-19 ENCOUNTER — TELEPHONE (OUTPATIENT)
Dept: CARDIOLOGY CLINIC | Facility: CLINIC | Age: 75
End: 2025-06-19

## 2025-06-19 NOTE — TELEPHONE ENCOUNTER
Post CVA Discharge Follow Up  Hospitalization:   05/24/25-06/03/2025  [facility]  06/03/2025-0616/2025      According to chart, patient discharged to   Home s/w Jing who reports Deonte is doing well with no new or worsening stroke like symptoms. He does continue with some memory problems   Ambulation / ADLs:  Patient mobilizing with walker   He does perform own ADLS     Physical Therapy outpatient     Patient maintained on a regular diet with thin liquids     Medication Review:  Reviewed neurology-related medications with them. No reported missed doses, medication side effects, or signs of bleeding. 81 mg Asa taking. As well as prescribed BP medications     Last reported BP : takes every day however they do not have one to report today.   Did review importance of maintaining normotensive state. Wife aware and says it has been good however she just iddnt have exact numbers to report     Appointments:   stroke hospital follow up appointment scheduled         he denies any questions or concerns at this time.

## 2025-06-19 NOTE — TELEPHONE ENCOUNTER
"Working Referral Que     Doctor: Dr. Dutta     Pt was admitted into the hospital on 5/24/25 and discharged on 6/3/256 and than admitted again or transferred on 6/3/25 and discharged on 6/16/25.    There is a routine referral we received on 6/16/25 submitted by ML RODRIGUEZ in the hospital stating \"For LOOP recorder implantation \"    I called the pt and I spoke wit his wife cony and she explained that the hospital never mentioned to them anything about needing a Loop recorder so he is confused on this but she did see it being mentioned on the AVS when he was discharged. She also mentioned that the pt had a 2 week ZIO monitor ON 4/21/25 and the results came back normal per Dr. Dutta so she is asking does Ivette actually need this loop recorder ? and does Dr. Dutta want ivette to be seen in office ?    Please reach out to the pt with answers to her questions but also if any questions or if clarification is needed on our end. Thank You.        "

## 2025-06-20 NOTE — PROGRESS NOTES
PT Evaluation     Today's date: 2025  Patient name: Deonte Greenfield  : 1950  MRN: [id number]  Referring provider: Martinez Mayer MD  Dx:   Encounter Diagnosis     ICD-10-CM    1. Generalized weakness  R53.1       2. Acute CVA (cerebrovascular accident) ([facility])  I63.9 Ambulatory Referral to Physical Therapy          Start Time: 1445  Stop Time: 1530  Total time in clinic (min): 45 minutes    Assessment  Impairments: abnormal gait, abnormal or restricted ROM, activity intolerance, impaired balance, impaired physical strength, lacks appropriate home exercise program, pain with function, weight-bearing intolerance, poor posture , poor body mechanics, participation limitations, activity limitations and endurance  Functional limitations: walking, sit-stands, bed mobility, LE dressing    Assessment details: Patient is a 74 y.o. male who presents to outpatient PT with reports of residual LE weakness and balance issues since recent fall and hospitalization in 2025. Patient presents with LE weakness that affects his stability and mechanics with transfers, walking, and step negotiation. Patient ambulates most distances with rolling walker at this time, compared to using cane for most distances prior to recent fall. Please note that history of Right hip pain and prior history of both hip replacements continue to affect his sitting tolerance and requires to stand frequently while sitting. His balance and strength deficits puts him at an increased falls risk, thus using rolling walker for patient's safety at this time. He lives with family, however has a 1st floor set-up . He has to navigate 2 steps to enter house with assistance of banister and family as needed. He is not driving currently. Patient will benefit from skilled PT services to improve balance, improve functional LE strength, and improve overall safety with ADLs to reduce falls risk. Patient would benefit from skilled PT services to address  these impairments and to maximize function.  Thank you for the referral.    Barriers to intervention: transportation and medical complexity  Understanding of Dx/Px/POC: good     Prognosis: good    Goals  Impairment Goals 4-6 weeks   In order to maximize function patient will be able to...   - Decrease intensity/duration/frequency of pain to 4/10  - Increase hip/LE strength to 4/5 throughout  - Demonstrate improved hip flexibility as demonstrated by increased ROM through therapeutic exercise    Functional Goals 6-8 weeks  In order to return to prior level of function patient will be able to...   - Participate in ADL's/IADL's/sport specific activities with no greater than 2/10 pain.    - Increase Functional Status Measure (FOTO) to: > predicted at discharge  - Demonstrate independence and compliant with HEP  - Demonstrate a squat and or sit to stand with good mechanics and eccentric control without pain/difficulty/compensation  - Demonstrate functional activities with good core and glute strength without compensation/pain/difficulty   - Ascend and descend stairs without increased pain/compensation/difficulty and a reciprocal gait pattern.  - Patient will be able to demonstrate good gait mechanics without compensations.     Plan  Patient would benefit from: skilled PT  Planned modality interventions: cryotherapy, electrical stimulation/Russian stimulation, low level laser therapy and traction  Other planned modality interventions: moist heat    Planned therapy interventions: joint mobilization, manual therapy, neuromuscular re-education, patient education, strengthening, stretching, therapeutic activities, therapeutic exercise, home exercise program, functional ROM exercises, postural training, balance/weight bearing training, body mechanics training, flexibility, IASTM, kinesiology taping, Villalobos taping, massage, nerve gliding, transfer training and gait training    Frequency: 1-2x week  Duration in weeks:  4  Treatment plan discussed with: patient, PTA and referring physician        Subjective Evaluation    History of Present Illness  Mechanism of injury: Deonte Greenfield is a 74 y.o. year-old male who presents with reports of residual muscle weakness and balance issues since he had a fall in April. He presents for PT IE accompanied by his son Sarath, who helped related part of HPI. Patient had a fall in April and does not recall how he fell. He reports he was found unconscious by his son having hit his head. Went to the ER and then again in May and had MRI of brain that noted multiple brain bleeds. Patient had 4 tubes for drainage, and was in hospital for a while. He received inpatient PT for about 1 week and was discharged last week. He has been home for the past 1 week. He reports some residual weakness and balance issues. He uses rolling walker for most ambulation, but occasionally uses cane in the house. Prior to fall he was walking most distances with cane. Per his son, patient was told not to drive for 6 months since fall, thus needs family for assistance with transportation. He was recommended for outpatient PT at this time.   Quality of life: good    Patient Goals  Patient goals for therapy: decreased pain, increased motion, improved balance, increased strength, independence with ADLs/IADLs and return to sport/leisure activities    Pain  Current pain ratin  At best pain ratin  At worst pain ratin  Location: Right hip, low back  Quality: dull ache  Aggravating factors: sitting, standing, stair climbing, walking and lifting    Social Support  Steps to enter house: yes  2  Stairs in house: no   Lives in: multiple-level home (has 1st floor set-up)  Lives with: spouse and adult children    Employment status: not working (retired; [employer])    Diagnostic Tests  MRI studies: abnormal  Treatments  Current treatment: physical therapy  Discharged from (in last 30 days): inpatient hospitalization        Objective      Strength/Myotome Testing     Left Hip   Planes of Motion   Flexion: 4-  Extension: 3-  Abduction: 3+  External rotation: 3+    Right Hip   Planes of Motion   Flexion: 4  Extension: 3-  Abduction: 3+  External rotation: 3+    Left Knee   Flexion: 4-  Extension: 4-    Right Knee   Flexion: 3+  Extension: 4-  Neuro Exam:     Transfers   Sit to stand: independent   Wheelchair to mat: minimum assist (close supervision)   Mat to wheelchair: minimum assist (close supervision)   Sit to supine: minimum assist   Supine to sit: minimum assist     Functional outcomes   5x sit to stand: 14 with 2 UE support (seconds)  TU with rolling walker (seconds)  Functional outcome comment: Close supervision on testing above; needs cues on form and safety  Functional outcome gait comment: With rolling walker: forward hunched/guarded UE posture, shortened step length, slow/cautious gait pattern, needs cuing for navigation in PT clinic              POC Expires Auth Status Start Date Expiration Date PT Visit Limit    2025 Pending   BOMN   Date 2025       Used 1       Remaining           Diagnosis: gait dysfunction, balance issues s/p fall in 2025   Precautions: DM2, HTN, hx of subdural hematoma; hx of THR bilat, hx of multiple L/S surgeries   Next Physician's Appt:   Shanelle HEP:   Manuals        LE stretching                Neuro Re-Ed        Foam balance        SLS        Tandem amb        Wobble Board        Quad sets -> supine SLR        Glute sets -> bridges        Hip ADD ball squeeze        BKFO / clams                                Ther Ex        HS stretch        LTRs        DKTC w/ ball        HR / TR        Side-stepping         Standing hip ABD, ER                         Ther Activity              NuStep/Recum bike for LE mobility             Mini Squat        STS        Leg Press        Stepping over obstables                        Pt Ed HEP, POC       Re-Evaluation             Modalities

## 2025-06-23 ENCOUNTER — EVALUATION (OUTPATIENT)
Dept: PHYSICAL THERAPY | Facility: CLINIC | Age: 75
End: 2025-06-23
Payer: COMMERCIAL

## 2025-06-23 DIAGNOSIS — I63.9 ACUTE CVA (CEREBROVASCULAR ACCIDENT) (HCC): ICD-10-CM

## 2025-06-23 DIAGNOSIS — R53.1 GENERALIZED WEAKNESS: Primary | ICD-10-CM

## 2025-06-23 PROCEDURE — 97530 THERAPEUTIC ACTIVITIES: CPT | Performed by: PHYSICAL THERAPIST

## 2025-06-24 NOTE — TELEPHONE ENCOUNTER
Called pt's wife, Jing, on home phone number. Informed Jing that Dr. Dutta does not believe pt needs a loop recorder at this time, but that he would like him to follow up with Neurology or Neuro Surgery to see what they say and if they think he needs a loop recorder. Jing understood. Informed Jing that if Neurology/Neuro Surgery believe pt should get loop recorder, they can contact us through [employer] to let us know and a loop recorder implantation can be scheduled without follow up in the cardiology office. Jing understood. No questions at this time.

## 2025-06-25 ENCOUNTER — OFFICE VISIT (OUTPATIENT)
Dept: INTERNAL MEDICINE CLINIC | Facility: CLINIC | Age: 75
End: 2025-06-25
Payer: COMMERCIAL

## 2025-06-25 VITALS
WEIGHT: 226 LBS | RESPIRATION RATE: 16 BRPM | BODY MASS INDEX: 37.65 KG/M2 | HEIGHT: 65 IN | HEART RATE: 66 BPM | OXYGEN SATURATION: 98 % | SYSTOLIC BLOOD PRESSURE: 110 MMHG | DIASTOLIC BLOOD PRESSURE: 60 MMHG | TEMPERATURE: 97.5 F

## 2025-06-25 DIAGNOSIS — N18.2 DIABETES MELLITUS DUE TO UNDERLYING CONDITION WITH STAGE 2 CHRONIC KIDNEY DISEASE, WITHOUT LONG-TERM CURRENT USE OF INSULIN  (HCC): ICD-10-CM

## 2025-06-25 DIAGNOSIS — K86.2 PANCREATIC CYST: ICD-10-CM

## 2025-06-25 DIAGNOSIS — I62.03 ACUTE ON CHRONIC INTRACRANIAL SUBDURAL HEMATOMA (HCC): ICD-10-CM

## 2025-06-25 DIAGNOSIS — I10 PRIMARY HYPERTENSION: ICD-10-CM

## 2025-06-25 DIAGNOSIS — N18.2 STAGE 2 CHRONIC KIDNEY DISEASE: ICD-10-CM

## 2025-06-25 DIAGNOSIS — D64.9 NORMOCYTIC ANEMIA: ICD-10-CM

## 2025-06-25 DIAGNOSIS — I62.01 ACUTE ON CHRONIC INTRACRANIAL SUBDURAL HEMATOMA (HCC): ICD-10-CM

## 2025-06-25 DIAGNOSIS — I63.9 ACUTE CVA (CEREBROVASCULAR ACCIDENT) (HCC): Primary | ICD-10-CM

## 2025-06-25 DIAGNOSIS — E08.22 DIABETES MELLITUS DUE TO UNDERLYING CONDITION WITH STAGE 2 CHRONIC KIDNEY DISEASE, WITHOUT LONG-TERM CURRENT USE OF INSULIN  (HCC): ICD-10-CM

## 2025-06-25 PROCEDURE — G2211 COMPLEX E/M VISIT ADD ON: HCPCS | Performed by: INTERNAL MEDICINE

## 2025-06-25 PROCEDURE — 99214 OFFICE O/P EST MOD 30 MIN: CPT | Performed by: INTERNAL MEDICINE

## 2025-06-25 NOTE — PROGRESS NOTES
INTERNAL MEDICINE TRANSITION OF CARE OFFICE VISIT  [facility] - [facility] INTERNAL MEDICINE [place]    NAME: Deonte Greenfield  AGE: 74 y.o. SEX: male  : 1950     DATE: 2025     Assessment and Plan:     1. Acute CVA (cerebrovascular accident) (HCC)  Assessment & Plan:  MRI brain : Evolving recent right cerebellar infarct with mild local mass effect.   Patient is on aspirin and Lipitor 10 mg.  Recently had a Zio patch.  No significant arrhythmias or A-fib.    Plan  Continue aspirin 81 mg daily  Continue Lipitor 10 mg  Follow-up with neurology.  Follow-up with physical therapy.    2. Acute on chronic intracranial subdural hematoma (HCC)  Assessment & Plan:  Patient initially had a right-sided small SDH in April.  But follow-up MRI shows bilateral hematomas on .  Patient was admitted to the hospital, status post bilateral lake holes.  Repeat CTs shows decrease in size in bilateral SDH's.    Plan  Repeat CT next week  Follow-up with neurosurgery in 2 weeks.    3. Diabetes mellitus due to underlying condition with stage 2 chronic kidney disease, without long-term current use of insulin  (HCC)  Assessment & Plan:    Lab Results   Component Value Date    HGBA1C 6.2 (H) 2025     Patient was on metformin 500 mg twice daily before.  It was discontinued following hospital discharge as his glycemic control was good.  HbA1c past 2 months was less than 6.4%.  Accu-Cheks at home ranges from .    Plan  HbA1c in the next visit  Monitor off medications    4. Primary hypertension  Assessment & Plan:  BP in the office 110/60.    Plan  Continue atenolol-HCTZ 15-25 mg  Continue losartan 50 mg daily    5. Stage 2 chronic kidney disease  Assessment & Plan:  Lab Results   Component Value Date    EGFR 90 2025    EGFR 86 2025    EGFR 90 2025    CREATININE 0.75 2025    CREATININE 0.84 2025    CREATININE 0.74 2025     Creatinine at baseline.  Avoid  nephrotoxins    6. Normocytic anemia  Assessment & Plan:  Patient's hemoglobin around 10-11.  MCV 96.  Mentzer index 24 which suggest iron deficiency anemia.    Plan  Continue oral iron    7. Pancreatic cyst  Assessment & Plan:  MRI abdomen: Numerous cystic lesions scattered throughout the pancreas with a dominant lesion measuring 18 mm in the body. Worrisome features include ductal dilation up to 6 mm in the tail the pancreas and abrupt change in caliber of the pancreatic duct.     Plan  Has a scheduled appointment with GI on 6/27th.  Surgical oncology on board.         Counseling:     Medication Side Effects: Adverse side effects of medications were reviewed with the patient/guardian today.  I have spent 50 minutes with Patient and family today in which greater than 50% of this time was spent in counseling/coordination of care regarding Diagnostic results, Prognosis, Risks and benefits of tx options, Instructions for management, Patient and family education, Importance of tx compliance, Risk factor reductions, Counseling / Coordination of care, Documenting in the medical record, Reviewing/placing orders in the medical record (including tests, medications, and/or procedures), and Obtaining or reviewing history  .  Barriers to treatment include: No identified barriers     Transitional Care Management Review:     Deonte Greenfield is a 74 y.o. male here for TCM follow-up    During the TCM phone call patient stated:    TCM Call (since 6/11/2025)       Date and time call was made  6/17/2025  4:37 PM    Hospital care reviewed  Records reviewed    Patient was hospitialized at  [facility]    Date of Admission  06/03/25    Date of discharge  06/16/25    Diagnosis  Acute on chronic intercranial subdural hematoma    Disposition  Rehabilitation center    Were the patients medications reviewed and updated  Yes          TCM Call (since 6/11/2025)       Scheduled for follow up?  Yes    Did you obtain your prescribed  medications  Yes    Do you need help managing your prescriptions or medications  No    Is transportation to your appointment needed  No    I have advised the patient to call PCP with any new or worsening symptoms  Abby Braxton BEE    Living Arrangements  Spouse or Significiant other             HPI:     74-year-old male with hypertension, DM-2, CKD-2, stroke, PVD presents to the clinic for transition of care.  In April patient initially had a small subdural hematoma in the right side following a fall.  Patient had a follow-up MRI on 5/24 which showed new large bilateral subdural hematomas.  Patient was admitted to [facility],  status post bilateral lake holes for evacuation on 5/25, MMA was not done due to anatomical considerations.  Follow-up CTs with decrease in size bilateral SDH's.  Patient then developed acute CVA in right superior cerebellum.  He was started on aspirin on discharge following clearance by neurosurgery.  Patient was not on any blood thinners or antiplatelets prior.  Patient had right upper arm weakness following the stroke, currently almost at baseline function.  Walking with the help of a walker.      The following portions of the patient's history were reviewed and updated as appropriate: allergies, current medications, past family history, past medical history, past social history, past surgical history and problem list.     Review of Systems:     Review of Systems   Constitutional:  Negative for chills and fever.   HENT:  Negative for ear pain and sore throat.    Eyes:  Negative for pain and visual disturbance.   Respiratory:  Negative for cough and shortness of breath.    Cardiovascular:  Negative for chest pain and palpitations.   Gastrointestinal:  Negative for abdominal pain and vomiting.   Genitourinary:  Negative for dysuria and hematuria.   Musculoskeletal:  Negative for arthralgias and back pain.   Skin:  Negative for color change and rash.   Neurological:   "Negative for seizures and syncope.   All other systems reviewed and are negative.       Problem List:     Problem List[1]     Objective:     /60 (BP Location: Right arm, Patient Position: Sitting, Cuff Size: Adult)   Pulse 66   Temp 97.5 °F (36.4 °C) (Tympanic)   Resp 16   Ht 5' 5\" (1.651 m)   Wt 103 kg (226 lb)   SpO2 98%   BMI 37.61 kg/m²     Physical Exam  Constitutional:       General: He is not in acute distress.     Appearance: Normal appearance. He is not ill-appearing.   HENT:      Head: Normocephalic.      Nose: No congestion.      Mouth/Throat:      Mouth: Mucous membranes are moist.      Pharynx: Oropharynx is clear.     Cardiovascular:      Rate and Rhythm: Normal rate.      Pulses: Normal pulses.      Heart sounds: Normal heart sounds.   Pulmonary:      Effort: Pulmonary effort is normal.      Breath sounds: Normal breath sounds.   Abdominal:      General: Abdomen is flat.      Palpations: Abdomen is soft.     Musculoskeletal:      Right lower leg: No edema.      Left lower leg: Edema present.     Skin:     General: Skin is warm and dry.      Capillary Refill: Capillary refill takes less than 2 seconds.     Neurological:      General: No focal deficit present.      Mental Status: He is alert and oriented to person, place, and time.     Psychiatric:         Mood and Affect: Mood normal.         Laboratory Results: I have personally reviewed the pertinent laboratory results/reports     Radiology/Other Diagnostic Testing Results: Results Review Statement: I personally reviewed the following image studies in PACS and associated radiology reports: MRI abdomen/MRCP. My interpretation of the radiology images/reports is:  .    No results found.     Current Medications:     Medications Prior to Visit[2]    Laine Dunbar MD  [facility] INTERNAL MEDICINE [place]          [1]   Patient Active Problem List  Diagnosis    Fall    PVD (peripheral vascular disease) (HCC)    Diabetes mellitus (HCC)    " Groin pain, right    Syncope    Thrombocytopenia (HCC)    Abnormal echocardiogram    Hyperuricemia    Hypocitraturia    Mixed hyperlipidemia    Neuropathy    Onychomycosis    POPPY (obstructive sleep apnea)    Osteoarthritis    S/P hip replacement    Atrial septal aneurysm    Normocytic anemia    Bradycardia, sinus    Severe obesity (BMI 35.0-39.9) with comorbidity (HCC)    Acute on chronic intracranial subdural hematoma (HCC)    Leg swelling    Weight loss    Frailty    Acute pain    Insomnia    At risk for delirium    Acute CVA (cerebrovascular accident) (HCC)    Gout    Pancreatic cyst    HTN (hypertension)    CKD (chronic kidney disease)    Superficial foreign body of left leg without major open wound and without infection    Bilateral leg edema    Leg wound, left   [2]   Outpatient Medications Prior to Visit   Medication Sig Dispense Refill    acetaminophen (TYLENOL) 325 mg tablet Take 2 tablets (650 mg total) by mouth every 6 (six) hours as needed for mild pain or fever for up to 14 days      allopurinol (ZYLOPRIM) 300 mg tablet Take 1 tablet (300 mg total) by mouth in the morning.      aspirin (ECOTRIN LOW STRENGTH) 81 mg EC tablet Take 1 tablet (81 mg total) by mouth daily      atenolol-chlorthalidone (TENORETIC) 50-25 mg per tablet Take 1 tablet by mouth in the morning.      citric acid-potassium citrate (POLYCITRA K) 1,100-334 mg/5 mL solution Take 5 mL by mouth in the morning and 5 mL at noon and 5 mL in the evening. Take with meals.      Diclofenac Sodium (VOLTAREN) 1 % Apply 2 g topically 4 (four) times a day as needed (for muscle cramps) 50 g 0    ferrous sulfate 325 (65 Fe) mg tablet Take 1 tablet (325 mg total) by mouth every other day      Lancets (OneTouch Delica Plus [id number]) MISC       losartan (COZAAR) 50 mg tablet Take 1 tablet (50 mg total) by mouth daily 30 tablet 0    polyethylene glycol (MIRALAX) 17 g packet Take 17 g by mouth daily for 14 days 238 g 0    senna-docusate sodium (SENOKOT S)  8.6-50 mg per tablet Take 1 tablet by mouth daily at bedtime for 7 days 7 tablet 0    atorvastatin (LIPITOR) 10 mg tablet Take 1 tablet (10 mg total) by mouth daily at bedtime (Patient not taking: Reported on 6/25/2025)       No facility-administered medications prior to visit.

## 2025-06-25 NOTE — ASSESSMENT & PLAN NOTE
Patient initially had a right-sided small SDH in April.  But follow-up MRI shows bilateral hematomas on 5/24th.  Patient was admitted to the hospital, status post bilateral lake holes.  Repeat CTs shows decrease in size in bilateral SDH's.    Plan  Repeat CT next week  Follow-up with neurosurgery in 2 weeks.

## 2025-06-25 NOTE — ASSESSMENT & PLAN NOTE
BP in the office 110/60.    Plan  Continue atenolol-HCTZ 15-25 mg  Continue losartan 50 mg daily

## 2025-06-25 NOTE — ASSESSMENT & PLAN NOTE
Lab Results   Component Value Date    HGBA1C 6.2 (H) 05/24/2025     Patient was on metformin 500 mg twice daily before.  It was discontinued following hospital discharge as his glycemic control was good.  HbA1c past 2 months was less than 6.4%.  Accu-Cheks at home ranges from .    Plan  HbA1c in the next visit  Monitor off medications

## 2025-06-25 NOTE — ASSESSMENT & PLAN NOTE
Patient's hemoglobin around 10-11.  MCV 96.  Mentzer index 24 which suggest iron deficiency anemia.    Plan  Continue oral iron

## 2025-06-25 NOTE — ASSESSMENT & PLAN NOTE
MRI brain 5/31: Evolving recent right cerebellar infarct with mild local mass effect.   Patient is on aspirin and Lipitor 10 mg.  Recently had a Zio patch.  No significant arrhythmias or A-fib.    Plan  Continue aspirin 81 mg daily  Continue Lipitor 10 mg  Follow-up with neurology.  Follow-up with physical therapy.

## 2025-06-25 NOTE — ASSESSMENT & PLAN NOTE
Lab Results   Component Value Date    EGFR 90 06/18/2025    EGFR 86 06/12/2025    EGFR 90 06/06/2025    CREATININE 0.75 06/18/2025    CREATININE 0.84 06/12/2025    CREATININE 0.74 06/06/2025     Creatinine at baseline.  Avoid nephrotoxins

## 2025-06-25 NOTE — ASSESSMENT & PLAN NOTE
MRI abdomen: Numerous cystic lesions scattered throughout the pancreas with a dominant lesion measuring 18 mm in the body. Worrisome features include ductal dilation up to 6 mm in the tail the pancreas and abrupt change in caliber of the pancreatic duct.     Plan  Has a scheduled appointment with GI on 6/27th.  Surgical oncology on board.

## 2025-06-26 ENCOUNTER — OFFICE VISIT (OUTPATIENT)
Dept: PHYSICAL THERAPY | Facility: CLINIC | Age: 75
End: 2025-06-26
Payer: COMMERCIAL

## 2025-06-26 DIAGNOSIS — R53.1 GENERALIZED WEAKNESS: ICD-10-CM

## 2025-06-26 DIAGNOSIS — I63.9 ACUTE CVA (CEREBROVASCULAR ACCIDENT) (HCC): Primary | ICD-10-CM

## 2025-06-26 PROCEDURE — 97110 THERAPEUTIC EXERCISES: CPT

## 2025-06-26 PROCEDURE — 97112 NEUROMUSCULAR REEDUCATION: CPT

## 2025-06-26 NOTE — PROGRESS NOTES
"Daily Note     Today's date: 2025  Patient name: Deonte Greenfield  : 1950  MRN: [id number]  Referring provider: Martinez Mayer MD  Dx:   Encounter Diagnosis     ICD-10-CM    1. Acute CVA (cerebrovascular accident) (HCC)  I63.9       2. Generalized weakness  R53.1                      Subjective: Pt states that he has no new complaints.       Objective: See treatment diary below      Assessment: Tolerated treatment well.  Initiated session with hip and knee strengthening in standing and on table.  Pt able to perform all with no complaints and minimal fatigue noted.  Cues for form to avoid hip ER during side stepping with fair carryover noted.  Pt will benefit from continued therapy.  Will progress as able.        Plan: Continue per plan of care.         POC Expires Auth Status Start Date Expiration Date PT Visit Limit    2025 Pending   BOMN   Date 2025      Used 1 2      Remaining           Diagnosis: gait dysfunction, balance issues s/p fall in 2025   Precautions: DM2, HTN, hx of subdural hematoma; hx of THR bilat, hx of multiple L/S surgeries   Next Physician's Appt:   [employer] HEP:   Manuals       LE stretching                Neuro Re-Ed        Foam balance        SLS        Tandem amb        Wobble Board        Quad sets -> supine SLR        Glute sets -> bridges  Bridges 2x10      Hip ADD ball squeeze  20x3\"      BKFO / clams  BKFO: GTB 15x ea      LAQ   10x ea                      Ther Ex        HS stretch        LTRs  10x ea      DKTC w/ ball  15x      HR / TR  20x HR      Side-stepping   2x at mirror      Standing hip ABD, ER  10x2 abduction ea                       Ther Activity              NuStep/Recum bike for LE mobility    NuStep 5 mins Lv3         Mini Squat        STS        Leg Press        Stepping over obstables                        Pt Ed HEP, POC       Re-Evaluation             Modalities                                                       "

## 2025-06-27 ENCOUNTER — DOCUMENTATION (OUTPATIENT)
Dept: HEMATOLOGY ONCOLOGY | Facility: CLINIC | Age: 75
End: 2025-06-27

## 2025-06-27 ENCOUNTER — OFFICE VISIT (OUTPATIENT)
Dept: GASTROENTEROLOGY | Facility: AMBULARY SURGERY CENTER | Age: 75
End: 2025-06-27
Payer: COMMERCIAL

## 2025-06-27 VITALS
DIASTOLIC BLOOD PRESSURE: 64 MMHG | OXYGEN SATURATION: 97 % | HEART RATE: 70 BPM | HEIGHT: 65 IN | BODY MASS INDEX: 37.75 KG/M2 | SYSTOLIC BLOOD PRESSURE: 116 MMHG | WEIGHT: 226.6 LBS

## 2025-06-27 DIAGNOSIS — K86.9 PANCREATIC LESION: Primary | ICD-10-CM

## 2025-06-27 PROCEDURE — 99204 OFFICE O/P NEW MOD 45 MIN: CPT | Performed by: PHYSICIAN ASSISTANT

## 2025-06-27 NOTE — PROGRESS NOTES
Name: Deonte Greenfield      : 1950      MRN: [id number]  Encounter Provider: Scot Martines PA-C  Encounter Date: 2025   Encounter department: [facility]  :  Assessment & Plan  Pancreatic lesion  Query for IPMN versus pancreatic malignancy, requires further evaluation and tissue diagnosis    - Plan EUS with FNA as soon as possible, discussed with our schedulers    - Procedure was explained in detail to the patient at this time including associated risks and benefits    - May follow-up with surgical oncology as previously recommended  Orders:    Endoscopic ultrasonography, GI (Upper); Future    Ambulatory Referral to Surgical Oncology; Future        History of Present Illness     Deonte Greenfield is a 74 y.o. male who presents for evaluation.  In April he had seen a fall and developed a subdural hematoma, in May had been to have hemorrhagic cerebellar stroke, with imaging earlier this month showing some interval increase in the size of hematomas.  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  He was given referral oncology and also to our specialty.    Patient says he has never had EGD, had colonoscopy about 15 years ago.  He quit smoking about 25 years ago, no history of regular alcohol use.  He does not take any antiplatelet medications other than 81 mg aspirin at this time.    Otherwise denying acid reflux or heartburn, dysphagia, nausea or vomiting, abdominal pain, blood or mucus in the stools, or issues with diarrhea or constipation at this time.    HPI    Review of Systems   Constitutional:  Negative for activity change, appetite change, chills, fatigue, fever and unexpected weight change.   HENT:  Negative for congestion, nosebleeds, rhinorrhea, sore  "throat and trouble swallowing.    Eyes:  Negative for pain, itching and visual disturbance.   Respiratory:  Negative for cough, shortness of breath and wheezing.    Cardiovascular:  Negative for chest pain, palpitations and leg swelling.   Gastrointestinal:  Negative for constipation, diarrhea, nausea and vomiting.   Endocrine: Negative for cold intolerance, heat intolerance and polyuria.   Genitourinary:  Negative for difficulty urinating, dysuria, flank pain, frequency and hematuria.   Musculoskeletal:  Negative for arthralgias, back pain, myalgias and neck pain.   Skin:  Negative for color change, pallor and rash.   Neurological:  Negative for dizziness, speech difficulty, weakness, numbness and headaches.   Hematological:  Does not bruise/bleed easily.   Psychiatric/Behavioral:  Negative for dysphoric mood and sleep disturbance. The patient is not nervous/anxious.     A complete review of systems is negative other than that noted above in the HPI.      Current Medications[1]  Objective   /64 (BP Location: Left arm, Patient Position: Sitting, Cuff Size: Standard)   Pulse 70   Ht 5' 5\" (1.651 m)   Wt 103 kg (226 lb 9.6 oz)   SpO2 97%   BMI 37.71 kg/m²     Physical Exam  Constitutional:       General: He is not in acute distress.     Appearance: He is well-developed. He is not diaphoretic.   HENT:      Head: Normocephalic and atraumatic.     Eyes:      Conjunctiva/sclera: Conjunctivae normal.      Pupils: Pupils are equal, round, and reactive to light.       Cardiovascular:      Rate and Rhythm: Normal rate and regular rhythm.      Heart sounds: Normal heart sounds. No murmur heard.     No friction rub. No gallop.   Pulmonary:      Effort: Pulmonary effort is normal. No respiratory distress.      Breath sounds: Normal breath sounds. No stridor. No wheezing or rales.   Abdominal:      General: Bowel sounds are normal. There is no distension.      Palpations: Abdomen is soft. There is no mass.      " Tenderness: There is no abdominal tenderness. There is no guarding or rebound.     Musculoskeletal:         General: No tenderness. Normal range of motion.      Cervical back: Normal range of motion and neck supple.     Skin:     General: Skin is warm and dry.      Coloration: Skin is not pale.      Findings: No erythema or rash.     Neurological:      Mental Status: He is alert and oriented to person, place, and time.     Psychiatric:         Behavior: Behavior normal.            Lab Results: I personally reviewed relevant lab results.                  [1]   Current Outpatient Medications   Medication Sig Dispense Refill    acetaminophen (TYLENOL) 325 mg tablet Take 2 tablets (650 mg total) by mouth every 6 (six) hours as needed for mild pain or fever for up to 14 days      allopurinol (ZYLOPRIM) 300 mg tablet Take 1 tablet (300 mg total) by mouth in the morning.      aspirin (ECOTRIN LOW STRENGTH) 81 mg EC tablet Take 1 tablet (81 mg total) by mouth daily      atenolol-chlorthalidone (TENORETIC) 50-25 mg per tablet Take 1 tablet by mouth in the morning.      citric acid-potassium citrate (POLYCITRA K) 1,100-334 mg/5 mL solution Take 5 mL by mouth in the morning and 5 mL at noon and 5 mL in the evening. Take with meals.      Diclofenac Sodium (VOLTAREN) 1 % Apply 2 g topically 4 (four) times a day as needed (for muscle cramps) 50 g 0    ferrous sulfate 325 (65 Fe) mg tablet Take 1 tablet (325 mg total) by mouth every other day      Lancets (OneTouch Delica Plus [id number]) MISC       losartan (COZAAR) 50 mg tablet Take 1 tablet (50 mg total) by mouth daily 30 tablet 0    atorvastatin (LIPITOR) 10 mg tablet Take 1 tablet (10 mg total) by mouth daily at bedtime (Patient not taking: Reported on 6/27/2025)      polyethylene glycol (MIRALAX) 17 g packet Take 17 g by mouth daily for 14 days 238 g 0    senna-docusate sodium (SENOKOT S) 8.6-50 mg per tablet Take 1 tablet by mouth daily at bedtime for 7 days 7 tablet 0      No current facility-administered medications for this visit.

## 2025-06-27 NOTE — PROGRESS NOTES
BRYAN msg sent to the [facility] to get this pt set up for an EUS stat so that pt can be scheduled with surgical oncology. Pending scheduling until EUS date.

## 2025-06-30 ENCOUNTER — DOCUMENTATION (OUTPATIENT)
Dept: HEMATOLOGY ONCOLOGY | Facility: CLINIC | Age: 75
End: 2025-06-30

## 2025-06-30 ENCOUNTER — OFFICE VISIT (OUTPATIENT)
Dept: PHYSICAL THERAPY | Facility: CLINIC | Age: 75
End: 2025-06-30
Payer: COMMERCIAL

## 2025-06-30 DIAGNOSIS — R53.1 GENERALIZED WEAKNESS: Primary | ICD-10-CM

## 2025-06-30 PROCEDURE — 97110 THERAPEUTIC EXERCISES: CPT

## 2025-06-30 PROCEDURE — 97112 NEUROMUSCULAR REEDUCATION: CPT

## 2025-06-30 NOTE — PROGRESS NOTES
Pt has EUS scheduled on 7/23/25. Sent sched guidelines for pt to see surg onc one week following EUS date.

## 2025-06-30 NOTE — PROGRESS NOTES
"Daily Note     Today's date: 2025  Patient name: Deonte Greenfield  : 1950  MRN: [id number]  Referring provider: Martinez Mayer MD  Dx:   Encounter Diagnosis     ICD-10-CM    1. Generalized weakness  R53.1                      Subjective: Pt states that he was a little sore after his last visit but nothing too much.      Objective: See treatment diary below      Assessment: Tolerated treatment well.  Continued with outlined program and progressed as able.  NBOS on foam with occasional minimal to no UE support needed.  Hamstring stretch with strap with pt noting some increased pain after, the normal pain he will occasionally feel in his R glut/LB/hip.  Muscle fatigue noted, no other complaints.  Pt will benefit from continued therapy.  Will progress as able.        Plan: Continue per plan of care.         POC Expires Auth Status Start Date Expiration Date PT Visit Limit    2025 Pending   BOMN   Date 2025     Used 1 2 3     Remaining           Diagnosis: gait dysfunction, balance issues s/p fall in 2025   Precautions: DM2, HTN, hx of subdural hematoma; hx of THR bilat, hx of multiple L/S surgeries   Next Physician's Appt:   [employer] HEP:   Manuals      LE stretching                Neuro Re-Ed        Foam balance   NBOS 30\"x2     SLS        Tandem amb        Wobble Board        Quad sets -> supine SLR   3\"x10 ea     Glute sets -> bridges  Bridges 2x10 Bridges 2x10     Hip ADD ball squeeze  20x3\" 20x3\"     BKFO / clams  BKFO: GTB 15x ea BKFO: GTB 20x ea     LAQ   10x ea 2x10 ea                     Ther Ex        HS stretch   W/strap 10x10\" ea     LTRs  10x ea 10x ea     DKTC w/ ball  15x 20x     HR / TR  20x HR 20x HR     Side-stepping   2x at mirror 3x at mirror     Standing hip ABD, ER  10x2 abduction ea 10x2 ea abd                      Ther Activity              NuStep/Recum bike for LE mobility    NuStep 5 mins Lv3  NuStep 5 mins Lv3       Mini " Squat        STS        Leg Press        Stepping over obstables                        Pt Ed HEP, POC       Re-Evaluation             Modalities

## 2025-07-01 ENCOUNTER — OFFICE VISIT (OUTPATIENT)
Dept: PHYSICAL THERAPY | Facility: CLINIC | Age: 75
End: 2025-07-01
Payer: COMMERCIAL

## 2025-07-01 DIAGNOSIS — R53.1 GENERALIZED WEAKNESS: Primary | ICD-10-CM

## 2025-07-01 PROCEDURE — 97112 NEUROMUSCULAR REEDUCATION: CPT

## 2025-07-01 PROCEDURE — 97110 THERAPEUTIC EXERCISES: CPT

## 2025-07-01 NOTE — PROGRESS NOTES
"Daily Note     Today's date: 2025  Patient name: Deonte Greenfield  : 1950  MRN: [id number]  Referring provider: Martinez Mayer MD  Dx:   Encounter Diagnosis     ICD-10-CM    1. Generalized weakness  R53.1                      Subjective: Pt states that his hip/back was sore when he got home yesterday and it lasted for a little while.  Today it is feeling better.        Objective: See treatment diary below      Assessment: Tolerated treatment well.  Continued with outlined program with minor adjustments to avoid aggravating pt's hip pain.  Will assess pt's response nv and make any changes as needed.  Light UE support needed to maintain his balance.  Pt will benefit from continued therapy.  Will progress as able.        Plan: Continue per plan of care.         POC Expires Auth Status Start Date Expiration Date PT Visit Limit    2025 Pending   BOMN   Date 2025    Used 1 2 3 4    Remaining           Diagnosis: gait dysfunction, balance issues s/p fall in 2025   Precautions: DM2, HTN, hx of subdural hematoma; hx of THR bilat, hx of multiple L/S surgeries   Next Physician's Appt:   [employer] HEP:   Manuals     LE stretching                Neuro Re-Ed        Foam balance   NBOS 30\"x2     SLS        Tandem amb    Balance 30\"x2 ea    Wobble Board    2 mins f/b only    Quad sets -> supine SLR   3\"x10 ea     Glute sets -> bridges  Bridges 2x10 Bridges 2x10 Bridges 2x10    Hip ADD ball squeeze  20x3\" 20x3\" 20x3\"    BKFO / clams  BKFO: GTB 15x ea BKFO: GTB 20x ea BKFO: GTB 20x ea    LAQ   10x ea 2x10 ea Supine 20x ea    March                 Ther Ex        HS stretch   W/strap 10x10\" ea     LTRs  10x ea 10x ea 10x ea    DKTC w/ ball  15x 20x     HR / TR  20x HR 20x HR     Side-stepping   2x at mirror 3x at mirror 3x at mirror    Standing hip ABD, ER  10x2 abduction ea 10x2 ea abd                      Ther Activity              NuStep/Recum bike for " LE mobility    NuStep 5 mins Lv3  NuStep 5 mins Lv3  NuStep 6 mins Lv3     Mini Squat        STS        Leg Press        Stepping over obstables                        Pt Ed HEP, POC       Re-Evaluation             Modalities

## 2025-07-02 ENCOUNTER — HOSPITAL ENCOUNTER (OUTPATIENT)
Dept: RADIOLOGY | Facility: HOSPITAL | Age: 75
Discharge: HOME | End: 2025-07-02
Attending: PHYSICIAN ASSISTANT
Payer: COMMERCIAL

## 2025-07-02 DIAGNOSIS — S06.5XAA SUBDURAL HEMATOMA (HCC): ICD-10-CM

## 2025-07-02 DIAGNOSIS — Z98.890 POST-OPERATIVE STATE: ICD-10-CM

## 2025-07-02 PROCEDURE — 70450 CT HEAD/BRAIN W/O DYE: CPT

## 2025-07-02 NOTE — PROGRESS NOTES
At this time there are no sooner dates available for procedure. Will put patient on a wait list if something opens up.

## 2025-07-03 ENCOUNTER — OFFICE VISIT (OUTPATIENT)
Dept: PODIATRY | Facility: CLINIC | Age: 75
End: 2025-07-03
Payer: COMMERCIAL

## 2025-07-03 VITALS — HEIGHT: 65 IN | WEIGHT: 229 LBS | BODY MASS INDEX: 38.15 KG/M2 | OXYGEN SATURATION: 98 % | HEART RATE: 60 BPM

## 2025-07-03 DIAGNOSIS — E11.22 TYPE 2 DIABETES MELLITUS WITH STAGE 2 CHRONIC KIDNEY DISEASE, WITHOUT LONG-TERM CURRENT USE OF INSULIN  (HCC): ICD-10-CM

## 2025-07-03 DIAGNOSIS — B35.1 ONYCHOMYCOSIS: Primary | ICD-10-CM

## 2025-07-03 DIAGNOSIS — N18.2 TYPE 2 DIABETES MELLITUS WITH STAGE 2 CHRONIC KIDNEY DISEASE, WITHOUT LONG-TERM CURRENT USE OF INSULIN  (HCC): ICD-10-CM

## 2025-07-03 DIAGNOSIS — E11.9 ENCOUNTER FOR DIABETIC FOOT EXAM (HCC): ICD-10-CM

## 2025-07-03 PROCEDURE — 11721 DEBRIDE NAIL 6 OR MORE: CPT | Performed by: PODIATRIST

## 2025-07-03 PROCEDURE — 99203 OFFICE O/P NEW LOW 30 MIN: CPT | Performed by: PODIATRIST

## 2025-07-07 ENCOUNTER — OFFICE VISIT (OUTPATIENT)
Dept: PHYSICAL THERAPY | Facility: CLINIC | Age: 75
End: 2025-07-07
Payer: COMMERCIAL

## 2025-07-07 DIAGNOSIS — I63.9 ACUTE CVA (CEREBROVASCULAR ACCIDENT) (HCC): ICD-10-CM

## 2025-07-07 DIAGNOSIS — R53.1 GENERALIZED WEAKNESS: Primary | ICD-10-CM

## 2025-07-07 PROCEDURE — 97110 THERAPEUTIC EXERCISES: CPT | Performed by: PHYSICAL THERAPIST

## 2025-07-07 PROCEDURE — 97112 NEUROMUSCULAR REEDUCATION: CPT | Performed by: PHYSICAL THERAPIST

## 2025-07-07 PROCEDURE — 97530 THERAPEUTIC ACTIVITIES: CPT | Performed by: PHYSICAL THERAPIST

## 2025-07-07 NOTE — PROGRESS NOTES
"Daily Note     Today's date: 2025  Patient name: Deonte Greenfield  : 1950  MRN: [id number]  Referring provider: Martinez Mayer MD  Dx:   Encounter Diagnosis     ICD-10-CM    1. Generalized weakness  R53.1       2. Acute CVA (cerebrovascular accident) (HCC)  I63.9           Start Time: 1215  Stop Time: 1300  Total time in clinic (min): 45 minutes    Subjective: Patient reports no new changes on arrival.      Objective: See treatment diary below      Assessment: Tolerated treatment well. Noted improved overall activity tolerance, but required occasional seated rest breaks between exercises. Patient noted to have some Right knee soreness and pain especially with bed mobility and attempting LTRs. Patient however did not have knee pain with ball squeeze with LTRs. Performed all other exercises with good tolerance.  Patient exhibited good technique with therapeutic exercises and would benefit from continued PT      Plan: Continue per plan of care.  Progress treatment as tolerated.          POC Expires Auth Status Start Date Expiration Date PT Visit Limit    2025 Pending   BOMN   Date 2025    Used 1 2 3 4    Remaining           Diagnosis: gait dysfunction, balance issues s/p fall in 2025   Precautions: DM2, HTN, hx of subdural hematoma; hx of THR bilat, hx of multiple L/S surgeries   Next Physician's Appt:   [employer] HEP:   Manuals    LE stretching                Neuro Re-Ed        Foam balance   NBOS 30\"x2     SLS        Tandem amb    Balance 30\"x2 ea    Wobble Board    2 mins f/b only 2 mins f/b only   Quad sets -> supine SLR   3\"x10 ea  Quad sets 2-3\" 2x10 ea   Glute sets -> bridges  Bridges 2x10 Bridges 2x10 Bridges 2x10    Hip ADD ball squeeze  20x3\" 20x3\" 20x3\" 20x3\"   BKFO / clams  BKFO: GTB 15x ea BKFO: GTB 20x ea BKFO: GTB 20x ea BKFO: GTB 20x ea   LAQ   10x ea 2x10 ea Supine 20x ea Seated 1# 2x10 ea    " "Ex        HS stretch   W/strap 10x10\" ea     LTRs  10x ea 10x ea 10x ea P! Right knee; w/ ball squeeze x10 ea   DKTC w/ ball  15x 20x     HR / TR  20x HR 20x HR  HR/TR x20 ea   Side-stepping   2x at mirror 3x at mirror 3x at mirror 3x at mirror   Standing hip ABD, ER  10x2 abduction ea 10x2 ea abd                      Ther Activity              NuStep/Recum bike for LE mobility    NuStep 5 mins Lv3  NuStep 5 mins Lv3  NuStep 6 mins Lv3  NuStep 6 mins Lv3   Mini Squat        STS        Leg Press        Stepping over obstables                        Pt Ed HEP, POC    Ice pack   Re-Evaluation             Modalities                                                             "

## 2025-07-08 ENCOUNTER — DOCUMENTATION (OUTPATIENT)
Dept: HEMATOLOGY ONCOLOGY | Facility: CLINIC | Age: 75
End: 2025-07-08

## 2025-07-09 ENCOUNTER — OFFICE VISIT (OUTPATIENT)
Dept: NEUROSURGERY | Facility: CLINIC | Age: 75
End: 2025-07-09

## 2025-07-09 VITALS
DIASTOLIC BLOOD PRESSURE: 58 MMHG | BODY MASS INDEX: 38.15 KG/M2 | TEMPERATURE: 97.3 F | SYSTOLIC BLOOD PRESSURE: 122 MMHG | HEART RATE: 60 BPM | HEIGHT: 65 IN | WEIGHT: 229 LBS | OXYGEN SATURATION: 96 %

## 2025-07-09 DIAGNOSIS — I63.9 ACUTE CVA (CEREBROVASCULAR ACCIDENT) (HCC): ICD-10-CM

## 2025-07-09 DIAGNOSIS — Z98.890 HISTORY OF BURR HOLE SURGERY: ICD-10-CM

## 2025-07-09 DIAGNOSIS — S06.5XAA SUBDURAL HEMATOMA (HCC): Primary | ICD-10-CM

## 2025-07-09 PROCEDURE — 99024 POSTOP FOLLOW-UP VISIT: CPT | Performed by: NEUROLOGICAL SURGERY

## 2025-07-09 NOTE — PROGRESS NOTES
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  Did not undergo embolization, Dr. Frank did not find the anatomy favorable.    Presented with progressive decline, especially in ambulation, slowed speech. He had falls in early 2025, with a small falx SDH that resolved on short interval f/u. An outpatient MRI 25 for the above progressing symptoms demonstrated the cSDHs.     Drainage of the SDHs was as above, and the patient had his subdural drains removed as usual. A follow-up CT scan after drain removal showed an acute appearing infarct in the right superior cerebellum.  An MRI at that time confirmed that this was acute.  Was seen by neurology, and the patient was initiated on 81 mg aspirin.  Has since also follow-up with his cardiologist.    On follow-up today, the patient is slowly improving.  He is using a rolling walker for ambulation for stability and safety.  He is alert and following instructions although at times mildly confused.  His incisions are healing relatively well, some mild nonconcerning scabbing over the left side.    New CT of the head shows continued improvement of small residual subdural hematomas. Other CVA findings stable.     I have relaxed the patient's activity, bathing, medication restrictions.    He should continue to have follow-up with neurology to ultimately decide if he needs full dose ASA, or further stroke workup. The patient's wife agrees to contact them to arrange this. The patient's wife relates that their cardiologist does not feel he needs a loop recorder, although neurology did suggest this in their  last evaluation  note during his hospitalization in early 6/2025.  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     From a subdural standpoint, I do not feel he needs continued surveillance imaging.  Should neurology wish to increase his medical management to full dose ASA, or even anticoagulation, then they should consider a follow-up head CT scan after initiation to assess for any recurrence. He of course should have a new CT study should he have worsening neurologic function.    Otherwise, I will plan to see the patient back on an as-needed basis.      History of Present Illness     Deonte Greenfield is a 74 y.o. male who presents postop follow-up    HPI     Review of Systems   Musculoskeletal:  Gait problem: ambulates with walker.   Psychiatric/Behavioral:  Positive for confusion (poor memory).    All other systems reviewed and are negative.    I have personally reviewed the MA's review of systems and made changes as necessary.    07/09/25 Metrics: [id number]; VAS 65-70      Past Medical History   Past Medical History[1]  Past Surgical History[2]  Family History[3]  he reports that he has quit smoking. His smoking use included cigarettes. He has never used smokeless tobacco. He reports that he does not currently use alcohol. He reports that he does not use drugs.  Current Outpatient Medications   Medication Instructions   • allopurinol (ZYLOPRIM) 300 mg, Oral, Daily   • aspirin (ECOTRIN LOW STRENGTH) 81 mg, Oral, Daily   • atenolol-chlorthalidone (TENORETIC) 50-25 mg per tablet 1 tablet, Oral, Daily   • atorvastatin (LIPITOR) 10 mg, Oral, Daily at bedtime   • citric acid-potassium citrate (POLYCITRA K) 1,100-334 mg/5 mL solution 5 mL, Oral, 3 times daily with meals   • Diclofenac Sodium (VOLTAREN) 2 g, Topical, 4 times daily PRN   • ferrous sulfate 325 mg, Oral, Every other day   • Lancets (OneTouch  "Delica Plus [id number]) MISC    • losartan (COZAAR) 50 mg, Oral, Daily   • polyethylene glycol (MIRALAX) 17 g, Oral, Daily   • senna-docusate sodium (SENOKOT S) 8.6-50 mg per tablet 1 tablet, Oral, Daily at bedtime   Allergies[4]   Objective   /58 (BP Location: Left arm, Patient Position: Sitting, Cuff Size: Adult)   Pulse 60   Temp (!) 97.3 °F (36.3 °C) (Temporal)   Ht 5' 5\" (1.651 m)   Wt 104 kg (229 lb)   SpO2 96%   BMI 38.11 kg/m²     Physical Exam  Neurological Exam    Radiology Results Review: I have reviewed the following images/report studies in PACS:     CT head 7/2/2025:    IMPRESSION:     Continued decrease in size of bilateral cerebral convexity subdural hematomas. No acute parenchymal hemorrhage or new mass effect.     Evolution of prior right cerebellar infarcts.                    [1]  Past Medical History:  Diagnosis Date   • Diabetes mellitus (HCC)    • History of kidney stones 04/05/2025   • Hypertension    [2]  Past Surgical History:  Procedure Laterality Date   • KYLIE HOLE FOR SUBDURAL HEMATOMA Bilateral 5/25/2025    Procedure: KYLIE HOLES;  Surgeon: Issac Grady MD;  Location: [facility];  Service: Neurosurgery   • TOTAL HIP ARTHROPLASTY Bilateral    [3]  No family history on file.[4]  No Known Allergies"

## 2025-07-10 ENCOUNTER — OFFICE VISIT (OUTPATIENT)
Dept: PHYSICAL THERAPY | Facility: CLINIC | Age: 75
End: 2025-07-10
Payer: COMMERCIAL

## 2025-07-10 DIAGNOSIS — R53.1 GENERALIZED WEAKNESS: Primary | ICD-10-CM

## 2025-07-10 PROCEDURE — 97112 NEUROMUSCULAR REEDUCATION: CPT

## 2025-07-10 PROCEDURE — 97110 THERAPEUTIC EXERCISES: CPT

## 2025-07-10 NOTE — PROGRESS NOTES
"Daily Note     Today's date: 7/10/2025  Patient name: Deonte Greenfield  : 1950  MRN: [id number]  Referring provider: Martinez Mayer MD  Dx:   Encounter Diagnosis     ICD-10-CM    1. Generalized weakness  R53.1                      Subjective: Pt states that he is doing well, no new complaints.      Objective: See treatment diary below      Assessment: Tolerated treatment well.  Strengthening and balance ex's performed as indicated.  Pt challenged with wobble board balance and requires light UE support to maintain his balance.  No UE support needed for NBOS on foam.  CGS throughout for safety.  Resistance band added for side stepping with muscle fatigue noted.  Good tolerance throughout session.  Will continue to progress as able.      Plan: Continue per plan of care.         POC Expires Auth Status Start Date Expiration Date PT Visit Limit    2025 Pending   BOMN   Date 7/10/2025 2025 2025 2025    Used 6  3 4    Remaining           Diagnosis: gait dysfunction, balance issues s/p fall in 2025   Precautions: DM2, HTN, hx of subdural hematoma; hx of THR bilat, hx of multiple L/S surgeries   Next Physician's Appt:   [employer] HEP:   Manuals 7/10 6/26 6/30 7/1 7/7   LE stretching                Neuro Re-Ed        Foam balance NBOS 30\"x2  NBOS 30\"x2     SLS        Tandem amb    Balance 30\"x2 ea    Wobble Board 2 mins f/b: 1 min S/s   2 mins f/b only 2 mins f/b only   Quad sets -> supine SLR   3\"x10 ea  Quad sets 2-3\" 2x10 ea   Glute sets -> bridges  Bridges 2x10 Bridges 2x10 Bridges 2x10    Hip ADD ball squeeze 20x3\" 20x3\" 20x3\" 20x3\" 20x3\"   BKFO / clams BKFO: GTB 20x BKFO: GTB 15x ea BKFO: GTB 20x ea BKFO: GTB 20x ea BKFO: GTB 20x ea   LAQ   10x ea 2x10 ea Supine 20x ea Seated 1# 2x10 ea   March  H/L 10x2 B/L               Ther Ex        HS stretch   W/strap 10x10\" ea     LTRs 10x ea 10x ea 10x ea 10x ea P! Right knee; w/ ball squeeze x10 ea   DKTC w/ ball  15x 20x     HR / TR HR 20x " 20x HR 20x HR  HR/TR x20 ea   Side-stepping  OTB 3x at table 2x at mirror 3x at mirror 3x at mirror 3x at mirror   Standing hip ABD, ER  10x2 abduction ea 10x2 ea abd                      Ther Activity              NuStep/Recum bike for LE mobility  NuStep 7 mins Lv 4  NuStep 5 mins Lv3  NuStep 5 mins Lv3  NuStep 6 mins Lv3  NuStep 6 mins Lv3   Mini Squat        STS        Leg Press        Stepping over obstables                        Pt Ed     Ice pack   Re-Evaluation             Modalities

## 2025-07-11 ENCOUNTER — TELEPHONE (OUTPATIENT)
Dept: SURGICAL ONCOLOGY | Facility: CLINIC | Age: 75
End: 2025-07-11

## 2025-07-11 NOTE — TELEPHONE ENCOUNTER
Called pt. Spoke to cony. We were able to reschedule Deonte for his upcoming appt. Confirmed date, time and location

## 2025-07-15 ENCOUNTER — OFFICE VISIT (OUTPATIENT)
Dept: PHYSICAL THERAPY | Facility: CLINIC | Age: 75
End: 2025-07-15
Payer: COMMERCIAL

## 2025-07-15 DIAGNOSIS — R53.1 GENERALIZED WEAKNESS: Primary | ICD-10-CM

## 2025-07-15 DIAGNOSIS — I63.9 ACUTE CVA (CEREBROVASCULAR ACCIDENT) (HCC): ICD-10-CM

## 2025-07-15 PROCEDURE — 97112 NEUROMUSCULAR REEDUCATION: CPT

## 2025-07-15 PROCEDURE — 97110 THERAPEUTIC EXERCISES: CPT

## 2025-07-17 ENCOUNTER — OFFICE VISIT (OUTPATIENT)
Dept: PHYSICAL THERAPY | Facility: CLINIC | Age: 75
End: 2025-07-17
Payer: COMMERCIAL

## 2025-07-17 DIAGNOSIS — R53.1 GENERALIZED WEAKNESS: Primary | ICD-10-CM

## 2025-07-17 PROCEDURE — 97112 NEUROMUSCULAR REEDUCATION: CPT

## 2025-07-17 PROCEDURE — 97110 THERAPEUTIC EXERCISES: CPT

## 2025-07-17 PROCEDURE — 97140 MANUAL THERAPY 1/> REGIONS: CPT

## 2025-07-17 NOTE — ASSESSMENT & PLAN NOTE
The patient's clinical examination today is significant for thickened and dystrophic pedal nail plates with discoloration and fullness consistent with onychomycosis x 10.  Pedal pulses are palpable bilaterally, PT pulses were diminished.  Capillary fill time to toes is less than 3 seconds x 10.  Temperature gradient is within normal limits.  There are no open lesions.  The interdigital spaces are clear without maceration.  Vibratory sensation is diminished.  Epicritic sensation is intact.  Skin is thin with decreased turgor.  There is diminished hair growth to the bilateral extremities.    The patient's pedal nail plates are sharply debrided with a sterile clipper x 10 without complication.  The nails were then mechanically reduced in thickness utilizing a rotary bur without incident.  The patient's most recent hemoglobin A1c from May 2025 was 6.2, prior to that it was 6.4 in April 2025.    A diabetic foot examination was performed the patient is deemed to be in the low risk category.  Follow-up in 3 months.

## 2025-07-17 NOTE — PROGRESS NOTES
Name: Deonte Greenfield      : 1950      MRN: [id number]  Encounter Provider: Garry Pollack DPM  Encounter Date: 7/3/2025   Encounter department: [facility]  :  Assessment & Plan  Type 2 diabetes mellitus with stage 2 chronic kidney disease, without long-term current use of insulin  ([facility])    Lab Results   Component Value Date    HGBA1C 6.2 (H) 2025       Orders:    Ambulatory referral to Podiatry    Onychomycosis       The patient's clinical examination today is significant for thickened and dystrophic pedal nail plates with discoloration and fullness consistent with onychomycosis x 10.  Pedal pulses are palpable bilaterally, PT pulses were diminished.  Capillary fill time to toes is less than 3 seconds x 10.  Temperature gradient is within normal limits.  There are no open lesions.  The interdigital spaces are clear without maceration.  Vibratory sensation is diminished.  Epicritic sensation is intact.  Skin is thin with decreased turgor.  There is diminished hair growth to the bilateral extremities.    The patient's pedal nail plates are sharply debrided with a sterile clipper x 10 without complication.  The nails were then mechanically reduced in thickness utilizing a rotary bur without incident.  The patient's most recent hemoglobin A1c from May 2025 was 6.2, prior to that it was 6.4 in 2025.    A diabetic foot examination was performed the patient is deemed to be in the low risk category.  Follow-up in 3 months.  Encounter for diabetic foot exam (HCC)             History of Present Illness   HPI  Deonte Greenfield is a 74 y.o. male who presents today for routine diabetic foot examination.  He notes thickened and dystrophic toenail plates which are difficult to trim.  He notes no other acute pedal issues today.      Review of Systems   Constitutional: Negative.    Respiratory: Negative.     Cardiovascular: Negative.    Psychiatric/Behavioral: Negative.    "    Pertinent Medical History     PAST MEDICAL HISTORY:  Past Medical History[1]    PAST SURGICAL HISTORY:  Past Surgical History[2]     ALLERGIES:  Patient has no known allergies.    MEDICATIONS:  Current Medications[3]    SOCIAL HISTORY:  Social History[4]             Objective   Pulse 60   Ht 5' 5\" (1.651 m)   Wt 104 kg (229 lb)   SpO2 98%   BMI 38.11 kg/m²      Physical Exam  Constitutional:       Appearance: Normal appearance.   HENT:      Head: Normocephalic and atraumatic.     Cardiovascular:      Pulses: no weak pulses.           Dorsalis pedis pulses are 2+ on the right side and 2+ on the left side.        Posterior tibial pulses are 1+ on the right side and 1+ on the left side.   Pulmonary:      Effort: Pulmonary effort is normal.   Feet:      Right foot:      Protective Sensation: 7 sites tested.  7 sites sensed.      Skin integrity: Skin integrity normal. No ulcer, skin breakdown, erythema, warmth, callus or dry skin.      Toenail Condition: Right toenails are abnormally thick and long. Fungal disease present.     Left foot:      Protective Sensation: 7 sites tested.  7 sites sensed.      Skin integrity: Skin integrity normal. No ulcer, skin breakdown, erythema, warmth, callus or dry skin.      Toenail Condition: Left toenails are abnormally thick and long. Fungal disease present.     Comments: The patient's clinical examination today is significant for thickened and dystrophic pedal nail plates with discoloration and fullness consistent with onychomycosis x 10.  Pedal pulses are palpable bilaterally, PT pulses were diminished.  Capillary fill time to toes is less than 3 seconds x 10.  Temperature gradient is within normal limits.  There are no open lesions.  The interdigital spaces are clear without maceration.  Vibratory sensation is diminished.  Epicritic sensation is intact.  Skin is thin with decreased turgor.  There is diminished hair growth to the bilateral extremities.      Skin:     General: " Skin is warm and dry.      Capillary Refill: Capillary refill takes 2 to 3 seconds.     Neurological:      General: No focal deficit present.      Mental Status: He is alert and oriented to person, place, and time.     Psychiatric:         Mood and Affect: Mood normal.     Diabetic Foot Exam    Patient's shoes and socks removed.    Right Foot/Ankle   Right Foot Inspection  Skin Exam: skin normal and skin intact. No dry skin, no warmth, no callus, no erythema, no maceration, no abnormal color, no pre-ulcer, no ulcer and no callus.     Toe Exam: ROM and strength within normal limits.     Sensory   Vibration: diminished  Monofilament testing: intact    Vascular  Capillary refills: < 3 seconds  The right DP pulse is 2+. The right PT pulse is 1+.     Left Foot/Ankle  Left Foot Inspection  Skin Exam: skin normal and skin intact. No dry skin, no warmth, no erythema, no maceration, normal color, no pre-ulcer, no ulcer and no callus.     Toe Exam: ROM and strength within normal limits.     Sensory   Vibration: diminished  Monofilament testing: intact    Vascular  Capillary refills: < 3 seconds  The left DP pulse is 2+. The left PT pulse is 1+.     Assign Risk Category  No deformity present  No loss of protective sensation  No weak pulses  Risk: 0             [1]   Past Medical History:  Diagnosis Date    Diabetes mellitus (HCC)     History of kidney stones 04/05/2025    Hypertension    [2]   Past Surgical History:  Procedure Laterality Date    KYLIE HOLE FOR SUBDURAL HEMATOMA Bilateral 5/25/2025    Procedure: KYLIE HOLES;  Surgeon: Issac Grady MD;  Location: [facility];  Service: Neurosurgery    TOTAL HIP ARTHROPLASTY Bilateral    [3]   Current Outpatient Medications   Medication Sig Dispense Refill    allopurinol (ZYLOPRIM) 300 mg tablet Take 1 tablet (300 mg total) by mouth in the morning.      aspirin (ECOTRIN LOW STRENGTH) 81 mg EC tablet Take 1 tablet (81 mg total) by mouth daily      atenolol-chlorthalidone  (TENORETIC) 50-25 mg per tablet Take 1 tablet by mouth in the morning.      citric acid-potassium citrate (POLYCITRA K) 1,100-334 mg/5 mL solution Take 5 mL by mouth in the morning and 5 mL at noon and 5 mL in the evening. Take with meals.      Diclofenac Sodium (VOLTAREN) 1 % Apply 2 g topically 4 (four) times a day as needed (for muscle cramps) 50 g 0    ferrous sulfate 325 (65 Fe) mg tablet Take 1 tablet (325 mg total) by mouth every other day      Lancets (OneTouch Delica Plus [id number]) MISC       losartan (COZAAR) 50 mg tablet Take 1 tablet (50 mg total) by mouth daily 30 tablet 0    atorvastatin (LIPITOR) 10 mg tablet Take 1 tablet (10 mg total) by mouth daily at bedtime      polyethylene glycol (MIRALAX) 17 g packet Take 17 g by mouth daily for 14 days (Patient taking differently: Take 17 g by mouth every other day) 238 g 0    senna-docusate sodium (SENOKOT S) 8.6-50 mg per tablet Take 1 tablet by mouth daily at bedtime for 7 days 7 tablet 0     No current facility-administered medications for this visit.   [4]   Social History  Socioeconomic History    Marital status: /Civil Union   Tobacco Use    Smoking status: Former     Types: Cigarettes    Smokeless tobacco: Never   Vaping Use    Vaping status: Never Used   Substance and Sexual Activity    Alcohol use: Not Currently    Drug use: Never    Sexual activity: Not Currently     Social Drivers of Health     Financial Resource Strain: Low Risk  (11/26/2024)    Received from [facility]    Overall Financial Resource Strain (CARDIA)     Difficulty of Paying Living Expenses: Not hard at all   Food Insecurity: No Food Insecurity (6/16/2025)    Nursing - Inadequate Food Risk Classification     Worried About Running Out of Food in the Last Year: Never true     Ran Out of Food in the Last Year: Never true     Ran Out of Food in the Last Year: Never true   Transportation Needs: No Transportation Needs (6/16/2025)    Nursing - Transportation Risk  inflammation of the external auditory canal.  The tympanic membrane was intact with an aerated middle ear. This note was generated completely or in part utilizing Dragon dictation speech recognition software. Occasionally, words are mistranscribed and despite editing, the text may contain inaccuracies due to incorrect word recognition. If further clarification is needed please contact the office at [phone]. An electronic signature was used to authenticate this note.     --Yashira Juarez MD Classification     Lack of Transportation: No   Physical Activity: Inactive (11/26/2024)    Received from [facility]    Exercise Vital Sign     On average, how many days per week do you engage in moderate to strenuous exercise (like a brisk walk)?: 0 days     On average, how many minutes do you engage in exercise at this level?: 0 min   Stress: No Stress Concern Present (4/9/2025)    [employer] - Occupational Stress Questionnaire     Feeling of Stress : Not at all   Intimate Partner Violence: Unknown (6/16/2025)    Nursing IPS     Physically Hurt by Someone: No     Hurt or Threatened by Someone: No   Housing Stability: Unknown (6/16/2025)    Nursing: Inadequate Housing Risk Classification     Unable to Pay for Housing in the Last Year: No     Has Housing: No

## 2025-07-17 NOTE — PROGRESS NOTES
"Daily Note     Today's date: 2025  Patient name: Deonte Greenfield  : 1950  MRN: [id number]  Referring provider: Martinez Mayer MD  Dx:   Encounter Diagnosis     ICD-10-CM    1. Generalized weakness  R53.1                      Subjective: Pt states that he is doing ok, the pain in the front of his thigh is more burning.  He also has pain in his R glut, sometimes burning and sometimes just ache.        Objective: See treatment diary below      Assessment: Tolerated treatment well.  Session slightly modified to avoid ex's that aggravate pt's pain.   He was able to perform balance activities with no increased sx's.  Light UE support needed to maintain his balance.  Hamstring stretch with tightness noted B/L .  Attempted GINNY stretch on RLE, however after 2x pt had increased hip/thigh pain.  IASTM to R ITB with relief of pain noted.  Pt will benefit from continued therapy.  Will progress as able.      Plan: Continue per plan of care.         POC Expires Auth Status Start Date Expiration Date PT Visit Limit    2025 Pending   BOMN   Date 7/10/2025 7/15/2025 2025 2025    Used 6 7 8 4    Remaining           Diagnosis: gait dysfunction, balance issues s/p fall in 2025   Precautions: DM2, HTN, hx of subdural hematoma; hx of THR bilat, hx of multiple L/S surgeries   Next Physician's Appt:   [place] [facility]:   Manuals 7/10 7/15 7/17 7/1 7/7   LE stretching        IASTM   TH: R ITB     Neuro Re-Ed        Foam balance NBOS 30\"x2 NBOS 30\"x2 NBOS 30\"x2     SLS        Tandem amb   Balance 30\"x2 ea Balance 30\"x2 ea    Wobble Board 2 mins f/b: 1 min S/s 2 mins f/b; 1 min S/S 2 mins f/b: 1 min s/s 2 mins f/b only 2 mins f/b only   Quad sets -> supine SLR     Quad sets 2-3\" 2x10 ea   Glute sets -> bridges    Bridges 2x10    Hip ADD ball squeeze 20x3\" 20x3\"  20x3\" 20x3\"   BKFO / clams BKFO: GTB 20x BKFO: GTB 20x BKFO: GTB 20x BKFO: GTB 20x ea BKFO: GTB 20x ea   LAQ     Supine 20x ea Seated 1# 2x10 ea " "  March  H/L 10x2 B/L               Ther Ex        HS stretch        LTRs 10x ea 10x ea  10x ea 10x ea P! Right knee; w/ ball squeeze x10 ea   DKTC w/ ball        HR / TR HR 20x HR 20x HR 20x  HR/TR x20 ea   Side-stepping  OTB 3x at table 3x at mirror  3x at mirror 3x at mirror   Standing hip ABD, ER        Hamstring stretch   W/strap 10x10\" ea     GINNY stretch   10\"x2 on RLE P!      Ther Activity              NuStep/Recum bike for LE mobility  NuStep 7 mins Lv 4  NuStep 6 mins Lv4  NuStep 8 mins Lv3  NuStep 6 mins Lv3  NuStep 6 mins Lv3   Mini Squat        STS        Leg Press        Stepping over obstables                        Pt Ed     Ice pack   Re-Evaluation             Modalities                                                                   "

## 2025-07-17 NOTE — ASSESSMENT & PLAN NOTE
Lab Results   Component Value Date    HGBA1C 6.2 (H) 05/24/2025       Orders:    Ambulatory referral to Podiatry

## 2025-07-21 ENCOUNTER — EVALUATION (OUTPATIENT)
Dept: PHYSICAL THERAPY | Facility: CLINIC | Age: 75
End: 2025-07-21
Payer: COMMERCIAL

## 2025-07-21 ENCOUNTER — TELEPHONE (OUTPATIENT)
Dept: NEPHROLOGY | Facility: CLINIC | Age: 75
End: 2025-07-21

## 2025-07-21 ENCOUNTER — CONSULT (OUTPATIENT)
Dept: NEPHROLOGY | Facility: CLINIC | Age: 75
End: 2025-07-21
Payer: COMMERCIAL

## 2025-07-21 VITALS
OXYGEN SATURATION: 99 % | DIASTOLIC BLOOD PRESSURE: 64 MMHG | WEIGHT: 230 LBS | HEART RATE: 54 BPM | BODY MASS INDEX: 38.32 KG/M2 | HEIGHT: 65 IN | SYSTOLIC BLOOD PRESSURE: 122 MMHG

## 2025-07-21 DIAGNOSIS — R82.991 HYPOCITRATURIA: ICD-10-CM

## 2025-07-21 DIAGNOSIS — I63.9 ACUTE CVA (CEREBROVASCULAR ACCIDENT) (HCC): ICD-10-CM

## 2025-07-21 DIAGNOSIS — R60.0 LEG EDEMA: ICD-10-CM

## 2025-07-21 DIAGNOSIS — M25.551 RIGHT HIP PAIN: ICD-10-CM

## 2025-07-21 DIAGNOSIS — R53.1 GENERALIZED WEAKNESS: Primary | ICD-10-CM

## 2025-07-21 DIAGNOSIS — I10 PRIMARY HYPERTENSION: Primary | ICD-10-CM

## 2025-07-21 DIAGNOSIS — E66.01 SEVERE OBESITY (BMI 35.0-39.9) WITH COMORBIDITY (HCC): ICD-10-CM

## 2025-07-21 DIAGNOSIS — E11.42 TYPE 2 DIABETES MELLITUS WITH DIABETIC POLYNEUROPATHY, WITHOUT LONG-TERM CURRENT USE OF INSULIN (HCC): ICD-10-CM

## 2025-07-21 PROBLEM — N18.9 CKD (CHRONIC KIDNEY DISEASE): Status: RESOLVED | Noted: 2025-06-03 | Resolved: 2025-07-21

## 2025-07-21 LAB
SL AMB  POCT GLUCOSE, UA: NORMAL
SL AMB LEUKOCYTE ESTERASE,UA: NORMAL
SL AMB POCT BILIRUBIN,UA: NORMAL
SL AMB POCT BLOOD,UA: NORMAL
SL AMB POCT KETONES,UA: NORMAL
SL AMB POCT NITRITE,UA: POSITIVE
SL AMB POCT PH,UA: 8
SL AMB POCT SPECIFIC GRAVITY,UA: 1
SL AMB POCT URINE PROTEIN: NORMAL
SL AMB POCT UROBILINOGEN: 0.2

## 2025-07-21 PROCEDURE — 97530 THERAPEUTIC ACTIVITIES: CPT | Performed by: PHYSICAL THERAPIST

## 2025-07-21 PROCEDURE — 99204 OFFICE O/P NEW MOD 45 MIN: CPT | Performed by: STUDENT IN AN ORGANIZED HEALTH CARE EDUCATION/TRAINING PROGRAM

## 2025-07-21 PROCEDURE — 81002 URINALYSIS NONAUTO W/O SCOPE: CPT | Performed by: STUDENT IN AN ORGANIZED HEALTH CARE EDUCATION/TRAINING PROGRAM

## 2025-07-21 PROCEDURE — 97140 MANUAL THERAPY 1/> REGIONS: CPT | Performed by: PHYSICAL THERAPIST

## 2025-07-21 RX ORDER — LOSARTAN POTASSIUM 50 MG/1
50 TABLET ORAL DAILY
Qty: 90 TABLET | Refills: 3 | Status: SHIPPED | OUTPATIENT
Start: 2025-07-21

## 2025-07-21 RX ORDER — POTASSIUM CITRATE AND CITRIC ACID MONOHYDRATE 1100; 334 MG/5ML; MG/5ML
5 SOLUTION ORAL
Qty: 473 ML | Refills: 10 | Status: SHIPPED | OUTPATIENT
Start: 2025-07-21

## 2025-07-21 NOTE — ASSESSMENT & PLAN NOTE
Volume: Hypervolemic with lower extremity edema  Blood pressure: Normotensive, /64  Recommend:  Low-sodium diet  Currently taking atenolol/chlorthalidone  Losartan 25  Advised to maintain a good BP control to prevent CKD       Orders:    Ambulatory Referral to Nephrology    losartan (COZAAR) 50 mg tablet; Take 1 tablet (50 mg total) by mouth daily

## 2025-07-21 NOTE — ASSESSMENT & PLAN NOTE
Continue potassium citrate  Orders:    POCT urine dip    citric acid-potassium citrate (POLYCITRA K) 1,100-334 mg/5 mL solution; Take 5 mL by mouth in the morning and 5 mL at noon and 5 mL in the evening. Take with meals.    Basic metabolic panel; Future    Urinalysis with microscopic; Future

## 2025-07-21 NOTE — ASSESSMENT & PLAN NOTE
Lab Results   Component Value Date    HGBA1C 6.2 (H) 05/24/2025     HbA1c 6.2  Advised to maintain a good DM control to prevent progression of CKD   Maintain healthy diet (vegetables, fruits, whole grains, nonfat or low fat)  Weight loss  Physical activity (5 to 10 minutes to start the increase to 30 min a day)      Orders:    POCT urine dip

## 2025-07-21 NOTE — PROGRESS NOTES
Name: Deonte Greenfield      : 1950      MRN: [id number]  Encounter Provider: Joselyn Reyes Bahamonde, MD  Encounter Date: 2025   Encounter department: [facility] [place]  :  Assessment & Plan  Type 2 diabetes mellitus with diabetic polyneuropathy, without long-term current use of insulin (HCC)    Lab Results   Component Value Date    HGBA1C 6.2 (H) 2025     HbA1c 6.2  Advised to maintain a good DM control to prevent progression of CKD   Maintain healthy diet (vegetables, fruits, whole grains, nonfat or low fat)  Weight loss  Physical activity (5 to 10 minutes to start the increase to 30 min a day)      Orders:    POCT urine dip    Primary hypertension  Volume: Hypervolemic with lower extremity edema  Blood pressure: Normotensive, /64  Recommend:  Low-sodium diet  Currently taking atenolol/chlorthalidone  Losartan 25  Advised to maintain a good BP control to prevent CKD       Orders:    Ambulatory Referral to Nephrology    losartan (COZAAR) 50 mg tablet; Take 1 tablet (50 mg total) by mouth daily    Severe obesity (BMI 35.0-39.9) with comorbidity (HCC)  BMI 38.27  Recommend weight loss , heathy diet  Lifestyle modification           Hypocitraturia  Continue potassium citrate  Orders:    POCT urine dip    citric acid-potassium citrate (POLYCITRA K) 1,100-334 mg/5 mL solution; Take 5 mL by mouth in the morning and 5 mL at noon and 5 mL in the evening. Take with meals.    Basic metabolic panel; Future    Urinalysis with microscopic; Future    Leg edema  Lower extremity edema  Patient sleeps with his legs down  Wears compression socks  Taking chlorthalidone 25  Patient will call if worsening lower extremity edema, blisters, pain and will change to loop diuretics  Low-sodium diet           History of Present Illness   HPI  Deonte Greenfield is a 74 y.o. male with PMH of hypertension, obesity, anemia, constipation, gout, hypocitraturia complicated with nephrolithiasis, ICH who  presents initial evaluation of hypocitraturia  History obtained from: patient    Review of Systems   Constitutional:  Negative for activity change.   HENT:  Negative for congestion, dental problem and drooling.    Eyes:  Negative for discharge.   Respiratory:  Negative for apnea.    Cardiovascular:  Positive for leg swelling.   Gastrointestinal:  Negative for abdominal distention and abdominal pain.   Endocrine: Negative for cold intolerance.   Genitourinary:  Negative for enuresis and flank pain.   Skin:  Negative for color change and pallor.   Neurological:  Negative for dizziness.   Psychiatric/Behavioral:  Negative for agitation.      Pertinent Medical History            Medical History Reviewed by provider this encounter:     .  Past Medical History   Past Medical History[1]  Past Surgical History[2]  Family History[3]   reports that he has quit smoking. His smoking use included cigarettes. He has never used smokeless tobacco. He reports that he does not currently use alcohol. He reports that he does not use drugs.  Current Outpatient Medications   Medication Instructions    allopurinol (ZYLOPRIM) 300 mg, Oral, Daily    aspirin (ECOTRIN LOW STRENGTH) 81 mg, Oral, Daily    atenolol-chlorthalidone (TENORETIC) 50-25 mg per tablet 1 tablet, Oral, Daily    atorvastatin (LIPITOR) 10 mg, Oral, Daily at bedtime    citric acid-potassium citrate (POLYCITRA K) 1,100-334 mg/5 mL solution 5 mL, Oral, 3 times daily with meals    Diclofenac Sodium (VOLTAREN) 2 g, Topical, 4 times daily PRN    ferrous sulfate 325 mg, Oral, Every other day    Lancets (OneTouch Delica Plus [id number]) MISC     losartan (COZAAR) 50 mg, Oral, Daily    polyethylene glycol (MIRALAX) 17 g, Oral, Daily    senna-docusate sodium (SENOKOT S) 8.6-50 mg per tablet 1 tablet, Oral, Daily at bedtime   Allergies[4]   Medications Ordered Prior to Encounter[5]   Social History[6]     Objective   There were no vitals taken for this visit.     Physical  Exam  General:  no acute distress at this time  Skin:  No acute rash  Eyes:  No scleral icterus and noninjected  ENT:  mucous membranes moist  Neck:  no carotid bruits  Chest:  Clear to auscultation percussion, good respiratory effort, no use of accessory respiratory muscles  CVS:  Regular rate and rhythm without rub   Abdomen:  soft and nontender   Extremities: lower extremity edema  Neuro:  No gross focality  Psych:  Alert , cooperative              [1]   Past Medical History:  Diagnosis Date    Diabetes mellitus (HCC)     History of kidney stones 04/05/2025    Hypertension    [2]   Past Surgical History:  Procedure Laterality Date    KYLIE HOLE FOR SUBDURAL HEMATOMA Bilateral 5/25/2025    Procedure: KYLIE HOLES;  Surgeon: Issac Grady MD;  Location: [facility];  Service: Neurosurgery    TOTAL HIP ARTHROPLASTY Bilateral    [3] No family history on file.  [4] No Known Allergies  [5]   Current Outpatient Medications on File Prior to Visit   Medication Sig Dispense Refill    allopurinol (ZYLOPRIM) 300 mg tablet Take 1 tablet (300 mg total) by mouth in the morning.      aspirin (ECOTRIN LOW STRENGTH) 81 mg EC tablet Take 1 tablet (81 mg total) by mouth daily      atenolol-chlorthalidone (TENORETIC) 50-25 mg per tablet Take 1 tablet by mouth in the morning.      atorvastatin (LIPITOR) 10 mg tablet Take 1 tablet (10 mg total) by mouth daily at bedtime      citric acid-potassium citrate (POLYCITRA K) 1,100-334 mg/5 mL solution Take 5 mL by mouth in the morning and 5 mL at noon and 5 mL in the evening. Take with meals.      Diclofenac Sodium (VOLTAREN) 1 % Apply 2 g topically 4 (four) times a day as needed (for muscle cramps) 50 g 0    ferrous sulfate 325 (65 Fe) mg tablet Take 1 tablet (325 mg total) by mouth every other day      Lancets (OneTouch Delica Plus [id number]) MISC       losartan (COZAAR) 50 mg tablet Take 1 tablet (50 mg total) by mouth daily 30 tablet 0    polyethylene glycol (MIRALAX) 17 g packet Take  17 g by mouth daily for 14 days (Patient taking differently: Take 17 g by mouth every other day) 238 g 0    senna-docusate sodium (SENOKOT S) 8.6-50 mg per tablet Take 1 tablet by mouth daily at bedtime for 7 days 7 tablet 0     No current facility-administered medications on file prior to visit.   [6]   Social History  Tobacco Use    Smoking status: Former     Types: Cigarettes    Smokeless tobacco: Never   Vaping Use    Vaping status: Never Used   Substance and Sexual Activity    Alcohol use: Not Currently    Drug use: Never    Sexual activity: Not Currently

## 2025-07-21 NOTE — PATIENT INSTRUCTIONS
Thank you for coming to your visit today. As we discussed you kidney function is normal for your age, your creatinine is 0.75 and your kidney function is 90% (normal more than 60%).  Your electrolytes are normal. Please follow the recommendations below       Recommend low sodium (salt) food    Avoid nonsteroidal anti-inflammatory drugs such as Naprosyn, ibuprofen, Aleve, Advil, Celebrex, Meloxicam (Mobic) etc.  You can use Tylenol as needed if you do not have any liver condition to be concerned about    Try to avoid medications such as pantoprazole or  Protonix/Nexium or Esomeprazole)/Prilosec or omeprazole/Prevacid or lansoprazole/AcipHex or Rabeprazole.  If you are able to, use Pepcid as this is safer for your kidneys.    Try to exercise at least 30 minutes 3 days a week to begin with with an ultimate goal of 5 days a week for at least 30 minutes      Measures to reduce stone development:    Please Drink  oz of water or 2.5L-3 L a day, throughout the day  Avoid salt/low-salt diet   Try to decrease animal protein intake, dairy protein and vegetable base protein are better  Increase fruits and vegetables as much as possible  Avoid calcium products such as Tums or other types of calcium containing antacids, you can use Pepcid for indigestion (but you do not have to restrict your dietary calcium)  Avoid excessive vitamin D   Avoid excessive vitamin C  Try to avoid oxalate products (please refer to the diet sheet)  Limit fructose and sucrose type drinks such as coke      Next Visit in 12 months with results   If you need to see us earlier we can change the appointment for you      Joselyn Reyes Bahamonde, MD  Nephrology Attending

## 2025-07-21 NOTE — PROGRESS NOTES
PT Re-Evaluation     Today's date: 2025  Patient name: Deonte Greenfield  : 1950  MRN: [id number]  Referring provider: Martinez Mayer MD  Dx:   Encounter Diagnosis     ICD-10-CM    1. Generalized weakness  R53.1       2. Acute CVA (cerebrovascular accident) (HCC)  I63.9       3. Right hip pain  M25.551             Start Time: 1215  Stop Time: 1300  Total time in clinic (min): 45 minutes    Assessment  Impairments: abnormal gait, abnormal or restricted ROM, activity intolerance, impaired balance, impaired physical strength, lacks appropriate home exercise program, pain with function, weight-bearing intolerance, poor posture , poor body mechanics, participation limitations, activity limitations and endurance  Functional limitations: walking, sit-stands, bed mobility, LE dressing    Assessment details: Patient is a 74 y.o. male who presents to outpatient PT with reports of residual LE weakness and balance issues since recent fall and hospitalization in 2025. Patient presents with LE weakness that affects his stability and mechanics with transfers, walking, and step negotiation. Patient has attended PT for the past 1 month with good compliance to scheduled appointments. He reports slightly improved LE strength and balance overall. At this time, he ambulates most distances with rolling walker, however reports walking short household distances without assistive device but notices some unsteadiness with it. Please note that patient has recently been complaining of worsening Right hip pain that affects his tolerance with sitting and walking for prolonged periods of time. He frequently has to stand after prolonged sitting to relieve pain/pressure. Noted increased tenderness and tension along lateral Right hip and ITB region. Educated patient on activity modifications and use of ice/hot pack for conservative management of pain. Patient also given a recommendation for consulting orthopedic due to ongoing  pain that is limiting his function and tolerance with ADLs. Please note that history of Right hip pain and prior history of both hip replacements continue to affect his sitting tolerance and requires to stand frequently while sitting. His balance and strength deficits continue to put him at an increased falls risk, thus using rolling walker for patient's safety at this time. He lives with family, however has a 1st floor set-up . He has to navigate 2 steps to enter house with assistance of banister and family as needed. He is not driving currently. Patient will benefit from continued skilled PT services to further improve balance, improve functional LE strength, and improve overall safety with ADLs to reduce falls risk. Patient would benefit from skilled PT services to address these impairments and to maximize function.  Thank you for the referral.    Barriers to intervention: transportation and medical complexity  Understanding of Dx/Px/POC: good     Prognosis: good    Goals  Impairment Goals 4-6 weeks   In order to maximize function patient will be able to...   - Decrease intensity/duration/frequency of pain to 4/10. Ongoing  - Increase hip/LE strength to 4/5 throughout. Improved  - Demonstrate improved hip flexibility as demonstrated by increased ROM through therapeutic exercise. Improved    Functional Goals 6-8 weeks  In order to return to prior level of function patient will be able to...   - Participate in ADL's/IADL's/sport specific activities with no greater than 2/10 pain.  Ongoing  - Increase Functional Status Measure (FOTO) to: > predicted at discharge. Ongoing  - Demonstrate independence and compliant with HEP. Improved  - Demonstrate a squat and or sit to stand with good mechanics and eccentric control without pain/difficulty/compensation. Improved  - Demonstrate functional activities with good core and glute strength without compensation/pain/difficulty . Improved  - Ascend and descend stairs without  increased pain/compensation/difficulty and a reciprocal gait pattern. Ongoing  - Patient will be able to demonstrate good gait mechanics without compensations. Ongoing    Plan  Patient would benefit from: skilled PT  Planned modality interventions: cryotherapy, electrical stimulation/Russian stimulation, low level laser therapy and traction  Other planned modality interventions: moist heat    Planned therapy interventions: joint mobilization, manual therapy, neuromuscular re-education, patient education, strengthening, stretching, therapeutic activities, therapeutic exercise, home exercise program, functional ROM exercises, postural training, balance/weight bearing training, body mechanics training, flexibility, IASTM, kinesiology taping, Villalobos taping, massage, nerve gliding, transfer training and gait training    Frequency: 1-2x week  Duration in weeks: 4  Treatment plan discussed with: patient, PTA and referring physician        Subjective Evaluation    History of Present Illness  Mechanism of injury: Deonte Greenfield is a 74 y.o. year-old male who presents with reports of residual muscle weakness and balance issues since he had a fall in April. He presents for PT IE accompanied by his son Sarath, who helped related part of HPI. Patient had a fall in April and does not recall how he fell. He reports he was found unconscious by his son having hit his head. Went to the ER and then again in May and had MRI of brain that noted multiple brain bleeds. Patient had 4 tubes for drainage, and was in hospital for a while. He received inpatient PT for about 1 week and was discharged last week. He has been home for the past 1 week. He reports some residual weakness and balance issues. He uses rolling walker for most ambulation, but occasionally uses cane in the house. Prior to fall he was walking most distances with cane. Per his son, patient was told not to drive for 6 months since fall, thus needs family for assistance with  transportation. He was recommended for outpatient PT at this time.   Quality of life: good    Patient Goals  Patient goals for therapy: decreased pain, increased motion, improved balance, increased strength, independence with ADLs/IADLs and return to sport/leisure activities    Pain  Current pain ratin  At best pain ratin  At worst pain ratin  Location: Right hip, low back  Quality: dull ache  Aggravating factors: sitting, standing, stair climbing, walking and lifting    Social Support  Steps to enter house: yes  2  Stairs in house: no   Lives in: multiple-level home (has 1st floor set-up)  Lives with: spouse and adult children    Employment status: not working (retired; [employer])    Diagnostic Tests  MRI studies: abnormal  Treatments  Current treatment: physical therapy  Discharged from (in last 30 days): inpatient hospitalization        Objective     Strength/Myotome Testing     Left Hip   Planes of Motion   Flexion: 4  Extension: 3-  Abduction: 4-  External rotation: 4-    Right Hip   Planes of Motion   Flexion: 4  Extension: 3-  Abduction: 4-  External rotation: 4-    Left Knee   Flexion: 4-  Extension: 4-    Right Knee   Flexion: 4-  Extension: 4-  Neuro Exam:     Transfers   Sit to stand: independent   Wheelchair to mat: minimum assist (close supervision)   Mat to wheelchair: minimum assist (close supervision)   Sit to supine: minimum assist   Supine to sit: minimum assist     Functional outcomes   5x sit to stand: 14 with 2 UE support (seconds)  TU without assistive device (seconds)  Functional outcome comment: Close supervision on testing above; needs cues on form and safety  Functional outcome gait comment: With rolling walker: forward hunched/guarded UE posture, shortened step length, slow/cautious gait pattern, needs cuing for navigation in PT clinic              POC Expires Auth Status Start Date Expiration Date PT Visit Limit    2025    BOMN   Date 7/10/2025 7/15/2025 2025  "7/21/2025 7/7/2025   Used 6 7 8 9 5   Remaining           Diagnosis: gait dysfunction, balance issues s/p fall in April 2025   Precautions: DM2, HTN, hx of subdural hematoma; hx of THR bilat, hx of multiple L/S surgeries   Next Physician's Appt:   [facility] HEP:   Manuals 7/10 7/15 7/17 7/21 7/7   LE stretching        IASTM   TH: R ITB DK, Right ITB    STM    DK Right ITB            Neuro Re-Ed        Foam balance NBOS 30\"x2 NBOS 30\"x2 NBOS 30\"x2     SLS        Tandem amb   Balance 30\"x2 ea     Wobble Board 2 mins f/b: 1 min S/s 2 mins f/b; 1 min S/S 2 mins f/b: 1 min s/s  2 mins f/b only   Quad sets -> supine SLR     Quad sets 2-3\" 2x10 ea   Glute sets -> bridges        Hip ADD ball squeeze 20x3\" 20x3\"   20x3\"   BKFO / clams BKFO: GTB 20x BKFO: GTB 20x BKFO: GTB 20x  BKFO: GTB 20x ea   LAQ      Seated 1# 2x10 ea   March  H/L 10x2 B/L               Ther Ex        HS stretch        LTRs 10x ea 10x ea  10x ea  P! Right knee; w/ ball squeeze x10 ea   DKTC w/ ball        HR / TR HR 20x HR 20x HR 20x  HR/TR x20 ea   Side-stepping  OTB 3x at table 3x at mirror   3x at mirror   Standing hip ABD, ER        Hamstring stretch   W/strap 10x10\" ea     GINNY stretch   10\"x2 on RLE P!      Ther Activity             NuStep/Recum bike for LE mobility  NuStep 7 mins Lv 4  NuStep 6 mins Lv4  NuStep 8 mins Lv3 NuStep not performed p!  NuStep 6 mins Lv3   Mini Squat        STS        Leg Press        Stepping over obstables                        Pt Ed    POC, ice vs heat, ortho appointment Ice pack   Re-Evaluation       DK     Modalities                                               "

## 2025-07-23 ENCOUNTER — ANESTHESIA EVENT (OUTPATIENT)
Dept: GASTROENTEROLOGY | Facility: HOSPITAL | Age: 75
End: 2025-07-23
Payer: COMMERCIAL

## 2025-07-23 ENCOUNTER — ANESTHESIA (OUTPATIENT)
Dept: GASTROENTEROLOGY | Facility: HOSPITAL | Age: 75
End: 2025-07-23
Payer: COMMERCIAL

## 2025-07-23 ENCOUNTER — HOSPITAL ENCOUNTER (OUTPATIENT)
Dept: GASTROENTEROLOGY | Facility: HOSPITAL | Age: 75
Setting detail: OUTPATIENT SURGERY
Discharge: HOME | End: 2025-07-23
Attending: PHYSICIAN ASSISTANT
Payer: COMMERCIAL

## 2025-07-23 VITALS
BODY MASS INDEX: 38.32 KG/M2 | SYSTOLIC BLOOD PRESSURE: 162 MMHG | WEIGHT: 230 LBS | HEART RATE: 68 BPM | HEIGHT: 65 IN | DIASTOLIC BLOOD PRESSURE: 77 MMHG | TEMPERATURE: 97.1 F | OXYGEN SATURATION: 98 % | RESPIRATION RATE: 18 BRPM

## 2025-07-23 DIAGNOSIS — K86.9 PANCREATIC LESION: ICD-10-CM

## 2025-07-23 LAB — GLUCOSE SERPL-MCNC: 128 MG/DL (ref 65–140)

## 2025-07-23 PROCEDURE — 82150 ASSAY OF AMYLASE: CPT | Performed by: INTERNAL MEDICINE

## 2025-07-23 PROCEDURE — 43242 EGD US FINE NEEDLE BX/ASPIR: CPT | Performed by: INTERNAL MEDICINE

## 2025-07-23 PROCEDURE — 82378 CARCINOEMBRYONIC ANTIGEN: CPT | Performed by: INTERNAL MEDICINE

## 2025-07-23 PROCEDURE — C1889 IMPLANT/INSERT DEVICE, NOC: HCPCS

## 2025-07-23 PROCEDURE — 82948 REAGENT STRIP/BLOOD GLUCOSE: CPT

## 2025-07-23 RX ORDER — ROCURONIUM BROMIDE 10 MG/ML
INJECTION, SOLUTION INTRAVENOUS AS NEEDED
Status: DISCONTINUED | OUTPATIENT
Start: 2025-07-23 | End: 2025-07-23

## 2025-07-23 RX ORDER — EPHEDRINE SULFATE 50 MG/ML
INJECTION INTRAVENOUS AS NEEDED
Status: DISCONTINUED | OUTPATIENT
Start: 2025-07-23 | End: 2025-07-23

## 2025-07-23 RX ORDER — ONDANSETRON 2 MG/ML
INJECTION INTRAMUSCULAR; INTRAVENOUS AS NEEDED
Status: DISCONTINUED | OUTPATIENT
Start: 2025-07-23 | End: 2025-07-23

## 2025-07-23 RX ORDER — LIDOCAINE HYDROCHLORIDE 20 MG/ML
INJECTION, SOLUTION EPIDURAL; INFILTRATION; INTRACAUDAL; PERINEURAL AS NEEDED
Status: DISCONTINUED | OUTPATIENT
Start: 2025-07-23 | End: 2025-07-23

## 2025-07-23 RX ORDER — PROPOFOL 10 MG/ML
INJECTION, EMULSION INTRAVENOUS AS NEEDED
Status: DISCONTINUED | OUTPATIENT
Start: 2025-07-23 | End: 2025-07-23

## 2025-07-23 RX ORDER — SODIUM CHLORIDE 9 MG/ML
INJECTION, SOLUTION INTRAVENOUS CONTINUOUS PRN
Status: DISCONTINUED | OUTPATIENT
Start: 2025-07-23 | End: 2025-07-23

## 2025-07-23 RX ADMIN — ROCURONIUM BROMIDE 30 MG: 10 INJECTION, SOLUTION INTRAVENOUS at 08:24

## 2025-07-23 RX ADMIN — SUGAMMADEX 200 MG: 100 INJECTION, SOLUTION INTRAVENOUS at 08:44

## 2025-07-23 RX ADMIN — PHENYLEPHRINE HYDROCHLORIDE 100 MCG: 50 INJECTION INTRAVENOUS at 08:30

## 2025-07-23 RX ADMIN — PROPOFOL 100 MG: 10 INJECTION, EMULSION INTRAVENOUS at 08:24

## 2025-07-23 RX ADMIN — PHENYLEPHRINE HYDROCHLORIDE 100 MCG: 50 INJECTION INTRAVENOUS at 08:39

## 2025-07-23 RX ADMIN — PROPOFOL 50 MG: 10 INJECTION, EMULSION INTRAVENOUS at 08:17

## 2025-07-23 RX ADMIN — PHENYLEPHRINE HYDROCHLORIDE 100 MCG: 50 INJECTION INTRAVENOUS at 08:42

## 2025-07-23 RX ADMIN — EPHEDRINE SULFATE 10 MG: 50 INJECTION, SOLUTION INTRAVENOUS at 08:43

## 2025-07-23 RX ADMIN — PROPOFOL 50 MG: 10 INJECTION, EMULSION INTRAVENOUS at 08:18

## 2025-07-23 RX ADMIN — LIDOCAINE HYDROCHLORIDE 100 MG: 20 INJECTION, SOLUTION EPIDURAL; INFILTRATION; INTRACAUDAL at 08:16

## 2025-07-23 RX ADMIN — PROPOFOL 50 MG: 10 INJECTION, EMULSION INTRAVENOUS at 08:20

## 2025-07-23 RX ADMIN — SODIUM CHLORIDE: 0.9 INJECTION, SOLUTION INTRAVENOUS at 07:45

## 2025-07-23 RX ADMIN — PROPOFOL 50 MG: 10 INJECTION, EMULSION INTRAVENOUS at 08:16

## 2025-07-23 RX ADMIN — PHENYLEPHRINE HYDROCHLORIDE 100 MCG: 50 INJECTION INTRAVENOUS at 08:24

## 2025-07-23 RX ADMIN — ONDANSETRON 4 MG: 2 INJECTION, SOLUTION INTRAMUSCULAR; INTRAVENOUS at 08:39

## 2025-07-23 RX ADMIN — PHENYLEPHRINE HYDROCHLORIDE 100 MCG: 50 INJECTION INTRAVENOUS at 08:35

## 2025-07-23 NOTE — ANESTHESIA POSTPROCEDURE EVALUATION
Post-Op Assessment Note    CV Status:  Stable    Pain management: adequate       Mental Status:  Alert and awake   Hydration Status:  Euvolemic   PONV Controlled:  Controlled   Airway Patency:  Patent     Post Op Vitals Reviewed: Yes    No anethesia notable event occurred.    Staff: CRNA, Anesthesiologist           Last Filed PACU Vitals:  Vitals Value Taken Time   Temp 97.1 °F (36.2 °C) 07/23/25 08:52   Pulse 71 07/23/25 08:52   /71 07/23/25 08:52   Resp 17 07/23/25 08:52   SpO2 99 % 07/23/25 08:52       Modified Yeison:     Vitals Value Taken Time   Activity 2 07/23/25 08:52   Respiration 2 07/23/25 08:52   Circulation 2 07/23/25 08:52   Consciousness 1 07/23/25 08:52   Oxygen Saturation 2 07/23/25 08:52     Modified Yeison Score: 9

## 2025-07-23 NOTE — ANESTHESIA PREPROCEDURE EVALUATION
Procedure:  ENDOSCOPIC ULTRASOUND (UPPER)    Relevant Problems   ANESTHESIA (within normal limits)      CARDIO   (+) Bradycardia, sinus   (+) HTN (hypertension)   (+) Mixed hyperlipidemia   (+) PVD (peripheral vascular disease) (HCC)      ENDO (within normal limits)      GI/HEPATIC   (+) Pancreatic cyst      /RENAL (within normal limits)      GYN (within normal limits)      HEMATOLOGY   (+) Normocytic anemia   (+) Thrombocytopenia (HCC)      MUSCULOSKELETAL   (+) Gout   (+) Osteoarthritis      NEURO/PSYCH  Hx of CVA, had chronic subdural hematomas drained, neurosurgery no longer following with imaging, at baseline today   (+) Acute CVA (cerebrovascular accident) (HCC)   (+) Acute on chronic intracranial subdural hematoma (HCC)      PULMONARY   (+) POPPY (obstructive sleep apnea)        Physical Exam    Airway     Mallampati score: II  TM Distance: >3 FB  Neck ROM: full      Cardiovascular  Cardiovascular exam normal    Dental   No notable dental hx     Pulmonary  Pulmonary exam normal     Neurological    He appears awake, alert and oriented x3.  Comments: At baseline      Other Findings        Anesthesia Plan  ASA Score- 3     Anesthesia Type- IV sedation with anesthesia with ASA Monitors.         Additional Monitors:     Airway Plan: natural airway.    Comment: Spoke extensively with patient and his family member. He has a history of strokes with a recent Yael hole procedure to drain chronic hematomas. He has seen his neurosurgeon recently who is no longer requiring follow up CT scans. He is currently at his neurological baseline. Hew has no major neurologic deficits on my exam. Strength is 5/5 in arms. He has arthritis in his knees and hips that limit his proximal leg movement, but plantar and dorsiflexon are 5/5. Sensation intact throughout. A&O x3. Patient overall apepars at baseline. This procedure is somewhat urgent given the patient needs a biopsy of his pancreatic mass for concern for cancer. I expressed  that I am comfortable proceeding with the procedure but he is at higher risk for another stroke and cardiac event. The patient and his family verbalized understanding of the risks and desired to continue with the anesthetic plan. .       Plan Factors-Exercise tolerance (METS): <4 METS.    Chart reviewed. EKG reviewed. Imaging results reviewed. Existing labs reviewed. Patient summary reviewed.    Patient is not a current smoker.  Patient did not smoke on day of surgery.    Obstructive sleep apnea risk education given perioperatively.        Induction- intravenous.    Postoperative Plan- .   Monitoring Plan - Monitoring plan - standard ASA monitoring  Post Operative Pain Plan - non-opiod analgesics    Perioperative Resuscitation Plan - Level 1 - Full Code.       Informed Consent- Anesthetic plan and risks discussed with patient and spouse.  I personally reviewed this patient with the CRNA. Discussed and agreed on the Anesthesia Plan with the CRNA..      NPO Status:  Vitals Value Taken Time   Date of last liquid 07/22/25 07/23/25 07:22   Time of last liquid 2330 07/23/25 07:22   Date of last solid 07/22/25 07/23/25 07:22   Time of last solid 2330 07/23/25 07:22

## 2025-07-24 ENCOUNTER — OFFICE VISIT (OUTPATIENT)
Dept: PHYSICAL THERAPY | Facility: CLINIC | Age: 75
End: 2025-07-24
Payer: COMMERCIAL

## 2025-07-24 DIAGNOSIS — M25.551 RIGHT HIP PAIN: ICD-10-CM

## 2025-07-24 DIAGNOSIS — I63.9 ACUTE CVA (CEREBROVASCULAR ACCIDENT) (HCC): ICD-10-CM

## 2025-07-24 DIAGNOSIS — R53.1 GENERALIZED WEAKNESS: Primary | ICD-10-CM

## 2025-07-24 PROCEDURE — 97112 NEUROMUSCULAR REEDUCATION: CPT

## 2025-07-24 PROCEDURE — 97110 THERAPEUTIC EXERCISES: CPT

## 2025-07-24 NOTE — ANESTHESIA POSTPROCEDURE EVALUATION
Post-Op Assessment Note    CV Status:  Stable  Pain Score: 0    Pain management: adequate       Mental Status:  Alert and awake   Hydration Status:  Euvolemic   PONV Controlled:  Controlled   Airway Patency:  Patent     Post Op Vitals Reviewed: Yes    No anethesia notable event occurred.    Staff: CRNA, Anesthesiologist           Last Filed PACU Vitals:  Vitals Value Taken Time   Temp 97.1 °F (36.2 °C) 07/23/25 08:52   Pulse 71 07/23/25 08:52   /71 07/23/25 08:52   Resp 17 07/23/25 08:52   SpO2 99 % 07/23/25 08:52     Neuro exam stable from pre op. Extremities 5/5 strength with sensation throughout. No cranial deficits. Patient walked prior to discharge.     Modified Yeison:     Vitals Value Taken Time   Activity 2 07/23/25 09:17   Respiration 2 07/23/25 09:17   Circulation 2 07/23/25 09:17   Consciousness 2 07/23/25 09:17   Oxygen Saturation 2 07/23/25 09:17     Modified Yeison Score: 10

## 2025-07-24 NOTE — PROGRESS NOTES
"Daily Note     Today's date: 2025  Patient name: Deonte Greenfield  : 1950  MRN: [id number]  Referring provider: Martinez Mayer MD  Dx:   Encounter Diagnosis     ICD-10-CM    1. Generalized weakness  R53.1       2. Acute CVA (cerebrovascular accident) (HCC)  I63.9       3. Right hip pain  M25.551                      Subjective: Pt states that his hip isn't as bad as last visit however it is still aggravated.        Objective: See treatment diary below      Assessment: Tolerated treatment well.  Progressed pt with standing balance activities as indicated.  Pt requires 1 UE support for tandem ambulation, however tandem balance he was able to sustain for 30 secs w/o UE support.  Pt was unable to perform H/L march due to increased hip pain.  No other complaints with supine strengthening.  Pt will benefit from continued therapy.  Will progress as able.      Plan: Continue per plan of care.         POC Expires Auth Status Start Date Expiration Date PT Visit Limit    2025    BOMN   Date 7/10/2025 7/15/2025 2025 2025 2025   Used 6 7 8 9 10   Remaining           Diagnosis: gait dysfunction, balance issues s/p fall in 2025   Precautions: DM2, HTN, hx of subdural hematoma; hx of THR bilat, hx of multiple L/S surgeries   Next Physician's Appt:   [employer] HEP:   Manuals 7/10 7/15 7/17 7/21 7/24   LE stretching        IAS   TH: R ITB DK, Right ITB    STM    DK Right ITB            Neuro Re-Ed        Foam balance NBOS 30\"x2 NBOS 30\"x2 NBOS 30\"x2  NBOS 30\"x2   SLS        Tandem amb   Balance 30\"x2 ea  Ambul 2x mirror   Tandem balance     30\"x2 ea   Wobble Board 2 mins f/b: 1 min S/s 2 mins f/b; 1 min S/S 2 mins f/b: 1 min s/s     Quad sets -> supine SLR        Glute sets -> bridges        Hip ADD ball squeeze 20x3\" 20x3\"      BKFO / clams BKFO: GTB 20x BKFO: GTB 20x BKFO: GTB 20x  BKFO: GTB 20x   LAQ         March  H/L 10x2 B/L               Ther Ex        HS stretch        LTRs 10x ea " "10x ea  10x ea  10x ea   DKTC w/ ball        HR / TR HR 20x HR 20x HR 20x  HR 20x   Side-stepping  OTB 3x at table 3x at mirror   3x at mirror   Standing hip ABD, ER        Hamstring stretch   W/strap 10x10\" ea     GINNY stretch   10\"x2 on RLE P!      Ther Activity             NuStep/Recum bike for LE mobility  NuStep 7 mins Lv 4  NuStep 6 mins Lv4  NuStep 8 mins Lv3 NuStep not performed p!  NuStep 6 mins Lv4   Mini Squat        STS        Leg Press        Stepping over obstables     Hurdles 2x ea                   Pt Ed    POC, ice vs heat, ortho appointment    Re-Evaluation       DK     Modalities                                               "

## 2025-07-29 ENCOUNTER — OFFICE VISIT (OUTPATIENT)
Dept: PHYSICAL THERAPY | Facility: CLINIC | Age: 75
End: 2025-07-29
Payer: COMMERCIAL

## 2025-07-29 DIAGNOSIS — M25.551 RIGHT HIP PAIN: ICD-10-CM

## 2025-07-29 DIAGNOSIS — I63.9 ACUTE CVA (CEREBROVASCULAR ACCIDENT) (HCC): ICD-10-CM

## 2025-07-29 DIAGNOSIS — R53.1 GENERALIZED WEAKNESS: Primary | ICD-10-CM

## 2025-07-29 PROCEDURE — 97110 THERAPEUTIC EXERCISES: CPT

## 2025-07-29 PROCEDURE — 97112 NEUROMUSCULAR REEDUCATION: CPT

## 2025-07-30 LAB — MISCELLANEOUS LAB TEST RESULT: NORMAL

## 2025-07-31 ENCOUNTER — APPOINTMENT (OUTPATIENT)
Dept: PHYSICAL THERAPY | Facility: CLINIC | Age: 75
End: 2025-07-31
Payer: COMMERCIAL

## 2025-07-31 LAB — MISCELLANEOUS LAB TEST RESULT: NORMAL

## 2025-08-01 ENCOUNTER — CONSULT (OUTPATIENT)
Dept: SURGICAL ONCOLOGY | Facility: CLINIC | Age: 75
End: 2025-08-01
Attending: PHYSICIAN ASSISTANT
Payer: COMMERCIAL

## 2025-08-01 ENCOUNTER — OFFICE VISIT (OUTPATIENT)
Dept: PHYSICAL THERAPY | Facility: CLINIC | Age: 75
End: 2025-08-01
Payer: COMMERCIAL

## 2025-08-01 VITALS
DIASTOLIC BLOOD PRESSURE: 68 MMHG | RESPIRATION RATE: 18 BRPM | WEIGHT: 233.6 LBS | HEART RATE: 66 BPM | OXYGEN SATURATION: 97 % | SYSTOLIC BLOOD PRESSURE: 110 MMHG | BODY MASS INDEX: 38.92 KG/M2 | HEIGHT: 65 IN | TEMPERATURE: 97.6 F

## 2025-08-01 DIAGNOSIS — K86.9 PANCREATIC LESION: ICD-10-CM

## 2025-08-01 DIAGNOSIS — R53.1 GENERALIZED WEAKNESS: Primary | ICD-10-CM

## 2025-08-01 DIAGNOSIS — M25.551 RIGHT HIP PAIN: ICD-10-CM

## 2025-08-01 DIAGNOSIS — I63.9 ACUTE CVA (CEREBROVASCULAR ACCIDENT) (HCC): ICD-10-CM

## 2025-08-01 DIAGNOSIS — K86.2 PANCREATIC CYST: Primary | ICD-10-CM

## 2025-08-01 PROCEDURE — 97530 THERAPEUTIC ACTIVITIES: CPT | Performed by: PHYSICAL THERAPIST

## 2025-08-01 PROCEDURE — 99204 OFFICE O/P NEW MOD 45 MIN: CPT | Performed by: SURGERY

## 2025-08-01 PROCEDURE — 97112 NEUROMUSCULAR REEDUCATION: CPT | Performed by: PHYSICAL THERAPIST

## 2025-08-01 PROCEDURE — 97110 THERAPEUTIC EXERCISES: CPT | Performed by: PHYSICAL THERAPIST

## 2025-08-05 ENCOUNTER — OFFICE VISIT (OUTPATIENT)
Dept: PHYSICAL THERAPY | Facility: CLINIC | Age: 75
End: 2025-08-05
Payer: COMMERCIAL

## 2025-08-05 DIAGNOSIS — R53.1 GENERALIZED WEAKNESS: Primary | ICD-10-CM

## 2025-08-05 DIAGNOSIS — I63.9 ACUTE CVA (CEREBROVASCULAR ACCIDENT) (HCC): ICD-10-CM

## 2025-08-05 DIAGNOSIS — M25.551 RIGHT HIP PAIN: ICD-10-CM

## 2025-08-05 PROCEDURE — 97530 THERAPEUTIC ACTIVITIES: CPT | Performed by: PHYSICAL THERAPIST

## 2025-08-05 PROCEDURE — 97110 THERAPEUTIC EXERCISES: CPT | Performed by: PHYSICAL THERAPIST

## 2025-08-05 PROCEDURE — 97112 NEUROMUSCULAR REEDUCATION: CPT | Performed by: PHYSICAL THERAPIST

## 2025-08-06 ENCOUNTER — APPOINTMENT (OUTPATIENT)
Dept: RADIOLOGY | Facility: OTHER | Age: 75
End: 2025-08-06
Attending: ORTHOPAEDIC SURGERY
Payer: COMMERCIAL

## 2025-08-06 VITALS — HEIGHT: 65 IN | BODY MASS INDEX: 38.82 KG/M2 | WEIGHT: 233 LBS

## 2025-08-06 DIAGNOSIS — Z01.89 ENCOUNTER FOR LOWER EXTREMITY COMPARISON IMAGING STUDY: ICD-10-CM

## 2025-08-06 DIAGNOSIS — M17.0 BILATERAL PRIMARY OSTEOARTHRITIS OF KNEE: ICD-10-CM

## 2025-08-06 DIAGNOSIS — M25.561 RIGHT KNEE PAIN, UNSPECIFIED CHRONICITY: Primary | ICD-10-CM

## 2025-08-06 PROCEDURE — 99203 OFFICE O/P NEW LOW 30 MIN: CPT | Performed by: ORTHOPAEDIC SURGERY

## 2025-08-07 ENCOUNTER — OFFICE VISIT (OUTPATIENT)
Dept: PHYSICAL THERAPY | Facility: CLINIC | Age: 75
End: 2025-08-07
Payer: COMMERCIAL

## 2025-08-07 DIAGNOSIS — I63.9 ACUTE CVA (CEREBROVASCULAR ACCIDENT) (HCC): ICD-10-CM

## 2025-08-07 DIAGNOSIS — R53.1 GENERALIZED WEAKNESS: Primary | ICD-10-CM

## 2025-08-07 DIAGNOSIS — M25.551 RIGHT HIP PAIN: ICD-10-CM

## 2025-08-07 PROCEDURE — 97110 THERAPEUTIC EXERCISES: CPT

## 2025-08-07 PROCEDURE — 97530 THERAPEUTIC ACTIVITIES: CPT

## 2025-08-07 PROCEDURE — 97112 NEUROMUSCULAR REEDUCATION: CPT

## 2025-08-12 ENCOUNTER — OFFICE VISIT (OUTPATIENT)
Dept: PHYSICAL THERAPY | Facility: CLINIC | Age: 75
End: 2025-08-12
Payer: COMMERCIAL

## 2025-08-14 ENCOUNTER — OFFICE VISIT (OUTPATIENT)
Dept: PHYSICAL THERAPY | Facility: CLINIC | Age: 75
End: 2025-08-14
Payer: COMMERCIAL

## 2025-08-19 ENCOUNTER — OFFICE VISIT (OUTPATIENT)
Dept: PHYSICAL THERAPY | Facility: CLINIC | Age: 75
End: 2025-08-19
Payer: COMMERCIAL

## 2025-08-19 DIAGNOSIS — I63.9 ACUTE CVA (CEREBROVASCULAR ACCIDENT) (HCC): ICD-10-CM

## 2025-08-19 DIAGNOSIS — M25.551 RIGHT HIP PAIN: ICD-10-CM

## 2025-08-19 DIAGNOSIS — R53.1 GENERALIZED WEAKNESS: Primary | ICD-10-CM

## 2025-08-19 PROCEDURE — 97112 NEUROMUSCULAR REEDUCATION: CPT

## 2025-08-19 PROCEDURE — 97110 THERAPEUTIC EXERCISES: CPT

## 2025-08-21 ENCOUNTER — EVALUATION (OUTPATIENT)
Dept: PHYSICAL THERAPY | Facility: CLINIC | Age: 75
End: 2025-08-21
Payer: COMMERCIAL

## 2025-08-21 DIAGNOSIS — I63.9 ACUTE CVA (CEREBROVASCULAR ACCIDENT) (HCC): ICD-10-CM

## 2025-08-21 DIAGNOSIS — M25.551 RIGHT HIP PAIN: ICD-10-CM

## 2025-08-21 DIAGNOSIS — R53.1 GENERALIZED WEAKNESS: Primary | ICD-10-CM

## 2025-08-21 PROCEDURE — 97110 THERAPEUTIC EXERCISES: CPT | Performed by: PHYSICAL THERAPIST

## 2025-08-21 PROCEDURE — 97530 THERAPEUTIC ACTIVITIES: CPT | Performed by: PHYSICAL THERAPIST

## 2025-08-21 PROCEDURE — 97140 MANUAL THERAPY 1/> REGIONS: CPT | Performed by: PHYSICAL THERAPIST

## (undated) DEVICE — REM POLYHESIVE ADULT PATIENT RETURN ELECTRODE: Brand: VALLEYLAB

## (undated) DEVICE — ICP CATH BACTISEAL EVD LRG 1.9MM X 35CM

## (undated) DEVICE — ANTIBACTERIAL VIOLET BRAIDED (POLYGLACTIN 910), SYNTHETIC ABSORBABLE SUTURE: Brand: COATED VICRYL

## (undated) DEVICE — GLOVE INDICATOR PI UNDERGLOVE SZ 8 BLUE

## (undated) DEVICE — PREP SURGICAL PURPREP 26ML

## (undated) DEVICE — LIGHT HANDLE COVER SLEEVE DISP BLUE STELLAR

## (undated) DEVICE — TOOL MR8-14MH30 MR8 14CM MATCH 3MM: Brand: MIDAS REX MR8

## (undated) DEVICE — Device: Brand: IQ SYSTEM

## (undated) DEVICE — TELFA NON-ADHERENT ABSORBENT DRESSING: Brand: TELFA

## (undated) DEVICE — SKN PRP WNG SPNGE PVP SCRB STR: Brand: MEDLINE INDUSTRIES, INC.

## (undated) DEVICE — SUT SILK 2-0 18 IN A185H

## (undated) DEVICE — SUT ETHILON 3-0 FS-1 18 IN 663G

## (undated) DEVICE — INTENDED FOR TISSUE SEPARATION, AND OTHER PROCEDURES THAT REQUIRE A SHARP SURGICAL BLADE TO PUNCTURE OR CUT.: Brand: BARD-PARKER SAFETY BLADES SIZE 15, STERILE

## (undated) DEVICE — PACK BURR HOLE PBDS RF

## (undated) DEVICE — SURGIFOAM 8.5 X 12.5

## (undated) DEVICE — IMPLANTABLE DEVICE
Type: IMPLANTABLE DEVICE | Status: NON-FUNCTIONAL
Brand: THINFLAP SYSTEM

## (undated) DEVICE — GLOVE SRG BIOGEL ECLIPSE 8

## (undated) DEVICE — DRAPE INTESTINAL ISOLATION BAG

## (undated) DEVICE — SLIM BODY SKIN STAPLER: Brand: APPOSE ULC

## (undated) DEVICE — DRAPE SHEET X-LG

## (undated) DEVICE — DRAPE CRANI

## (undated) DEVICE — PERFORATOR CRANIAL 14MM

## (undated) DEVICE — TUBING SUCTION 5MM X 12 FT

## (undated) DEVICE — 3M™ IOBAN™ 2 ANTIMICROBIAL INCISE DRAPE 6650EZ: Brand: IOBAN™ 2

## (undated) DEVICE — INTENDED FOR TISSUE SEPARATION, AND OTHER PROCEDURES THAT REQUIRE A SHARP SURGICAL BLADE TO PUNCTURE OR CUT.: Brand: BARD-PARKER ® CARBON RIB-BACK BLADES